# Patient Record
Sex: MALE | Race: WHITE | NOT HISPANIC OR LATINO | Employment: OTHER | URBAN - METROPOLITAN AREA
[De-identification: names, ages, dates, MRNs, and addresses within clinical notes are randomized per-mention and may not be internally consistent; named-entity substitution may affect disease eponyms.]

---

## 2017-01-01 ENCOUNTER — PATIENT MESSAGE (OUTPATIENT)
Dept: HEMATOLOGY/ONCOLOGY | Facility: CLINIC | Age: 62
End: 2017-01-01

## 2017-01-01 ENCOUNTER — ANESTHESIA EVENT (OUTPATIENT)
Dept: SURGERY | Facility: HOSPITAL | Age: 62
DRG: 654 | End: 2017-01-01
Payer: COMMERCIAL

## 2017-01-01 ENCOUNTER — HOSPITAL ENCOUNTER (OUTPATIENT)
Dept: RADIOLOGY | Facility: HOSPITAL | Age: 62
Discharge: HOME | End: 2017-05-19
Attending: NURSE PRACTITIONER
Payer: COMMERCIAL

## 2017-01-01 ENCOUNTER — PATIENT MESSAGE (OUTPATIENT)
Dept: WOUND CARE | Facility: CLINIC | Age: 62
End: 2017-01-01

## 2017-01-01 ENCOUNTER — OFFICE VISIT (OUTPATIENT)
Dept: HEMATOLOGY/ONCOLOGY | Facility: CLINIC | Age: 62
End: 2017-01-01
Payer: COMMERCIAL

## 2017-01-01 ENCOUNTER — INFUSION (OUTPATIENT)
Dept: INFUSION THERAPY | Facility: HOSPITAL | Age: 62
End: 2017-01-01
Attending: INTERNAL MEDICINE
Payer: COMMERCIAL

## 2017-01-01 ENCOUNTER — HOSPITAL ENCOUNTER (OUTPATIENT)
Facility: HOSPITAL | Age: 62
Discharge: HOME | End: 2017-03-21
Attending: RADIOLOGY | Admitting: RADIOLOGY
Payer: COMMERCIAL

## 2017-01-01 ENCOUNTER — LAB VISIT (OUTPATIENT)
Dept: LAB | Facility: HOSPITAL | Age: 62
End: 2017-01-01
Attending: UROLOGY
Payer: COMMERCIAL

## 2017-01-01 ENCOUNTER — TELEPHONE (OUTPATIENT)
Dept: UROLOGY | Facility: CLINIC | Age: 62
End: 2017-01-01

## 2017-01-01 ENCOUNTER — TELEPHONE (OUTPATIENT)
Dept: ADMINISTRATIVE | Facility: OTHER | Age: 62
End: 2017-01-01

## 2017-01-01 ENCOUNTER — HOSPITAL ENCOUNTER (OUTPATIENT)
Dept: RADIOLOGY | Facility: HOSPITAL | Age: 62
Discharge: HOME | End: 2017-08-18
Attending: INTERNAL MEDICINE
Payer: COMMERCIAL

## 2017-01-01 ENCOUNTER — TELEPHONE (OUTPATIENT)
Dept: HEMATOLOGY/ONCOLOGY | Facility: CLINIC | Age: 62
End: 2017-01-01

## 2017-01-01 ENCOUNTER — HOSPITAL ENCOUNTER (OUTPATIENT)
Dept: PREADMISSION TESTING | Facility: HOSPITAL | Age: 62
Discharge: HOME | End: 2017-02-27
Attending: UROLOGY
Payer: COMMERCIAL

## 2017-01-01 ENCOUNTER — TELEPHONE (OUTPATIENT)
Dept: RADIOLOGY | Facility: HOSPITAL | Age: 62
End: 2017-01-01

## 2017-01-01 ENCOUNTER — LAB VISIT (OUTPATIENT)
Dept: LAB | Facility: HOSPITAL | Age: 62
End: 2017-01-01
Attending: NURSE PRACTITIONER
Payer: COMMERCIAL

## 2017-01-01 ENCOUNTER — LAB VISIT (OUTPATIENT)
Dept: LAB | Facility: HOSPITAL | Age: 62
End: 2017-01-01
Payer: COMMERCIAL

## 2017-01-01 ENCOUNTER — CLINICAL SUPPORT (OUTPATIENT)
Dept: UROLOGY | Facility: CLINIC | Age: 62
End: 2017-01-01
Payer: COMMERCIAL

## 2017-01-01 ENCOUNTER — PATIENT MESSAGE (OUTPATIENT)
Dept: SURGERY | Facility: HOSPITAL | Age: 62
End: 2017-01-01

## 2017-01-01 ENCOUNTER — OFFICE VISIT (OUTPATIENT)
Dept: PULMONOLOGY | Facility: CLINIC | Age: 62
End: 2017-01-01
Payer: COMMERCIAL

## 2017-01-01 ENCOUNTER — ANESTHESIA (OUTPATIENT)
Dept: SURGERY | Facility: HOSPITAL | Age: 62
End: 2017-01-01
Payer: COMMERCIAL

## 2017-01-01 ENCOUNTER — HOSPITAL ENCOUNTER (INPATIENT)
Facility: HOSPITAL | Age: 62
LOS: 10 days | DRG: 308 | End: 2017-10-19
Attending: EMERGENCY MEDICINE | Admitting: INTERNAL MEDICINE
Payer: COMMERCIAL

## 2017-01-01 ENCOUNTER — PATIENT MESSAGE (OUTPATIENT)
Dept: UROLOGY | Facility: CLINIC | Age: 62
End: 2017-01-01

## 2017-01-01 ENCOUNTER — LAB VISIT (OUTPATIENT)
Dept: LAB | Facility: HOSPITAL | Age: 62
End: 2017-01-01
Attending: INTERNAL MEDICINE
Payer: COMMERCIAL

## 2017-01-01 ENCOUNTER — LAB VISIT (OUTPATIENT)
Dept: LAB | Facility: HOSPITAL | Age: 62
End: 2017-01-01
Attending: ANESTHESIOLOGY
Payer: COMMERCIAL

## 2017-01-01 ENCOUNTER — HOSPITAL ENCOUNTER (INPATIENT)
Facility: HOSPITAL | Age: 62
LOS: 2 days | Discharge: HOME | DRG: 684 | End: 2017-06-14
Attending: UROLOGY | Admitting: UROLOGY
Payer: COMMERCIAL

## 2017-01-01 ENCOUNTER — OFFICE VISIT (OUTPATIENT)
Dept: UROLOGY | Facility: CLINIC | Age: 62
DRG: 684 | End: 2017-01-01
Payer: COMMERCIAL

## 2017-01-01 ENCOUNTER — TELEPHONE (OUTPATIENT)
Dept: PULMONOLOGY | Facility: CLINIC | Age: 62
End: 2017-01-01

## 2017-01-01 ENCOUNTER — HOSPITAL ENCOUNTER (OUTPATIENT)
Dept: RADIOLOGY | Facility: HOSPITAL | Age: 62
Discharge: HOME | End: 2017-09-19
Attending: INTERNAL MEDICINE
Payer: COMMERCIAL

## 2017-01-01 ENCOUNTER — HOSPITAL ENCOUNTER (INPATIENT)
Facility: HOSPITAL | Age: 62
LOS: 4 days | Discharge: HOME | DRG: 654 | End: 2017-07-09
Attending: UROLOGY | Admitting: UROLOGY
Payer: COMMERCIAL

## 2017-01-01 ENCOUNTER — INITIAL CONSULT (OUTPATIENT)
Dept: WOUND CARE | Facility: CLINIC | Age: 62
End: 2017-01-01
Payer: COMMERCIAL

## 2017-01-01 ENCOUNTER — SURGERY (OUTPATIENT)
Age: 62
End: 2017-01-01

## 2017-01-01 ENCOUNTER — OFFICE VISIT (OUTPATIENT)
Dept: UROLOGY | Facility: CLINIC | Age: 62
End: 2017-01-01
Payer: COMMERCIAL

## 2017-01-01 ENCOUNTER — OFFICE VISIT (OUTPATIENT)
Dept: WOUND CARE | Facility: CLINIC | Age: 62
End: 2017-01-01
Payer: COMMERCIAL

## 2017-01-01 ENCOUNTER — HOSPITAL ENCOUNTER (OUTPATIENT)
Facility: HOSPITAL | Age: 62
Discharge: HOME | End: 2017-07-13
Attending: UROLOGY | Admitting: UROLOGY
Payer: COMMERCIAL

## 2017-01-01 ENCOUNTER — INITIAL CONSULT (OUTPATIENT)
Dept: INTERVENTIONAL RADIOLOGY/VASCULAR | Facility: CLINIC | Age: 62
End: 2017-01-01
Payer: COMMERCIAL

## 2017-01-01 ENCOUNTER — HOSPITAL ENCOUNTER (OUTPATIENT)
Dept: RADIOLOGY | Facility: HOSPITAL | Age: 62
Discharge: HOME | End: 2017-03-16
Attending: NURSE PRACTITIONER
Payer: COMMERCIAL

## 2017-01-01 ENCOUNTER — ANESTHESIA EVENT (OUTPATIENT)
Dept: SURGERY | Facility: HOSPITAL | Age: 62
End: 2017-01-01
Payer: COMMERCIAL

## 2017-01-01 ENCOUNTER — HOSPITAL ENCOUNTER (OUTPATIENT)
Facility: HOSPITAL | Age: 62
Discharge: HOME | End: 2017-06-08
Attending: UROLOGY | Admitting: UROLOGY
Payer: COMMERCIAL

## 2017-01-01 ENCOUNTER — HOSPITAL ENCOUNTER (OUTPATIENT)
Dept: RADIOLOGY | Facility: HOSPITAL | Age: 62
Discharge: HOME | End: 2017-01-31
Attending: UROLOGY
Payer: COMMERCIAL

## 2017-01-01 ENCOUNTER — INITIAL CONSULT (OUTPATIENT)
Dept: HEMATOLOGY/ONCOLOGY | Facility: CLINIC | Age: 62
End: 2017-01-01
Payer: COMMERCIAL

## 2017-01-01 ENCOUNTER — HOSPITAL ENCOUNTER (OUTPATIENT)
Facility: HOSPITAL | Age: 62
Discharge: HOME | End: 2017-09-25
Attending: RADIOLOGY | Admitting: RADIOLOGY
Payer: COMMERCIAL

## 2017-01-01 ENCOUNTER — HOSPITAL ENCOUNTER (OUTPATIENT)
Dept: RADIOLOGY | Facility: HOSPITAL | Age: 62
Discharge: HOME | End: 2017-06-06
Attending: NURSE PRACTITIONER
Payer: COMMERCIAL

## 2017-01-01 ENCOUNTER — HOSPITAL ENCOUNTER (OUTPATIENT)
Facility: HOSPITAL | Age: 62
Discharge: HOME | End: 2017-03-03
Attending: UROLOGY | Admitting: UROLOGY
Payer: COMMERCIAL

## 2017-01-01 ENCOUNTER — HOSPITAL ENCOUNTER (OUTPATIENT)
Facility: HOSPITAL | Age: 62
Discharge: HOME | End: 2017-09-23
Attending: EMERGENCY MEDICINE | Admitting: HOSPITALIST
Payer: COMMERCIAL

## 2017-01-01 ENCOUNTER — INITIAL CONSULT (OUTPATIENT)
Dept: INTERNAL MEDICINE | Facility: CLINIC | Age: 62
End: 2017-01-01
Payer: COMMERCIAL

## 2017-01-01 ENCOUNTER — HOSPITAL ENCOUNTER (OUTPATIENT)
Dept: UROLOGY | Facility: HOSPITAL | Age: 62
Discharge: HOME | End: 2017-05-19
Attending: UROLOGY
Payer: COMMERCIAL

## 2017-01-01 ENCOUNTER — ANESTHESIA (OUTPATIENT)
Dept: SURGERY | Facility: HOSPITAL | Age: 62
DRG: 654 | End: 2017-01-01
Payer: COMMERCIAL

## 2017-01-01 VITALS
TEMPERATURE: 98 F | DIASTOLIC BLOOD PRESSURE: 83 MMHG | RESPIRATION RATE: 18 BRPM | HEIGHT: 66 IN | SYSTOLIC BLOOD PRESSURE: 150 MMHG | HEART RATE: 69 BPM | BODY MASS INDEX: 26.79 KG/M2 | WEIGHT: 166.69 LBS

## 2017-01-01 VITALS
HEIGHT: 66 IN | OXYGEN SATURATION: 96 % | RESPIRATION RATE: 20 BRPM | RESPIRATION RATE: 16 BRPM | SYSTOLIC BLOOD PRESSURE: 151 MMHG | SYSTOLIC BLOOD PRESSURE: 131 MMHG | DIASTOLIC BLOOD PRESSURE: 69 MMHG | HEART RATE: 87 BPM | HEART RATE: 68 BPM | OXYGEN SATURATION: 97 % | TEMPERATURE: 98 F | DIASTOLIC BLOOD PRESSURE: 64 MMHG | TEMPERATURE: 98 F | WEIGHT: 163.56 LBS | BODY MASS INDEX: 26.29 KG/M2

## 2017-01-01 VITALS
SYSTOLIC BLOOD PRESSURE: 131 MMHG | HEART RATE: 89 BPM | HEART RATE: 80 BPM | RESPIRATION RATE: 18 BRPM | DIASTOLIC BLOOD PRESSURE: 60 MMHG | OXYGEN SATURATION: 93 % | WEIGHT: 142.44 LBS | SYSTOLIC BLOOD PRESSURE: 118 MMHG | TEMPERATURE: 99 F | TEMPERATURE: 99 F | DIASTOLIC BLOOD PRESSURE: 64 MMHG | HEIGHT: 66 IN | BODY MASS INDEX: 22.89 KG/M2 | RESPIRATION RATE: 18 BRPM | BODY MASS INDEX: 22.82 KG/M2 | HEIGHT: 66 IN | OXYGEN SATURATION: 93 % | WEIGHT: 142 LBS

## 2017-01-01 VITALS
HEART RATE: 98 BPM | HEIGHT: 66 IN | SYSTOLIC BLOOD PRESSURE: 134 MMHG | DIASTOLIC BLOOD PRESSURE: 70 MMHG | BODY MASS INDEX: 24.45 KG/M2 | WEIGHT: 152.13 LBS

## 2017-01-01 VITALS
RESPIRATION RATE: 18 BRPM | OXYGEN SATURATION: 95 % | HEART RATE: 81 BPM | HEIGHT: 66 IN | WEIGHT: 165 LBS | DIASTOLIC BLOOD PRESSURE: 72 MMHG | TEMPERATURE: 98 F | BODY MASS INDEX: 26.52 KG/M2 | SYSTOLIC BLOOD PRESSURE: 110 MMHG

## 2017-01-01 VITALS
SYSTOLIC BLOOD PRESSURE: 109 MMHG | BODY MASS INDEX: 27.07 KG/M2 | SYSTOLIC BLOOD PRESSURE: 121 MMHG | WEIGHT: 168.44 LBS | BODY MASS INDEX: 29.27 KG/M2 | DIASTOLIC BLOOD PRESSURE: 72 MMHG | WEIGHT: 175.69 LBS | TEMPERATURE: 98 F | HEART RATE: 65 BPM | HEIGHT: 66 IN | DIASTOLIC BLOOD PRESSURE: 60 MMHG | HEART RATE: 63 BPM | HEIGHT: 65 IN | RESPIRATION RATE: 16 BRPM | OXYGEN SATURATION: 97 %

## 2017-01-01 VITALS
TEMPERATURE: 99 F | HEIGHT: 66 IN | DIASTOLIC BLOOD PRESSURE: 70 MMHG | RESPIRATION RATE: 16 BRPM | HEIGHT: 65 IN | HEART RATE: 52 BPM | SYSTOLIC BLOOD PRESSURE: 96 MMHG | OXYGEN SATURATION: 98 % | DIASTOLIC BLOOD PRESSURE: 52 MMHG | RESPIRATION RATE: 15 BRPM | WEIGHT: 175 LBS | TEMPERATURE: 98 F | HEART RATE: 52 BPM | BODY MASS INDEX: 28.12 KG/M2 | WEIGHT: 175.69 LBS | BODY MASS INDEX: 29.27 KG/M2 | SYSTOLIC BLOOD PRESSURE: 109 MMHG

## 2017-01-01 VITALS
OXYGEN SATURATION: 97 % | TEMPERATURE: 97 F | WEIGHT: 168 LBS | RESPIRATION RATE: 18 BRPM | HEART RATE: 55 BPM | BODY MASS INDEX: 27.99 KG/M2 | DIASTOLIC BLOOD PRESSURE: 67 MMHG | SYSTOLIC BLOOD PRESSURE: 114 MMHG | HEIGHT: 65 IN

## 2017-01-01 VITALS
BODY MASS INDEX: 27.16 KG/M2 | HEIGHT: 66 IN | DIASTOLIC BLOOD PRESSURE: 58 MMHG | RESPIRATION RATE: 14 BRPM | WEIGHT: 169 LBS | HEART RATE: 70 BPM | TEMPERATURE: 99 F | OXYGEN SATURATION: 100 % | SYSTOLIC BLOOD PRESSURE: 102 MMHG

## 2017-01-01 VITALS
TEMPERATURE: 98 F | SYSTOLIC BLOOD PRESSURE: 125 MMHG | HEIGHT: 65 IN | HEIGHT: 66 IN | WEIGHT: 167 LBS | RESPIRATION RATE: 18 BRPM | BODY MASS INDEX: 28.38 KG/M2 | WEIGHT: 175.69 LBS | HEART RATE: 70 BPM | WEIGHT: 170.5 LBS | HEART RATE: 64 BPM | BODY MASS INDEX: 27.82 KG/M2 | BODY MASS INDEX: 29.27 KG/M2 | OXYGEN SATURATION: 99 % | DIASTOLIC BLOOD PRESSURE: 70 MMHG | HEIGHT: 65 IN | RESPIRATION RATE: 14 BRPM | DIASTOLIC BLOOD PRESSURE: 65 MMHG | DIASTOLIC BLOOD PRESSURE: 74 MMHG | HEIGHT: 65 IN | WEIGHT: 167 LBS | HEART RATE: 68 BPM | DIASTOLIC BLOOD PRESSURE: 69 MMHG | SYSTOLIC BLOOD PRESSURE: 108 MMHG | SYSTOLIC BLOOD PRESSURE: 140 MMHG | HEIGHT: 66 IN | BODY MASS INDEX: 27.4 KG/M2 | WEIGHT: 176.56 LBS | SYSTOLIC BLOOD PRESSURE: 120 MMHG | HEART RATE: 63 BPM | BODY MASS INDEX: 27.82 KG/M2 | TEMPERATURE: 98 F

## 2017-01-01 VITALS
DIASTOLIC BLOOD PRESSURE: 97 MMHG | HEIGHT: 66 IN | TEMPERATURE: 98 F | WEIGHT: 166.25 LBS | BODY MASS INDEX: 26.72 KG/M2 | SYSTOLIC BLOOD PRESSURE: 153 MMHG | RESPIRATION RATE: 16 BRPM | HEART RATE: 58 BPM

## 2017-01-01 VITALS
SYSTOLIC BLOOD PRESSURE: 133 MMHG | TEMPERATURE: 99 F | BODY MASS INDEX: 23.63 KG/M2 | HEART RATE: 106 BPM | OXYGEN SATURATION: 99 % | RESPIRATION RATE: 20 BRPM | DIASTOLIC BLOOD PRESSURE: 61 MMHG | WEIGHT: 146.38 LBS

## 2017-01-01 VITALS
TEMPERATURE: 98 F | DIASTOLIC BLOOD PRESSURE: 57 MMHG | HEART RATE: 60 BPM | RESPIRATION RATE: 18 BRPM | SYSTOLIC BLOOD PRESSURE: 105 MMHG

## 2017-01-01 VITALS
HEART RATE: 77 BPM | TEMPERATURE: 98 F | OXYGEN SATURATION: 96 % | SYSTOLIC BLOOD PRESSURE: 121 MMHG | HEIGHT: 66 IN | DIASTOLIC BLOOD PRESSURE: 62 MMHG | BODY MASS INDEX: 23.31 KG/M2 | RESPIRATION RATE: 16 BRPM | WEIGHT: 145.06 LBS

## 2017-01-01 VITALS
HEIGHT: 65 IN | HEART RATE: 72 BPM | WEIGHT: 169.06 LBS | SYSTOLIC BLOOD PRESSURE: 110 MMHG | DIASTOLIC BLOOD PRESSURE: 72 MMHG | BODY MASS INDEX: 28.17 KG/M2

## 2017-01-01 VITALS
OXYGEN SATURATION: 100 % | WEIGHT: 165 LBS | TEMPERATURE: 99 F | HEIGHT: 66 IN | SYSTOLIC BLOOD PRESSURE: 105 MMHG | HEART RATE: 90 BPM | DIASTOLIC BLOOD PRESSURE: 53 MMHG | RESPIRATION RATE: 18 BRPM | BODY MASS INDEX: 26.52 KG/M2

## 2017-01-01 VITALS
HEIGHT: 66 IN | BODY MASS INDEX: 26.54 KG/M2 | BODY MASS INDEX: 27.92 KG/M2 | TEMPERATURE: 98 F | WEIGHT: 171.94 LBS | HEIGHT: 65 IN | DIASTOLIC BLOOD PRESSURE: 71 MMHG | SYSTOLIC BLOOD PRESSURE: 122 MMHG | WEIGHT: 165.13 LBS | HEART RATE: 80 BPM | DIASTOLIC BLOOD PRESSURE: 59 MMHG | WEIGHT: 167.56 LBS | RESPIRATION RATE: 16 BRPM | RESPIRATION RATE: 18 BRPM | BODY MASS INDEX: 27.63 KG/M2 | SYSTOLIC BLOOD PRESSURE: 127 MMHG | TEMPERATURE: 98 F | HEIGHT: 66 IN | HEART RATE: 65 BPM

## 2017-01-01 VITALS
DIASTOLIC BLOOD PRESSURE: 72 MMHG | OXYGEN SATURATION: 98 % | BODY MASS INDEX: 26.5 KG/M2 | SYSTOLIC BLOOD PRESSURE: 142 MMHG | HEIGHT: 66 IN | TEMPERATURE: 97 F | WEIGHT: 164.88 LBS | HEART RATE: 61 BPM

## 2017-01-01 VITALS
BODY MASS INDEX: 26.29 KG/M2 | HEIGHT: 66 IN | DIASTOLIC BLOOD PRESSURE: 60 MMHG | SYSTOLIC BLOOD PRESSURE: 108 MMHG | RESPIRATION RATE: 18 BRPM | SYSTOLIC BLOOD PRESSURE: 114 MMHG | HEART RATE: 66 BPM | DIASTOLIC BLOOD PRESSURE: 56 MMHG | WEIGHT: 163.56 LBS | RESPIRATION RATE: 18 BRPM | TEMPERATURE: 98 F | HEART RATE: 61 BPM | TEMPERATURE: 98 F | RESPIRATION RATE: 18 BRPM | HEART RATE: 72 BPM | SYSTOLIC BLOOD PRESSURE: 117 MMHG | DIASTOLIC BLOOD PRESSURE: 65 MMHG

## 2017-01-01 VITALS
HEART RATE: 91 BPM | TEMPERATURE: 98 F | BODY MASS INDEX: 21.33 KG/M2 | OXYGEN SATURATION: 100 % | DIASTOLIC BLOOD PRESSURE: 63 MMHG | HEIGHT: 66 IN | SYSTOLIC BLOOD PRESSURE: 140 MMHG | WEIGHT: 132.69 LBS | RESPIRATION RATE: 27 BRPM

## 2017-01-01 VITALS
RESPIRATION RATE: 12 BRPM | HEIGHT: 66 IN | DIASTOLIC BLOOD PRESSURE: 77 MMHG | OXYGEN SATURATION: 98 % | SYSTOLIC BLOOD PRESSURE: 125 MMHG | WEIGHT: 161 LBS | BODY MASS INDEX: 25.88 KG/M2 | SYSTOLIC BLOOD PRESSURE: 129 MMHG | BODY MASS INDEX: 25.94 KG/M2 | HEART RATE: 75 BPM | OXYGEN SATURATION: 99 % | TEMPERATURE: 98 F | HEIGHT: 66 IN | TEMPERATURE: 98 F | WEIGHT: 161.38 LBS | RESPIRATION RATE: 14 BRPM | DIASTOLIC BLOOD PRESSURE: 58 MMHG | HEART RATE: 82 BPM

## 2017-01-01 VITALS
BODY MASS INDEX: 26.54 KG/M2 | HEART RATE: 61 BPM | RESPIRATION RATE: 16 BRPM | RESPIRATION RATE: 16 BRPM | WEIGHT: 165.13 LBS | HEIGHT: 66 IN | SYSTOLIC BLOOD PRESSURE: 132 MMHG | TEMPERATURE: 98 F | DIASTOLIC BLOOD PRESSURE: 67 MMHG | SYSTOLIC BLOOD PRESSURE: 121 MMHG | HEART RATE: 68 BPM | TEMPERATURE: 98 F | DIASTOLIC BLOOD PRESSURE: 66 MMHG

## 2017-01-01 VITALS
BODY MASS INDEX: 26.29 KG/M2 | WEIGHT: 163.56 LBS | HEIGHT: 66 IN | RESPIRATION RATE: 18 BRPM | DIASTOLIC BLOOD PRESSURE: 58 MMHG | HEART RATE: 63 BPM | SYSTOLIC BLOOD PRESSURE: 118 MMHG | TEMPERATURE: 98 F

## 2017-01-01 VITALS
BODY MASS INDEX: 26.58 KG/M2 | HEIGHT: 66 IN | HEART RATE: 84 BPM | DIASTOLIC BLOOD PRESSURE: 70 MMHG | HEART RATE: 75 BPM | HEIGHT: 66 IN | WEIGHT: 165.38 LBS | SYSTOLIC BLOOD PRESSURE: 159 MMHG | BODY MASS INDEX: 26.22 KG/M2 | WEIGHT: 163.13 LBS | SYSTOLIC BLOOD PRESSURE: 147 MMHG | DIASTOLIC BLOOD PRESSURE: 89 MMHG

## 2017-01-01 VITALS
SYSTOLIC BLOOD PRESSURE: 115 MMHG | DIASTOLIC BLOOD PRESSURE: 58 MMHG | HEART RATE: 71 BPM | RESPIRATION RATE: 16 BRPM | TEMPERATURE: 98 F

## 2017-01-01 VITALS
DIASTOLIC BLOOD PRESSURE: 57 MMHG | HEART RATE: 59 BPM | TEMPERATURE: 99 F | SYSTOLIC BLOOD PRESSURE: 112 MMHG | RESPIRATION RATE: 18 BRPM

## 2017-01-01 VITALS
TEMPERATURE: 98 F | BODY MASS INDEX: 22.99 KG/M2 | RESPIRATION RATE: 16 BRPM | HEART RATE: 109 BPM | HEIGHT: 66 IN | DIASTOLIC BLOOD PRESSURE: 62 MMHG | SYSTOLIC BLOOD PRESSURE: 112 MMHG | WEIGHT: 143.06 LBS

## 2017-01-01 VITALS
WEIGHT: 142.19 LBS | HEART RATE: 93 BPM | SYSTOLIC BLOOD PRESSURE: 128 MMHG | BODY MASS INDEX: 22.85 KG/M2 | RESPIRATION RATE: 16 BRPM | OXYGEN SATURATION: 98 % | DIASTOLIC BLOOD PRESSURE: 70 MMHG | HEIGHT: 66 IN

## 2017-01-01 VITALS
RESPIRATION RATE: 20 BRPM | DIASTOLIC BLOOD PRESSURE: 56 MMHG | HEART RATE: 68 BPM | SYSTOLIC BLOOD PRESSURE: 110 MMHG | TEMPERATURE: 98 F

## 2017-01-01 VITALS
DIASTOLIC BLOOD PRESSURE: 59 MMHG | RESPIRATION RATE: 18 BRPM | SYSTOLIC BLOOD PRESSURE: 117 MMHG | TEMPERATURE: 98 F | HEART RATE: 64 BPM

## 2017-01-01 DIAGNOSIS — N30.01 ACUTE CYSTITIS WITH HEMATURIA: Primary | ICD-10-CM

## 2017-01-01 DIAGNOSIS — R31.0 GROSS HEMATURIA: Primary | ICD-10-CM

## 2017-01-01 DIAGNOSIS — I50.9 CHF (CONGESTIVE HEART FAILURE): ICD-10-CM

## 2017-01-01 DIAGNOSIS — N40.0 HYPERTROPHY OF PROSTATE WITHOUT URINARY OBSTRUCTION AND OTHER LOWER URINARY TRACT SYMPTOMS (LUTS): ICD-10-CM

## 2017-01-01 DIAGNOSIS — R60.9 EDEMA, UNSPECIFIED TYPE: ICD-10-CM

## 2017-01-01 DIAGNOSIS — N13.30 HYDRONEPHROSIS OF RIGHT KIDNEY: ICD-10-CM

## 2017-01-01 DIAGNOSIS — D69.59 CHEMOTHERAPY-INDUCED THROMBOCYTOPENIA: ICD-10-CM

## 2017-01-01 DIAGNOSIS — R30.0 DYSURIA: ICD-10-CM

## 2017-01-01 DIAGNOSIS — Z51.11 ENCOUNTER FOR CHEMOTHERAPY MANAGEMENT: Primary | ICD-10-CM

## 2017-01-01 DIAGNOSIS — R11.2 CHEMOTHERAPY INDUCED NAUSEA AND VOMITING: ICD-10-CM

## 2017-01-01 DIAGNOSIS — R91.8 PULMONARY NODULES: ICD-10-CM

## 2017-01-01 DIAGNOSIS — C67.0 MALIGNANT NEOPLASM OF TRIGONE OF URINARY BLADDER: ICD-10-CM

## 2017-01-01 DIAGNOSIS — D49.4 BLADDER TUMOR: ICD-10-CM

## 2017-01-01 DIAGNOSIS — D63.0 ANEMIA IN NEOPLASTIC DISEASE: ICD-10-CM

## 2017-01-01 DIAGNOSIS — D69.6 THROMBOCYTOPENIA: ICD-10-CM

## 2017-01-01 DIAGNOSIS — E88.09 HYPOALBUMINEMIA: ICD-10-CM

## 2017-01-01 DIAGNOSIS — R79.89 ELEVATED SERUM CREATININE: ICD-10-CM

## 2017-01-01 DIAGNOSIS — Z51.11 ENCOUNTER FOR CHEMOTHERAPY MANAGEMENT: ICD-10-CM

## 2017-01-01 DIAGNOSIS — E83.42 HYPOMAGNESEMIA: ICD-10-CM

## 2017-01-01 DIAGNOSIS — G89.3 CANCER ASSOCIATED PAIN: ICD-10-CM

## 2017-01-01 DIAGNOSIS — C78.00 MALIGNANT NEOPLASM METASTATIC TO LUNG, UNSPECIFIED LATERALITY: ICD-10-CM

## 2017-01-01 DIAGNOSIS — I10 ESSENTIAL HYPERTENSION: ICD-10-CM

## 2017-01-01 DIAGNOSIS — C67.0 MALIGNANT NEOPLASM OF TRIGONE OF URINARY BLADDER: Primary | ICD-10-CM

## 2017-01-01 DIAGNOSIS — R31.0 GROSS HEMATURIA: ICD-10-CM

## 2017-01-01 DIAGNOSIS — Z98.890 POST-OPERATIVE STATE: ICD-10-CM

## 2017-01-01 DIAGNOSIS — Z93.6 PRESENCE OF UROSTOMY: ICD-10-CM

## 2017-01-01 DIAGNOSIS — R53.83 FATIGUE: ICD-10-CM

## 2017-01-01 DIAGNOSIS — C67.6 MALIGNANT NEOPLASM OF URETERIC ORIFICE: ICD-10-CM

## 2017-01-01 DIAGNOSIS — N39.0 URINARY TRACT INFECTION, SITE NOT SPECIFIED: ICD-10-CM

## 2017-01-01 DIAGNOSIS — T45.1X5A CHEMOTHERAPY INDUCED NAUSEA AND VOMITING: ICD-10-CM

## 2017-01-01 DIAGNOSIS — Z45.2 ENCOUNTER FOR INSERTION OF VENOUS ACCESS PORT: ICD-10-CM

## 2017-01-01 DIAGNOSIS — N17.9 AKI (ACUTE KIDNEY INJURY): ICD-10-CM

## 2017-01-01 DIAGNOSIS — N13.30 HYDRONEPHROSIS, RIGHT: ICD-10-CM

## 2017-01-01 DIAGNOSIS — G89.3 NEOPLASM RELATED PAIN (ACUTE) (CHRONIC): ICD-10-CM

## 2017-01-01 DIAGNOSIS — N13.5 BILATERAL URETERAL OBSTRUCTION: ICD-10-CM

## 2017-01-01 DIAGNOSIS — N39.0 URINARY TRACT INFECTION WITHOUT HEMATURIA, SITE UNSPECIFIED: ICD-10-CM

## 2017-01-01 DIAGNOSIS — N40.0 ENLARGED PROSTATE: ICD-10-CM

## 2017-01-01 DIAGNOSIS — Z01.818 PREOP TESTING: ICD-10-CM

## 2017-01-01 DIAGNOSIS — R91.8 MULTIPLE LUNG NODULES: Primary | ICD-10-CM

## 2017-01-01 DIAGNOSIS — R50.81 FEVER IN OTHER DISEASES: ICD-10-CM

## 2017-01-01 DIAGNOSIS — E87.6 HYPOKALEMIA: ICD-10-CM

## 2017-01-01 DIAGNOSIS — R91.8 MULTIPLE LUNG NODULES: ICD-10-CM

## 2017-01-01 DIAGNOSIS — T45.1X5A CHEMOTHERAPY-INDUCED THROMBOCYTOPENIA: ICD-10-CM

## 2017-01-01 DIAGNOSIS — R05.9 COUGH: ICD-10-CM

## 2017-01-01 DIAGNOSIS — N13.5 URETERAL OBSTRUCTION, RIGHT: ICD-10-CM

## 2017-01-01 DIAGNOSIS — Z53.21 PATIENT LEFT WITHOUT BEING SEEN: ICD-10-CM

## 2017-01-01 DIAGNOSIS — N13.8 BPH WITH OBSTRUCTION/LOWER URINARY TRACT SYMPTOMS: ICD-10-CM

## 2017-01-01 DIAGNOSIS — Z01.818 PRE-OP TESTING: Primary | ICD-10-CM

## 2017-01-01 DIAGNOSIS — D63.8 ANEMIA OF CHRONIC DISEASE: ICD-10-CM

## 2017-01-01 DIAGNOSIS — N39.0 URINARY TRACT INFECTION WITHOUT HEMATURIA, SITE UNSPECIFIED: Primary | ICD-10-CM

## 2017-01-01 DIAGNOSIS — I48.91 ATRIAL FIBRILLATION WITH RVR: Primary | ICD-10-CM

## 2017-01-01 DIAGNOSIS — I49.9 CARDIAC RHYTHM DISORDER OR DISTURBANCE OR CHANGE: ICD-10-CM

## 2017-01-01 DIAGNOSIS — R35.0 URINARY FREQUENCY: ICD-10-CM

## 2017-01-01 DIAGNOSIS — R91.8 LUNG MASS: ICD-10-CM

## 2017-01-01 DIAGNOSIS — J90 BILATERAL PLEURAL EFFUSION: ICD-10-CM

## 2017-01-01 DIAGNOSIS — E88.09 SERUM ALBUMIN DECREASED: ICD-10-CM

## 2017-01-01 DIAGNOSIS — N17.9 AKI (ACUTE KIDNEY INJURY): Primary | ICD-10-CM

## 2017-01-01 DIAGNOSIS — Z01.818 PREOP TESTING: Primary | ICD-10-CM

## 2017-01-01 DIAGNOSIS — R06.02 SOB (SHORTNESS OF BREATH): Primary | ICD-10-CM

## 2017-01-01 DIAGNOSIS — K59.00 CONSTIPATION, UNSPECIFIED CONSTIPATION TYPE: Primary | ICD-10-CM

## 2017-01-01 DIAGNOSIS — G47.00 INSOMNIA, UNSPECIFIED TYPE: Primary | ICD-10-CM

## 2017-01-01 DIAGNOSIS — C67.6 MALIGNANT NEOPLASM OF URETERIC ORIFICE: Primary | ICD-10-CM

## 2017-01-01 DIAGNOSIS — Z43.6 ATTENTION TO UROSTOMY: Primary | ICD-10-CM

## 2017-01-01 DIAGNOSIS — N40.1 BPH WITH OBSTRUCTION/LOWER URINARY TRACT SYMPTOMS: ICD-10-CM

## 2017-01-01 DIAGNOSIS — Z90.6 S/P RADICAL CYSTOPROSTATECTOMY: ICD-10-CM

## 2017-01-01 DIAGNOSIS — I48.91 NEW ONSET A-FIB: Primary | ICD-10-CM

## 2017-01-01 DIAGNOSIS — J90 PLEURAL EFFUSION: ICD-10-CM

## 2017-01-01 DIAGNOSIS — Z90.79 S/P RADICAL CYSTOPROSTATECTOMY: ICD-10-CM

## 2017-01-01 DIAGNOSIS — R01.1 NEWLY RECOGNIZED HEART MURMUR: ICD-10-CM

## 2017-01-01 DIAGNOSIS — Z71.89 ENCOUNTER FOR OSTOMY CARE EDUCATION: ICD-10-CM

## 2017-01-01 DIAGNOSIS — R06.02 SHORTNESS OF BREATH: ICD-10-CM

## 2017-01-01 DIAGNOSIS — K59.03 DRUG-INDUCED CONSTIPATION: Primary | ICD-10-CM

## 2017-01-01 DIAGNOSIS — D49.4 BLADDER TUMOR: Primary | ICD-10-CM

## 2017-01-01 DIAGNOSIS — C79.9 METASTASIS FROM BLADDER CANCER: ICD-10-CM

## 2017-01-01 DIAGNOSIS — N30.00 ACUTE CYSTITIS WITHOUT HEMATURIA: Primary | ICD-10-CM

## 2017-01-01 DIAGNOSIS — C67.9 METASTASIS FROM BLADDER CANCER: ICD-10-CM

## 2017-01-01 DIAGNOSIS — D64.9 ANEMIA, UNSPECIFIED TYPE: ICD-10-CM

## 2017-01-01 DIAGNOSIS — I46.9 ASYSTOLE: ICD-10-CM

## 2017-01-01 DIAGNOSIS — I48.91 A-FIB: ICD-10-CM

## 2017-01-01 DIAGNOSIS — R35.0 URINARY FREQUENCY: Primary | ICD-10-CM

## 2017-01-01 DIAGNOSIS — R63.4 WEIGHT LOSS: ICD-10-CM

## 2017-01-01 DIAGNOSIS — R33.9 URINARY RETENTION: Primary | ICD-10-CM

## 2017-01-01 DIAGNOSIS — J18.9 HCAP (HEALTHCARE-ASSOCIATED PNEUMONIA): ICD-10-CM

## 2017-01-01 DIAGNOSIS — J96.01 ACUTE RESPIRATORY FAILURE WITH HYPOXIA: ICD-10-CM

## 2017-01-01 DIAGNOSIS — K59.00 CONSTIPATION, UNSPECIFIED CONSTIPATION TYPE: ICD-10-CM

## 2017-01-01 DIAGNOSIS — Z79.01 LONG-TERM (CURRENT) USE OF ANTICOAGULANTS: ICD-10-CM

## 2017-01-01 DIAGNOSIS — F32.0 MILD SINGLE CURRENT EPISODE OF MAJOR DEPRESSIVE DISORDER: ICD-10-CM

## 2017-01-01 DIAGNOSIS — R06.00 DYSPNEA: ICD-10-CM

## 2017-01-01 DIAGNOSIS — D72.829 LEUKOCYTOSIS, UNSPECIFIED TYPE: Chronic | ICD-10-CM

## 2017-01-01 DIAGNOSIS — Z01.818 PREOP EXAMINATION: Primary | ICD-10-CM

## 2017-01-01 DIAGNOSIS — I48.91 ATRIAL FIBRILLATION: ICD-10-CM

## 2017-01-01 DIAGNOSIS — I50.9 ACUTE DECOMPENSATED HEART FAILURE: ICD-10-CM

## 2017-01-01 DIAGNOSIS — N13.30 HYDRONEPHROSIS, RIGHT: Primary | ICD-10-CM

## 2017-01-01 DIAGNOSIS — R31.9 HEMATURIA: ICD-10-CM

## 2017-01-01 DIAGNOSIS — N40.0 BENIGN NODULAR PROSTATIC HYPERPLASIA WITHOUT LOWER URINARY TRACT SYMPTOMS: ICD-10-CM

## 2017-01-01 DIAGNOSIS — Z95.828 PORT-A-CATH IN PLACE: ICD-10-CM

## 2017-01-01 DIAGNOSIS — R30.0 DYSURIA: Primary | ICD-10-CM

## 2017-01-01 LAB
ABO + RH BLD: NORMAL
ALBUMIN SERPL BCP-MCNC: 2.2 G/DL
ALBUMIN SERPL BCP-MCNC: 2.3 G/DL
ALBUMIN SERPL BCP-MCNC: 2.5 G/DL
ALBUMIN SERPL BCP-MCNC: 2.9 G/DL
ALBUMIN SERPL BCP-MCNC: 3.2 G/DL
ALBUMIN SERPL BCP-MCNC: 3.4 G/DL
ALBUMIN SERPL BCP-MCNC: 3.4 G/DL
ALBUMIN SERPL BCP-MCNC: 3.5 G/DL
ALBUMIN SERPL BCP-MCNC: 3.6 G/DL
ALBUMIN SERPL BCP-MCNC: 3.7 G/DL
ALBUMIN SERPL BCP-MCNC: 3.7 G/DL
ALBUMIN SERPL BCP-MCNC: 3.8 G/DL
ALBUMIN SERPL BCP-MCNC: 4 G/DL
ALBUMIN SERPL BCP-MCNC: 4 G/DL
ALLENS TEST: ABNORMAL
ALP SERPL-CCNC: 103 U/L
ALP SERPL-CCNC: 43 U/L
ALP SERPL-CCNC: 45 U/L
ALP SERPL-CCNC: 46 U/L
ALP SERPL-CCNC: 46 U/L
ALP SERPL-CCNC: 47 U/L
ALP SERPL-CCNC: 51 U/L
ALP SERPL-CCNC: 59 U/L
ALP SERPL-CCNC: 61 U/L
ALP SERPL-CCNC: 63 U/L
ALP SERPL-CCNC: 66 U/L
ALP SERPL-CCNC: 67 U/L
ALP SERPL-CCNC: 75 U/L
ALP SERPL-CCNC: 87 U/L
ALT SERPL W/O P-5'-P-CCNC: 10 U/L
ALT SERPL W/O P-5'-P-CCNC: 12 U/L
ALT SERPL W/O P-5'-P-CCNC: 14 U/L
ALT SERPL W/O P-5'-P-CCNC: 15 U/L
ALT SERPL W/O P-5'-P-CCNC: 16 U/L
ALT SERPL W/O P-5'-P-CCNC: 17 U/L
ALT SERPL W/O P-5'-P-CCNC: 17 U/L
ALT SERPL W/O P-5'-P-CCNC: 18 U/L
ALT SERPL W/O P-5'-P-CCNC: 19 U/L
ALT SERPL W/O P-5'-P-CCNC: 20 U/L
ALT SERPL W/O P-5'-P-CCNC: 23 U/L
ALT SERPL W/O P-5'-P-CCNC: 29 U/L
AMYLASE, BODY FLUID: 18 U/L
ANION GAP SERPL CALC-SCNC: 10 MMOL/L
ANION GAP SERPL CALC-SCNC: 11 MMOL/L
ANION GAP SERPL CALC-SCNC: 12 MMOL/L
ANION GAP SERPL CALC-SCNC: 13 MMOL/L
ANION GAP SERPL CALC-SCNC: 13 MMOL/L
ANION GAP SERPL CALC-SCNC: 5 MMOL/L
ANION GAP SERPL CALC-SCNC: 6 MMOL/L
ANION GAP SERPL CALC-SCNC: 7 MMOL/L
ANION GAP SERPL CALC-SCNC: 8 MMOL/L
ANION GAP SERPL CALC-SCNC: 9 MMOL/L
ANISOCYTOSIS BLD QL SMEAR: SLIGHT
APPEARANCE FLD: NORMAL
APTT BLDCRRT: 24.9 SEC
APTT BLDCRRT: 26.4 SEC
APTT BLDCRRT: 27.5 SEC
AST SERPL-CCNC: 14 U/L
AST SERPL-CCNC: 15 U/L
AST SERPL-CCNC: 16 U/L
AST SERPL-CCNC: 16 U/L
AST SERPL-CCNC: 17 U/L
AST SERPL-CCNC: 17 U/L
AST SERPL-CCNC: 18 U/L
AST SERPL-CCNC: 23 U/L
BACTERIA #/AREA URNS AUTO: ABNORMAL /HPF
BACTERIA BLD CULT: NORMAL
BACTERIA FLD CULT: NORMAL
BACTERIA SPEC AEROBE CULT: NO GROWTH
BACTERIA UR CULT: NORMAL
BASO STIPL BLD QL SMEAR: ABNORMAL
BASOPHILS # BLD AUTO: 0 K/UL
BASOPHILS # BLD AUTO: 0 K/UL
BASOPHILS # BLD AUTO: 0.01 K/UL
BASOPHILS # BLD AUTO: 0.02 K/UL
BASOPHILS # BLD AUTO: 0.03 K/UL
BASOPHILS # BLD AUTO: 0.03 K/UL
BASOPHILS # BLD AUTO: 0.04 K/UL
BASOPHILS # BLD AUTO: 0.05 K/UL
BASOPHILS # BLD AUTO: 0.05 K/UL
BASOPHILS # BLD AUTO: 0.06 K/UL
BASOPHILS # BLD AUTO: ABNORMAL K/UL
BASOPHILS NFR BLD: 0 %
BASOPHILS NFR BLD: 0.1 %
BASOPHILS NFR BLD: 0.2 %
BASOPHILS NFR BLD: 0.3 %
BASOPHILS NFR BLD: 1 %
BILIRUB SERPL-MCNC: 0.4 MG/DL
BILIRUB SERPL-MCNC: 0.5 MG/DL
BILIRUB SERPL-MCNC: 0.6 MG/DL
BILIRUB SERPL-MCNC: 0.7 MG/DL
BILIRUB SERPL-MCNC: 0.9 MG/DL
BILIRUB SERPL-MCNC: NORMAL MG/DL
BILIRUB UR QL STRIP: ABNORMAL
BILIRUB UR QL STRIP: NEGATIVE
BLD GP AB SCN CELLS X3 SERPL QL: NORMAL
BLD PROD TYP BPU: NORMAL
BLOOD UNIT EXPIRATION DATE: NORMAL
BLOOD UNIT TYPE CODE: 6200
BLOOD UNIT TYPE CODE: NORMAL
BLOOD UNIT TYPE: NORMAL
BLOOD URINE, POC: POSITIVE
BNP SERPL-MCNC: 1050 PG/ML
BNP SERPL-MCNC: 248 PG/ML
BNP SERPL-MCNC: 378 PG/ML
BODY FLD TYPE: NORMAL
BODY FLUID SOURCE AMYLASE: NORMAL
BODY FLUID SOURCE, LDH: NORMAL
BUN SERPL-MCNC: 12 MG/DL
BUN SERPL-MCNC: 12 MG/DL
BUN SERPL-MCNC: 13 MG/DL
BUN SERPL-MCNC: 13 MG/DL
BUN SERPL-MCNC: 15 MG/DL
BUN SERPL-MCNC: 15 MG/DL
BUN SERPL-MCNC: 16 MG/DL
BUN SERPL-MCNC: 17 MG/DL
BUN SERPL-MCNC: 18 MG/DL
BUN SERPL-MCNC: 18 MG/DL
BUN SERPL-MCNC: 19 MG/DL
BUN SERPL-MCNC: 20 MG/DL
BUN SERPL-MCNC: 20 MG/DL
BUN SERPL-MCNC: 21 MG/DL
BUN SERPL-MCNC: 23 MG/DL
BUN SERPL-MCNC: 24 MG/DL
BUN SERPL-MCNC: 25 MG/DL
BUN SERPL-MCNC: 25 MG/DL
BUN SERPL-MCNC: 26 MG/DL
BUN SERPL-MCNC: 27 MG/DL
BUN SERPL-MCNC: 30 MG/DL
BUN SERPL-MCNC: 32 MG/DL
BUN SERPL-MCNC: 32 MG/DL
BUN SERPL-MCNC: 33 MG/DL
BUN SERPL-MCNC: 33 MG/DL
BUN SERPL-MCNC: 40 MG/DL
BUN SERPL-MCNC: 40 MG/DL
BUN SERPL-MCNC: 42 MG/DL
BUN SERPL-MCNC: 49 MG/DL
BUN SERPL-MCNC: 55 MG/DL
CALCIUM SERPL-MCNC: 10.1 MG/DL
CALCIUM SERPL-MCNC: 7.8 MG/DL
CALCIUM SERPL-MCNC: 7.9 MG/DL
CALCIUM SERPL-MCNC: 8 MG/DL
CALCIUM SERPL-MCNC: 8 MG/DL
CALCIUM SERPL-MCNC: 8.1 MG/DL
CALCIUM SERPL-MCNC: 8.2 MG/DL
CALCIUM SERPL-MCNC: 8.4 MG/DL
CALCIUM SERPL-MCNC: 8.5 MG/DL
CALCIUM SERPL-MCNC: 8.5 MG/DL
CALCIUM SERPL-MCNC: 8.6 MG/DL
CALCIUM SERPL-MCNC: 8.6 MG/DL
CALCIUM SERPL-MCNC: 8.9 MG/DL
CALCIUM SERPL-MCNC: 8.9 MG/DL
CALCIUM SERPL-MCNC: 9 MG/DL
CALCIUM SERPL-MCNC: 9 MG/DL
CALCIUM SERPL-MCNC: 9.2 MG/DL
CALCIUM SERPL-MCNC: 9.3 MG/DL
CALCIUM SERPL-MCNC: 9.4 MG/DL
CALCIUM SERPL-MCNC: 9.5 MG/DL
CALCIUM SERPL-MCNC: 9.6 MG/DL
CALCIUM SERPL-MCNC: 9.6 MG/DL
CALCIUM SERPL-MCNC: 9.8 MG/DL
CALCIUM SERPL-MCNC: 9.8 MG/DL
CHLORIDE SERPL-SCNC: 100 MMOL/L
CHLORIDE SERPL-SCNC: 101 MMOL/L
CHLORIDE SERPL-SCNC: 102 MMOL/L
CHLORIDE SERPL-SCNC: 103 MMOL/L
CHLORIDE SERPL-SCNC: 104 MMOL/L
CHLORIDE SERPL-SCNC: 104 MMOL/L
CHLORIDE SERPL-SCNC: 106 MMOL/L
CHLORIDE SERPL-SCNC: 107 MMOL/L
CHLORIDE SERPL-SCNC: 84 MMOL/L
CHLORIDE SERPL-SCNC: 84 MMOL/L
CHLORIDE SERPL-SCNC: 87 MMOL/L
CHLORIDE SERPL-SCNC: 88 MMOL/L
CHLORIDE SERPL-SCNC: 89 MMOL/L
CHLORIDE SERPL-SCNC: 89 MMOL/L
CHLORIDE SERPL-SCNC: 90 MMOL/L
CHLORIDE SERPL-SCNC: 91 MMOL/L
CHLORIDE SERPL-SCNC: 91 MMOL/L
CHLORIDE SERPL-SCNC: 92 MMOL/L
CHLORIDE SERPL-SCNC: 93 MMOL/L
CHLORIDE SERPL-SCNC: 96 MMOL/L
CHLORIDE SERPL-SCNC: 97 MMOL/L
CHLORIDE SERPL-SCNC: 98 MMOL/L
CHLORIDE SERPL-SCNC: 99 MMOL/L
CHLORIDE SERPL-SCNC: 99 MMOL/L
CHOLEST SERPL-MCNC: 77 MG/DL
CHOLEST/HDLC SERPL: 2.9 {RATIO}
CLARITY UR REFRACT.AUTO: ABNORMAL
CLARITY UR REFRACT.AUTO: CLEAR
CO2 SERPL-SCNC: 21 MMOL/L
CO2 SERPL-SCNC: 21 MMOL/L
CO2 SERPL-SCNC: 22 MMOL/L
CO2 SERPL-SCNC: 23 MMOL/L
CO2 SERPL-SCNC: 25 MMOL/L
CO2 SERPL-SCNC: 25 MMOL/L
CO2 SERPL-SCNC: 26 MMOL/L
CO2 SERPL-SCNC: 27 MMOL/L
CO2 SERPL-SCNC: 28 MMOL/L
CO2 SERPL-SCNC: 29 MMOL/L
CO2 SERPL-SCNC: 30 MMOL/L
CO2 SERPL-SCNC: 31 MMOL/L
CO2 SERPL-SCNC: 32 MMOL/L
CO2 SERPL-SCNC: 32 MMOL/L
CO2 SERPL-SCNC: 33 MMOL/L
CO2 SERPL-SCNC: 34 MMOL/L
CO2 SERPL-SCNC: 35 MMOL/L
CO2 SERPL-SCNC: 35 MMOL/L
CO2 SERPL-SCNC: 36 MMOL/L
CO2 SERPL-SCNC: 37 MMOL/L
CO2 SERPL-SCNC: 39 MMOL/L
CODING SYSTEM: NORMAL
COLOR FLD: NORMAL
COLOR UR AUTO: ABNORMAL
COLOR UR AUTO: YELLOW
COLOR, POC UA: YELLOW
CREAT SERPL-MCNC: 0.7 MG/DL
CREAT SERPL-MCNC: 0.8 MG/DL
CREAT SERPL-MCNC: 0.9 MG/DL
CREAT SERPL-MCNC: 1 MG/DL
CREAT SERPL-MCNC: 1.1 MG/DL
CREAT SERPL-MCNC: 1.9 MG/DL
CREAT SERPL-MCNC: 2.7 MG/DL
CREAT SERPL-MCNC: 4.2 MG/DL
CREAT SERPL-MCNC: 5.6 MG/DL
DELSYS: ABNORMAL
DIASTOLIC DYSFUNCTION: NO
DIFFERENTIAL METHOD: ABNORMAL
DISPENSE STATUS: NORMAL
EOSINOPHIL # BLD AUTO: 0 K/UL
EOSINOPHIL # BLD AUTO: 0.1 K/UL
EOSINOPHIL # BLD AUTO: 0.2 K/UL
EOSINOPHIL # BLD AUTO: 0.3 K/UL
EOSINOPHIL # BLD AUTO: 0.5 K/UL
EOSINOPHIL # BLD AUTO: ABNORMAL K/UL
EOSINOPHIL NFR BLD: 0 %
EOSINOPHIL NFR BLD: 0 %
EOSINOPHIL NFR BLD: 0.1 %
EOSINOPHIL NFR BLD: 0.2 %
EOSINOPHIL NFR BLD: 0.2 %
EOSINOPHIL NFR BLD: 0.3 %
EOSINOPHIL NFR BLD: 0.4 %
EOSINOPHIL NFR BLD: 0.4 %
EOSINOPHIL NFR BLD: 0.5 %
EOSINOPHIL NFR BLD: 0.6 %
EOSINOPHIL NFR BLD: 0.8 %
EOSINOPHIL NFR BLD: 1 %
EOSINOPHIL NFR BLD: 1.1 %
EOSINOPHIL NFR BLD: 2.5 %
EOSINOPHIL NFR BLD: 2.5 %
EOSINOPHIL NFR BLD: 2.7 %
EOSINOPHIL NFR BLD: 2.8 %
EOSINOPHIL NFR BLD: 5.7 %
ERYTHROCYTE [DISTWIDTH] IN BLOOD BY AUTOMATED COUNT: 12.2 %
ERYTHROCYTE [DISTWIDTH] IN BLOOD BY AUTOMATED COUNT: 12.6 %
ERYTHROCYTE [DISTWIDTH] IN BLOOD BY AUTOMATED COUNT: 13.1 %
ERYTHROCYTE [DISTWIDTH] IN BLOOD BY AUTOMATED COUNT: 13.3 %
ERYTHROCYTE [DISTWIDTH] IN BLOOD BY AUTOMATED COUNT: 13.6 %
ERYTHROCYTE [DISTWIDTH] IN BLOOD BY AUTOMATED COUNT: 13.7 %
ERYTHROCYTE [DISTWIDTH] IN BLOOD BY AUTOMATED COUNT: 13.9 %
ERYTHROCYTE [DISTWIDTH] IN BLOOD BY AUTOMATED COUNT: 14 %
ERYTHROCYTE [DISTWIDTH] IN BLOOD BY AUTOMATED COUNT: 14 %
ERYTHROCYTE [DISTWIDTH] IN BLOOD BY AUTOMATED COUNT: 14.1 %
ERYTHROCYTE [DISTWIDTH] IN BLOOD BY AUTOMATED COUNT: 14.1 %
ERYTHROCYTE [DISTWIDTH] IN BLOOD BY AUTOMATED COUNT: 14.2 %
ERYTHROCYTE [DISTWIDTH] IN BLOOD BY AUTOMATED COUNT: 14.3 %
ERYTHROCYTE [DISTWIDTH] IN BLOOD BY AUTOMATED COUNT: 14.4 %
ERYTHROCYTE [DISTWIDTH] IN BLOOD BY AUTOMATED COUNT: 14.5 %
ERYTHROCYTE [DISTWIDTH] IN BLOOD BY AUTOMATED COUNT: 14.5 %
ERYTHROCYTE [DISTWIDTH] IN BLOOD BY AUTOMATED COUNT: 14.6 %
ERYTHROCYTE [DISTWIDTH] IN BLOOD BY AUTOMATED COUNT: 14.7 %
ERYTHROCYTE [DISTWIDTH] IN BLOOD BY AUTOMATED COUNT: 14.8 %
ERYTHROCYTE [DISTWIDTH] IN BLOOD BY AUTOMATED COUNT: 14.9 %
EST. GFR  (AFRICAN AMERICAN): 11.5 ML/MIN/1.73 M^2
EST. GFR  (AFRICAN AMERICAN): 16.3 ML/MIN/1.73 M^2
EST. GFR  (AFRICAN AMERICAN): 27.7 ML/MIN/1.73 M^2
EST. GFR  (AFRICAN AMERICAN): 42.4 ML/MIN/1.73 M^2
EST. GFR  (AFRICAN AMERICAN): >60 ML/MIN/1.73 M^2
EST. GFR  (NON AFRICAN AMERICAN): 14.1 ML/MIN/1.73 M^2
EST. GFR  (NON AFRICAN AMERICAN): 24 ML/MIN/1.73 M^2
EST. GFR  (NON AFRICAN AMERICAN): 36.7 ML/MIN/1.73 M^2
EST. GFR  (NON AFRICAN AMERICAN): 9.9 ML/MIN/1.73 M^2
EST. GFR  (NON AFRICAN AMERICAN): >60 ML/MIN/1.73 M^2
ESTIMATED PA SYSTOLIC PRESSURE: 41.83
FIBRINOGEN PPP-MCNC: 464 MG/DL
GLUCOSE FLD-MCNC: 143 MG/DL
GLUCOSE SERPL-MCNC: 100 MG/DL
GLUCOSE SERPL-MCNC: 101 MG/DL
GLUCOSE SERPL-MCNC: 102 MG/DL
GLUCOSE SERPL-MCNC: 103 MG/DL
GLUCOSE SERPL-MCNC: 104 MG/DL
GLUCOSE SERPL-MCNC: 105 MG/DL
GLUCOSE SERPL-MCNC: 105 MG/DL
GLUCOSE SERPL-MCNC: 106 MG/DL
GLUCOSE SERPL-MCNC: 107 MG/DL
GLUCOSE SERPL-MCNC: 107 MG/DL
GLUCOSE SERPL-MCNC: 108 MG/DL
GLUCOSE SERPL-MCNC: 108 MG/DL
GLUCOSE SERPL-MCNC: 109 MG/DL
GLUCOSE SERPL-MCNC: 110 MG/DL
GLUCOSE SERPL-MCNC: 110 MG/DL
GLUCOSE SERPL-MCNC: 112 MG/DL
GLUCOSE SERPL-MCNC: 113 MG/DL
GLUCOSE SERPL-MCNC: 117 MG/DL
GLUCOSE SERPL-MCNC: 119 MG/DL
GLUCOSE SERPL-MCNC: 123 MG/DL
GLUCOSE SERPL-MCNC: 123 MG/DL
GLUCOSE SERPL-MCNC: 124 MG/DL
GLUCOSE SERPL-MCNC: 125 MG/DL
GLUCOSE SERPL-MCNC: 127 MG/DL
GLUCOSE SERPL-MCNC: 129 MG/DL
GLUCOSE SERPL-MCNC: 141 MG/DL
GLUCOSE SERPL-MCNC: 141 MG/DL
GLUCOSE SERPL-MCNC: 144 MG/DL
GLUCOSE SERPL-MCNC: 145 MG/DL
GLUCOSE SERPL-MCNC: 145 MG/DL
GLUCOSE SERPL-MCNC: 149 MG/DL
GLUCOSE SERPL-MCNC: 159 MG/DL (ref 70–110)
GLUCOSE SERPL-MCNC: 162 MG/DL (ref 70–110)
GLUCOSE SERPL-MCNC: 169 MG/DL (ref 70–110)
GLUCOSE SERPL-MCNC: 171 MG/DL
GLUCOSE SERPL-MCNC: 172 MG/DL
GLUCOSE SERPL-MCNC: 177 MG/DL (ref 70–110)
GLUCOSE SERPL-MCNC: 395 MG/DL
GLUCOSE SERPL-MCNC: 90 MG/DL
GLUCOSE SERPL-MCNC: 99 MG/DL
GLUCOSE UR QL STRIP: ABNORMAL
GLUCOSE UR QL STRIP: NEGATIVE
GLUCOSE UR QL STRIP: NORMAL
GRAM STN SPEC: NORMAL
HCO3 UR-SCNC: 22.3 MMOL/L (ref 24–28)
HCO3 UR-SCNC: 22.5 MMOL/L (ref 24–28)
HCO3 UR-SCNC: 22.5 MMOL/L (ref 24–28)
HCO3 UR-SCNC: 25.6 MMOL/L (ref 24–28)
HCO3 UR-SCNC: 42.8 MMOL/L (ref 24–28)
HCT VFR BLD AUTO: 20.5 %
HCT VFR BLD AUTO: 20.5 %
HCT VFR BLD AUTO: 21.4 %
HCT VFR BLD AUTO: 23.3 %
HCT VFR BLD AUTO: 24.4 %
HCT VFR BLD AUTO: 24.5 %
HCT VFR BLD AUTO: 24.7 %
HCT VFR BLD AUTO: 25 %
HCT VFR BLD AUTO: 25.7 %
HCT VFR BLD AUTO: 25.7 %
HCT VFR BLD AUTO: 25.8 %
HCT VFR BLD AUTO: 26 %
HCT VFR BLD AUTO: 26.6 %
HCT VFR BLD AUTO: 27.8 %
HCT VFR BLD AUTO: 28.2 %
HCT VFR BLD AUTO: 28.4 %
HCT VFR BLD AUTO: 28.5 %
HCT VFR BLD AUTO: 28.8 %
HCT VFR BLD AUTO: 29.1 %
HCT VFR BLD AUTO: 29.3 %
HCT VFR BLD AUTO: 29.4 %
HCT VFR BLD AUTO: 29.8 %
HCT VFR BLD AUTO: 29.9 %
HCT VFR BLD AUTO: 30 %
HCT VFR BLD AUTO: 30.1 %
HCT VFR BLD AUTO: 30.2 %
HCT VFR BLD AUTO: 30.4 %
HCT VFR BLD AUTO: 31 %
HCT VFR BLD AUTO: 31.6 %
HCT VFR BLD AUTO: 32.2 %
HCT VFR BLD AUTO: 36.4 %
HCT VFR BLD AUTO: 36.6 %
HCT VFR BLD AUTO: 37.7 %
HCT VFR BLD CALC: 25 %PCV (ref 36–54)
HCT VFR BLD CALC: 25 %PCV (ref 36–54)
HCT VFR BLD CALC: 26 %PCV (ref 36–54)
HCT VFR BLD CALC: 26 %PCV (ref 36–54)
HCT VFR BLD CALC: 32 %PCV (ref 36–54)
HDLC SERPL-MCNC: 27 MG/DL
HDLC SERPL: 35.1 %
HGB BLD-MCNC: 10 G/DL
HGB BLD-MCNC: 10.1 G/DL
HGB BLD-MCNC: 10.2 G/DL
HGB BLD-MCNC: 11 G/DL
HGB BLD-MCNC: 11.4 G/DL
HGB BLD-MCNC: 12.4 G/DL
HGB BLD-MCNC: 12.4 G/DL
HGB BLD-MCNC: 12.8 G/DL
HGB BLD-MCNC: 7.1 G/DL
HGB BLD-MCNC: 7.1 G/DL
HGB BLD-MCNC: 7.5 G/DL
HGB BLD-MCNC: 8 G/DL
HGB BLD-MCNC: 8.2 G/DL
HGB BLD-MCNC: 8.3 G/DL
HGB BLD-MCNC: 8.4 G/DL
HGB BLD-MCNC: 8.6 G/DL
HGB BLD-MCNC: 8.7 G/DL
HGB BLD-MCNC: 8.8 G/DL
HGB BLD-MCNC: 8.9 G/DL
HGB BLD-MCNC: 9 G/DL
HGB BLD-MCNC: 9 G/DL
HGB BLD-MCNC: 9.1 G/DL
HGB BLD-MCNC: 9.2 G/DL
HGB BLD-MCNC: 9.2 G/DL
HGB BLD-MCNC: 9.3 G/DL
HGB BLD-MCNC: 9.3 G/DL
HGB BLD-MCNC: 9.4 G/DL
HGB BLD-MCNC: 9.6 G/DL
HGB BLD-MCNC: 9.6 G/DL
HGB BLD-MCNC: 9.9 G/DL
HGB BLD-MCNC: 9.9 G/DL
HGB UR QL STRIP: ABNORMAL
HYALINE CASTS UR QL AUTO: 0 /LPF
HYPOCHROMIA BLD QL SMEAR: ABNORMAL
IMM GRANULOCYTES # BLD AUTO: 0.26 K/UL
IMM GRANULOCYTES # BLD AUTO: 0.37 K/UL
IMM GRANULOCYTES # BLD AUTO: 0.51 K/UL
IMM GRANULOCYTES # BLD AUTO: 0.54 K/UL
IMM GRANULOCYTES NFR BLD AUTO: 0.9 %
IMM GRANULOCYTES NFR BLD AUTO: 1.3 %
IMM GRANULOCYTES NFR BLD AUTO: 1.3 %
IMM GRANULOCYTES NFR BLD AUTO: 1.5 %
INR PPP: 1
INR PPP: 1.1
INR PPP: 1.2
INR PPP: 1.8
KETONES UR QL STRIP: ABNORMAL
KETONES UR QL STRIP: NEGATIVE
KETONES UR QL STRIP: NORMAL
LACTATE SERPL-SCNC: 0.6 MMOL/L
LACTATE SERPL-SCNC: 1 MMOL/L
LACTATE SERPL-SCNC: 2 MMOL/L
LDH FLD L TO P-CCNC: 166 U/L
LDLC SERPL CALC-MCNC: 41.2 MG/DL
LEUKOCYTE ESTERASE UR QL STRIP: ABNORMAL
LEUKOCYTE ESTERASE URINE, POC: NORMAL
LIPASE SERPL-CCNC: 16 U/L
LYMPHOCYTES # BLD AUTO: 0.6 K/UL
LYMPHOCYTES # BLD AUTO: 0.7 K/UL
LYMPHOCYTES # BLD AUTO: 0.8 K/UL
LYMPHOCYTES # BLD AUTO: 0.9 K/UL
LYMPHOCYTES # BLD AUTO: 1 K/UL
LYMPHOCYTES # BLD AUTO: 1 K/UL
LYMPHOCYTES # BLD AUTO: 1.2 K/UL
LYMPHOCYTES # BLD AUTO: 1.3 K/UL
LYMPHOCYTES # BLD AUTO: 1.6 K/UL
LYMPHOCYTES # BLD AUTO: ABNORMAL K/UL
LYMPHOCYTES NFR BLD: 1.8 %
LYMPHOCYTES NFR BLD: 1.9 %
LYMPHOCYTES NFR BLD: 10.8 %
LYMPHOCYTES NFR BLD: 11.5 %
LYMPHOCYTES NFR BLD: 12.1 %
LYMPHOCYTES NFR BLD: 12.3 %
LYMPHOCYTES NFR BLD: 13 %
LYMPHOCYTES NFR BLD: 13.3 %
LYMPHOCYTES NFR BLD: 19.4 %
LYMPHOCYTES NFR BLD: 2.3 %
LYMPHOCYTES NFR BLD: 2.7 %
LYMPHOCYTES NFR BLD: 2.7 %
LYMPHOCYTES NFR BLD: 2.8 %
LYMPHOCYTES NFR BLD: 2.9 %
LYMPHOCYTES NFR BLD: 4.5 %
LYMPHOCYTES NFR BLD: 5 %
LYMPHOCYTES NFR BLD: 6.8 %
LYMPHOCYTES NFR BLD: 7.5 %
LYMPHOCYTES NFR BLD: 7.5 %
LYMPHOCYTES NFR BLD: 7.6 %
LYMPHOCYTES NFR BLD: 8.1 %
LYMPHOCYTES NFR BLD: 8.3 %
LYMPHOCYTES NFR BLD: 9.3 %
LYMPHOCYTES NFR FLD MANUAL: 8 %
MAGNESIUM SERPL-MCNC: 1.3 MG/DL
MAGNESIUM SERPL-MCNC: 1.5 MG/DL
MAGNESIUM SERPL-MCNC: 1.6 MG/DL
MAGNESIUM SERPL-MCNC: 1.7 MG/DL
MAGNESIUM SERPL-MCNC: 1.8 MG/DL
MAGNESIUM SERPL-MCNC: 1.9 MG/DL
MAGNESIUM SERPL-MCNC: 1.9 MG/DL
MAGNESIUM SERPL-MCNC: 2 MG/DL
MAGNESIUM SERPL-MCNC: 2.1 MG/DL
MAGNESIUM SERPL-MCNC: 2.1 MG/DL
MAGNESIUM SERPL-MCNC: 2.2 MG/DL
MAGNESIUM SERPL-MCNC: 3 MG/DL
MAGNESIUM SERPL-MCNC: 3.5 MG/DL
MCH RBC QN AUTO: 24.9 PG
MCH RBC QN AUTO: 25 PG
MCH RBC QN AUTO: 25.1 PG
MCH RBC QN AUTO: 25.3 PG
MCH RBC QN AUTO: 25.4 PG
MCH RBC QN AUTO: 25.5 PG
MCH RBC QN AUTO: 25.5 PG
MCH RBC QN AUTO: 26.3 PG
MCH RBC QN AUTO: 28.2 PG
MCH RBC QN AUTO: 29.1 PG
MCH RBC QN AUTO: 29.3 PG
MCH RBC QN AUTO: 29.4 PG
MCH RBC QN AUTO: 29.5 PG
MCH RBC QN AUTO: 29.6 PG
MCH RBC QN AUTO: 29.7 PG
MCH RBC QN AUTO: 29.8 PG
MCH RBC QN AUTO: 29.8 PG
MCH RBC QN AUTO: 29.9 PG
MCH RBC QN AUTO: 30.4 PG
MCH RBC QN AUTO: 30.7 PG
MCH RBC QN AUTO: 31 PG
MCH RBC QN AUTO: 31 PG
MCH RBC QN AUTO: 31.6 PG
MCH RBC QN AUTO: 31.8 PG
MCH RBC QN AUTO: 32 PG
MCH RBC QN AUTO: 32 PG
MCH RBC QN AUTO: 32.1 PG
MCH RBC QN AUTO: 32.2 PG
MCH RBC QN AUTO: 32.3 PG
MCHC RBC AUTO-ENTMCNC: 29.8 G/DL
MCHC RBC AUTO-ENTMCNC: 30.4 G/DL
MCHC RBC AUTO-ENTMCNC: 30.6 G/DL
MCHC RBC AUTO-ENTMCNC: 30.7 G/DL
MCHC RBC AUTO-ENTMCNC: 30.8 G/DL
MCHC RBC AUTO-ENTMCNC: 31 G/DL
MCHC RBC AUTO-ENTMCNC: 31.3 G/DL
MCHC RBC AUTO-ENTMCNC: 31.6 G/DL
MCHC RBC AUTO-ENTMCNC: 32.1 G/DL
MCHC RBC AUTO-ENTMCNC: 32.6 G/DL
MCHC RBC AUTO-ENTMCNC: 32.6 G/DL
MCHC RBC AUTO-ENTMCNC: 32.7 G/DL
MCHC RBC AUTO-ENTMCNC: 33.2 %
MCHC RBC AUTO-ENTMCNC: 33.2 %
MCHC RBC AUTO-ENTMCNC: 33.5 %
MCHC RBC AUTO-ENTMCNC: 33.6 %
MCHC RBC AUTO-ENTMCNC: 33.9 %
MCHC RBC AUTO-ENTMCNC: 33.9 %
MCHC RBC AUTO-ENTMCNC: 34 %
MCHC RBC AUTO-ENTMCNC: 34.1 %
MCHC RBC AUTO-ENTMCNC: 34.2 %
MCHC RBC AUTO-ENTMCNC: 34.2 %
MCHC RBC AUTO-ENTMCNC: 34.3 %
MCHC RBC AUTO-ENTMCNC: 34.5 %
MCHC RBC AUTO-ENTMCNC: 34.5 G/DL
MCHC RBC AUTO-ENTMCNC: 34.6 %
MCHC RBC AUTO-ENTMCNC: 34.6 %
MCHC RBC AUTO-ENTMCNC: 35 %
MCHC RBC AUTO-ENTMCNC: 35.2 %
MCHC RBC AUTO-ENTMCNC: 35.6 %
MCHC RBC AUTO-ENTMCNC: 36.8 %
MCV RBC AUTO: 78 FL
MCV RBC AUTO: 80 FL
MCV RBC AUTO: 80 FL
MCV RBC AUTO: 81 FL
MCV RBC AUTO: 82 FL
MCV RBC AUTO: 83 FL
MCV RBC AUTO: 83 FL
MCV RBC AUTO: 84 FL
MCV RBC AUTO: 85 FL
MCV RBC AUTO: 86 FL
MCV RBC AUTO: 87 FL
MCV RBC AUTO: 88 FL
MCV RBC AUTO: 89 FL
MCV RBC AUTO: 91 FL
MCV RBC AUTO: 93 FL
MCV RBC AUTO: 94 FL
MCV RBC AUTO: 95 FL
MCV RBC AUTO: 95 FL
MICROSCOPIC COMMENT: ABNORMAL
MITRAL VALVE REGURGITATION: ABNORMAL
MODE: ABNORMAL
MONOCYTES # BLD AUTO: 0.2 K/UL
MONOCYTES # BLD AUTO: 0.6 K/UL
MONOCYTES # BLD AUTO: 0.6 K/UL
MONOCYTES # BLD AUTO: 0.7 K/UL
MONOCYTES # BLD AUTO: 0.8 K/UL
MONOCYTES # BLD AUTO: 0.9 K/UL
MONOCYTES # BLD AUTO: 0.9 K/UL
MONOCYTES # BLD AUTO: 1 K/UL
MONOCYTES # BLD AUTO: 1 K/UL
MONOCYTES # BLD AUTO: 1.2 K/UL
MONOCYTES # BLD AUTO: 1.3 K/UL
MONOCYTES # BLD AUTO: 1.4 K/UL
MONOCYTES # BLD AUTO: 1.7 K/UL
MONOCYTES # BLD AUTO: 1.8 K/UL
MONOCYTES # BLD AUTO: 1.8 K/UL
MONOCYTES # BLD AUTO: 1.9 K/UL
MONOCYTES # BLD AUTO: 2.1 K/UL
MONOCYTES # BLD AUTO: 2.2 K/UL
MONOCYTES # BLD AUTO: ABNORMAL K/UL
MONOCYTES NFR BLD: 11.4 %
MONOCYTES NFR BLD: 11.6 %
MONOCYTES NFR BLD: 3.9 %
MONOCYTES NFR BLD: 4.3 %
MONOCYTES NFR BLD: 4.9 %
MONOCYTES NFR BLD: 5 %
MONOCYTES NFR BLD: 5 %
MONOCYTES NFR BLD: 5.2 %
MONOCYTES NFR BLD: 5.8 %
MONOCYTES NFR BLD: 6 %
MONOCYTES NFR BLD: 6 %
MONOCYTES NFR BLD: 6.2 %
MONOCYTES NFR BLD: 6.5 %
MONOCYTES NFR BLD: 7.1 %
MONOCYTES NFR BLD: 7.2 %
MONOCYTES NFR BLD: 7.3 %
MONOCYTES NFR BLD: 7.8 %
MONOCYTES NFR BLD: 7.8 %
MONOCYTES NFR BLD: 8.3 %
MONOCYTES NFR BLD: 8.3 %
MONOCYTES NFR BLD: 8.9 %
MONOCYTES NFR BLD: 9.1 %
MONOCYTES NFR BLD: 9.1 %
MONOCYTES NFR BLD: 9.4 %
MONOCYTES NFR BLD: 9.4 %
MONOS+MACROS NFR FLD MANUAL: 10 %
NEUTROPHILS # BLD AUTO: 1.2 K/UL
NEUTROPHILS # BLD AUTO: 1.9 K/UL
NEUTROPHILS # BLD AUTO: 10.2 K/UL
NEUTROPHILS # BLD AUTO: 12.2 K/UL
NEUTROPHILS # BLD AUTO: 2.6 K/UL
NEUTROPHILS # BLD AUTO: 20.8 K/UL
NEUTROPHILS # BLD AUTO: 24.4 K/UL
NEUTROPHILS # BLD AUTO: 24.8 K/UL
NEUTROPHILS # BLD AUTO: 25.3 K/UL
NEUTROPHILS # BLD AUTO: 26.1 K/UL
NEUTROPHILS # BLD AUTO: 26.3 K/UL
NEUTROPHILS # BLD AUTO: 26.4 K/UL
NEUTROPHILS # BLD AUTO: 27.9 K/UL
NEUTROPHILS # BLD AUTO: 3.5 K/UL
NEUTROPHILS # BLD AUTO: 33.4 K/UL
NEUTROPHILS # BLD AUTO: 36.7 K/UL
NEUTROPHILS # BLD AUTO: 4 K/UL
NEUTROPHILS # BLD AUTO: 4.9 K/UL
NEUTROPHILS # BLD AUTO: 5.7 K/UL
NEUTROPHILS # BLD AUTO: 5.9 K/UL
NEUTROPHILS # BLD AUTO: 5.9 K/UL
NEUTROPHILS # BLD AUTO: 6.3 K/UL
NEUTROPHILS # BLD AUTO: 6.6 K/UL
NEUTROPHILS # BLD AUTO: 6.7 K/UL
NEUTROPHILS # BLD AUTO: 6.9 K/UL
NEUTROPHILS # BLD AUTO: 7.2 K/UL
NEUTROPHILS # BLD AUTO: 7.7 K/UL
NEUTROPHILS # BLD AUTO: 7.8 K/UL
NEUTROPHILS # BLD AUTO: 8 K/UL
NEUTROPHILS # BLD AUTO: 8.2 K/UL
NEUTROPHILS # BLD AUTO: 8.3 K/UL
NEUTROPHILS # BLD AUTO: 8.7 K/UL
NEUTROPHILS # BLD AUTO: 8.7 K/UL
NEUTROPHILS # BLD AUTO: 8.8 K/UL
NEUTROPHILS NFR BLD: 70.2 %
NEUTROPHILS NFR BLD: 73.5 %
NEUTROPHILS NFR BLD: 74.6 %
NEUTROPHILS NFR BLD: 74.8 %
NEUTROPHILS NFR BLD: 76.1 %
NEUTROPHILS NFR BLD: 78.4 %
NEUTROPHILS NFR BLD: 80.4 %
NEUTROPHILS NFR BLD: 80.6 %
NEUTROPHILS NFR BLD: 81.2 %
NEUTROPHILS NFR BLD: 81.4 %
NEUTROPHILS NFR BLD: 82.7 %
NEUTROPHILS NFR BLD: 83.4 %
NEUTROPHILS NFR BLD: 84.3 %
NEUTROPHILS NFR BLD: 86.9 %
NEUTROPHILS NFR BLD: 87.1 %
NEUTROPHILS NFR BLD: 88 %
NEUTROPHILS NFR BLD: 89.8 %
NEUTROPHILS NFR BLD: 89.9 %
NEUTROPHILS NFR BLD: 89.9 %
NEUTROPHILS NFR BLD: 90.4 %
NEUTROPHILS NFR BLD: 90.7 %
NEUTROPHILS NFR BLD: 91.5 %
NEUTROPHILS NFR BLD: 91.6 %
NEUTROPHILS NFR BLD: 91.8 %
NEUTROPHILS NFR BLD: 91.9 %
NEUTROPHILS NFR FLD MANUAL: 82 %
NITRITE UR QL STRIP: ABNORMAL
NITRITE UR QL STRIP: NEGATIVE
NITRITE, POC UA: NORMAL
NONHDLC SERPL-MCNC: 50 MG/DL
NRBC BLD-RTO: 0 /100 WBC
NUM UNITS TRANS POOLED PLATELETS: NORMAL
OVALOCYTES BLD QL SMEAR: ABNORMAL
PCO2 BLDA: 28.2 MMHG (ref 35–45)
PCO2 BLDA: 29.9 MMHG (ref 35–45)
PCO2 BLDA: 34.1 MMHG (ref 35–45)
PCO2 BLDA: 35.2 MMHG (ref 35–45)
PCO2 BLDA: 81.4 MMHG (ref 35–45)
PH SMN: 7.33 [PH] (ref 7.35–7.45)
PH SMN: 7.41 [PH] (ref 7.35–7.45)
PH SMN: 7.43 [PH] (ref 7.35–7.45)
PH SMN: 7.51 [PH] (ref 7.35–7.45)
PH SMN: 7.54 [PH] (ref 7.35–7.45)
PH UR STRIP: 6 [PH] (ref 5–8)
PH UR STRIP: 7 [PH] (ref 5–8)
PH, POC UA: 8
PHOSPHATE SERPL-MCNC: 1.8 MG/DL
PHOSPHATE SERPL-MCNC: 2.8 MG/DL
PHOSPHATE SERPL-MCNC: 2.8 MG/DL
PHOSPHATE SERPL-MCNC: 3.1 MG/DL
PHOSPHATE SERPL-MCNC: 3.2 MG/DL
PHOSPHATE SERPL-MCNC: 3.5 MG/DL
PHOSPHATE SERPL-MCNC: 3.5 MG/DL
PLATELET # BLD AUTO: 109 K/UL
PLATELET # BLD AUTO: 111 K/UL
PLATELET # BLD AUTO: 115 K/UL
PLATELET # BLD AUTO: 118 K/UL
PLATELET # BLD AUTO: 120 K/UL
PLATELET # BLD AUTO: 121 K/UL
PLATELET # BLD AUTO: 122 K/UL
PLATELET # BLD AUTO: 125 K/UL
PLATELET # BLD AUTO: 131 K/UL
PLATELET # BLD AUTO: 134 K/UL
PLATELET # BLD AUTO: 139 K/UL
PLATELET # BLD AUTO: 143 K/UL
PLATELET # BLD AUTO: 146 K/UL
PLATELET # BLD AUTO: 147 K/UL
PLATELET # BLD AUTO: 151 K/UL
PLATELET # BLD AUTO: 157 K/UL
PLATELET # BLD AUTO: 162 K/UL
PLATELET # BLD AUTO: 188 K/UL
PLATELET # BLD AUTO: 193 K/UL
PLATELET # BLD AUTO: 201 K/UL
PLATELET # BLD AUTO: 273 K/UL
PLATELET # BLD AUTO: 359 K/UL
PLATELET # BLD AUTO: 380 K/UL
PLATELET # BLD AUTO: 382 K/UL
PLATELET # BLD AUTO: 390 K/UL
PLATELET # BLD AUTO: 409 K/UL
PLATELET # BLD AUTO: 419 K/UL
PLATELET # BLD AUTO: 42 K/UL
PLATELET # BLD AUTO: 425 K/UL
PLATELET # BLD AUTO: 426 K/UL
PLATELET # BLD AUTO: 439 K/UL
PLATELET # BLD AUTO: 476 K/UL
PLATELET # BLD AUTO: 482 K/UL
PLATELET # BLD AUTO: 58 K/UL
PLATELET # BLD AUTO: 99 K/UL
PLATELET BLD QL SMEAR: ABNORMAL
PMV BLD AUTO: 10 FL
PMV BLD AUTO: 10.1 FL
PMV BLD AUTO: 10.1 FL
PMV BLD AUTO: 10.3 FL
PMV BLD AUTO: 10.3 FL
PMV BLD AUTO: 10.4 FL
PMV BLD AUTO: 10.5 FL
PMV BLD AUTO: 10.8 FL
PMV BLD AUTO: 11 FL
PMV BLD AUTO: 11.8 FL
PMV BLD AUTO: 8 FL
PMV BLD AUTO: 8.1 FL
PMV BLD AUTO: 8.4 FL
PMV BLD AUTO: 8.4 FL
PMV BLD AUTO: 8.6 FL
PMV BLD AUTO: 8.6 FL
PMV BLD AUTO: 8.8 FL
PMV BLD AUTO: 8.8 FL
PMV BLD AUTO: 9 FL
PMV BLD AUTO: 9.3 FL
PMV BLD AUTO: 9.3 FL
PMV BLD AUTO: 9.4 FL
PMV BLD AUTO: 9.5 FL
PMV BLD AUTO: 9.5 FL
PMV BLD AUTO: 9.6 FL
PMV BLD AUTO: 9.8 FL
PMV BLD AUTO: 9.9 FL
PO2 BLDA: 181 MMHG (ref 80–100)
PO2 BLDA: 184 MMHG (ref 80–100)
PO2 BLDA: 190 MMHG (ref 80–100)
PO2 BLDA: 196 MMHG (ref 80–100)
PO2 BLDA: 322 MMHG (ref 80–100)
POC BE: -1 MMOL/L
POC BE: -2 MMOL/L
POC BE: -2 MMOL/L
POC BE: 17 MMOL/L
POC BE: 3 MMOL/L
POC IONIZED CALCIUM: 0.99 MMOL/L (ref 1.06–1.42)
POC IONIZED CALCIUM: 1.13 MMOL/L (ref 1.06–1.42)
POC IONIZED CALCIUM: 1.21 MMOL/L (ref 1.06–1.42)
POC IONIZED CALCIUM: 1.28 MMOL/L (ref 1.06–1.42)
POC IONIZED CALCIUM: 1.28 MMOL/L (ref 1.06–1.42)
POC SATURATED O2: 100 % (ref 95–100)
POC TCO2: 23 MMOL/L (ref 23–27)
POC TCO2: 24 MMOL/L (ref 23–27)
POC TCO2: 24 MMOL/L (ref 23–27)
POC TCO2: 26 MMOL/L (ref 23–27)
POC TCO2: 45 MMOL/L (ref 23–27)
POCT GLUCOSE: 140 MG/DL (ref 70–110)
POIKILOCYTOSIS BLD QL SMEAR: SLIGHT
POLYCHROMASIA BLD QL SMEAR: ABNORMAL
POTASSIUM BLD-SCNC: 2.8 MMOL/L (ref 3.5–5.1)
POTASSIUM BLD-SCNC: 3 MMOL/L (ref 3.5–5.1)
POTASSIUM BLD-SCNC: 3.1 MMOL/L (ref 3.5–5.1)
POTASSIUM BLD-SCNC: 3.1 MMOL/L (ref 3.5–5.1)
POTASSIUM BLD-SCNC: 5 MMOL/L (ref 3.5–5.1)
POTASSIUM SERPL-SCNC: 2.8 MMOL/L
POTASSIUM SERPL-SCNC: 3.1 MMOL/L
POTASSIUM SERPL-SCNC: 3.2 MMOL/L
POTASSIUM SERPL-SCNC: 3.2 MMOL/L
POTASSIUM SERPL-SCNC: 3.3 MMOL/L
POTASSIUM SERPL-SCNC: 3.3 MMOL/L
POTASSIUM SERPL-SCNC: 3.4 MMOL/L
POTASSIUM SERPL-SCNC: 3.4 MMOL/L
POTASSIUM SERPL-SCNC: 3.5 MMOL/L
POTASSIUM SERPL-SCNC: 3.6 MMOL/L
POTASSIUM SERPL-SCNC: 3.6 MMOL/L
POTASSIUM SERPL-SCNC: 3.7 MMOL/L
POTASSIUM SERPL-SCNC: 3.8 MMOL/L
POTASSIUM SERPL-SCNC: 3.9 MMOL/L
POTASSIUM SERPL-SCNC: 4 MMOL/L
POTASSIUM SERPL-SCNC: 4.1 MMOL/L
POTASSIUM SERPL-SCNC: 4.2 MMOL/L
POTASSIUM SERPL-SCNC: 4.2 MMOL/L
POTASSIUM SERPL-SCNC: 4.3 MMOL/L
POTASSIUM SERPL-SCNC: 4.8 MMOL/L
POTASSIUM SERPL-SCNC: 4.9 MMOL/L
POTASSIUM SERPL-SCNC: 5.1 MMOL/L
POTASSIUM SERPL-SCNC: 5.2 MMOL/L
PROCALCITONIN SERPL IA-MCNC: 0.11 NG/ML
PROCALCITONIN SERPL IA-MCNC: 0.12 NG/ML
PROT FLD-MCNC: 4.1 G/DL
PROT SERPL-MCNC: 6 G/DL
PROT SERPL-MCNC: 6.4 G/DL
PROT SERPL-MCNC: 6.6 G/DL
PROT SERPL-MCNC: 6.6 G/DL
PROT SERPL-MCNC: 6.7 G/DL
PROT SERPL-MCNC: 6.7 G/DL
PROT SERPL-MCNC: 6.8 G/DL
PROT SERPL-MCNC: 7 G/DL
PROT SERPL-MCNC: 7 G/DL
PROT SERPL-MCNC: 7.1 G/DL
PROT SERPL-MCNC: 7.1 G/DL
PROT SERPL-MCNC: 7.2 G/DL
PROT SERPL-MCNC: 7.3 G/DL
PROT SERPL-MCNC: 7.5 G/DL
PROT UR QL STRIP: ABNORMAL
PROT UR QL STRIP: ABNORMAL
PROT UR QL STRIP: NEGATIVE
PROT UR QL STRIP: NEGATIVE
PROTEIN, POC: NORMAL
PROTHROMBIN TIME: 11.1 SEC
PROTHROMBIN TIME: 12 SEC
PROTHROMBIN TIME: 12.5 SEC
PROTHROMBIN TIME: 18.2 SEC
PROVIDER CREDENTIALS: ABNORMAL
PROVIDER NOTIFIED: ABNORMAL
RBC # BLD AUTO: 2.36 M/UL
RBC # BLD AUTO: 2.38 M/UL
RBC # BLD AUTO: 2.39 M/UL
RBC # BLD AUTO: 2.68 M/UL
RBC # BLD AUTO: 2.78 M/UL
RBC # BLD AUTO: 2.79 M/UL
RBC # BLD AUTO: 2.82 M/UL
RBC # BLD AUTO: 2.86 M/UL
RBC # BLD AUTO: 2.91 M/UL
RBC # BLD AUTO: 2.94 M/UL
RBC # BLD AUTO: 2.94 M/UL
RBC # BLD AUTO: 3.06 M/UL
RBC # BLD AUTO: 3.07 M/UL
RBC # BLD AUTO: 3.08 M/UL
RBC # BLD AUTO: 3.22 M/UL
RBC # BLD AUTO: 3.23 M/UL
RBC # BLD AUTO: 3.29 M/UL
RBC # BLD AUTO: 3.43 M/UL
RBC # BLD AUTO: 3.52 M/UL
RBC # BLD AUTO: 3.54 M/UL
RBC # BLD AUTO: 3.54 M/UL
RBC # BLD AUTO: 3.56 M/UL
RBC # BLD AUTO: 3.56 M/UL
RBC # BLD AUTO: 3.58 M/UL
RBC # BLD AUTO: 3.59 M/UL
RBC # BLD AUTO: 3.61 M/UL
RBC # BLD AUTO: 3.63 M/UL
RBC # BLD AUTO: 3.65 M/UL
RBC # BLD AUTO: 3.66 M/UL
RBC # BLD AUTO: 3.77 M/UL
RBC # BLD AUTO: 3.79 M/UL
RBC # BLD AUTO: 3.82 M/UL
RBC # BLD AUTO: 3.87 M/UL
RBC # BLD AUTO: 3.92 M/UL
RBC # BLD AUTO: 3.96 M/UL
RBC #/AREA URNS AUTO: 4 /HPF (ref 0–4)
RBC #/AREA URNS AUTO: 6 /HPF (ref 0–4)
RBC #/AREA URNS AUTO: 8 /HPF (ref 0–4)
RETIRED EF AND QEF - SEE NOTES: 55 (ref 55–65)
SAMPLE: ABNORMAL
SCHISTOCYTES BLD QL SMEAR: PRESENT
SITE: ABNORMAL
SODIUM BLD-SCNC: 130 MMOL/L (ref 136–145)
SODIUM BLD-SCNC: 132 MMOL/L (ref 136–145)
SODIUM BLD-SCNC: 133 MMOL/L (ref 136–145)
SODIUM SERPL-SCNC: 128 MMOL/L
SODIUM SERPL-SCNC: 130 MMOL/L
SODIUM SERPL-SCNC: 131 MMOL/L
SODIUM SERPL-SCNC: 132 MMOL/L
SODIUM SERPL-SCNC: 132 MMOL/L
SODIUM SERPL-SCNC: 133 MMOL/L
SODIUM SERPL-SCNC: 134 MMOL/L
SODIUM SERPL-SCNC: 135 MMOL/L
SODIUM SERPL-SCNC: 136 MMOL/L
SODIUM SERPL-SCNC: 137 MMOL/L
SODIUM SERPL-SCNC: 138 MMOL/L
SODIUM SERPL-SCNC: 140 MMOL/L
SP GR UR STRIP: 1.01 (ref 1–1.03)
SP GR UR STRIP: 1.01 (ref 1–1.03)
SP GR UR STRIP: 1.02 (ref 1–1.03)
SP GR UR STRIP: 1.03 (ref 1–1.03)
SP02: 100
SPECIFIC GRAVITY, POC UA: 1000
SPECIMEN SOURCE: NORMAL
SPECIMEN SOURCE: NORMAL
SQUAMOUS #/AREA URNS AUTO: 1 /HPF
TIME NOTIFIED: 801
TOXIC GRANULES BLD QL SMEAR: ABNORMAL
TRANS ERYTHROCYTES VOL PATIENT: NORMAL ML
TRICUSPID VALVE REGURGITATION: ABNORMAL
TRIGL SERPL-MCNC: 44 MG/DL
TROPONIN I SERPL DL<=0.01 NG/ML-MCNC: <0.006 NG/ML
TSH SERPL DL<=0.005 MIU/L-ACNC: 0.77 UIU/ML
TSH SERPL DL<=0.005 MIU/L-ACNC: 1.21 UIU/ML
URINARY STONE ANALYSIS: NORMAL
URN SPEC COLLECT METH UR: ABNORMAL
UROBILINOGEN UR STRIP-ACNC: ABNORMAL EU/DL
UROBILINOGEN UR STRIP-ACNC: NEGATIVE EU/DL
UROBILINOGEN, POC UA: NORMAL
VERBAL RESULT READBACK PERFORMED: YES
WBC # BLD AUTO: 10 K/UL
WBC # BLD AUTO: 10.08 K/UL
WBC # BLD AUTO: 10.09 K/UL
WBC # BLD AUTO: 10.77 K/UL
WBC # BLD AUTO: 10.96 K/UL
WBC # BLD AUTO: 11.02 K/UL
WBC # BLD AUTO: 12.46 K/UL
WBC # BLD AUTO: 14.48 K/UL
WBC # BLD AUTO: 2.09 K/UL
WBC # BLD AUTO: 2.93 K/UL
WBC # BLD AUTO: 22.7 K/UL
WBC # BLD AUTO: 27.64 K/UL
WBC # BLD AUTO: 28.14 K/UL
WBC # BLD AUTO: 28.3 K/UL
WBC # BLD AUTO: 28.6 K/UL
WBC # BLD AUTO: 28.96 K/UL
WBC # BLD AUTO: 29.49 K/UL
WBC # BLD AUTO: 31.05 K/UL
WBC # BLD AUTO: 31.19 K/UL
WBC # BLD AUTO: 36.42 K/UL
WBC # BLD AUTO: 39.94 K/UL
WBC # BLD AUTO: 4.06 K/UL
WBC # BLD AUTO: 4.85 K/UL
WBC # BLD AUTO: 6.02 K/UL
WBC # BLD AUTO: 6.29 K/UL
WBC # BLD AUTO: 7.22 K/UL
WBC # BLD AUTO: 8.39 K/UL
WBC # BLD AUTO: 8.45 K/UL
WBC # BLD AUTO: 8.59 K/UL
WBC # BLD AUTO: 8.84 K/UL
WBC # BLD AUTO: 8.85 K/UL
WBC # BLD AUTO: 8.86 K/UL
WBC # BLD AUTO: 9.3 K/UL
WBC # BLD AUTO: 9.55 K/UL
WBC # BLD AUTO: 9.58 K/UL
WBC # FLD: 965 /CU MM
WBC #/AREA URNS AUTO: 13 /HPF (ref 0–5)
WBC #/AREA URNS AUTO: 24 /HPF (ref 0–5)
WBC #/AREA URNS AUTO: 25 /HPF (ref 0–5)
YEAST UR QL AUTO: ABNORMAL
YEAST UR QL AUTO: ABNORMAL

## 2017-01-01 PROCEDURE — 80048 BASIC METABOLIC PNL TOTAL CA: CPT

## 2017-01-01 PROCEDURE — 86901 BLOOD TYPING SEROLOGIC RH(D): CPT

## 2017-01-01 PROCEDURE — 99291 CRITICAL CARE FIRST HOUR: CPT | Mod: ,,, | Performed by: EMERGENCY MEDICINE

## 2017-01-01 PROCEDURE — 85025 COMPLETE CBC W/AUTO DIFF WBC: CPT

## 2017-01-01 PROCEDURE — D9220A PRA ANESTHESIA: Mod: CRNA,,, | Performed by: NURSE ANESTHETIST, CERTIFIED REGISTERED

## 2017-01-01 PROCEDURE — 87088 URINE BACTERIA CULTURE: CPT

## 2017-01-01 PROCEDURE — 27200651 HC AIRWAY, LMA: Performed by: NURSE ANESTHETIST, CERTIFIED REGISTERED

## 2017-01-01 PROCEDURE — 87086 URINE CULTURE/COLONY COUNT: CPT

## 2017-01-01 PROCEDURE — 25000003 PHARM REV CODE 250: Performed by: NURSE PRACTITIONER

## 2017-01-01 PROCEDURE — 71000015 HC POSTOP RECOV 1ST HR: Performed by: UROLOGY

## 2017-01-01 PROCEDURE — 83735 ASSAY OF MAGNESIUM: CPT

## 2017-01-01 PROCEDURE — 25000003 PHARM REV CODE 250: Performed by: INTERNAL MEDICINE

## 2017-01-01 PROCEDURE — G0378 HOSPITAL OBSERVATION PER HR: HCPCS

## 2017-01-01 PROCEDURE — 99999 PR PBB SHADOW E&M-EST. PATIENT-LVL III: CPT | Mod: PBBFAC,,,

## 2017-01-01 PROCEDURE — 0T1807C BYPASS BILATERAL URETERS TO ILEOCUTANEOUS WITH AUTOLOGOUS TISSUE SUBSTITUTE, OPEN APPROACH: ICD-10-PCS | Performed by: UROLOGY

## 2017-01-01 PROCEDURE — 63600175 PHARM REV CODE 636 W HCPCS: Performed by: INTERNAL MEDICINE

## 2017-01-01 PROCEDURE — 99999 PR PBB SHADOW E&M-EST. PATIENT-LVL III: CPT | Mod: PBBFAC,,, | Performed by: UROLOGY

## 2017-01-01 PROCEDURE — 63600175 PHARM REV CODE 636 W HCPCS: Performed by: NURSE ANESTHETIST, CERTIFIED REGISTERED

## 2017-01-01 PROCEDURE — 97161 PT EVAL LOW COMPLEX 20 MIN: CPT

## 2017-01-01 PROCEDURE — 97530 THERAPEUTIC ACTIVITIES: CPT

## 2017-01-01 PROCEDURE — 87077 CULTURE AEROBIC IDENTIFY: CPT

## 2017-01-01 PROCEDURE — 85027 COMPLETE CBC AUTOMATED: CPT

## 2017-01-01 PROCEDURE — 87186 SC STD MICRODIL/AGAR DIL: CPT

## 2017-01-01 PROCEDURE — 96361 HYDRATE IV INFUSION ADD-ON: CPT

## 2017-01-01 PROCEDURE — 99214 OFFICE O/P EST MOD 30 MIN: CPT | Mod: S$GLB,,, | Performed by: NURSE PRACTITIONER

## 2017-01-01 PROCEDURE — 99204 OFFICE O/P NEW MOD 45 MIN: CPT | Mod: S$GLB,,, | Performed by: INTERNAL MEDICINE

## 2017-01-01 PROCEDURE — 93005 ELECTROCARDIOGRAM TRACING: CPT

## 2017-01-01 PROCEDURE — 25000003 PHARM REV CODE 250: Performed by: REGISTERED NURSE

## 2017-01-01 PROCEDURE — 25000003 PHARM REV CODE 250: Performed by: UROLOGY

## 2017-01-01 PROCEDURE — 36415 COLL VENOUS BLD VENIPUNCTURE: CPT

## 2017-01-01 PROCEDURE — 63600175 PHARM REV CODE 636 W HCPCS: Performed by: NURSE PRACTITIONER

## 2017-01-01 PROCEDURE — 80053 COMPREHEN METABOLIC PANEL: CPT

## 2017-01-01 PROCEDURE — 96365 THER/PROPH/DIAG IV INF INIT: CPT

## 2017-01-01 PROCEDURE — 0TTB0ZZ RESECTION OF BLADDER, OPEN APPROACH: ICD-10-PCS | Performed by: UROLOGY

## 2017-01-01 PROCEDURE — P9021 RED BLOOD CELLS UNIT: HCPCS

## 2017-01-01 PROCEDURE — 27201037 HC PRESSURE MONITORING SET UP

## 2017-01-01 PROCEDURE — 96366 THER/PROPH/DIAG IV INF ADDON: CPT

## 2017-01-01 PROCEDURE — 99999 PR PBB SHADOW E&M-EST. PATIENT-LVL IV: CPT | Mod: PBBFAC,,, | Performed by: NURSE PRACTITIONER

## 2017-01-01 PROCEDURE — 3008F BODY MASS INDEX DOCD: CPT | Mod: S$GLB,,, | Performed by: INTERNAL MEDICINE

## 2017-01-01 PROCEDURE — 84100 ASSAY OF PHOSPHORUS: CPT

## 2017-01-01 PROCEDURE — 96413 CHEMO IV INFUSION 1 HR: CPT

## 2017-01-01 PROCEDURE — 37000009 HC ANESTHESIA EA ADD 15 MINS: Performed by: UROLOGY

## 2017-01-01 PROCEDURE — 87015 SPECIMEN INFECT AGNT CONCNTJ: CPT

## 2017-01-01 PROCEDURE — 63600175 PHARM REV CODE 636 W HCPCS: Performed by: STUDENT IN AN ORGANIZED HEALTH CARE EDUCATION/TRAINING PROGRAM

## 2017-01-01 PROCEDURE — 25000003 PHARM REV CODE 250: Performed by: STUDENT IN AN ORGANIZED HEALTH CARE EDUCATION/TRAINING PROGRAM

## 2017-01-01 PROCEDURE — C1769 GUIDE WIRE: HCPCS | Performed by: UROLOGY

## 2017-01-01 PROCEDURE — 36430 TRANSFUSION BLD/BLD COMPNT: CPT

## 2017-01-01 PROCEDURE — 97535 SELF CARE MNGMENT TRAINING: CPT

## 2017-01-01 PROCEDURE — 88305 TISSUE EXAM BY PATHOLOGIST: CPT | Mod: 26,,, | Performed by: PATHOLOGY

## 2017-01-01 PROCEDURE — 0VT00ZZ RESECTION OF PROSTATE, OPEN APPROACH: ICD-10-PCS | Performed by: UROLOGY

## 2017-01-01 PROCEDURE — 36000705 HC OR TIME LEV I EA ADD 15 MIN: Performed by: UROLOGY

## 2017-01-01 PROCEDURE — 20600001 HC STEP DOWN PRIVATE ROOM

## 2017-01-01 PROCEDURE — 1160F RVW MEDS BY RX/DR IN RCRD: CPT | Mod: S$GLB,,, | Performed by: UROLOGY

## 2017-01-01 PROCEDURE — 99999 PR PBB SHADOW E&M-EST. PATIENT-LVL III: CPT | Mod: PBBFAC,,, | Performed by: NURSE PRACTITIONER

## 2017-01-01 PROCEDURE — 71260 CT THORAX DX C+: CPT | Mod: 26,,, | Performed by: RADIOLOGY

## 2017-01-01 PROCEDURE — 99244 OFF/OP CNSLTJ NEW/EST MOD 40: CPT | Mod: S$GLB,,, | Performed by: HOSPITALIST

## 2017-01-01 PROCEDURE — 99214 OFFICE O/P EST MOD 30 MIN: CPT | Mod: S$GLB,,, | Performed by: INTERNAL MEDICINE

## 2017-01-01 PROCEDURE — 74177 CT ABD & PELVIS W/CONTRAST: CPT | Mod: 26,,, | Performed by: RADIOLOGY

## 2017-01-01 PROCEDURE — 88342 IMHCHEM/IMCYTCHM 1ST ANTB: CPT | Mod: 26,,, | Performed by: PATHOLOGY

## 2017-01-01 PROCEDURE — 99291 CRITICAL CARE FIRST HOUR: CPT | Mod: ,,, | Performed by: NURSE PRACTITIONER

## 2017-01-01 PROCEDURE — 99214 OFFICE O/P EST MOD 30 MIN: CPT | Mod: S$GLB,,, | Performed by: UROLOGY

## 2017-01-01 PROCEDURE — 93010 ELECTROCARDIOGRAM REPORT: CPT | Mod: 76,,, | Performed by: INTERNAL MEDICINE

## 2017-01-01 PROCEDURE — 76770 US EXAM ABDO BACK WALL COMP: CPT | Mod: TC

## 2017-01-01 PROCEDURE — 99233 SBSQ HOSP IP/OBS HIGH 50: CPT | Mod: ,,, | Performed by: INTERNAL MEDICINE

## 2017-01-01 PROCEDURE — 32554 ASPIRATE PLEURA W/O IMAGING: CPT | Mod: LT,,, | Performed by: INTERNAL MEDICINE

## 2017-01-01 PROCEDURE — 99223 1ST HOSP IP/OBS HIGH 75: CPT | Mod: ,,, | Performed by: INTERNAL MEDICINE

## 2017-01-01 PROCEDURE — 71260 CT THORAX DX C+: CPT | Mod: TC

## 2017-01-01 PROCEDURE — 88309 TISSUE EXAM BY PATHOLOGIST: CPT | Mod: 26,,,

## 2017-01-01 PROCEDURE — 83880 ASSAY OF NATRIURETIC PEPTIDE: CPT

## 2017-01-01 PROCEDURE — 3074F SYST BP LT 130 MM HG: CPT | Mod: S$GLB,,, | Performed by: INTERNAL MEDICINE

## 2017-01-01 PROCEDURE — 20000000 HC ICU ROOM

## 2017-01-01 PROCEDURE — 99215 OFFICE O/P EST HI 40 MIN: CPT | Mod: S$GLB,,, | Performed by: INTERNAL MEDICINE

## 2017-01-01 PROCEDURE — 96367 TX/PROPH/DG ADDL SEQ IV INF: CPT

## 2017-01-01 PROCEDURE — 25500020 PHARM REV CODE 255: Performed by: UROLOGY

## 2017-01-01 PROCEDURE — 99999 PR PBB SHADOW E&M-EST. PATIENT-LVL III: CPT | Mod: PBBFAC,,, | Performed by: INTERNAL MEDICINE

## 2017-01-01 PROCEDURE — 93010 ELECTROCARDIOGRAM REPORT: CPT | Mod: ,,, | Performed by: INTERNAL MEDICINE

## 2017-01-01 PROCEDURE — 99217 PR OBSERVATION CARE DISCHARGE: CPT | Mod: ,,, | Performed by: NURSE PRACTITIONER

## 2017-01-01 PROCEDURE — 99214 OFFICE O/P EST MOD 30 MIN: CPT | Mod: 25,S$GLB,, | Performed by: UROLOGY

## 2017-01-01 PROCEDURE — 1160F RVW MEDS BY RX/DR IN RCRD: CPT | Mod: S$GLB,,, | Performed by: NURSE PRACTITIONER

## 2017-01-01 PROCEDURE — 99202 OFFICE O/P NEW SF 15 MIN: CPT | Mod: S$GLB,,, | Performed by: CLINICAL NURSE SPECIALIST

## 2017-01-01 PROCEDURE — 82945 GLUCOSE OTHER FLUID: CPT

## 2017-01-01 PROCEDURE — 94799 UNLISTED PULMONARY SVC/PX: CPT

## 2017-01-01 PROCEDURE — 63600175 PHARM REV CODE 636 W HCPCS: Performed by: UROLOGY

## 2017-01-01 PROCEDURE — 96360 HYDRATION IV INFUSION INIT: CPT

## 2017-01-01 PROCEDURE — 63600175 PHARM REV CODE 636 W HCPCS: Performed by: RADIOLOGY

## 2017-01-01 PROCEDURE — 80048 BASIC METABOLIC PNL TOTAL CA: CPT | Mod: 91

## 2017-01-01 PROCEDURE — 76770 US EXAM ABDO BACK WALL COMP: CPT | Mod: 26,,, | Performed by: RADIOLOGY

## 2017-01-01 PROCEDURE — 86900 BLOOD TYPING SEROLOGIC ABO: CPT

## 2017-01-01 PROCEDURE — D9220A PRA ANESTHESIA: Mod: CRNA,,, | Performed by: REGISTERED NURSE

## 2017-01-01 PROCEDURE — S0077 INJECTION, CLINDAMYCIN PHOSP: HCPCS | Performed by: INTERNAL MEDICINE

## 2017-01-01 PROCEDURE — 96375 TX/PRO/DX INJ NEW DRUG ADDON: CPT

## 2017-01-01 PROCEDURE — 97165 OT EVAL LOW COMPLEX 30 MIN: CPT

## 2017-01-01 PROCEDURE — 63600175 PHARM REV CODE 636 W HCPCS

## 2017-01-01 PROCEDURE — 25000003 PHARM REV CODE 250: Performed by: EMERGENCY MEDICINE

## 2017-01-01 PROCEDURE — 8E0W4CZ ROBOTIC ASSISTED PROCEDURE OF TRUNK REGION, PERCUTANEOUS ENDOSCOPIC APPROACH: ICD-10-PCS | Performed by: UROLOGY

## 2017-01-01 PROCEDURE — 88309 TISSUE EXAM BY PATHOLOGIST: CPT

## 2017-01-01 PROCEDURE — 93306 TTE W/DOPPLER COMPLETE: CPT | Mod: 26,,, | Performed by: INTERNAL MEDICINE

## 2017-01-01 PROCEDURE — 99900035 HC TECH TIME PER 15 MIN (STAT)

## 2017-01-01 PROCEDURE — 87040 BLOOD CULTURE FOR BACTERIA: CPT | Mod: 59

## 2017-01-01 PROCEDURE — 74178 CT ABD&PLV WO CNTR FLWD CNTR: CPT | Mod: 26,,, | Performed by: RADIOLOGY

## 2017-01-01 PROCEDURE — 52240 CYSTOSCOPY AND TREATMENT: CPT | Mod: ,,, | Performed by: UROLOGY

## 2017-01-01 PROCEDURE — 74177 CT ABD & PELVIS W/CONTRAST: CPT | Mod: TC

## 2017-01-01 PROCEDURE — 86850 RBC ANTIBODY SCREEN: CPT

## 2017-01-01 PROCEDURE — 36000706: Performed by: UROLOGY

## 2017-01-01 PROCEDURE — 93306 TTE W/DOPPLER COMPLETE: CPT

## 2017-01-01 PROCEDURE — 99213 OFFICE O/P EST LOW 20 MIN: CPT | Mod: S$GLB,,, | Performed by: CLINICAL NURSE SPECIALIST

## 2017-01-01 PROCEDURE — 52332 CYSTOSCOPY AND TREATMENT: CPT | Mod: 51,RT,, | Performed by: UROLOGY

## 2017-01-01 PROCEDURE — 27000221 HC OXYGEN, UP TO 24 HOURS

## 2017-01-01 PROCEDURE — 25000003 PHARM REV CODE 250: Performed by: NURSE ANESTHETIST, CERTIFIED REGISTERED

## 2017-01-01 PROCEDURE — 88307 TISSUE EXAM BY PATHOLOGIST: CPT | Performed by: PATHOLOGY

## 2017-01-01 PROCEDURE — 84157 ASSAY OF PROTEIN OTHER: CPT

## 2017-01-01 PROCEDURE — 88333 PATH CONSLTJ SURG CYTO XM 1: CPT | Mod: 26,,, | Performed by: PATHOLOGY

## 2017-01-01 PROCEDURE — 81001 URINALYSIS AUTO W/SCOPE: CPT | Mod: S$GLB,,, | Performed by: UROLOGY

## 2017-01-01 PROCEDURE — 25500020 PHARM REV CODE 255: Performed by: NURSE PRACTITIONER

## 2017-01-01 PROCEDURE — D9220A PRA ANESTHESIA: Mod: ANES,,, | Performed by: ANESTHESIOLOGY

## 2017-01-01 PROCEDURE — 71000039 HC RECOVERY, EACH ADD'L HOUR: Performed by: UROLOGY

## 2017-01-01 PROCEDURE — 84484 ASSAY OF TROPONIN QUANT: CPT

## 2017-01-01 PROCEDURE — 82330 ASSAY OF CALCIUM: CPT

## 2017-01-01 PROCEDURE — 99222 1ST HOSP IP/OBS MODERATE 55: CPT | Mod: ,,, | Performed by: UROLOGY

## 2017-01-01 PROCEDURE — 25500020 PHARM REV CODE 255: Performed by: EMERGENCY MEDICINE

## 2017-01-01 PROCEDURE — 83605 ASSAY OF LACTIC ACID: CPT

## 2017-01-01 PROCEDURE — 88305 TISSUE EXAM BY PATHOLOGIST: CPT | Performed by: PATHOLOGY

## 2017-01-01 PROCEDURE — 86920 COMPATIBILITY TEST SPIN: CPT

## 2017-01-01 PROCEDURE — 99999 PR PBB SHADOW E&M-EST. PATIENT-LVL I: CPT | Mod: PBBFAC,,, | Performed by: CLINICAL NURSE SPECIALIST

## 2017-01-01 PROCEDURE — 71000016 HC POSTOP RECOV ADDL HR: Performed by: UROLOGY

## 2017-01-01 PROCEDURE — 85610 PROTHROMBIN TIME: CPT

## 2017-01-01 PROCEDURE — 37000008 HC ANESTHESIA 1ST 15 MINUTES: Performed by: UROLOGY

## 2017-01-01 PROCEDURE — 99999 PR PBB SHADOW E&M-EST. PATIENT-LVL III: CPT | Mod: 25,PBBFAC,, | Performed by: NURSE PRACTITIONER

## 2017-01-01 PROCEDURE — 97162 PT EVAL MOD COMPLEX 30 MIN: CPT

## 2017-01-01 PROCEDURE — 84295 ASSAY OF SERUM SODIUM: CPT

## 2017-01-01 PROCEDURE — 85730 THROMBOPLASTIN TIME PARTIAL: CPT

## 2017-01-01 PROCEDURE — 88341 IMHCHEM/IMCYTCHM EA ADD ANTB: CPT | Mod: 26,,, | Performed by: PATHOLOGY

## 2017-01-01 PROCEDURE — 36620 INSERTION CATHETER ARTERY: CPT | Mod: 59,,, | Performed by: ANESTHESIOLOGY

## 2017-01-01 PROCEDURE — 07TC0ZZ RESECTION OF PELVIS LYMPHATIC, OPEN APPROACH: ICD-10-PCS | Performed by: UROLOGY

## 2017-01-01 PROCEDURE — 82803 BLOOD GASES ANY COMBINATION: CPT

## 2017-01-01 PROCEDURE — 83690 ASSAY OF LIPASE: CPT

## 2017-01-01 PROCEDURE — 84443 ASSAY THYROID STIM HORMONE: CPT

## 2017-01-01 PROCEDURE — 36000712 HC OR TIME LEV V 1ST 15 MIN: Performed by: UROLOGY

## 2017-01-01 PROCEDURE — 99215 OFFICE O/P EST HI 40 MIN: CPT | Mod: S$GLB,,, | Performed by: UROLOGY

## 2017-01-01 PROCEDURE — 88300 SURGICAL PATH GROSS: CPT | Performed by: PATHOLOGY

## 2017-01-01 PROCEDURE — 51595 REMOVE BLADDER/REVISE TRACT: CPT | Mod: 22,,, | Performed by: UROLOGY

## 2017-01-01 PROCEDURE — 0T9130Z DRAINAGE OF LEFT KIDNEY WITH DRAINAGE DEVICE, PERCUTANEOUS APPROACH: ICD-10-PCS | Performed by: RADIOLOGY

## 2017-01-01 PROCEDURE — 82370 X-RAY ASSAY CALCULUS: CPT

## 2017-01-01 PROCEDURE — 3078F DIAST BP <80 MM HG: CPT | Mod: S$GLB,,, | Performed by: INTERNAL MEDICINE

## 2017-01-01 PROCEDURE — 84132 ASSAY OF SERUM POTASSIUM: CPT

## 2017-01-01 PROCEDURE — 81001 URINALYSIS AUTO W/SCOPE: CPT

## 2017-01-01 PROCEDURE — 99215 OFFICE O/P EST HI 40 MIN: CPT | Mod: S$GLB,,, | Performed by: NURSE PRACTITIONER

## 2017-01-01 PROCEDURE — 99232 SBSQ HOSP IP/OBS MODERATE 35: CPT | Mod: ,,, | Performed by: INTERNAL MEDICINE

## 2017-01-01 PROCEDURE — A4216 STERILE WATER/SALINE, 10 ML: HCPCS | Performed by: NURSE PRACTITIONER

## 2017-01-01 PROCEDURE — 27800505 HC CATH, RADIAL ARTERY KIT: Performed by: NURSE ANESTHETIST, CERTIFIED REGISTERED

## 2017-01-01 PROCEDURE — 87205 SMEAR GRAM STAIN: CPT

## 2017-01-01 PROCEDURE — 84145 PROCALCITONIN (PCT): CPT

## 2017-01-01 PROCEDURE — 0T9030Z DRAINAGE OF RIGHT KIDNEY WITH DRAINAGE DEVICE, PERCUTANEOUS APPROACH: ICD-10-PCS | Performed by: RADIOLOGY

## 2017-01-01 PROCEDURE — 11000001 HC ACUTE MED/SURG PRIVATE ROOM

## 2017-01-01 PROCEDURE — 97116 GAIT TRAINING THERAPY: CPT

## 2017-01-01 PROCEDURE — 96368 THER/DIAG CONCURRENT INF: CPT

## 2017-01-01 PROCEDURE — 1160F RVW MEDS BY RX/DR IN RCRD: CPT | Mod: S$GLB,,, | Performed by: INTERNAL MEDICINE

## 2017-01-01 PROCEDURE — 25000003 PHARM REV CODE 250: Performed by: RADIOLOGY

## 2017-01-01 PROCEDURE — 85025 COMPLETE CBC W/AUTO DIFF WBC: CPT | Mod: 91

## 2017-01-01 PROCEDURE — 63600175 PHARM REV CODE 636 W HCPCS: Performed by: FAMILY MEDICINE

## 2017-01-01 PROCEDURE — 83615 LACTATE (LD) (LDH) ENZYME: CPT

## 2017-01-01 PROCEDURE — 99999 PR PBB SHADOW E&M-EST. PATIENT-LVL V: CPT | Mod: PBBFAC,,, | Performed by: INTERNAL MEDICINE

## 2017-01-01 PROCEDURE — 80061 LIPID PANEL: CPT

## 2017-01-01 PROCEDURE — 99222 1ST HOSP IP/OBS MODERATE 55: CPT | Mod: ,,, | Performed by: INTERNAL MEDICINE

## 2017-01-01 PROCEDURE — 96417 CHEMO IV INFUS EACH ADDL SEQ: CPT

## 2017-01-01 PROCEDURE — 85384 FIBRINOGEN ACTIVITY: CPT

## 2017-01-01 PROCEDURE — 87070 CULTURE OTHR SPECIMN AEROBIC: CPT

## 2017-01-01 PROCEDURE — 63600175 PHARM REV CODE 636 W HCPCS: Performed by: EMERGENCY MEDICINE

## 2017-01-01 PROCEDURE — 83735 ASSAY OF MAGNESIUM: CPT | Mod: 91

## 2017-01-01 PROCEDURE — 87102 FUNGUS ISOLATION CULTURE: CPT

## 2017-01-01 PROCEDURE — 52332 CYSTOSCOPY AND TREATMENT: CPT | Mod: 51,,, | Performed by: UROLOGY

## 2017-01-01 PROCEDURE — 81003 URINALYSIS AUTO W/O SCOPE: CPT

## 2017-01-01 PROCEDURE — 87116 MYCOBACTERIA CULTURE: CPT

## 2017-01-01 PROCEDURE — 96374 THER/PROPH/DIAG INJ IV PUSH: CPT

## 2017-01-01 PROCEDURE — P9019 PLATELETS, EACH UNIT: HCPCS

## 2017-01-01 PROCEDURE — 71000033 HC RECOVERY, INTIAL HOUR: Performed by: UROLOGY

## 2017-01-01 PROCEDURE — 74420 UROGRAPHY RTRGR +-KUB: CPT | Mod: 26,,, | Performed by: UROLOGY

## 2017-01-01 PROCEDURE — 25500020 PHARM REV CODE 255: Performed by: INTERNAL MEDICINE

## 2017-01-01 PROCEDURE — 74178 CT ABD&PLV WO CNTR FLWD CNTR: CPT | Mod: TC

## 2017-01-01 PROCEDURE — 86965 POOLING BLOOD PLATELETS: CPT

## 2017-01-01 PROCEDURE — 87040 BLOOD CULTURE FOR BACTERIA: CPT

## 2017-01-01 PROCEDURE — 96376 TX/PRO/DX INJ SAME DRUG ADON: CPT

## 2017-01-01 PROCEDURE — 85014 HEMATOCRIT: CPT

## 2017-01-01 PROCEDURE — 50389 REMOVE RENAL TUBE W/FLUORO: CPT | Mod: 50,,, | Performed by: UROLOGY

## 2017-01-01 PROCEDURE — 99999 PR PBB SHADOW E&M-EST. PATIENT-LVL III: CPT | Mod: PBBFAC,,, | Performed by: HOSPITALIST

## 2017-01-01 PROCEDURE — 71250 CT THORAX DX C-: CPT | Mod: TC

## 2017-01-01 PROCEDURE — 99220 PR INITIAL OBSERVATION CARE,LEVL III: CPT | Mod: ,,, | Performed by: NURSE PRACTITIONER

## 2017-01-01 PROCEDURE — 94760 N-INVAS EAR/PLS OXIMETRY 1: CPT

## 2017-01-01 PROCEDURE — 36000707: Performed by: UROLOGY

## 2017-01-01 PROCEDURE — 52310 CYSTOSCOPY AND TREATMENT: CPT | Mod: ,,, | Performed by: UROLOGY

## 2017-01-01 PROCEDURE — 36000704 HC OR TIME LEV I 1ST 15 MIN: Performed by: UROLOGY

## 2017-01-01 PROCEDURE — 86644 CMV ANTIBODY: CPT

## 2017-01-01 PROCEDURE — P9045 ALBUMIN (HUMAN), 5%, 250 ML: HCPCS | Performed by: NURSE ANESTHETIST, CERTIFIED REGISTERED

## 2017-01-01 PROCEDURE — 88307 TISSUE EXAM BY PATHOLOGIST: CPT | Mod: 26,,, | Performed by: PATHOLOGY

## 2017-01-01 PROCEDURE — 52310 CYSTOSCOPY AND TREATMENT: CPT

## 2017-01-01 PROCEDURE — 89051 BODY FLUID CELL COUNT: CPT

## 2017-01-01 PROCEDURE — 99205 OFFICE O/P NEW HI 60 MIN: CPT | Mod: S$GLB,,, | Performed by: INTERNAL MEDICINE

## 2017-01-01 PROCEDURE — 99204 OFFICE O/P NEW MOD 45 MIN: CPT | Mod: S$GLB,,, | Performed by: NURSE PRACTITIONER

## 2017-01-01 PROCEDURE — 71250 CT THORAX DX C-: CPT | Mod: 26,,, | Performed by: RADIOLOGY

## 2017-01-01 PROCEDURE — 51720 TREATMENT OF BLADDER LESION: CPT | Mod: 59,,, | Performed by: UROLOGY

## 2017-01-01 PROCEDURE — 88300 SURGICAL PATH GROSS: CPT | Mod: 26,,, | Performed by: PATHOLOGY

## 2017-01-01 PROCEDURE — 27201423 OPTIME MED/SURG SUP & DEVICES STERILE SUPPLY: Performed by: UROLOGY

## 2017-01-01 PROCEDURE — 27200651 HC AIRWAY, LMA: Performed by: REGISTERED NURSE

## 2017-01-01 PROCEDURE — 99024 POSTOP FOLLOW-UP VISIT: CPT | Mod: S$GLB,,, | Performed by: UROLOGY

## 2017-01-01 PROCEDURE — 36600 WITHDRAWAL OF ARTERIAL BLOOD: CPT

## 2017-01-01 PROCEDURE — 99285 EMERGENCY DEPT VISIT HI MDM: CPT | Mod: 25

## 2017-01-01 PROCEDURE — 0W9B3ZX DRAINAGE OF LEFT PLEURAL CAVITY, PERCUTANEOUS APPROACH, DIAGNOSTIC: ICD-10-PCS | Performed by: INTERNAL MEDICINE

## 2017-01-01 PROCEDURE — 3075F SYST BP GE 130 - 139MM HG: CPT | Mod: S$GLB,,, | Performed by: INTERNAL MEDICINE

## 2017-01-01 PROCEDURE — 99255 IP/OBS CONSLTJ NEW/EST HI 80: CPT | Mod: ,,, | Performed by: INTERNAL MEDICINE

## 2017-01-01 PROCEDURE — 50431 NJX PX NFROSGRM &/URTRGRM: CPT | Mod: 59,50,, | Performed by: UROLOGY

## 2017-01-01 PROCEDURE — 36000713 HC OR TIME LEV V EA ADD 15 MIN: Performed by: UROLOGY

## 2017-01-01 PROCEDURE — C1729 CATH, DRAINAGE: HCPCS | Performed by: UROLOGY

## 2017-01-01 PROCEDURE — 25000003 PHARM REV CODE 250: Performed by: HOSPITALIST

## 2017-01-01 PROCEDURE — 88309 TISSUE EXAM BY PATHOLOGIST: CPT | Mod: 26,,, | Performed by: PATHOLOGY

## 2017-01-01 PROCEDURE — 25000003 PHARM REV CODE 250: Performed by: ANESTHESIOLOGY

## 2017-01-01 PROCEDURE — 0T780DZ DILATION OF BILATERAL URETERS WITH INTRALUMINAL DEVICE, OPEN APPROACH: ICD-10-PCS | Performed by: UROLOGY

## 2017-01-01 PROCEDURE — 25000003 PHARM REV CODE 250: Performed by: FAMILY MEDICINE

## 2017-01-01 PROCEDURE — 85007 BL SMEAR W/DIFF WBC COUNT: CPT

## 2017-01-01 PROCEDURE — 63600175 PHARM REV CODE 636 W HCPCS: Performed by: REGISTERED NURSE

## 2017-01-01 PROCEDURE — 82150 ASSAY OF AMYLASE: CPT

## 2017-01-01 PROCEDURE — 99499 UNLISTED E&M SERVICE: CPT | Mod: ,,, | Performed by: EMERGENCY MEDICINE

## 2017-01-01 PROCEDURE — C2617 STENT, NON-COR, TEM W/O DEL: HCPCS | Performed by: UROLOGY

## 2017-01-01 PROCEDURE — 27100025 HC TUBING, SET FLUID WARMER: Performed by: NURSE ANESTHETIST, CERTIFIED REGISTERED

## 2017-01-01 PROCEDURE — 99291 CRITICAL CARE FIRST HOUR: CPT

## 2017-01-01 PROCEDURE — 94761 N-INVAS EAR/PLS OXIMETRY MLT: CPT

## 2017-01-01 DEVICE — STENT URET PERCUFLEX 6FR 26CM: Type: IMPLANTABLE DEVICE | Site: URETER | Status: FUNCTIONAL

## 2017-01-01 RX ORDER — CIPROFLOXACIN 250 MG/1
500 TABLET, FILM COATED ORAL ONCE
Status: CANCELLED | OUTPATIENT
Start: 2017-01-01 | End: 2017-01-01

## 2017-01-01 RX ORDER — SODIUM CHLORIDE 0.9 % (FLUSH) 0.9 %
3 SYRINGE (ML) INJECTION EVERY 8 HOURS
Status: DISCONTINUED | OUTPATIENT
Start: 2017-01-01 | End: 2017-01-01 | Stop reason: HOSPADM

## 2017-01-01 RX ORDER — OXYCODONE HYDROCHLORIDE 5 MG/1
5 TABLET ORAL EVERY 4 HOURS PRN
Status: DISCONTINUED | OUTPATIENT
Start: 2017-01-01 | End: 2017-01-01 | Stop reason: HOSPADM

## 2017-01-01 RX ORDER — MAGNESIUM SULFATE HEPTAHYDRATE 40 MG/ML
2 INJECTION, SOLUTION INTRAVENOUS ONCE
Status: DISCONTINUED | OUTPATIENT
Start: 2017-01-01 | End: 2017-01-01

## 2017-01-01 RX ORDER — DILTIAZEM HYDROCHLORIDE 5 MG/ML
15 INJECTION INTRAVENOUS
Status: COMPLETED | OUTPATIENT
Start: 2017-01-01 | End: 2017-01-01

## 2017-01-01 RX ORDER — SODIUM CHLORIDE 9 MG/ML
INJECTION, SOLUTION INTRAVENOUS CONTINUOUS
Status: DISCONTINUED | OUTPATIENT
Start: 2017-01-01 | End: 2017-01-01

## 2017-01-01 RX ORDER — AMPICILLIN 1 G/1
INJECTION, POWDER, FOR SOLUTION INTRAMUSCULAR; INTRAVENOUS
Status: DISCONTINUED
Start: 2017-01-01 | End: 2017-01-01 | Stop reason: HOSPADM

## 2017-01-01 RX ORDER — DIAZEPAM 5 MG/1
5 TABLET ORAL EVERY 6 HOURS PRN
Status: DISCONTINUED | OUTPATIENT
Start: 2017-01-01 | End: 2017-01-01

## 2017-01-01 RX ORDER — SODIUM CHLORIDE 0.9 % (FLUSH) 0.9 %
10 SYRINGE (ML) INJECTION
Status: DISCONTINUED | OUTPATIENT
Start: 2017-01-01 | End: 2017-01-01 | Stop reason: HOSPADM

## 2017-01-01 RX ORDER — ONDANSETRON 2 MG/ML
INJECTION INTRAMUSCULAR; INTRAVENOUS
Status: DISCONTINUED | OUTPATIENT
Start: 2017-01-01 | End: 2017-01-01

## 2017-01-01 RX ORDER — ALBUMIN HUMAN 50 G/1000ML
SOLUTION INTRAVENOUS CONTINUOUS PRN
Status: DISCONTINUED | OUTPATIENT
Start: 2017-01-01 | End: 2017-01-01

## 2017-01-01 RX ORDER — DEXAMETHASONE SODIUM PHOSPHATE 4 MG/ML
INJECTION, SOLUTION INTRA-ARTICULAR; INTRALESIONAL; INTRAMUSCULAR; INTRAVENOUS; SOFT TISSUE
Status: DISCONTINUED | OUTPATIENT
Start: 2017-01-01 | End: 2017-01-01

## 2017-01-01 RX ORDER — PROPOFOL 10 MG/ML
VIAL (ML) INTRAVENOUS CONTINUOUS PRN
Status: DISCONTINUED | OUTPATIENT
Start: 2017-01-01 | End: 2017-01-01

## 2017-01-01 RX ORDER — SODIUM CHLORIDE 9 MG/ML
INJECTION, SOLUTION INTRAVENOUS CONTINUOUS
Status: DISCONTINUED | OUTPATIENT
Start: 2017-01-01 | End: 2017-01-01 | Stop reason: HOSPADM

## 2017-01-01 RX ORDER — CIPROFLOXACIN 500 MG/1
500 TABLET ORAL EVERY 12 HOURS
Qty: 20 TABLET | Refills: 0 | Status: SHIPPED | OUTPATIENT
Start: 2017-01-01 | End: 2017-01-01

## 2017-01-01 RX ORDER — KETOROLAC TROMETHAMINE 30 MG/ML
INJECTION, SOLUTION INTRAMUSCULAR; INTRAVENOUS
Status: DISCONTINUED | OUTPATIENT
Start: 2017-01-01 | End: 2017-01-01

## 2017-01-01 RX ORDER — POTASSIUM CHLORIDE 20 MEQ/15ML
40 SOLUTION ORAL
Status: DISCONTINUED | OUTPATIENT
Start: 2017-01-01 | End: 2017-01-01

## 2017-01-01 RX ORDER — POTASSIUM CHLORIDE 20 MEQ/15ML
60 SOLUTION ORAL
Status: DISCONTINUED | OUTPATIENT
Start: 2017-01-01 | End: 2017-01-01

## 2017-01-01 RX ORDER — INDAPAMIDE 2.5 MG/1
2.5 TABLET ORAL EVERY MORNING
Status: ON HOLD | COMMUNITY
Start: 2017-01-01 | End: 2017-01-01 | Stop reason: HOSPADM

## 2017-01-01 RX ORDER — DILTIAZEM HCL 1 MG/ML
10 INJECTION, SOLUTION INTRAVENOUS CONTINUOUS
Status: DISCONTINUED | OUTPATIENT
Start: 2017-01-01 | End: 2017-01-01

## 2017-01-01 RX ORDER — METHYLENE BLUE 10 MG/ML
INJECTION INTRAVENOUS
Status: DISCONTINUED | OUTPATIENT
Start: 2017-01-01 | End: 2017-01-01

## 2017-01-01 RX ORDER — LIDOCAINE HYDROCHLORIDE 20 MG/ML
JELLY TOPICAL ONCE
Status: COMPLETED | OUTPATIENT
Start: 2017-01-01 | End: 2017-01-01

## 2017-01-01 RX ORDER — METOPROLOL TARTRATE 25 MG/1
50 TABLET, FILM COATED ORAL 3 TIMES DAILY
Status: DISCONTINUED | OUTPATIENT
Start: 2017-01-01 | End: 2017-01-01

## 2017-01-01 RX ORDER — BISACODYL 10 MG
10 SUPPOSITORY, RECTAL RECTAL 2 TIMES DAILY
Status: DISCONTINUED | OUTPATIENT
Start: 2017-01-01 | End: 2017-01-01

## 2017-01-01 RX ORDER — HEPARIN 100 UNIT/ML
5 SYRINGE INTRAVENOUS ONCE
Status: COMPLETED | OUTPATIENT
Start: 2017-01-01 | End: 2017-01-01

## 2017-01-01 RX ORDER — CIPROFLOXACIN 2 MG/ML
400 INJECTION, SOLUTION INTRAVENOUS
Status: DISCONTINUED | OUTPATIENT
Start: 2017-01-01 | End: 2017-01-01 | Stop reason: HOSPADM

## 2017-01-01 RX ORDER — CIPROFLOXACIN 2 MG/ML
INJECTION, SOLUTION INTRAVENOUS
Status: COMPLETED
Start: 2017-01-01 | End: 2017-01-01

## 2017-01-01 RX ORDER — INDAPAMIDE 2.5 MG/1
2.5 TABLET ORAL EVERY MORNING
Status: DISCONTINUED | OUTPATIENT
Start: 2017-01-01 | End: 2017-01-01 | Stop reason: HOSPADM

## 2017-01-01 RX ORDER — FUROSEMIDE 10 MG/ML
40 INJECTION INTRAMUSCULAR; INTRAVENOUS
Status: COMPLETED | OUTPATIENT
Start: 2017-01-01 | End: 2017-01-01

## 2017-01-01 RX ORDER — HEPARIN 100 UNIT/ML
500 SYRINGE INTRAVENOUS
Status: CANCELLED | OUTPATIENT
Start: 2017-01-01

## 2017-01-01 RX ORDER — OXYCODONE AND ACETAMINOPHEN 5; 325 MG/1; MG/1
1 TABLET ORAL EVERY 4 HOURS PRN
Status: DISCONTINUED | OUTPATIENT
Start: 2017-01-01 | End: 2017-01-01 | Stop reason: HOSPADM

## 2017-01-01 RX ORDER — TRAZODONE HYDROCHLORIDE 50 MG/1
50 TABLET ORAL NIGHTLY
Qty: 30 TABLET | Refills: 1 | Status: SHIPPED | OUTPATIENT
Start: 2017-01-01 | End: 2017-01-01

## 2017-01-01 RX ORDER — MORPHINE SULFATE ORAL SOLUTION 10 MG/5ML
4 SOLUTION ORAL
Status: DISCONTINUED | OUTPATIENT
Start: 2017-01-01 | End: 2017-01-01

## 2017-01-01 RX ORDER — METOPROLOL SUCCINATE 50 MG/1
200 TABLET, EXTENDED RELEASE ORAL NIGHTLY
Qty: 90 TABLET | Refills: 0 | Status: ON HOLD | OUTPATIENT
Start: 2017-01-01 | End: 2017-01-01 | Stop reason: HOSPADM

## 2017-01-01 RX ORDER — PHENAZOPYRIDINE HYDROCHLORIDE 100 MG/1
200 TABLET, FILM COATED ORAL 3 TIMES DAILY PRN
Qty: 60 TABLET | Refills: 1 | Status: SHIPPED | OUTPATIENT
Start: 2017-01-01 | End: 2017-01-01 | Stop reason: SDUPTHER

## 2017-01-01 RX ORDER — AMIODARONE HYDROCHLORIDE 200 MG/1
400 TABLET ORAL 2 TIMES DAILY
Status: DISCONTINUED | OUTPATIENT
Start: 2017-01-01 | End: 2017-01-01

## 2017-01-01 RX ORDER — GLYCOPYRROLATE 0.2 MG/ML
INJECTION INTRAMUSCULAR; INTRAVENOUS
Status: DISCONTINUED | OUTPATIENT
Start: 2017-01-01 | End: 2017-01-01

## 2017-01-01 RX ORDER — CEFAZOLIN SODIUM 1 G/50ML
1 SOLUTION INTRAVENOUS
Status: COMPLETED | OUTPATIENT
Start: 2017-01-01 | End: 2017-01-01

## 2017-01-01 RX ORDER — OXYCODONE AND ACETAMINOPHEN 5; 325 MG/1; MG/1
1 TABLET ORAL EVERY 4 HOURS PRN
Qty: 60 TABLET | Refills: 0 | Status: ON HOLD | OUTPATIENT
Start: 2017-01-01 | End: 2017-01-01 | Stop reason: HOSPADM

## 2017-01-01 RX ORDER — POTASSIUM CHLORIDE 20 MEQ/1
40 TABLET, EXTENDED RELEASE ORAL ONCE
Status: DISCONTINUED | OUTPATIENT
Start: 2017-01-01 | End: 2017-01-01

## 2017-01-01 RX ORDER — ACETAMINOPHEN 325 MG/1
650 TABLET ORAL
Status: CANCELLED | OUTPATIENT
Start: 2017-01-01

## 2017-01-01 RX ORDER — LACTULOSE 10 G/15ML
20 SOLUTION ORAL 2 TIMES DAILY
Qty: 600 ML | Refills: 0 | Status: SHIPPED | OUTPATIENT
Start: 2017-01-01 | End: 2017-01-01

## 2017-01-01 RX ORDER — DOCUSATE SODIUM 100 MG/1
100 CAPSULE, LIQUID FILLED ORAL 2 TIMES DAILY
Status: DISCONTINUED | OUTPATIENT
Start: 2017-01-01 | End: 2017-01-01 | Stop reason: HOSPADM

## 2017-01-01 RX ORDER — FENTANYL 25 UG/1
1 PATCH TRANSDERMAL
Qty: 1 PATCH | Refills: 0 | Status: SHIPPED | OUTPATIENT
Start: 2017-01-01

## 2017-01-01 RX ORDER — ACETAMINOPHEN 10 MG/ML
1000 INJECTION, SOLUTION INTRAVENOUS EVERY 8 HOURS
Status: COMPLETED | OUTPATIENT
Start: 2017-01-01 | End: 2017-01-01

## 2017-01-01 RX ORDER — DILTIAZEM HYDROCHLORIDE 5 MG/ML
0.35 INJECTION INTRAVENOUS ONCE
Status: DISCONTINUED | OUTPATIENT
Start: 2017-01-01 | End: 2017-01-01

## 2017-01-01 RX ORDER — SODIUM CHLORIDE 0.9 % (FLUSH) 0.9 %
10 SYRINGE (ML) INJECTION
Status: CANCELLED | OUTPATIENT
Start: 2017-01-01

## 2017-01-01 RX ORDER — HEPARIN 100 UNIT/ML
500 SYRINGE INTRAVENOUS
Status: COMPLETED | OUTPATIENT
Start: 2017-01-01 | End: 2017-01-01

## 2017-01-01 RX ORDER — METOPROLOL TARTRATE 25 MG/1
25 TABLET, FILM COATED ORAL ONCE
Status: COMPLETED | OUTPATIENT
Start: 2017-01-01 | End: 2017-01-01

## 2017-01-01 RX ORDER — OXYBUTYNIN CHLORIDE 5 MG/1
TABLET ORAL
COMMUNITY
Start: 2017-01-01 | End: 2017-01-01 | Stop reason: SDUPTHER

## 2017-01-01 RX ORDER — DEXTROSE MONOHYDRATE, SODIUM CHLORIDE, AND POTASSIUM CHLORIDE 50; 1.49; 4.5 G/1000ML; G/1000ML; G/1000ML
INJECTION, SOLUTION INTRAVENOUS CONTINUOUS
Status: DISCONTINUED | OUTPATIENT
Start: 2017-01-01 | End: 2017-01-01 | Stop reason: HOSPADM

## 2017-01-01 RX ORDER — TAMSULOSIN HYDROCHLORIDE 0.4 MG/1
CAPSULE ORAL
Status: ON HOLD | COMMUNITY
Start: 2017-01-01 | End: 2017-01-01 | Stop reason: HOSPADM

## 2017-01-01 RX ORDER — MAGNESIUM SULFATE HEPTAHYDRATE 40 MG/ML
2 INJECTION, SOLUTION INTRAVENOUS ONCE
Status: COMPLETED | OUTPATIENT
Start: 2017-01-01 | End: 2017-01-01

## 2017-01-01 RX ORDER — ONDANSETRON 2 MG/ML
4 INJECTION INTRAMUSCULAR; INTRAVENOUS EVERY 8 HOURS PRN
Status: DISCONTINUED | OUTPATIENT
Start: 2017-01-01 | End: 2017-01-01 | Stop reason: HOSPADM

## 2017-01-01 RX ORDER — NAPROXEN SODIUM 220 MG
220 TABLET ORAL 2 TIMES DAILY PRN
COMMUNITY
End: 2017-01-01

## 2017-01-01 RX ORDER — OXYCODONE AND ACETAMINOPHEN 5; 325 MG/1; MG/1
1-2 TABLET ORAL EVERY 4 HOURS PRN
Qty: 31 TABLET | Refills: 0 | Status: SHIPPED | OUTPATIENT
Start: 2017-01-01 | End: 2017-01-01 | Stop reason: SDUPTHER

## 2017-01-01 RX ORDER — POTASSIUM CHLORIDE 29.8 MG/ML
40 INJECTION INTRAVENOUS ONCE
Status: DISCONTINUED | OUTPATIENT
Start: 2017-01-01 | End: 2017-01-01

## 2017-01-01 RX ORDER — HYDROMORPHONE HYDROCHLORIDE 2 MG/ML
INJECTION, SOLUTION INTRAMUSCULAR; INTRAVENOUS; SUBCUTANEOUS
Status: DISCONTINUED | OUTPATIENT
Start: 2017-01-01 | End: 2017-01-01

## 2017-01-01 RX ORDER — L. ACIDOPHILUS/L.BULGARICUS 100MM CELL
1 GRANULES IN PACKET (EA) ORAL 2 TIMES DAILY
Status: ON HOLD | COMMUNITY
End: 2017-01-01 | Stop reason: HOSPADM

## 2017-01-01 RX ORDER — ETODOLAC 400 MG/1
400 TABLET, FILM COATED ORAL DAILY
Status: ON HOLD | COMMUNITY
End: 2017-01-01 | Stop reason: HOSPADM

## 2017-01-01 RX ORDER — METOPROLOL TARTRATE 50 MG/1
50 TABLET ORAL 4 TIMES DAILY
Status: DISCONTINUED | OUTPATIENT
Start: 2017-01-01 | End: 2017-01-01

## 2017-01-01 RX ORDER — LIDOCAINE HYDROCHLORIDE ANHYDROUS AND DEXTROSE MONOHYDRATE .8; 5 G/100ML; G/100ML
INJECTION, SOLUTION INTRAVENOUS CONTINUOUS PRN
Status: DISCONTINUED | OUTPATIENT
Start: 2017-01-01 | End: 2017-01-01

## 2017-01-01 RX ORDER — PHENAZOPYRIDINE HYDROCHLORIDE 100 MG/1
200 TABLET, FILM COATED ORAL 3 TIMES DAILY PRN
Qty: 21 TABLET | Refills: 0 | Status: SHIPPED | OUTPATIENT
Start: 2017-01-01 | End: 2017-01-01 | Stop reason: SDUPTHER

## 2017-01-01 RX ORDER — POTASSIUM CHLORIDE 7.45 MG/ML
10 INJECTION INTRAVENOUS
Status: COMPLETED | OUTPATIENT
Start: 2017-01-01 | End: 2017-01-01

## 2017-01-01 RX ORDER — PROPOFOL 10 MG/ML
VIAL (ML) INTRAVENOUS
Status: DISCONTINUED | OUTPATIENT
Start: 2017-01-01 | End: 2017-01-01

## 2017-01-01 RX ORDER — ALVIMOPAN 12 MG/1
12 CAPSULE ORAL
Status: COMPLETED | OUTPATIENT
Start: 2017-01-01 | End: 2017-01-01

## 2017-01-01 RX ORDER — DILTIAZEM HYDROCHLORIDE 60 MG/1
60 TABLET, FILM COATED ORAL EVERY 6 HOURS
Status: DISCONTINUED | OUTPATIENT
Start: 2017-01-01 | End: 2017-01-01

## 2017-01-01 RX ORDER — ALVIMOPAN 12 MG/1
12 CAPSULE ORAL 2 TIMES DAILY
Status: DISCONTINUED | OUTPATIENT
Start: 2017-01-01 | End: 2017-01-01

## 2017-01-01 RX ORDER — METOCLOPRAMIDE HYDROCHLORIDE 5 MG/ML
INJECTION INTRAMUSCULAR; INTRAVENOUS
Status: DISCONTINUED | OUTPATIENT
Start: 2017-01-01 | End: 2017-01-01

## 2017-01-01 RX ORDER — METOPROLOL TARTRATE 25 MG/1
75 TABLET, FILM COATED ORAL 3 TIMES DAILY
Status: DISCONTINUED | OUTPATIENT
Start: 2017-01-01 | End: 2017-01-01

## 2017-01-01 RX ORDER — MORPHINE SULFATE 2 MG/ML
3 INJECTION, SOLUTION INTRAMUSCULAR; INTRAVENOUS EVERY 4 HOURS PRN
Status: DISCONTINUED | OUTPATIENT
Start: 2017-01-01 | End: 2017-01-01

## 2017-01-01 RX ORDER — IBUPROFEN 200 MG
200 TABLET ORAL EVERY 6 HOURS PRN
Status: ON HOLD | COMMUNITY
End: 2017-01-01 | Stop reason: HOSPADM

## 2017-01-01 RX ORDER — MORPHINE SULFATE 2 MG/ML
2 INJECTION, SOLUTION INTRAMUSCULAR; INTRAVENOUS
Status: COMPLETED | OUTPATIENT
Start: 2017-01-01 | End: 2017-01-01

## 2017-01-01 RX ORDER — POLYETHYLENE GLYCOL 3350 17 G/17G
17 POWDER, FOR SOLUTION ORAL DAILY
Status: DISCONTINUED | OUTPATIENT
Start: 2017-01-01 | End: 2017-01-01 | Stop reason: HOSPADM

## 2017-01-01 RX ORDER — AMOXICILLIN AND CLAVULANATE POTASSIUM 875; 125 MG/1; MG/1
1 TABLET, FILM COATED ORAL EVERY 12 HOURS
Qty: 14 TABLET | Refills: 0 | Status: SHIPPED | OUTPATIENT
Start: 2017-01-01 | End: 2017-01-01 | Stop reason: ALTCHOICE

## 2017-01-01 RX ORDER — FENTANYL CITRATE 50 UG/ML
INJECTION, SOLUTION INTRAMUSCULAR; INTRAVENOUS
Status: DISCONTINUED | OUTPATIENT
Start: 2017-01-01 | End: 2017-01-01

## 2017-01-01 RX ORDER — AMOXICILLIN AND CLAVULANATE POTASSIUM 875; 125 MG/1; MG/1
1 TABLET, FILM COATED ORAL 2 TIMES DAILY
Qty: 28 TABLET | Refills: 0 | Status: SHIPPED | OUTPATIENT
Start: 2017-01-01 | End: 2017-01-01

## 2017-01-01 RX ORDER — BENZONATATE 100 MG/1
100 CAPSULE ORAL 3 TIMES DAILY PRN
Qty: 30 CAPSULE | Refills: 1 | Status: SHIPPED | OUTPATIENT
Start: 2017-01-01 | End: 2017-01-01

## 2017-01-01 RX ORDER — ACETAMINOPHEN 10 MG/ML
1000 INJECTION, SOLUTION INTRAVENOUS EVERY 8 HOURS
Status: DISPENSED | OUTPATIENT
Start: 2017-01-01 | End: 2017-01-01

## 2017-01-01 RX ORDER — OXYCODONE AND ACETAMINOPHEN 10; 325 MG/1; MG/1
1 TABLET ORAL EVERY 4 HOURS PRN
Status: DISCONTINUED | OUTPATIENT
Start: 2017-01-01 | End: 2017-01-01 | Stop reason: HOSPADM

## 2017-01-01 RX ORDER — DILTIAZEM HCL 1 MG/ML
5 INJECTION, SOLUTION INTRAVENOUS
Status: COMPLETED | OUTPATIENT
Start: 2017-01-01 | End: 2017-01-01

## 2017-01-01 RX ORDER — CALCIUM CARBONATE 200(500)MG
1000 TABLET,CHEWABLE ORAL DAILY
Status: DISCONTINUED | OUTPATIENT
Start: 2017-01-01 | End: 2017-01-01 | Stop reason: HOSPADM

## 2017-01-01 RX ORDER — DIPHENHYDRAMINE HCL 25 MG
25 CAPSULE ORAL
Status: COMPLETED | OUTPATIENT
Start: 2017-01-01 | End: 2017-01-01

## 2017-01-01 RX ORDER — BISACODYL 10 MG
10 SUPPOSITORY, RECTAL RECTAL DAILY
Status: DISCONTINUED | OUTPATIENT
Start: 2017-01-01 | End: 2017-01-01 | Stop reason: HOSPADM

## 2017-01-01 RX ORDER — AMOXICILLIN 250 MG
1 CAPSULE ORAL DAILY
Status: DISCONTINUED | OUTPATIENT
Start: 2017-01-01 | End: 2017-01-01

## 2017-01-01 RX ORDER — VALSARTAN 320 MG/1
320 TABLET ORAL DAILY
COMMUNITY
Start: 2017-01-01 | End: 2017-01-01

## 2017-01-01 RX ORDER — LIDOCAINE HYDROCHLORIDE 10 MG/ML
INJECTION, SOLUTION EPIDURAL; INFILTRATION; INTRACAUDAL; PERINEURAL
Status: DISCONTINUED
Start: 2017-01-01 | End: 2017-01-01 | Stop reason: HOSPADM

## 2017-01-01 RX ORDER — IBUPROFEN 200 MG
24 TABLET ORAL
Status: DISCONTINUED | OUTPATIENT
Start: 2017-01-01 | End: 2017-01-01

## 2017-01-01 RX ORDER — VALSARTAN 160 MG/1
320 TABLET ORAL DAILY
Status: DISCONTINUED | OUTPATIENT
Start: 2017-01-01 | End: 2017-01-01 | Stop reason: HOSPADM

## 2017-01-01 RX ORDER — HYDROCODONE BITARTRATE AND ACETAMINOPHEN 500; 5 MG/1; MG/1
TABLET ORAL ONCE
Status: COMPLETED | OUTPATIENT
Start: 2017-01-01 | End: 2017-01-01

## 2017-01-01 RX ORDER — LANOLIN ALCOHOL/MO/W.PET/CERES
400 CREAM (GRAM) TOPICAL DAILY
Qty: 60 TABLET | Refills: 1 | Status: SHIPPED | OUTPATIENT
Start: 2017-01-01 | End: 2017-01-01

## 2017-01-01 RX ORDER — LORAZEPAM 2 MG/ML
0.25 INJECTION INTRAMUSCULAR ONCE AS NEEDED
Status: DISCONTINUED | OUTPATIENT
Start: 2017-01-01 | End: 2017-01-01 | Stop reason: HOSPADM

## 2017-01-01 RX ORDER — LIDOCAINE HYDROCHLORIDE 10 MG/ML
1 INJECTION, SOLUTION EPIDURAL; INFILTRATION; INTRACAUDAL; PERINEURAL ONCE
Status: COMPLETED | OUTPATIENT
Start: 2017-01-01 | End: 2017-01-01

## 2017-01-01 RX ORDER — IBUPROFEN 200 MG
16 TABLET ORAL
Status: DISCONTINUED | OUTPATIENT
Start: 2017-01-01 | End: 2017-01-01

## 2017-01-01 RX ORDER — MIDAZOLAM HYDROCHLORIDE 1 MG/ML
INJECTION, SOLUTION INTRAMUSCULAR; INTRAVENOUS
Status: DISCONTINUED | OUTPATIENT
Start: 2017-01-01 | End: 2017-01-01

## 2017-01-01 RX ORDER — NEOSTIGMINE METHYLSULFATE 1 MG/ML
INJECTION, SOLUTION INTRAVENOUS
Status: DISCONTINUED | OUTPATIENT
Start: 2017-01-01 | End: 2017-01-01

## 2017-01-01 RX ORDER — FUROSEMIDE 40 MG/1
40 TABLET ORAL DAILY
Status: DISCONTINUED | OUTPATIENT
Start: 2017-01-01 | End: 2017-01-01

## 2017-01-01 RX ORDER — PHENAZOPYRIDINE HYDROCHLORIDE 200 MG/1
200 TABLET, FILM COATED ORAL 3 TIMES DAILY PRN
Qty: 21 TABLET | Refills: 0 | Status: SHIPPED | OUTPATIENT
Start: 2017-01-01 | End: 2017-01-01

## 2017-01-01 RX ORDER — ONDANSETRON 2 MG/ML
4 INJECTION INTRAMUSCULAR; INTRAVENOUS ONCE AS NEEDED
Status: DISCONTINUED | OUTPATIENT
Start: 2017-01-01 | End: 2017-01-01 | Stop reason: HOSPADM

## 2017-01-01 RX ORDER — NAPROXEN SODIUM 275 MG/1
275 TABLET ORAL 2 TIMES DAILY PRN
Status: DISCONTINUED | OUTPATIENT
Start: 2017-01-01 | End: 2017-01-01 | Stop reason: HOSPADM

## 2017-01-01 RX ORDER — PHENAZOPYRIDINE HYDROCHLORIDE 100 MG/1
200 TABLET, FILM COATED ORAL 3 TIMES DAILY PRN
Qty: 60 TABLET | Refills: 1 | Status: CANCELLED | OUTPATIENT
Start: 2017-01-01

## 2017-01-01 RX ORDER — PHENAZOPYRIDINE HYDROCHLORIDE 100 MG/1
200 TABLET, FILM COATED ORAL 3 TIMES DAILY PRN
Qty: 21 TABLET | Refills: 0 | Status: CANCELLED | OUTPATIENT
Start: 2017-01-01

## 2017-01-01 RX ORDER — HYDROMORPHONE HYDROCHLORIDE 1 MG/ML
1 INJECTION, SOLUTION INTRAMUSCULAR; INTRAVENOUS; SUBCUTANEOUS
Status: DISCONTINUED | OUTPATIENT
Start: 2017-01-01 | End: 2017-01-01 | Stop reason: HOSPADM

## 2017-01-01 RX ORDER — DOXAZOSIN 1 MG/1
4 TABLET ORAL DAILY
Status: DISCONTINUED | OUTPATIENT
Start: 2017-01-01 | End: 2017-01-01 | Stop reason: HOSPADM

## 2017-01-01 RX ORDER — MORPHINE SULFATE ORAL SOLUTION 10 MG/5ML
4 SOLUTION ORAL ONCE
Status: COMPLETED | OUTPATIENT
Start: 2017-01-01 | End: 2017-01-01

## 2017-01-01 RX ORDER — DOCUSATE SODIUM 100 MG/1
100 CAPSULE, LIQUID FILLED ORAL 2 TIMES DAILY
Refills: 0 | COMMUNITY
Start: 2017-01-01 | End: 2017-01-01

## 2017-01-01 RX ORDER — FENTANYL CITRATE 50 UG/ML
INJECTION, SOLUTION INTRAMUSCULAR; INTRAVENOUS
Status: DISCONTINUED
Start: 2017-01-01 | End: 2017-01-01 | Stop reason: HOSPADM

## 2017-01-01 RX ORDER — LANOLIN ALCOHOL/MO/W.PET/CERES
400 CREAM (GRAM) TOPICAL DAILY
Status: DISCONTINUED | OUTPATIENT
Start: 2017-01-01 | End: 2017-01-01 | Stop reason: HOSPADM

## 2017-01-01 RX ORDER — ACETAMINOPHEN 10 MG/ML
INJECTION, SOLUTION INTRAVENOUS
Status: DISCONTINUED | OUTPATIENT
Start: 2017-01-01 | End: 2017-01-01

## 2017-01-01 RX ORDER — PHENYLEPHRINE HYDROCHLORIDE 10 MG/ML
INJECTION INTRAVENOUS
Status: DISCONTINUED | OUTPATIENT
Start: 2017-01-01 | End: 2017-01-01

## 2017-01-01 RX ORDER — AMOXICILLIN AND CLAVULANATE POTASSIUM 500; 125 MG/1; MG/1
1 TABLET, FILM COATED ORAL 2 TIMES DAILY
Qty: 14 TABLET | Refills: 0 | Status: SHIPPED | OUTPATIENT
Start: 2017-01-01 | End: 2017-01-01

## 2017-01-01 RX ORDER — SODIUM CHLORIDE, SODIUM LACTATE, POTASSIUM CHLORIDE, CALCIUM CHLORIDE 600; 310; 30; 20 MG/100ML; MG/100ML; MG/100ML; MG/100ML
INJECTION, SOLUTION INTRAVENOUS CONTINUOUS
Status: DISCONTINUED | OUTPATIENT
Start: 2017-01-01 | End: 2017-01-01 | Stop reason: ALTCHOICE

## 2017-01-01 RX ORDER — LIDOCAINE HYDROCHLORIDE 20 MG/ML
JELLY TOPICAL ONCE
Status: CANCELLED | OUTPATIENT
Start: 2017-01-01 | End: 2017-01-01

## 2017-01-01 RX ORDER — MIDAZOLAM HYDROCHLORIDE 1 MG/ML
INJECTION INTRAMUSCULAR; INTRAVENOUS
Status: DISCONTINUED
Start: 2017-01-01 | End: 2017-01-01 | Stop reason: HOSPADM

## 2017-01-01 RX ORDER — MAGNESIUM SULFATE HEPTAHYDRATE 40 MG/ML
2 INJECTION, SOLUTION INTRAVENOUS
Status: COMPLETED | OUTPATIENT
Start: 2017-01-01 | End: 2017-01-01

## 2017-01-01 RX ORDER — PROMETHAZINE HYDROCHLORIDE 25 MG/1
25 TABLET ORAL EVERY 6 HOURS PRN
Qty: 60 TABLET | Refills: 1 | Status: SHIPPED | OUTPATIENT
Start: 2017-01-01 | End: 2017-01-01

## 2017-01-01 RX ORDER — SODIUM,POTASSIUM PHOSPHATES 280-250MG
2 POWDER IN PACKET (EA) ORAL
Status: DISCONTINUED | OUTPATIENT
Start: 2017-01-01 | End: 2017-01-01

## 2017-01-01 RX ORDER — NITROFURANTOIN 25; 75 MG/1; MG/1
100 CAPSULE ORAL 2 TIMES DAILY
Qty: 20 CAPSULE | Refills: 0 | Status: SHIPPED | OUTPATIENT
Start: 2017-01-01 | End: 2017-01-01

## 2017-01-01 RX ORDER — ONDANSETRON 8 MG/1
8 TABLET, ORALLY DISINTEGRATING ORAL EVERY 12 HOURS PRN
Qty: 30 TABLET | Refills: 1 | Status: SHIPPED | OUTPATIENT
Start: 2017-01-01 | End: 2017-01-01

## 2017-01-01 RX ORDER — MIDAZOLAM HYDROCHLORIDE 1 MG/ML
2 INJECTION INTRAMUSCULAR; INTRAVENOUS
Status: DISCONTINUED | OUTPATIENT
Start: 2017-01-01 | End: 2017-01-01 | Stop reason: HOSPADM

## 2017-01-01 RX ORDER — ACETAMINOPHEN 325 MG/1
650 TABLET ORAL
Status: COMPLETED | OUTPATIENT
Start: 2017-01-01 | End: 2017-01-01

## 2017-01-01 RX ORDER — GLUCAGON 1 MG
1 KIT INJECTION
Status: DISCONTINUED | OUTPATIENT
Start: 2017-01-01 | End: 2017-01-01

## 2017-01-01 RX ORDER — HYDROCODONE BITARTRATE AND ACETAMINOPHEN 500; 5 MG/1; MG/1
TABLET ORAL
Status: DISCONTINUED | OUTPATIENT
Start: 2017-01-01 | End: 2017-01-01

## 2017-01-01 RX ORDER — METOPROLOL SUCCINATE 100 MG/1
200 TABLET, EXTENDED RELEASE ORAL DAILY
Status: DISCONTINUED | OUTPATIENT
Start: 2017-01-01 | End: 2017-01-01

## 2017-01-01 RX ORDER — POTASSIUM CHLORIDE 20 MEQ/1
40 TABLET, EXTENDED RELEASE ORAL ONCE
Status: COMPLETED | OUTPATIENT
Start: 2017-01-01 | End: 2017-01-01

## 2017-01-01 RX ORDER — DILTIAZEM HYDROCHLORIDE 120 MG/1
120 CAPSULE, COATED, EXTENDED RELEASE ORAL DAILY
Status: DISCONTINUED | OUTPATIENT
Start: 2017-01-01 | End: 2017-01-01

## 2017-01-01 RX ORDER — LORAZEPAM 0.5 MG/1
1 TABLET ORAL EVERY 6 HOURS PRN
Status: DISCONTINUED | OUTPATIENT
Start: 2017-01-01 | End: 2017-01-01

## 2017-01-01 RX ORDER — LACTULOSE 10 G/15ML
SOLUTION ORAL; RECTAL
COMMUNITY
Start: 2017-01-01 | End: 2017-01-01

## 2017-01-01 RX ORDER — DIAZEPAM 10 MG/2ML
2.5 INJECTION INTRAMUSCULAR ONCE
Status: COMPLETED | OUTPATIENT
Start: 2017-01-01 | End: 2017-01-01

## 2017-01-01 RX ORDER — KETAMINE HYDROCHLORIDE 100 MG/ML
INJECTION, SOLUTION INTRAMUSCULAR; INTRAVENOUS
Status: DISCONTINUED | OUTPATIENT
Start: 2017-01-01 | End: 2017-01-01

## 2017-01-01 RX ORDER — OXYBUTYNIN CHLORIDE 5 MG/1
5 TABLET ORAL 3 TIMES DAILY PRN
Qty: 90 TABLET | Refills: 3 | Status: SHIPPED | OUTPATIENT
Start: 2017-01-01 | End: 2017-01-01

## 2017-01-01 RX ORDER — NITROFURANTOIN 25; 75 MG/1; MG/1
100 CAPSULE ORAL
Status: ON HOLD | COMMUNITY
End: 2017-01-01 | Stop reason: HOSPADM

## 2017-01-01 RX ORDER — FENTANYL 25 UG/1
1 PATCH TRANSDERMAL
Status: DISCONTINUED | OUTPATIENT
Start: 2017-01-01 | End: 2017-01-01

## 2017-01-01 RX ORDER — HEPARIN 100 UNIT/ML
500 SYRINGE INTRAVENOUS
Status: DISCONTINUED | OUTPATIENT
Start: 2017-01-01 | End: 2017-01-01 | Stop reason: HOSPADM

## 2017-01-01 RX ORDER — MAGNESIUM SULFATE HEPTAHYDRATE 40 MG/ML
2 INJECTION, SOLUTION INTRAVENOUS
Status: DISCONTINUED | OUTPATIENT
Start: 2017-01-01 | End: 2017-01-01

## 2017-01-01 RX ORDER — ENOXAPARIN SODIUM 100 MG/ML
40 INJECTION SUBCUTANEOUS
Status: CANCELLED | OUTPATIENT
Start: 2017-01-01

## 2017-01-01 RX ORDER — METOPROLOL TARTRATE 50 MG/1
50 TABLET ORAL 3 TIMES DAILY
Status: DISCONTINUED | OUTPATIENT
Start: 2017-01-01 | End: 2017-01-01

## 2017-01-01 RX ORDER — FUROSEMIDE 10 MG/ML
40 INJECTION INTRAMUSCULAR; INTRAVENOUS 2 TIMES DAILY
Status: DISCONTINUED | OUTPATIENT
Start: 2017-01-01 | End: 2017-01-01

## 2017-01-01 RX ORDER — HYDROMORPHONE HYDROCHLORIDE 1 MG/ML
INJECTION, SOLUTION INTRAMUSCULAR; INTRAVENOUS; SUBCUTANEOUS
Status: COMPLETED
Start: 2017-01-01 | End: 2017-01-01

## 2017-01-01 RX ORDER — POTASSIUM CHLORIDE 20 MEQ/1
20 TABLET, EXTENDED RELEASE ORAL DAILY
Qty: 30 TABLET | Refills: 0 | Status: SHIPPED | OUTPATIENT
Start: 2017-01-01 | End: 2017-01-01

## 2017-01-01 RX ORDER — ACETAMINOPHEN 10 MG/ML
1000 INJECTION, SOLUTION INTRAVENOUS
Status: COMPLETED | OUTPATIENT
Start: 2017-01-01 | End: 2017-01-01

## 2017-01-01 RX ORDER — HEPARIN 100 UNIT/ML
SYRINGE INTRAVENOUS CODE/TRAUMA/SEDATION MEDICATION
Status: COMPLETED | OUTPATIENT
Start: 2017-01-01 | End: 2017-01-01

## 2017-01-01 RX ORDER — ASPIRIN 325 MG
325 TABLET ORAL ONCE
Status: DISCONTINUED | OUTPATIENT
Start: 2017-01-01 | End: 2017-01-01

## 2017-01-01 RX ORDER — DEXTROSE MONOHYDRATE, SODIUM CHLORIDE, AND POTASSIUM CHLORIDE 50; 1.49; 4.5 G/1000ML; G/1000ML; G/1000ML
INJECTION, SOLUTION INTRAVENOUS CONTINUOUS
Status: DISCONTINUED | OUTPATIENT
Start: 2017-01-01 | End: 2017-01-01

## 2017-01-01 RX ORDER — MIDAZOLAM HYDROCHLORIDE 1 MG/ML
1 INJECTION INTRAMUSCULAR; INTRAVENOUS
Status: DISCONTINUED | OUTPATIENT
Start: 2017-01-01 | End: 2017-01-01 | Stop reason: HOSPADM

## 2017-01-01 RX ORDER — MIDAZOLAM HYDROCHLORIDE 1 MG/ML
INJECTION, SOLUTION INTRAMUSCULAR; INTRAVENOUS CODE/TRAUMA/SEDATION MEDICATION
Status: COMPLETED | OUTPATIENT
Start: 2017-01-01 | End: 2017-01-01

## 2017-01-01 RX ORDER — METOPROLOL SUCCINATE 50 MG/1
50 TABLET, EXTENDED RELEASE ORAL NIGHTLY
Status: DISCONTINUED | OUTPATIENT
Start: 2017-01-01 | End: 2017-01-01 | Stop reason: HOSPADM

## 2017-01-01 RX ORDER — ROCURONIUM BROMIDE 10 MG/ML
INJECTION, SOLUTION INTRAVENOUS
Status: DISCONTINUED | OUTPATIENT
Start: 2017-01-01 | End: 2017-01-01

## 2017-01-01 RX ORDER — LIDOCAINE HCL/PF 100 MG/5ML
SYRINGE (ML) INTRAVENOUS
Status: DISCONTINUED | OUTPATIENT
Start: 2017-01-01 | End: 2017-01-01

## 2017-01-01 RX ORDER — POTASSIUM CHLORIDE 750 MG/1
60 CAPSULE, EXTENDED RELEASE ORAL ONCE
Status: COMPLETED | OUTPATIENT
Start: 2017-01-01 | End: 2017-01-01

## 2017-01-01 RX ORDER — OXYCODONE HYDROCHLORIDE 10 MG/1
10 TABLET ORAL EVERY 4 HOURS PRN
Qty: 61 TABLET | Refills: 0 | Status: SHIPPED | OUTPATIENT
Start: 2017-01-01 | End: 2017-01-01

## 2017-01-01 RX ORDER — MORPHINE SULFATE ORAL SOLUTION 10 MG/5ML
4 SOLUTION ORAL EVERY 4 HOURS
Status: DISCONTINUED | OUTPATIENT
Start: 2017-01-01 | End: 2017-01-01

## 2017-01-01 RX ORDER — FENTANYL CITRATE 50 UG/ML
INJECTION, SOLUTION INTRAMUSCULAR; INTRAVENOUS CODE/TRAUMA/SEDATION MEDICATION
Status: COMPLETED | OUTPATIENT
Start: 2017-01-01 | End: 2017-01-01

## 2017-01-01 RX ORDER — NAPROXEN SODIUM 220 MG/1
81 TABLET, FILM COATED ORAL DAILY
Status: ON HOLD | COMMUNITY
End: 2017-01-01 | Stop reason: HOSPADM

## 2017-01-01 RX ORDER — KETOROLAC TROMETHAMINE 30 MG/ML
15 INJECTION, SOLUTION INTRAMUSCULAR; INTRAVENOUS EVERY 8 HOURS
Status: COMPLETED | OUTPATIENT
Start: 2017-01-01 | End: 2017-01-01

## 2017-01-01 RX ORDER — HYDROMORPHONE HYDROCHLORIDE 1 MG/ML
0.2 INJECTION, SOLUTION INTRAMUSCULAR; INTRAVENOUS; SUBCUTANEOUS EVERY 5 MIN PRN
Status: DISCONTINUED | OUTPATIENT
Start: 2017-01-01 | End: 2017-01-01 | Stop reason: HOSPADM

## 2017-01-01 RX ORDER — DILTIAZEM HYDROCHLORIDE 5 MG/ML
0.25 INJECTION INTRAVENOUS ONCE
Status: COMPLETED | OUTPATIENT
Start: 2017-01-01 | End: 2017-01-01

## 2017-01-01 RX ORDER — MAGNESIUM SULFATE HEPTAHYDRATE 40 MG/ML
4 INJECTION, SOLUTION INTRAVENOUS ONCE
Status: COMPLETED | OUTPATIENT
Start: 2017-01-01 | End: 2017-01-01

## 2017-01-01 RX ORDER — DIPHENHYDRAMINE HCL 25 MG
25 CAPSULE ORAL EVERY 6 HOURS PRN
Status: DISCONTINUED | OUTPATIENT
Start: 2017-01-01 | End: 2017-01-01 | Stop reason: HOSPADM

## 2017-01-01 RX ORDER — PHENAZOPYRIDINE HYDROCHLORIDE 100 MG/1
200 TABLET, FILM COATED ORAL ONCE
Status: DISCONTINUED | OUTPATIENT
Start: 2017-01-01 | End: 2017-01-01 | Stop reason: HOSPADM

## 2017-01-01 RX ORDER — HEPARIN SODIUM 5000 [USP'U]/ML
5000 INJECTION, SOLUTION INTRAVENOUS; SUBCUTANEOUS EVERY 8 HOURS
Status: DISCONTINUED | OUTPATIENT
Start: 2017-01-01 | End: 2017-01-01

## 2017-01-01 RX ORDER — OXYCODONE AND ACETAMINOPHEN 5; 325 MG/1; MG/1
1 TABLET ORAL EVERY 6 HOURS PRN
Qty: 45 TABLET | Refills: 0 | Status: ON HOLD | OUTPATIENT
Start: 2017-01-01 | End: 2017-01-01 | Stop reason: HOSPADM

## 2017-01-01 RX ORDER — POLYETHYLENE GLYCOL 3350 17 G/17G
17 POWDER, FOR SOLUTION ORAL DAILY
Qty: 30 PACKET | Refills: 0 | Status: SHIPPED | OUTPATIENT
Start: 2017-01-01 | End: 2017-01-01 | Stop reason: SDUPTHER

## 2017-01-01 RX ORDER — MEPERIDINE HYDROCHLORIDE 50 MG/ML
12.5 INJECTION INTRAMUSCULAR; INTRAVENOUS; SUBCUTANEOUS ONCE AS NEEDED
Status: DISCONTINUED | OUTPATIENT
Start: 2017-01-01 | End: 2017-01-01 | Stop reason: HOSPADM

## 2017-01-01 RX ORDER — PHENAZOPYRIDINE HYDROCHLORIDE 100 MG/1
200 TABLET, FILM COATED ORAL 3 TIMES DAILY PRN
Qty: 60 TABLET | Refills: 1 | Status: ON HOLD | OUTPATIENT
Start: 2017-01-01 | End: 2017-01-01 | Stop reason: HOSPADM

## 2017-01-01 RX ORDER — LIDOCAINE HYDROCHLORIDE 10 MG/ML
10 INJECTION, SOLUTION EPIDURAL; INFILTRATION; INTRACAUDAL; PERINEURAL
Status: COMPLETED | OUTPATIENT
Start: 2017-01-01 | End: 2017-01-01

## 2017-01-01 RX ORDER — LIDOCAINE HYDROCHLORIDE 10 MG/ML
1 INJECTION, SOLUTION EPIDURAL; INFILTRATION; INTRACAUDAL; PERINEURAL ONCE
Status: DISCONTINUED | OUTPATIENT
Start: 2017-01-01 | End: 2017-01-01 | Stop reason: HOSPADM

## 2017-01-01 RX ORDER — TRAZODONE HYDROCHLORIDE 50 MG/1
50 TABLET ORAL NIGHTLY
Status: DISCONTINUED | OUTPATIENT
Start: 2017-01-01 | End: 2017-01-01 | Stop reason: HOSPADM

## 2017-01-01 RX ORDER — MAGNESIUM SULFATE HEPTAHYDRATE 40 MG/ML
4 INJECTION, SOLUTION INTRAVENOUS
Status: DISCONTINUED | OUTPATIENT
Start: 2017-01-01 | End: 2017-01-01

## 2017-01-01 RX ORDER — POTASSIUM CHLORIDE 20 MEQ/1
20 TABLET, EXTENDED RELEASE ORAL ONCE
Status: COMPLETED | OUTPATIENT
Start: 2017-01-01 | End: 2017-01-01

## 2017-01-01 RX ORDER — FAMOTIDINE 20 MG/1
20 TABLET, FILM COATED ORAL 2 TIMES DAILY
Status: DISCONTINUED | OUTPATIENT
Start: 2017-01-01 | End: 2017-01-01 | Stop reason: HOSPADM

## 2017-01-01 RX ORDER — BENZONATATE 100 MG/1
100 CAPSULE ORAL 3 TIMES DAILY PRN
Qty: 30 CAPSULE | Refills: 1 | Status: SHIPPED | OUTPATIENT
Start: 2017-01-01 | End: 2017-01-01 | Stop reason: SDUPTHER

## 2017-01-01 RX ORDER — METOPROLOL TARTRATE 1 MG/ML
5 INJECTION, SOLUTION INTRAVENOUS EVERY 5 MIN PRN
Status: COMPLETED | OUTPATIENT
Start: 2017-01-01 | End: 2017-01-01

## 2017-01-01 RX ORDER — BENZONATATE 100 MG/1
100 CAPSULE ORAL 3 TIMES DAILY PRN
Status: DISCONTINUED | OUTPATIENT
Start: 2017-01-01 | End: 2017-01-01 | Stop reason: HOSPADM

## 2017-01-01 RX ORDER — HYDROMORPHONE HYDROCHLORIDE 1 MG/ML
0.5 INJECTION, SOLUTION INTRAMUSCULAR; INTRAVENOUS; SUBCUTANEOUS ONCE
Status: COMPLETED | OUTPATIENT
Start: 2017-01-01 | End: 2017-01-01

## 2017-01-01 RX ORDER — POLYETHYLENE GLYCOL 3350 17 G/17G
17 POWDER, FOR SOLUTION ORAL DAILY
Qty: 30 PACKET | Refills: 0 | Status: SHIPPED | OUTPATIENT
Start: 2017-01-01 | End: 2017-01-01

## 2017-01-01 RX ORDER — VALSARTAN 320 MG/1
TABLET ORAL
COMMUNITY
Start: 2017-01-01 | End: 2017-01-01 | Stop reason: SDUPTHER

## 2017-01-01 RX ORDER — NITROFURANTOIN 25; 75 MG/1; MG/1
100 CAPSULE ORAL 2 TIMES DAILY
Qty: 14 CAPSULE | Refills: 0 | Status: SHIPPED | OUTPATIENT
Start: 2017-01-01 | End: 2017-01-01

## 2017-01-01 RX ORDER — METOPROLOL SUCCINATE 50 MG/1
50 TABLET, EXTENDED RELEASE ORAL DAILY
Status: DISCONTINUED | OUTPATIENT
Start: 2017-01-01 | End: 2017-01-01

## 2017-01-01 RX ORDER — ENOXAPARIN SODIUM 100 MG/ML
40 INJECTION SUBCUTANEOUS DAILY
Qty: 12 ML | Refills: 0 | Status: SHIPPED | OUTPATIENT
Start: 2017-01-01 | End: 2017-01-01

## 2017-01-01 RX ORDER — OXYBUTYNIN CHLORIDE 5 MG/1
5 TABLET ORAL 3 TIMES DAILY
Status: DISCONTINUED | OUTPATIENT
Start: 2017-01-01 | End: 2017-01-01 | Stop reason: HOSPADM

## 2017-01-01 RX ORDER — SODIUM CHLORIDE 9 MG/ML
1000 INJECTION, SOLUTION INTRAVENOUS CONTINUOUS
Qty: 1000 ML | Refills: 6 | Status: SHIPPED | OUTPATIENT
Start: 2017-01-01 | End: 2017-01-01

## 2017-01-01 RX ORDER — CEFOXITIN SODIUM 2 G/50ML
2 INJECTION, SOLUTION INTRAVENOUS
Status: COMPLETED | OUTPATIENT
Start: 2017-01-01 | End: 2017-01-01

## 2017-01-01 RX ORDER — GUAIFENESIN/DEXTROMETHORPHAN 100-10MG/5
5 SYRUP ORAL 3 TIMES DAILY PRN
Status: DISCONTINUED | OUTPATIENT
Start: 2017-01-01 | End: 2017-01-01

## 2017-01-01 RX ORDER — MORPHINE SULFATE 2 MG/ML
1 INJECTION, SOLUTION INTRAMUSCULAR; INTRAVENOUS ONCE
Status: COMPLETED | OUTPATIENT
Start: 2017-01-01 | End: 2017-01-01

## 2017-01-01 RX ORDER — HYDROMORPHONE HYDROCHLORIDE 2 MG/ML
0.2 INJECTION, SOLUTION INTRAMUSCULAR; INTRAVENOUS; SUBCUTANEOUS EVERY 5 MIN PRN
Status: DISCONTINUED | OUTPATIENT
Start: 2017-01-01 | End: 2017-01-01 | Stop reason: HOSPADM

## 2017-01-01 RX ORDER — ENOXAPARIN SODIUM 100 MG/ML
40 INJECTION SUBCUTANEOUS EVERY 24 HOURS
Status: DISCONTINUED | OUTPATIENT
Start: 2017-01-01 | End: 2017-01-01 | Stop reason: HOSPADM

## 2017-01-01 RX ORDER — ENOXAPARIN SODIUM 100 MG/ML
40 INJECTION SUBCUTANEOUS
Status: DISCONTINUED | OUTPATIENT
Start: 2017-01-01 | End: 2017-01-01

## 2017-01-01 RX ORDER — POLYETHYLENE GLYCOL 3350 17 G/17G
17 POWDER, FOR SOLUTION ORAL DAILY
Status: DISCONTINUED | OUTPATIENT
Start: 2017-01-01 | End: 2017-01-01

## 2017-01-01 RX ORDER — OXYCODONE AND ACETAMINOPHEN 10; 325 MG/1; MG/1
1 TABLET ORAL EVERY 4 HOURS PRN
Status: DISCONTINUED | OUTPATIENT
Start: 2017-01-01 | End: 2017-01-01

## 2017-01-01 RX ORDER — CIPROFLOXACIN 500 MG/1
500 TABLET ORAL 2 TIMES DAILY
Qty: 14 TABLET | Refills: 0 | Status: SHIPPED | OUTPATIENT
Start: 2017-01-01 | End: 2017-01-01

## 2017-01-01 RX ORDER — SODIUM CHLORIDE, SODIUM LACTATE, POTASSIUM CHLORIDE, CALCIUM CHLORIDE 600; 310; 30; 20 MG/100ML; MG/100ML; MG/100ML; MG/100ML
INJECTION, SOLUTION INTRAVENOUS CONTINUOUS
Status: DISCONTINUED | OUTPATIENT
Start: 2017-01-01 | End: 2017-01-01 | Stop reason: HOSPADM

## 2017-01-01 RX ORDER — NITROFURANTOIN 25; 75 MG/1; MG/1
100 CAPSULE ORAL 2 TIMES DAILY
Qty: 14 CAPSULE | Refills: 0 | Status: SHIPPED | OUTPATIENT
Start: 2017-01-01 | End: 2017-01-01 | Stop reason: SDUPTHER

## 2017-01-01 RX ORDER — HEPARIN SODIUM 5000 [USP'U]/ML
5000 INJECTION, SOLUTION INTRAVENOUS; SUBCUTANEOUS EVERY 8 HOURS
Status: CANCELLED | OUTPATIENT
Start: 2017-01-01

## 2017-01-01 RX ORDER — DIPHENHYDRAMINE HYDROCHLORIDE 50 MG/ML
25 INJECTION INTRAMUSCULAR; INTRAVENOUS EVERY 6 HOURS PRN
Status: DISCONTINUED | OUTPATIENT
Start: 2017-01-01 | End: 2017-01-01 | Stop reason: HOSPADM

## 2017-01-01 RX ORDER — NAPROXEN SODIUM 220 MG/1
81 TABLET, FILM COATED ORAL DAILY
Status: DISCONTINUED | OUTPATIENT
Start: 2017-01-01 | End: 2017-01-01

## 2017-01-01 RX ORDER — CLINDAMYCIN PHOSPHATE 900 MG/50ML
900 INJECTION, SOLUTION INTRAVENOUS
Status: DISCONTINUED | OUTPATIENT
Start: 2017-01-01 | End: 2017-01-01

## 2017-01-01 RX ORDER — AMOXICILLIN 500 MG/1
500 CAPSULE ORAL EVERY 8 HOURS
Qty: 30 CAPSULE | Refills: 0 | Status: SHIPPED | OUTPATIENT
Start: 2017-01-01 | End: 2017-01-01

## 2017-01-01 RX ORDER — SODIUM CHLORIDE 9 MG/ML
500 INJECTION, SOLUTION INTRAVENOUS ONCE
Status: COMPLETED | OUTPATIENT
Start: 2017-01-01 | End: 2017-01-01

## 2017-01-01 RX ORDER — POTASSIUM CHLORIDE 29.8 MG/ML
40 INJECTION INTRAVENOUS ONCE
Status: COMPLETED | OUTPATIENT
Start: 2017-01-01 | End: 2017-01-01

## 2017-01-01 RX ORDER — POTASSIUM CHLORIDE 20 MEQ/15ML
60 SOLUTION ORAL ONCE
Status: COMPLETED | OUTPATIENT
Start: 2017-01-01 | End: 2017-01-01

## 2017-01-01 RX ORDER — METOPROLOL SUCCINATE 100 MG/1
50 TABLET, EXTENDED RELEASE ORAL NIGHTLY
COMMUNITY
Start: 2017-01-01 | End: 2017-01-01 | Stop reason: DRUGHIGH

## 2017-01-01 RX ORDER — OXYCODONE AND ACETAMINOPHEN 5; 325 MG/1; MG/1
1 TABLET ORAL EVERY 4 HOURS PRN
Status: DISCONTINUED | OUTPATIENT
Start: 2017-01-01 | End: 2017-01-01

## 2017-01-01 RX ORDER — FENTANYL CITRATE 50 UG/ML
50 INJECTION, SOLUTION INTRAMUSCULAR; INTRAVENOUS
Status: DISCONTINUED | OUTPATIENT
Start: 2017-01-01 | End: 2017-01-01 | Stop reason: HOSPADM

## 2017-01-01 RX ORDER — ACETAMINOPHEN 325 MG/1
650 TABLET ORAL EVERY 8 HOURS PRN
Status: DISCONTINUED | OUTPATIENT
Start: 2017-01-01 | End: 2017-01-01

## 2017-01-01 RX ORDER — POLYETHYLENE GLYCOL 3350 17 G/17G
17 POWDER, FOR SOLUTION ORAL DAILY
Qty: 527 G | Refills: 0 | Status: SHIPPED | OUTPATIENT
Start: 2017-01-01 | End: 2017-01-01

## 2017-01-01 RX ORDER — SERTRALINE HYDROCHLORIDE 25 MG/1
25 TABLET, FILM COATED ORAL DAILY
Status: DISCONTINUED | OUTPATIENT
Start: 2017-01-01 | End: 2017-01-01

## 2017-01-01 RX ORDER — MORPHINE SULFATE ORAL SOLUTION 10 MG/5ML
4 SOLUTION ORAL EVERY 4 HOURS PRN
Qty: 15 ML | Refills: 0 | Status: SHIPPED | OUTPATIENT
Start: 2017-01-01

## 2017-01-01 RX ORDER — OXYCODONE AND ACETAMINOPHEN 5; 325 MG/1; MG/1
1 TABLET ORAL EVERY 4 HOURS PRN
Qty: 21 TABLET | Refills: 0 | Status: SHIPPED | OUTPATIENT
Start: 2017-01-01 | End: 2017-01-01 | Stop reason: SDUPTHER

## 2017-01-01 RX ORDER — DIPHENHYDRAMINE HCL 25 MG
25 CAPSULE ORAL
Status: CANCELLED | OUTPATIENT
Start: 2017-01-01

## 2017-01-01 RX ORDER — HYDROCODONE BITARTRATE AND ACETAMINOPHEN 500; 5 MG/1; MG/1
TABLET ORAL ONCE
Status: CANCELLED | OUTPATIENT
Start: 2017-01-01 | End: 2017-01-01

## 2017-01-01 RX ORDER — OXYCODONE AND ACETAMINOPHEN 5; 325 MG/1; MG/1
1 TABLET ORAL EVERY 4 HOURS PRN
Qty: 31 TABLET | Refills: 0 | Status: SHIPPED | OUTPATIENT
Start: 2017-01-01 | End: 2017-01-01 | Stop reason: SDUPTHER

## 2017-01-01 RX ORDER — HYDROCODONE BITARTRATE AND ACETAMINOPHEN 5; 325 MG/1; MG/1
1 TABLET ORAL EVERY 6 HOURS PRN
Qty: 30 TABLET | Refills: 0 | Status: ON HOLD | OUTPATIENT
Start: 2017-01-01 | End: 2017-01-01 | Stop reason: HOSPADM

## 2017-01-01 RX ORDER — OXYCODONE HYDROCHLORIDE 5 MG/1
10 TABLET ORAL EVERY 4 HOURS PRN
Status: DISCONTINUED | OUTPATIENT
Start: 2017-01-01 | End: 2017-01-01 | Stop reason: HOSPADM

## 2017-01-01 RX ORDER — OXYBUTYNIN CHLORIDE 5 MG/1
TABLET ORAL
COMMUNITY
Start: 2017-01-01 | End: 2017-01-01

## 2017-01-01 RX ORDER — DOXAZOSIN 1 MG/1
4 TABLET ORAL EVERY MORNING
Status: DISCONTINUED | OUTPATIENT
Start: 2017-01-01 | End: 2017-01-01 | Stop reason: HOSPADM

## 2017-01-01 RX ORDER — FAMOTIDINE 10 MG/ML
INJECTION INTRAVENOUS
Status: DISCONTINUED | OUTPATIENT
Start: 2017-01-01 | End: 2017-01-01

## 2017-01-01 RX ORDER — HYDROCODONE BITARTRATE AND ACETAMINOPHEN 5; 325 MG/1; MG/1
1 TABLET ORAL EVERY 4 HOURS PRN
Status: DISCONTINUED | OUTPATIENT
Start: 2017-01-01 | End: 2017-01-01 | Stop reason: HOSPADM

## 2017-01-01 RX ORDER — TAMSULOSIN HYDROCHLORIDE 0.4 MG/1
0.4 CAPSULE ORAL DAILY
Qty: 30 CAPSULE | Refills: 11 | Status: SHIPPED | OUTPATIENT
Start: 2017-01-01 | End: 2017-01-01

## 2017-01-01 RX ORDER — LIDOCAINE AND PRILOCAINE 25; 25 MG/G; MG/G
CREAM TOPICAL
Qty: 5 G | Refills: 0 | Status: ON HOLD | OUTPATIENT
Start: 2017-01-01 | End: 2017-01-01 | Stop reason: HOSPADM

## 2017-01-01 RX ORDER — VALSARTAN 160 MG/1
320 TABLET ORAL
Status: DISCONTINUED | OUTPATIENT
Start: 2017-01-01 | End: 2017-01-01 | Stop reason: HOSPADM

## 2017-01-01 RX ORDER — LUBIPROSTONE 8 UG/1
24 CAPSULE ORAL 2 TIMES DAILY
Status: DISCONTINUED | OUTPATIENT
Start: 2017-01-01 | End: 2017-01-01 | Stop reason: HOSPADM

## 2017-01-01 RX ORDER — LUBIPROSTONE 24 UG/1
24 CAPSULE ORAL 2 TIMES DAILY
Qty: 60 CAPSULE | Refills: 2 | Status: SHIPPED | OUTPATIENT
Start: 2017-01-01 | End: 2017-01-01

## 2017-01-01 RX ORDER — SODIUM,POTASSIUM PHOSPHATES 280-250MG
2 POWDER IN PACKET (EA) ORAL ONCE
Status: DISCONTINUED | OUTPATIENT
Start: 2017-01-01 | End: 2017-01-01

## 2017-01-01 RX ORDER — LORAZEPAM 0.5 MG/1
0.5 TABLET ORAL EVERY 8 HOURS PRN
Qty: 60 TABLET | Refills: 0 | Status: ON HOLD | OUTPATIENT
Start: 2017-01-01 | End: 2017-01-01 | Stop reason: HOSPADM

## 2017-01-01 RX ORDER — OXYCODONE AND ACETAMINOPHEN 10; 325 MG/1; MG/1
1 TABLET ORAL EVERY 6 HOURS PRN
Qty: 90 TABLET | Refills: 0 | Status: SHIPPED | OUTPATIENT
Start: 2017-01-01 | End: 2017-01-01

## 2017-01-01 RX ORDER — DIAZEPAM 5 MG/1
5 TABLET ORAL EVERY 6 HOURS PRN
Qty: 20 TABLET | Refills: 0 | Status: SHIPPED | OUTPATIENT
Start: 2017-01-01 | End: 2017-11-17

## 2017-01-01 RX ORDER — METOPROLOL SUCCINATE 50 MG/1
100 TABLET, EXTENDED RELEASE ORAL DAILY
Status: DISCONTINUED | OUTPATIENT
Start: 2017-01-01 | End: 2017-01-01

## 2017-01-01 RX ORDER — FENTANYL CITRATE 50 UG/ML
25 INJECTION, SOLUTION INTRAMUSCULAR; INTRAVENOUS EVERY 4 HOURS PRN
Status: DISCONTINUED | OUTPATIENT
Start: 2017-01-01 | End: 2017-01-01

## 2017-01-01 RX ORDER — FUROSEMIDE 10 MG/ML
40 INJECTION INTRAMUSCULAR; INTRAVENOUS ONCE
Status: COMPLETED | OUTPATIENT
Start: 2017-01-01 | End: 2017-01-01

## 2017-01-01 RX ORDER — KETAMINE HYDROCHLORIDE 100 MG/ML
INJECTION, SOLUTION INTRAMUSCULAR; INTRAVENOUS
Status: DISCONTINUED
Start: 2017-01-01 | End: 2017-01-01 | Stop reason: HOSPADM

## 2017-01-01 RX ORDER — ASPIRIN 81 MG/1
81 TABLET ORAL DAILY
Status: DISCONTINUED | OUTPATIENT
Start: 2017-01-01 | End: 2017-01-01

## 2017-01-01 RX ORDER — DILTIAZEM HCL 1 MG/ML
5 INJECTION, SOLUTION INTRAVENOUS
Status: DISCONTINUED | OUTPATIENT
Start: 2017-01-01 | End: 2017-01-01

## 2017-01-01 RX ORDER — CIPROFLOXACIN 500 MG/1
500 TABLET ORAL ONCE
Status: COMPLETED | OUTPATIENT
Start: 2017-01-01 | End: 2017-01-01

## 2017-01-01 RX ORDER — MULTIVITAMIN
1 TABLET ORAL DAILY
Status: ON HOLD | COMMUNITY
End: 2017-01-01 | Stop reason: HOSPADM

## 2017-01-01 RX ORDER — LORAZEPAM 0.5 MG/1
0.5 TABLET ORAL EVERY 8 HOURS PRN
Status: DISCONTINUED | OUTPATIENT
Start: 2017-01-01 | End: 2017-01-01

## 2017-01-01 RX ORDER — POTASSIUM CHLORIDE 20 MEQ/1
20 TABLET, EXTENDED RELEASE ORAL DAILY
Status: DISCONTINUED | OUTPATIENT
Start: 2017-01-01 | End: 2017-01-01

## 2017-01-01 RX ADMIN — LORAZEPAM 1 MG: 0.5 TABLET ORAL at 02:10

## 2017-01-01 RX ADMIN — FAMOTIDINE 20 MG: 20 TABLET, FILM COATED ORAL at 09:07

## 2017-01-01 RX ADMIN — MORPHINE SULFATE 3 MG: 2 INJECTION, SOLUTION INTRAMUSCULAR; INTRAVENOUS at 06:10

## 2017-01-01 RX ADMIN — SODIUM CHLORIDE, PRESERVATIVE FREE 3 ML: 5 INJECTION INTRAVENOUS at 05:06

## 2017-01-01 RX ADMIN — LORAZEPAM 1 MG: 0.5 TABLET ORAL at 08:10

## 2017-01-01 RX ADMIN — SODIUM CHLORIDE, PRESERVATIVE FREE 3 ML: 5 INJECTION INTRAVENOUS at 02:06

## 2017-01-01 RX ADMIN — SODIUM CHLORIDE, PRESERVATIVE FREE 10 ML: 5 INJECTION INTRAVENOUS at 02:03

## 2017-01-01 RX ADMIN — MITOMYCIN 40 MG: 20 INJECTION, POWDER, LYOPHILIZED, FOR SOLUTION INTRAVENOUS at 08:03

## 2017-01-01 RX ADMIN — OXYCODONE HYDROCHLORIDE AND ACETAMINOPHEN 1 TABLET: 5; 325 TABLET ORAL at 10:06

## 2017-01-01 RX ADMIN — SODIUM CHLORIDE: 0.9 INJECTION, SOLUTION INTRAVENOUS at 09:05

## 2017-01-01 RX ADMIN — MAGNESIUM OXIDE TAB 400 MG (241.3 MG ELEMENTAL MG) 400 MG: 400 (241.3 MG) TAB at 11:07

## 2017-01-01 RX ADMIN — KETOROLAC TROMETHAMINE 15 MG: 30 INJECTION, SOLUTION INTRAMUSCULAR at 06:07

## 2017-01-01 RX ADMIN — LIDOCAINE HYDROCHLORIDE 100 MG: 20 INJECTION, SOLUTION INTRAVENOUS at 08:07

## 2017-01-01 RX ADMIN — FUROSEMIDE 40 MG: 40 TABLET ORAL at 09:10

## 2017-01-01 RX ADMIN — MIDAZOLAM HYDROCHLORIDE 0.5 MG: 1 INJECTION, SOLUTION INTRAMUSCULAR; INTRAVENOUS at 01:09

## 2017-01-01 RX ADMIN — VALSARTAN 320 MG: 160 TABLET, FILM COATED ORAL at 02:06

## 2017-01-01 RX ADMIN — LIDOCAINE HYDROCHLORIDE 100 MG: 20 INJECTION, SOLUTION INTRAVENOUS at 07:03

## 2017-01-01 RX ADMIN — METOPROLOL TARTRATE 75 MG: 25 TABLET ORAL at 09:09

## 2017-01-01 RX ADMIN — ACETAMINOPHEN 650 MG: 325 TABLET ORAL at 09:05

## 2017-01-01 RX ADMIN — DOXAZOSIN 4 MG: 1 TABLET ORAL at 01:06

## 2017-01-01 RX ADMIN — KETAMINE HYDROCHLORIDE 50 MG: 100 INJECTION, SOLUTION, CONCENTRATE INTRAMUSCULAR; INTRAVENOUS at 08:06

## 2017-01-01 RX ADMIN — OXYCODONE HYDROCHLORIDE AND ACETAMINOPHEN 1 TABLET: 10; 325 TABLET ORAL at 11:06

## 2017-01-01 RX ADMIN — DIAZEPAM 5 MG: 5 TABLET ORAL at 12:10

## 2017-01-01 RX ADMIN — ASPIRIN 81 MG CHEWABLE TABLET 81 MG: 81 TABLET CHEWABLE at 09:10

## 2017-01-01 RX ADMIN — CEFOXITIN SODIUM 2 G: 2 INJECTION, SOLUTION INTRAVENOUS at 06:07

## 2017-01-01 RX ADMIN — IOHEXOL 50 ML: 300 INJECTION, SOLUTION INTRAVENOUS at 10:06

## 2017-01-01 RX ADMIN — DEXAMETHASONE SODIUM PHOSPHATE 8 MG: 4 INJECTION, SOLUTION INTRAMUSCULAR; INTRAVENOUS at 08:07

## 2017-01-01 RX ADMIN — AMPICILLIN 1 G: 1 INJECTION, POWDER, FOR SOLUTION INTRAMUSCULAR; INTRAVENOUS at 09:06

## 2017-01-01 RX ADMIN — SODIUM CHLORIDE: 0.9 INJECTION, SOLUTION INTRAVENOUS at 03:04

## 2017-01-01 RX ADMIN — MAGNESIUM SULFATE IN WATER 2 G: 40 INJECTION, SOLUTION INTRAVENOUS at 10:10

## 2017-01-01 RX ADMIN — FAMOTIDINE 20 MG: 20 TABLET, FILM COATED ORAL at 11:07

## 2017-01-01 RX ADMIN — POTASSIUM CHLORIDE 10 MEQ: 10 INJECTION, SOLUTION INTRAVENOUS at 06:07

## 2017-01-01 RX ADMIN — MORPHINE SULFATE 3 MG: 2 INJECTION, SOLUTION INTRAMUSCULAR; INTRAVENOUS at 02:10

## 2017-01-01 RX ADMIN — VALSARTAN 320 MG: 160 TABLET, FILM COATED ORAL at 03:07

## 2017-01-01 RX ADMIN — AMIODARONE HYDROCHLORIDE 400 MG: 200 TABLET ORAL at 08:10

## 2017-01-01 RX ADMIN — ACETAMINOPHEN 1000 MG: 10 INJECTION, SOLUTION INTRAVENOUS at 06:07

## 2017-01-01 RX ADMIN — KETOROLAC TROMETHAMINE 15 MG: 30 INJECTION, SOLUTION INTRAMUSCULAR at 09:07

## 2017-01-01 RX ADMIN — RIVAROXABAN 20 MG: 20 TABLET, FILM COATED ORAL at 05:10

## 2017-01-01 RX ADMIN — INDAPAMIDE 2.5 MG: 2.5 TABLET, FILM COATED ORAL at 06:07

## 2017-01-01 RX ADMIN — DILTIAZEM HYDROCHLORIDE 120 MG: 120 CAPSULE, EXTENDED RELEASE ORAL at 09:10

## 2017-01-01 RX ADMIN — VANCOMYCIN HYDROCHLORIDE 1000 MG: 1 INJECTION, POWDER, LYOPHILIZED, FOR SOLUTION INTRAVENOUS at 12:10

## 2017-01-01 RX ADMIN — SERTRALINE HYDROCHLORIDE 25 MG: 25 TABLET ORAL at 09:10

## 2017-01-01 RX ADMIN — FENTANYL CITRATE 25 MCG: 50 INJECTION INTRAMUSCULAR; INTRAVENOUS at 09:10

## 2017-01-01 RX ADMIN — CALCIUM GLUCONATE 1000 MG: 94 INJECTION, SOLUTION INTRAVENOUS at 06:07

## 2017-01-01 RX ADMIN — METOPROLOL SUCCINATE 50 MG: 50 TABLET, EXTENDED RELEASE ORAL at 09:07

## 2017-01-01 RX ADMIN — CEFTRIAXONE 2 G: 2 INJECTION, SOLUTION INTRAVENOUS at 07:10

## 2017-01-01 RX ADMIN — ONDANSETRON 4 MG: 2 INJECTION, SOLUTION INTRAMUSCULAR; INTRAVENOUS at 07:03

## 2017-01-01 RX ADMIN — FENTANYL CITRATE 50 MCG: 50 INJECTION INTRAMUSCULAR; INTRAVENOUS at 07:03

## 2017-01-01 RX ADMIN — ACETAMINOPHEN 1000 MG: 10 INJECTION, SOLUTION INTRAVENOUS at 07:07

## 2017-01-01 RX ADMIN — FAMOTIDINE 20 MG: 20 TABLET, FILM COATED ORAL at 08:07

## 2017-01-01 RX ADMIN — HYDROMORPHONE HYDROCHLORIDE 0.5 MG: 1 INJECTION, SOLUTION INTRAMUSCULAR; INTRAVENOUS; SUBCUTANEOUS at 03:07

## 2017-01-01 RX ADMIN — DILTIAZEM HYDROCHLORIDE 15 MG: 5 INJECTION INTRAVENOUS at 09:09

## 2017-01-01 RX ADMIN — FUROSEMIDE 40 MG: 10 INJECTION, SOLUTION INTRAVENOUS at 08:10

## 2017-01-01 RX ADMIN — RIVAROXABAN 20 MG: 20 TABLET, FILM COATED ORAL at 04:10

## 2017-01-01 RX ADMIN — FENTANYL CITRATE 50 MCG: 50 INJECTION, SOLUTION INTRAMUSCULAR; INTRAVENOUS at 11:06

## 2017-01-01 RX ADMIN — ASPIRIN 81 MG CHEWABLE TABLET 81 MG: 81 TABLET CHEWABLE at 08:10

## 2017-01-01 RX ADMIN — DEXMEDETOMIDINE HYDROCHLORIDE 0.5 MCG/KG/HR: 100 INJECTION, SOLUTION, CONCENTRATE INTRAVENOUS at 08:07

## 2017-01-01 RX ADMIN — FENTANYL CITRATE 25 MCG: 50 INJECTION, SOLUTION INTRAMUSCULAR; INTRAVENOUS at 10:06

## 2017-01-01 RX ADMIN — MIDAZOLAM HYDROCHLORIDE 2 MG: 1 INJECTION, SOLUTION INTRAMUSCULAR; INTRAVENOUS at 04:07

## 2017-01-01 RX ADMIN — CISPLATIN 65 MG: 1 INJECTION, SOLUTION INTRAVENOUS at 02:05

## 2017-01-01 RX ADMIN — GLYCOPYRROLATE 0.1 MG: 0.2 INJECTION, SOLUTION INTRAMUSCULAR; INTRAVENOUS at 07:03

## 2017-01-01 RX ADMIN — AMIODARONE HYDROCHLORIDE 1 MG/MIN: 1.8 INJECTION, SOLUTION INTRAVENOUS at 02:10

## 2017-01-01 RX ADMIN — SODIUM CHLORIDE, PRESERVATIVE FREE 500 UNITS: 5 INJECTION INTRAVENOUS at 02:03

## 2017-01-01 RX ADMIN — KETOROLAC TROMETHAMINE 30 MG: 30 INJECTION, SOLUTION INTRAMUSCULAR; INTRAVENOUS at 08:03

## 2017-01-01 RX ADMIN — DEXAMETHASONE SODIUM PHOSPHATE 0.25 MG: 4 INJECTION, SOLUTION INTRAMUSCULAR; INTRAVENOUS at 01:04

## 2017-01-01 RX ADMIN — DILTIAZEM HYDROCHLORIDE 60 MG: 60 TABLET, FILM COATED ORAL at 10:10

## 2017-01-01 RX ADMIN — LIDOCAINE HYDROCHLORIDE 10 MG: 10 INJECTION, SOLUTION EPIDURAL; INFILTRATION; INTRACAUDAL; PERINEURAL at 12:03

## 2017-01-01 RX ADMIN — OXYCODONE HYDROCHLORIDE AND ACETAMINOPHEN 1 TABLET: 10; 325 TABLET ORAL at 06:10

## 2017-01-01 RX ADMIN — SODIUM CHLORIDE 500 ML: 0.9 INJECTION, SOLUTION INTRAVENOUS at 12:03

## 2017-01-01 RX ADMIN — MAGNESIUM SULFATE HEPTAHYDRATE 3 G: 500 INJECTION, SOLUTION INTRAMUSCULAR; INTRAVENOUS at 06:07

## 2017-01-01 RX ADMIN — FUROSEMIDE 40 MG: 40 TABLET ORAL at 10:10

## 2017-01-01 RX ADMIN — FENTANYL CITRATE 25 MCG: 50 INJECTION, SOLUTION INTRAMUSCULAR; INTRAVENOUS at 01:09

## 2017-01-01 RX ADMIN — DILTIAZEM HYDROCHLORIDE 120 MG: 120 CAPSULE, EXTENDED RELEASE ORAL at 10:10

## 2017-01-01 RX ADMIN — OXYCODONE HYDROCHLORIDE AND ACETAMINOPHEN 1 TABLET: 5; 325 TABLET ORAL at 10:09

## 2017-01-01 RX ADMIN — DEXMEDETOMIDINE HYDROCHLORIDE 0.6 MCG/KG/HR: 100 INJECTION, SOLUTION, CONCENTRATE INTRAVENOUS at 09:07

## 2017-01-01 RX ADMIN — DEXAMETHASONE SODIUM PHOSPHATE 0.25 MG: 4 INJECTION, SOLUTION INTRAMUSCULAR; INTRAVENOUS at 12:05

## 2017-01-01 RX ADMIN — POLYETHYLENE GLYCOL 3350 17 G: 17 POWDER, FOR SOLUTION ORAL at 09:10

## 2017-01-01 RX ADMIN — FAMOTIDINE 20 MG: 10 INJECTION, SOLUTION INTRAVENOUS at 09:07

## 2017-01-01 RX ADMIN — POTASSIUM CHLORIDE 20 MEQ: 1500 TABLET, EXTENDED RELEASE ORAL at 09:10

## 2017-01-01 RX ADMIN — MIDAZOLAM HYDROCHLORIDE 2 MG: 1 INJECTION, SOLUTION INTRAMUSCULAR; INTRAVENOUS at 08:06

## 2017-01-01 RX ADMIN — NEOSTIGMINE METHYLSULFATE 5 MG: 1 INJECTION INTRAVENOUS at 01:07

## 2017-01-01 RX ADMIN — HYDROMORPHONE HYDROCHLORIDE 1 MG: 2 INJECTION, SOLUTION INTRAMUSCULAR; INTRAVENOUS; SUBCUTANEOUS at 09:06

## 2017-01-01 RX ADMIN — METOPROLOL TARTRATE 50 MG: 50 TABLET, FILM COATED ORAL at 01:09

## 2017-01-01 RX ADMIN — OXYCODONE HYDROCHLORIDE AND ACETAMINOPHEN 1 TABLET: 10; 325 TABLET ORAL at 05:06

## 2017-01-01 RX ADMIN — CEFTRIAXONE 2 G: 2 INJECTION, SOLUTION INTRAVENOUS at 11:10

## 2017-01-01 RX ADMIN — METOPROLOL TARTRATE 50 MG: 50 TABLET, FILM COATED ORAL at 05:10

## 2017-01-01 RX ADMIN — POTASSIUM CHLORIDE 60 MEQ: 20 SOLUTION ORAL at 03:10

## 2017-01-01 RX ADMIN — CEFTRIAXONE 2 G: 2 INJECTION, SOLUTION INTRAVENOUS at 08:10

## 2017-01-01 RX ADMIN — HYDROMORPHONE HYDROCHLORIDE 1 MG: 1 INJECTION, SOLUTION INTRAMUSCULAR; INTRAVENOUS; SUBCUTANEOUS at 05:07

## 2017-01-01 RX ADMIN — HEPARIN SODIUM (PORCINE) LOCK FLUSH IV SOLN 100 UNIT/ML 500 UNITS: 100 SOLUTION at 05:09

## 2017-01-01 RX ADMIN — SODIUM CHLORIDE: 0.9 INJECTION, SOLUTION INTRAVENOUS at 02:04

## 2017-01-01 RX ADMIN — VALSARTAN 320 MG: 160 TABLET, FILM COATED ORAL at 03:06

## 2017-01-01 RX ADMIN — DILTIAZEM HYDROCHLORIDE 60 MG: 60 TABLET, FILM COATED ORAL at 06:10

## 2017-01-01 RX ADMIN — AMIODARONE HYDROCHLORIDE 400 MG: 200 TABLET ORAL at 11:10

## 2017-01-01 RX ADMIN — CALCIUM CARBONATE (ANTACID) CHEW TAB 500 MG 1000 MG: 500 CHEW TAB at 06:07

## 2017-01-01 RX ADMIN — ALVIMOPAN 12 MG: 12 CAPSULE ORAL at 11:07

## 2017-01-01 RX ADMIN — OXYCODONE HYDROCHLORIDE 10 MG: 5 TABLET ORAL at 10:07

## 2017-01-01 RX ADMIN — METOPROLOL TARTRATE 50 MG: 50 TABLET, FILM COATED ORAL at 12:10

## 2017-01-01 RX ADMIN — FENTANYL CITRATE 50 MCG: 50 INJECTION, SOLUTION INTRAMUSCULAR; INTRAVENOUS at 08:06

## 2017-01-01 RX ADMIN — SODIUM CHLORIDE: 0.9 INJECTION, SOLUTION INTRAVENOUS at 02:05

## 2017-01-01 RX ADMIN — SODIUM CHLORIDE, SODIUM GLUCONATE, SODIUM ACETATE, POTASSIUM CHLORIDE, MAGNESIUM CHLORIDE, SODIUM PHOSPHATE, DIBASIC, AND POTASSIUM PHOSPHATE: .53; .5; .37; .037; .03; .012; .00082 INJECTION, SOLUTION INTRAVENOUS at 09:06

## 2017-01-01 RX ADMIN — METOPROLOL TARTRATE 5 MG: 5 INJECTION INTRAVENOUS at 07:10

## 2017-01-01 RX ADMIN — FENTANYL 1 PATCH: 25 PATCH, EXTENDED RELEASE TRANSDERMAL at 02:10

## 2017-01-01 RX ADMIN — LORAZEPAM 1 MG: 0.5 TABLET ORAL at 03:10

## 2017-01-01 RX ADMIN — AMIODARONE HYDROCHLORIDE 400 MG: 200 TABLET ORAL at 09:10

## 2017-01-01 RX ADMIN — DEXTROSE MONOHYDRATE, SODIUM CHLORIDE, AND POTASSIUM CHLORIDE: 50; 4.5; 1.49 INJECTION, SOLUTION INTRAVENOUS at 10:04

## 2017-01-01 RX ADMIN — OXYCODONE HYDROCHLORIDE 10 MG: 5 TABLET ORAL at 01:07

## 2017-01-01 RX ADMIN — OXYCODONE HYDROCHLORIDE AND ACETAMINOPHEN 1 TABLET: 5; 325 TABLET ORAL at 11:06

## 2017-01-01 RX ADMIN — OXYCODONE HYDROCHLORIDE AND ACETAMINOPHEN 1 TABLET: 10; 325 TABLET ORAL at 09:10

## 2017-01-01 RX ADMIN — SODIUM CHLORIDE, SODIUM LACTATE, POTASSIUM CHLORIDE, AND CALCIUM CHLORIDE: .6; .31; .03; .02 INJECTION, SOLUTION INTRAVENOUS at 06:03

## 2017-01-01 RX ADMIN — ALVIMOPAN 12 MG: 12 CAPSULE ORAL at 09:07

## 2017-01-01 RX ADMIN — SODIUM CHLORIDE, PRESERVATIVE FREE 10 ML: 5 INJECTION INTRAVENOUS at 03:05

## 2017-01-01 RX ADMIN — DILTIAZEM HYDROCHLORIDE 60 MG: 60 TABLET, FILM COATED ORAL at 11:10

## 2017-01-01 RX ADMIN — STANDARDIZED SENNA CONCENTRATE AND DOCUSATE SODIUM 1 TABLET: 8.6; 5 TABLET, FILM COATED ORAL at 08:10

## 2017-01-01 RX ADMIN — MIDAZOLAM HYDROCHLORIDE 2 MG: 1 INJECTION, SOLUTION INTRAMUSCULAR; INTRAVENOUS at 07:03

## 2017-01-01 RX ADMIN — METOCLOPRAMIDE 10 MG: 5 INJECTION, SOLUTION INTRAMUSCULAR; INTRAVENOUS at 09:07

## 2017-01-01 RX ADMIN — IOHEXOL 75 ML: 350 INJECTION, SOLUTION INTRAVENOUS at 11:08

## 2017-01-01 RX ADMIN — MAGNESIUM SULFATE HEPTAHYDRATE 1 G: 500 INJECTION, SOLUTION INTRAMUSCULAR; INTRAVENOUS at 10:10

## 2017-01-01 RX ADMIN — METHYLENE BLUE 5 MG: 10 INJECTION, SOLUTION INTRAVENOUS at 08:03

## 2017-01-01 RX ADMIN — DEXTROSE MONOHYDRATE, SODIUM CHLORIDE, AND POTASSIUM CHLORIDE: 50; 4.5; 1.49 INJECTION, SOLUTION INTRAVENOUS at 01:07

## 2017-01-01 RX ADMIN — SODIUM PHOSPHATE, MONOBASIC, MONOHYDRATE 39.99 MMOL: 276; 142 INJECTION, SOLUTION INTRAVENOUS at 10:07

## 2017-01-01 RX ADMIN — SODIUM CHLORIDE 1000 ML: 0.9 INJECTION, SOLUTION INTRAVENOUS at 10:07

## 2017-01-01 RX ADMIN — OXYCODONE HYDROCHLORIDE AND ACETAMINOPHEN 1 TABLET: 5; 325 TABLET ORAL at 03:09

## 2017-01-01 RX ADMIN — METOPROLOL SUCCINATE 200 MG: 100 TABLET, EXTENDED RELEASE ORAL at 10:10

## 2017-01-01 RX ADMIN — HEPARIN 500 UNITS: 100 SYRINGE at 03:05

## 2017-01-01 RX ADMIN — MIDAZOLAM HYDROCHLORIDE 1 MG: 1 INJECTION, SOLUTION INTRAMUSCULAR; INTRAVENOUS at 12:09

## 2017-01-01 RX ADMIN — HEPARIN 500 UNITS: 100 SYRINGE at 04:05

## 2017-01-01 RX ADMIN — CLINDAMYCIN IN 5 PERCENT DEXTROSE 900 MG: 18 INJECTION, SOLUTION INTRAVENOUS at 08:10

## 2017-01-01 RX ADMIN — OXYCODONE HYDROCHLORIDE AND ACETAMINOPHEN 1 TABLET: 10; 325 TABLET ORAL at 05:10

## 2017-01-01 RX ADMIN — KETOROLAC TROMETHAMINE 15 MG: 30 INJECTION, SOLUTION INTRAMUSCULAR at 05:07

## 2017-01-01 RX ADMIN — ENOXAPARIN SODIUM 40 MG: 100 INJECTION SUBCUTANEOUS at 07:07

## 2017-01-01 RX ADMIN — MIDAZOLAM HYDROCHLORIDE 0.5 MG: 1 INJECTION, SOLUTION INTRAMUSCULAR; INTRAVENOUS at 10:06

## 2017-01-01 RX ADMIN — AMIODARONE HYDROCHLORIDE 400 MG: 200 TABLET ORAL at 10:10

## 2017-01-01 RX ADMIN — MAGNESIUM SULFATE HEPTAHYDRATE 3 G: 500 INJECTION, SOLUTION INTRAMUSCULAR; INTRAVENOUS at 03:10

## 2017-01-01 RX ADMIN — LORAZEPAM 1 MG: 0.5 TABLET ORAL at 06:10

## 2017-01-01 RX ADMIN — GEMCITABINE 1870 MG: 1 INJECTION, POWDER, LYOPHILIZED, FOR SOLUTION INTRAVENOUS at 12:04

## 2017-01-01 RX ADMIN — MAGNESIUM OXIDE TAB 400 MG (241.3 MG ELEMENTAL MG) 400 MG: 400 (241.3 MG) TAB at 09:07

## 2017-01-01 RX ADMIN — GUAIFENESIN AND DEXTROMETHORPHAN 5 ML: 100; 10 SYRUP ORAL at 02:10

## 2017-01-01 RX ADMIN — OXYCODONE HYDROCHLORIDE AND ACETAMINOPHEN 1 TABLET: 5; 325 TABLET ORAL at 05:10

## 2017-01-01 RX ADMIN — DOXAZOSIN 4 MG: 1 TABLET ORAL at 09:06

## 2017-01-01 RX ADMIN — POTASSIUM CHLORIDE 60 MEQ: 750 CAPSULE, EXTENDED RELEASE ORAL at 08:09

## 2017-01-01 RX ADMIN — METOPROLOL SUCCINATE 250 MG: 100 TABLET, EXTENDED RELEASE ORAL at 08:09

## 2017-01-01 RX ADMIN — OXYCODONE HYDROCHLORIDE 10 MG: 5 TABLET ORAL at 08:07

## 2017-01-01 RX ADMIN — POTASSIUM BICARBONATE 50 MEQ: 25 TABLET, EFFERVESCENT ORAL at 08:10

## 2017-01-01 RX ADMIN — METOPROLOL SUCCINATE 50 MG: 50 TABLET, EXTENDED RELEASE ORAL at 11:06

## 2017-01-01 RX ADMIN — PHENYLEPHRINE HYDROCHLORIDE 100 MCG: 10 INJECTION INTRAVENOUS at 07:03

## 2017-01-01 RX ADMIN — DIAZEPAM 5 MG: 5 TABLET ORAL at 07:10

## 2017-01-01 RX ADMIN — CEFTRIAXONE 2 G: 2 INJECTION, SOLUTION INTRAVENOUS at 03:06

## 2017-01-01 RX ADMIN — FUROSEMIDE 40 MG: 10 INJECTION, SOLUTION INTRAVENOUS at 02:10

## 2017-01-01 RX ADMIN — CISPLATIN 68 MG: 1 INJECTION, SOLUTION INTRAVENOUS at 02:04

## 2017-01-01 RX ADMIN — FENTANYL CITRATE 25 MCG: 50 INJECTION, SOLUTION INTRAMUSCULAR; INTRAVENOUS at 01:03

## 2017-01-01 RX ADMIN — PHENYLEPHRINE HYDROCHLORIDE 150 MCG: 10 INJECTION INTRAVENOUS at 10:07

## 2017-01-01 RX ADMIN — ALVIMOPAN 12 MG: 12 CAPSULE ORAL at 07:07

## 2017-01-01 RX ADMIN — DOXAZOSIN 4 MG: 1 TABLET ORAL at 03:06

## 2017-01-01 RX ADMIN — GUAIFENESIN AND DEXTROMETHORPHAN 5 ML: 100; 10 SYRUP ORAL at 12:10

## 2017-01-01 RX ADMIN — DOXAZOSIN 4 MG: 1 TABLET ORAL at 06:07

## 2017-01-01 RX ADMIN — METOPROLOL SUCCINATE 200 MG: 100 TABLET, EXTENDED RELEASE ORAL at 09:10

## 2017-01-01 RX ADMIN — SODIUM CHLORIDE, SODIUM LACTATE, POTASSIUM CHLORIDE, AND CALCIUM CHLORIDE: .6; .31; .03; .02 INJECTION, SOLUTION INTRAVENOUS at 08:03

## 2017-01-01 RX ADMIN — SODIUM CHLORIDE 1000 ML: 0.9 INJECTION, SOLUTION INTRAVENOUS at 09:09

## 2017-01-01 RX ADMIN — DIAZEPAM 5 MG: 5 TABLET ORAL at 01:10

## 2017-01-01 RX ADMIN — Medication 3 ML: at 06:09

## 2017-01-01 RX ADMIN — OXYCODONE HYDROCHLORIDE AND ACETAMINOPHEN 1 TABLET: 10; 325 TABLET ORAL at 07:10

## 2017-01-01 RX ADMIN — METOPROLOL TARTRATE 50 MG: 50 TABLET, FILM COATED ORAL at 11:10

## 2017-01-01 RX ADMIN — SODIUM CHLORIDE 150 MG: 9 INJECTION, SOLUTION INTRAVENOUS at 12:04

## 2017-01-01 RX ADMIN — Medication 10 MG: at 08:03

## 2017-01-01 RX ADMIN — KETOROLAC TROMETHAMINE 15 MG: 30 INJECTION, SOLUTION INTRAMUSCULAR at 10:07

## 2017-01-01 RX ADMIN — LIDOCAINE HYDROCHLORIDE: 20 JELLY TOPICAL at 12:05

## 2017-01-01 RX ADMIN — PALONOSETRON HYDROCHLORIDE 0.25 MG: 0.25 INJECTION INTRAVENOUS at 11:03

## 2017-01-01 RX ADMIN — GLYCOPYRROLATE 0.2 MG: 0.2 INJECTION, SOLUTION INTRAMUSCULAR; INTRAVENOUS at 07:03

## 2017-01-01 RX ADMIN — OXYCODONE HYDROCHLORIDE 5 MG: 5 TABLET ORAL at 10:07

## 2017-01-01 RX ADMIN — CLINDAMYCIN IN 5 PERCENT DEXTROSE 900 MG: 18 INJECTION, SOLUTION INTRAVENOUS at 11:10

## 2017-01-01 RX ADMIN — CEFOXITIN SODIUM 2 G: 2 INJECTION, SOLUTION INTRAVENOUS at 07:07

## 2017-01-01 RX ADMIN — DEXTROSE MONOHYDRATE, SODIUM CHLORIDE, AND POTASSIUM CHLORIDE: 50; 4.5; 1.49 INJECTION, SOLUTION INTRAVENOUS at 12:07

## 2017-01-01 RX ADMIN — MAGNESIUM SULFATE IN WATER 4 G: 40 INJECTION, SOLUTION INTRAVENOUS at 08:09

## 2017-01-01 RX ADMIN — SERTRALINE HYDROCHLORIDE 25 MG: 25 TABLET ORAL at 10:10

## 2017-01-01 RX ADMIN — METOPROLOL SUCCINATE 50 MG: 50 TABLET, EXTENDED RELEASE ORAL at 10:07

## 2017-01-01 RX ADMIN — SODIUM CHLORIDE, SODIUM GLUCONATE, SODIUM ACETATE, POTASSIUM CHLORIDE, MAGNESIUM CHLORIDE, SODIUM PHOSPHATE, DIBASIC, AND POTASSIUM PHOSPHATE: .53; .5; .37; .037; .03; .012; .00082 INJECTION, SOLUTION INTRAVENOUS at 08:07

## 2017-01-01 RX ADMIN — TRAZODONE HYDROCHLORIDE 50 MG: 50 TABLET ORAL at 09:09

## 2017-01-01 RX ADMIN — SODIUM CHLORIDE, SODIUM GLUCONATE, SODIUM ACETATE, POTASSIUM CHLORIDE, MAGNESIUM CHLORIDE, SODIUM PHOSPHATE, DIBASIC, AND POTASSIUM PHOSPHATE: .53; .5; .37; .037; .03; .012; .00082 INJECTION, SOLUTION INTRAVENOUS at 09:07

## 2017-01-01 RX ADMIN — SODIUM CHLORIDE 150 MG: 9 INJECTION, SOLUTION INTRAVENOUS at 11:04

## 2017-01-01 RX ADMIN — MAGNESIUM OXIDE TAB 400 MG (241.3 MG ELEMENTAL MG) 400 MG: 400 (241.3 MG) TAB at 08:07

## 2017-01-01 RX ADMIN — AMPICILLIN 1 G: 1 INJECTION, POWDER, FOR SOLUTION INTRAMUSCULAR; INTRAVENOUS at 05:06

## 2017-01-01 RX ADMIN — AMPICILLIN 1 G: 1 INJECTION, POWDER, FOR SOLUTION INTRAMUSCULAR; INTRAVENOUS at 10:06

## 2017-01-01 RX ADMIN — GEMCITABINE HYDROCHLORIDE 1870 MG: 200 INJECTION, POWDER, LYOPHILIZED, FOR SOLUTION INTRAVENOUS at 01:05

## 2017-01-01 RX ADMIN — OXYCODONE HYDROCHLORIDE AND ACETAMINOPHEN 1 TABLET: 10; 325 TABLET ORAL at 10:10

## 2017-01-01 RX ADMIN — MIDAZOLAM HYDROCHLORIDE 1 MG: 1 INJECTION, SOLUTION INTRAMUSCULAR; INTRAVENOUS at 11:06

## 2017-01-01 RX ADMIN — OXYCODONE HYDROCHLORIDE AND ACETAMINOPHEN 1 TABLET: 5; 325 TABLET ORAL at 03:06

## 2017-01-01 RX ADMIN — IOHEXOL 15 ML: 350 INJECTION, SOLUTION INTRAVENOUS at 10:08

## 2017-01-01 RX ADMIN — ENOXAPARIN SODIUM 40 MG: 100 INJECTION SUBCUTANEOUS at 04:07

## 2017-01-01 RX ADMIN — VANCOMYCIN HYDROCHLORIDE 1000 MG: 1 INJECTION, POWDER, LYOPHILIZED, FOR SOLUTION INTRAVENOUS at 02:07

## 2017-01-01 RX ADMIN — ACETAMINOPHEN 1000 MG: 10 INJECTION, SOLUTION INTRAVENOUS at 03:07

## 2017-01-01 RX ADMIN — OXYCODONE HYDROCHLORIDE 10 MG: 5 TABLET ORAL at 02:07

## 2017-01-01 RX ADMIN — SODIUM CHLORIDE, SODIUM GLUCONATE, SODIUM ACETATE, POTASSIUM CHLORIDE, MAGNESIUM CHLORIDE, SODIUM PHOSPHATE, DIBASIC, AND POTASSIUM PHOSPHATE: .53; .5; .37; .037; .03; .012; .00082 INJECTION, SOLUTION INTRAVENOUS at 11:07

## 2017-01-01 RX ADMIN — MORPHINE SULFATE 2 MG: 2 INJECTION, SOLUTION INTRAMUSCULAR; INTRAVENOUS at 03:10

## 2017-01-01 RX ADMIN — DIPHENHYDRAMINE HYDROCHLORIDE 25 MG: 25 CAPSULE ORAL at 09:05

## 2017-01-01 RX ADMIN — MORPHINE SULFATE 2 MG: 2 INJECTION, SOLUTION INTRAMUSCULAR; INTRAVENOUS at 10:10

## 2017-01-01 RX ADMIN — POTASSIUM CHLORIDE: 149 INJECTION, SOLUTION, CONCENTRATE INTRAVENOUS at 09:04

## 2017-01-01 RX ADMIN — FENTANYL CITRATE 100 MCG: 50 INJECTION, SOLUTION INTRAMUSCULAR; INTRAVENOUS at 08:07

## 2017-01-01 RX ADMIN — POTASSIUM CHLORIDE 20 MEQ: 1500 TABLET, EXTENDED RELEASE ORAL at 10:07

## 2017-01-01 RX ADMIN — SODIUM CHLORIDE, PRESERVATIVE FREE 3 ML: 5 INJECTION INTRAVENOUS at 09:06

## 2017-01-01 RX ADMIN — PROPOFOL 200 MG: 10 INJECTION, EMULSION INTRAVENOUS at 07:03

## 2017-01-01 RX ADMIN — HYDROMORPHONE HYDROCHLORIDE 1 MG: 1 INJECTION, SOLUTION INTRAMUSCULAR; INTRAVENOUS; SUBCUTANEOUS at 11:07

## 2017-01-01 RX ADMIN — MIDAZOLAM HYDROCHLORIDE 2 MG: 1 INJECTION, SOLUTION INTRAMUSCULAR; INTRAVENOUS at 08:07

## 2017-01-01 RX ADMIN — HEPARIN SODIUM 5000 UNITS: 5000 INJECTION, SOLUTION INTRAVENOUS; SUBCUTANEOUS at 05:10

## 2017-01-01 RX ADMIN — MIDAZOLAM HYDROCHLORIDE 1 MG: 1 INJECTION, SOLUTION INTRAMUSCULAR; INTRAVENOUS at 04:07

## 2017-01-01 RX ADMIN — INDAPAMIDE 2.5 MG: 2.5 TABLET, FILM COATED ORAL at 08:09

## 2017-01-01 RX ADMIN — OXYCODONE HYDROCHLORIDE AND ACETAMINOPHEN 1 TABLET: 10; 325 TABLET ORAL at 02:10

## 2017-01-01 RX ADMIN — AMPICILLIN 1 G: 1 INJECTION, POWDER, FOR SOLUTION INTRAMUSCULAR; INTRAVENOUS at 02:06

## 2017-01-01 RX ADMIN — FENTANYL CITRATE 50 MCG: 50 INJECTION, SOLUTION INTRAMUSCULAR; INTRAVENOUS at 04:07

## 2017-01-01 RX ADMIN — CALCIUM CARBONATE (ANTACID) CHEW TAB 500 MG 1000 MG: 500 CHEW TAB at 11:07

## 2017-01-01 RX ADMIN — GLYCOPYRROLATE 0.1 MG: 0.2 INJECTION, SOLUTION INTRAMUSCULAR; INTRAVENOUS at 08:07

## 2017-01-01 RX ADMIN — POTASSIUM CHLORIDE 10 MEQ: 10 INJECTION, SOLUTION INTRAVENOUS at 08:07

## 2017-01-01 RX ADMIN — IOHEXOL 125 ML: 350 INJECTION, SOLUTION INTRAVENOUS at 08:01

## 2017-01-01 RX ADMIN — OXYCODONE HYDROCHLORIDE AND ACETAMINOPHEN 1 TABLET: 5; 325 TABLET ORAL at 08:09

## 2017-01-01 RX ADMIN — PROPOFOL 120 MG: 10 INJECTION, EMULSION INTRAVENOUS at 08:07

## 2017-01-01 RX ADMIN — AMPICILLIN 1 G: 1 INJECTION, POWDER, FOR SOLUTION INTRAMUSCULAR; INTRAVENOUS at 01:06

## 2017-01-01 RX ADMIN — SODIUM CHLORIDE, PRESERVATIVE FREE 500 UNITS: 5 INJECTION INTRAVENOUS at 03:04

## 2017-01-01 RX ADMIN — KETOROLAC TROMETHAMINE 30 MG: 30 INJECTION, SOLUTION INTRAMUSCULAR; INTRAVENOUS at 08:07

## 2017-01-01 RX ADMIN — CIPROFLOXACIN 400 MG: 2 INJECTION, SOLUTION INTRAVENOUS at 07:03

## 2017-01-01 RX ADMIN — DIAZEPAM 2.5 MG: 5 INJECTION, SOLUTION INTRAMUSCULAR; INTRAVENOUS at 11:10

## 2017-01-01 RX ADMIN — FUROSEMIDE 40 MG: 10 INJECTION, SOLUTION INTRAVENOUS at 03:10

## 2017-01-01 RX ADMIN — POTASSIUM CHLORIDE 40 MEQ: 400 INJECTION, SOLUTION INTRAVENOUS at 05:10

## 2017-01-01 RX ADMIN — METOPROLOL TARTRATE 25 MG: 25 TABLET ORAL at 05:09

## 2017-01-01 RX ADMIN — SODIUM CHLORIDE: 0.9 INJECTION, SOLUTION INTRAVENOUS at 03:06

## 2017-01-01 RX ADMIN — CEFOXITIN SODIUM 2 G: 2 INJECTION, SOLUTION INTRAVENOUS at 10:07

## 2017-01-01 RX ADMIN — IOHEXOL 15 ML: 350 INJECTION, SOLUTION INTRAVENOUS at 09:08

## 2017-01-01 RX ADMIN — METOPROLOL TARTRATE 75 MG: 25 TABLET ORAL at 05:09

## 2017-01-01 RX ADMIN — AMPICILLIN SODIUM 1 G: 1 INJECTION, POWDER, FOR SOLUTION INTRAMUSCULAR; INTRAVENOUS at 08:06

## 2017-01-01 RX ADMIN — RIVAROXABAN 20 MG: 10 TABLET, FILM COATED ORAL at 06:10

## 2017-01-01 RX ADMIN — Medication 3 ML: at 02:09

## 2017-01-01 RX ADMIN — METOPROLOL SUCCINATE 50 MG: 50 TABLET, EXTENDED RELEASE ORAL at 08:07

## 2017-01-01 RX ADMIN — METOPROLOL SUCCINATE 50 MG: 50 TABLET, EXTENDED RELEASE ORAL at 09:06

## 2017-01-01 RX ADMIN — IOHEXOL 75 ML: 350 INJECTION, SOLUTION INTRAVENOUS at 10:05

## 2017-01-01 RX ADMIN — BENZONATATE 100 MG: 100 CAPSULE ORAL at 12:09

## 2017-01-01 RX ADMIN — CIPROFLOXACIN HYDROCHLORIDE 500 MG: 500 TABLET, FILM COATED ORAL at 12:05

## 2017-01-01 RX ADMIN — SODIUM CHLORIDE: 0.9 INJECTION, SOLUTION INTRAVENOUS at 04:06

## 2017-01-01 RX ADMIN — MAGNESIUM SULFATE IN WATER 2 G: 40 INJECTION, SOLUTION INTRAVENOUS at 03:10

## 2017-01-01 RX ADMIN — AMIODARONE HYDROCHLORIDE 150 MG: 1.5 INJECTION, SOLUTION INTRAVENOUS at 02:10

## 2017-01-01 RX ADMIN — MORPHINE SULFATE 4 MG: 10 SOLUTION ORAL at 06:10

## 2017-01-01 RX ADMIN — METOPROLOL TARTRATE 50 MG: 50 TABLET, FILM COATED ORAL at 09:10

## 2017-01-01 RX ADMIN — ROCURONIUM BROMIDE 30 MG: 10 INJECTION, SOLUTION INTRAVENOUS at 10:07

## 2017-01-01 RX ADMIN — CLINDAMYCIN IN 5 PERCENT DEXTROSE 900 MG: 18 INJECTION, SOLUTION INTRAVENOUS at 03:10

## 2017-01-01 RX ADMIN — MIDAZOLAM HYDROCHLORIDE 0.5 MG: 1 INJECTION, SOLUTION INTRAMUSCULAR; INTRAVENOUS at 12:09

## 2017-01-01 RX ADMIN — AMPICILLIN 1 G: 1 INJECTION, POWDER, FOR SOLUTION INTRAMUSCULAR; INTRAVENOUS at 03:06

## 2017-01-01 RX ADMIN — CISPLATIN 65 MG: 1 INJECTION INTRAVENOUS at 01:04

## 2017-01-01 RX ADMIN — DOCUSATE SODIUM 100 MG: 100 CAPSULE, LIQUID FILLED ORAL at 09:06

## 2017-01-01 RX ADMIN — METOCLOPRAMIDE 10 MG: 5 INJECTION, SOLUTION INTRAMUSCULAR; INTRAVENOUS at 07:03

## 2017-01-01 RX ADMIN — CEFTRIAXONE 2 G: 2 INJECTION, SOLUTION INTRAVENOUS at 04:06

## 2017-01-01 RX ADMIN — LUBIPROSTONE 24 MCG: 8 CAPSULE, GELATIN COATED ORAL at 09:06

## 2017-01-01 RX ADMIN — DILTIAZEM HYDROCHLORIDE 17.5 MG/HR: 5 INJECTION INTRAVENOUS at 06:10

## 2017-01-01 RX ADMIN — OXYCODONE HYDROCHLORIDE 5 MG: 5 TABLET ORAL at 11:07

## 2017-01-01 RX ADMIN — OXYCODONE HYDROCHLORIDE AND ACETAMINOPHEN 1 TABLET: 5; 325 TABLET ORAL at 10:10

## 2017-01-01 RX ADMIN — KETOROLAC TROMETHAMINE 15 MG: 30 INJECTION, SOLUTION INTRAMUSCULAR at 01:07

## 2017-01-01 RX ADMIN — MIDAZOLAM 1 MG: 1 INJECTION INTRAMUSCULAR; INTRAVENOUS at 01:03

## 2017-01-01 RX ADMIN — HYDROMORPHONE HYDROCHLORIDE 1 MG: 1 INJECTION, SOLUTION INTRAMUSCULAR; INTRAVENOUS; SUBCUTANEOUS at 06:07

## 2017-01-01 RX ADMIN — SODIUM CHLORIDE: 0.9 INJECTION, SOLUTION INTRAVENOUS at 01:05

## 2017-01-01 RX ADMIN — IOHEXOL 15 ML: 350 INJECTION, SOLUTION INTRAVENOUS at 08:05

## 2017-01-01 RX ADMIN — IOHEXOL 75 ML: 350 INJECTION, SOLUTION INTRAVENOUS at 06:03

## 2017-01-01 RX ADMIN — ALVIMOPAN 12 MG: 12 CAPSULE ORAL at 08:07

## 2017-01-01 RX ADMIN — PROPOFOL 200 MCG/KG/MIN: 10 INJECTION, EMULSION INTRAVENOUS at 08:06

## 2017-01-01 RX ADMIN — PROPOFOL 50 MG: 10 INJECTION, EMULSION INTRAVENOUS at 07:03

## 2017-01-01 RX ADMIN — IOHEXOL 15 ML: 350 INJECTION, SOLUTION INTRAVENOUS at 09:05

## 2017-01-01 RX ADMIN — POLYETHYLENE GLYCOL 3350 17 G: 17 POWDER, FOR SOLUTION ORAL at 05:10

## 2017-01-01 RX ADMIN — STANDARDIZED SENNA CONCENTRATE AND DOCUSATE SODIUM 1 TABLET: 8.6; 5 TABLET, FILM COATED ORAL at 09:10

## 2017-01-01 RX ADMIN — LIDOCAINE HYDROCHLORIDE 1 MG: 10 INJECTION, SOLUTION EPIDURAL; INFILTRATION; INTRACAUDAL; PERINEURAL at 07:07

## 2017-01-01 RX ADMIN — OXYCODONE HYDROCHLORIDE AND ACETAMINOPHEN 1 TABLET: 10; 325 TABLET ORAL at 08:10

## 2017-01-01 RX ADMIN — DILTIAZEM HYDROCHLORIDE 15 MG/HR: 5 INJECTION INTRAVENOUS at 12:10

## 2017-01-01 RX ADMIN — Medication 20 MG: at 10:07

## 2017-01-01 RX ADMIN — SODIUM CHLORIDE: 0.9 INJECTION, SOLUTION INTRAVENOUS at 01:06

## 2017-01-01 RX ADMIN — ONDANSETRON 4 MG: 2 INJECTION INTRAMUSCULAR; INTRAVENOUS at 08:06

## 2017-01-01 RX ADMIN — ROCURONIUM BROMIDE 50 MG: 10 INJECTION, SOLUTION INTRAVENOUS at 08:07

## 2017-01-01 RX ADMIN — IOHEXOL 15 ML: 350 INJECTION, SOLUTION INTRAVENOUS at 03:03

## 2017-01-01 RX ADMIN — ACETAMINOPHEN 1000 MG: 10 INJECTION, SOLUTION INTRAVENOUS at 10:07

## 2017-01-01 RX ADMIN — GLYCOPYRROLATE 0.5 MG: 0.2 INJECTION, SOLUTION INTRAMUSCULAR; INTRAVENOUS at 01:07

## 2017-01-01 RX ADMIN — POLYETHYLENE GLYCOL 3350 17 G: 17 POWDER, FOR SOLUTION ORAL at 08:07

## 2017-01-01 RX ADMIN — DEXAMETHASONE SODIUM PHOSPHATE 0.25 MG: 4 INJECTION, SOLUTION INTRAMUSCULAR; INTRAVENOUS at 12:04

## 2017-01-01 RX ADMIN — FUROSEMIDE 40 MG: 10 INJECTION, SOLUTION INTRAVENOUS at 05:10

## 2017-01-01 RX ADMIN — KETOROLAC TROMETHAMINE 15 MG: 30 INJECTION, SOLUTION INTRAMUSCULAR at 02:07

## 2017-01-01 RX ADMIN — POTASSIUM CHLORIDE 10 MEQ: 10 INJECTION, SOLUTION INTRAVENOUS at 12:10

## 2017-01-01 RX ADMIN — GUAIFENESIN AND DEXTROMETHORPHAN 5 ML: 100; 10 SYRUP ORAL at 09:10

## 2017-01-01 RX ADMIN — PHENYLEPHRINE HYDROCHLORIDE 0.5 MCG/KG/MIN: 10 INJECTION INTRAVENOUS at 10:07

## 2017-01-01 RX ADMIN — ALBUMIN (HUMAN): 12.5 SOLUTION INTRAVENOUS at 11:07

## 2017-01-01 RX ADMIN — VALSARTAN 320 MG: 160 TABLET, FILM COATED ORAL at 09:06

## 2017-01-01 RX ADMIN — RIVAROXABAN 20 MG: 20 TABLET, FILM COATED ORAL at 06:10

## 2017-01-01 RX ADMIN — GUAIFENESIN AND DEXTROMETHORPHAN 5 ML: 100; 10 SYRUP ORAL at 05:10

## 2017-01-01 RX ADMIN — POLYETHYLENE GLYCOL 3350 17 G: 17 POWDER, FOR SOLUTION ORAL at 08:10

## 2017-01-01 RX ADMIN — GEMCITABINE 1930 MG: 1 INJECTION, POWDER, LYOPHILIZED, FOR SOLUTION INTRAVENOUS at 02:04

## 2017-01-01 RX ADMIN — VANCOMYCIN HYDROCHLORIDE 1000 MG: 1 INJECTION, POWDER, LYOPHILIZED, FOR SOLUTION INTRAVENOUS at 07:07

## 2017-01-01 RX ADMIN — RIVAROXABAN 20 MG: 10 TABLET, FILM COATED ORAL at 05:10

## 2017-01-01 RX ADMIN — DILTIAZEM HYDROCHLORIDE 10 MG/HR: 5 INJECTION INTRAVENOUS at 07:10

## 2017-01-01 RX ADMIN — HEPARIN SODIUM (PORCINE) LOCK FLUSH IV SOLN 100 UNIT/ML 5 ML: 100 SOLUTION at 01:03

## 2017-01-01 RX ADMIN — MORPHINE SULFATE 2 MG: 2 INJECTION, SOLUTION INTRAMUSCULAR; INTRAVENOUS at 07:10

## 2017-01-01 RX ADMIN — SODIUM CHLORIDE, PRESERVATIVE FREE 500 UNITS: 5 INJECTION INTRAVENOUS at 05:03

## 2017-01-01 RX ADMIN — MAGNESIUM SULFATE IN WATER 2 G: 40 INJECTION, SOLUTION INTRAVENOUS at 06:10

## 2017-01-01 RX ADMIN — OXYCODONE HYDROCHLORIDE AND ACETAMINOPHEN 1 TABLET: 10; 325 TABLET ORAL at 04:06

## 2017-01-01 RX ADMIN — HEPARIN 500 UNITS: 100 SYRINGE at 02:04

## 2017-01-01 RX ADMIN — CLINDAMYCIN IN 5 PERCENT DEXTROSE 900 MG: 18 INJECTION, SOLUTION INTRAVENOUS at 04:10

## 2017-01-01 RX ADMIN — MAGNESIUM SULFATE HEPTAHYDRATE 3 G: 500 INJECTION, SOLUTION INTRAMUSCULAR; INTRAVENOUS at 12:07

## 2017-01-01 RX ADMIN — GUAIFENESIN AND DEXTROMETHORPHAN 5 ML: 100; 10 SYRUP ORAL at 10:10

## 2017-01-01 RX ADMIN — FENTANYL CITRATE 25 MCG: 50 INJECTION, SOLUTION INTRAMUSCULAR; INTRAVENOUS at 09:06

## 2017-01-01 RX ADMIN — OXYCODONE HYDROCHLORIDE AND ACETAMINOPHEN 1 TABLET: 5; 325 TABLET ORAL at 08:10

## 2017-01-01 RX ADMIN — ONDANSETRON 4 MG: 2 INJECTION INTRAMUSCULAR; INTRAVENOUS at 09:07

## 2017-01-01 RX ADMIN — OXYCODONE HYDROCHLORIDE AND ACETAMINOPHEN 1 TABLET: 10; 325 TABLET ORAL at 01:10

## 2017-01-01 RX ADMIN — DILTIAZEM HYDROCHLORIDE 50 MG: 5 INJECTION INTRAVENOUS at 10:10

## 2017-01-01 RX ADMIN — OXYCODONE HYDROCHLORIDE AND ACETAMINOPHEN 1 TABLET: 10; 325 TABLET ORAL at 03:10

## 2017-01-01 RX ADMIN — MORPHINE SULFATE 4 MG: 10 SOLUTION ORAL at 03:10

## 2017-01-01 RX ADMIN — POTASSIUM CHLORIDE 40 MEQ: 1500 TABLET, EXTENDED RELEASE ORAL at 05:10

## 2017-01-01 RX ADMIN — GUAIFENESIN AND DEXTROMETHORPHAN 5 ML: 100; 10 SYRUP ORAL at 01:10

## 2017-01-01 RX ADMIN — METOPROLOL TARTRATE 50 MG: 50 TABLET, FILM COATED ORAL at 06:10

## 2017-01-01 RX ADMIN — MAGNESIUM SULFATE: 500 INJECTION, SOLUTION INTRAMUSCULAR; INTRAVENOUS at 09:05

## 2017-01-01 RX ADMIN — KETAMINE HYDROCHLORIDE 25 MG: 100 INJECTION, SOLUTION, CONCENTRATE INTRAMUSCULAR; INTRAVENOUS at 08:07

## 2017-01-01 RX ADMIN — Medication 10 MG: at 07:03

## 2017-01-01 RX ADMIN — POTASSIUM CHLORIDE 40 MEQ: 1500 TABLET, EXTENDED RELEASE ORAL at 10:10

## 2017-01-01 RX ADMIN — CALCIUM CHLORIDE 1000 MG: 100 INJECTION, SOLUTION INTRAVENOUS at 11:07

## 2017-01-01 RX ADMIN — DEXAMETHASONE SODIUM PHOSPHATE 8 MG: 4 INJECTION, SOLUTION INTRAMUSCULAR; INTRAVENOUS at 08:06

## 2017-01-01 RX ADMIN — OXYCODONE HYDROCHLORIDE 10 MG: 5 TABLET ORAL at 05:07

## 2017-01-01 RX ADMIN — GUAIFENESIN AND DEXTROMETHORPHAN 5 ML: 100; 10 SYRUP ORAL at 07:10

## 2017-01-01 RX ADMIN — DILTIAZEM HYDROCHLORIDE 5 MG/HR: 5 INJECTION INTRAVENOUS at 10:10

## 2017-01-01 RX ADMIN — SODIUM CHLORIDE, PRESERVATIVE FREE 500 UNITS: 5 INJECTION INTRAVENOUS at 04:04

## 2017-01-01 RX ADMIN — LIDOCAINE HYDROCHLORIDE 0.03 MG/KG/MIN: 8 INJECTION, SOLUTION INTRAVENOUS at 08:07

## 2017-01-01 RX ADMIN — MORPHINE SULFATE 1 MG: 2 INJECTION, SOLUTION INTRAMUSCULAR; INTRAVENOUS at 11:10

## 2017-01-01 RX ADMIN — SODIUM CHLORIDE: 0.9 INJECTION, SOLUTION INTRAVENOUS at 10:05

## 2017-01-01 RX ADMIN — SODIUM CHLORIDE, PRESERVATIVE FREE 10 ML: 5 INJECTION INTRAVENOUS at 03:04

## 2017-01-01 RX ADMIN — OXYCODONE HYDROCHLORIDE AND ACETAMINOPHEN 1 TABLET: 10; 325 TABLET ORAL at 12:10

## 2017-01-01 RX ADMIN — BENZONATATE 100 MG: 100 CAPSULE ORAL at 10:09

## 2017-01-01 RX ADMIN — GEMCITABINE HYDROCHLORIDE 1930 MG: 1 INJECTION, POWDER, LYOPHILIZED, FOR SOLUTION INTRAVENOUS at 12:03

## 2017-01-01 RX ADMIN — ALBUMIN (HUMAN): 12.5 SOLUTION INTRAVENOUS at 10:07

## 2017-01-01 RX ADMIN — FUROSEMIDE 40 MG: 10 INJECTION, SOLUTION INTRAVENOUS at 06:10

## 2017-01-01 RX ADMIN — MORPHINE SULFATE 3 MG: 2 INJECTION, SOLUTION INTRAMUSCULAR; INTRAVENOUS at 12:10

## 2017-01-01 RX ADMIN — LORAZEPAM 1 MG: 0.5 TABLET ORAL at 10:10

## 2017-01-01 RX ADMIN — METOPROLOL TARTRATE 50 MG: 50 TABLET, FILM COATED ORAL at 02:10

## 2017-01-01 RX ADMIN — ROCURONIUM BROMIDE 5 MG: 10 INJECTION, SOLUTION INTRAVENOUS at 12:07

## 2017-01-01 RX ADMIN — BISACODYL 10 MG: 10 SUPPOSITORY RECTAL at 03:06

## 2017-01-01 RX ADMIN — MORPHINE SULFATE 4 MG: 10 SOLUTION ORAL at 10:10

## 2017-01-01 RX ADMIN — FENTANYL CITRATE 25 MCG: 50 INJECTION, SOLUTION INTRAMUSCULAR; INTRAVENOUS at 11:06

## 2017-01-01 RX ADMIN — VALSARTAN 320 MG: 160 TABLET, FILM COATED ORAL at 02:07

## 2017-01-01 RX ADMIN — DEXTROSE MONOHYDRATE, SODIUM CHLORIDE, AND POTASSIUM CHLORIDE: 50; 4.5; 1.49 INJECTION, SOLUTION INTRAVENOUS at 02:07

## 2017-01-01 RX ADMIN — OXYCODONE HYDROCHLORIDE AND ACETAMINOPHEN 1 TABLET: 10; 325 TABLET ORAL at 07:06

## 2017-01-01 RX ADMIN — FUROSEMIDE 40 MG: 40 TABLET ORAL at 08:10

## 2017-01-01 RX ADMIN — HEPARIN SODIUM (PORCINE) LOCK FLUSH IV SOLN 100 UNIT/ML 500 UNITS: 100 SOLUTION at 12:06

## 2017-01-01 RX ADMIN — POTASSIUM CHLORIDE 10 MEQ: 10 INJECTION, SOLUTION INTRAVENOUS at 03:07

## 2017-01-01 RX ADMIN — DILTIAZEM HYDROCHLORIDE 60 MG: 60 TABLET, FILM COATED ORAL at 05:10

## 2017-01-01 RX ADMIN — POTASSIUM CHLORIDE 40 MEQ: 1500 TABLET, EXTENDED RELEASE ORAL at 08:10

## 2017-01-01 RX ADMIN — ACETAMINOPHEN 1000 MG: 10 INJECTION, SOLUTION INTRAVENOUS at 02:07

## 2017-01-01 RX ADMIN — MIDAZOLAM HYDROCHLORIDE 0.5 MG: 1 INJECTION, SOLUTION INTRAMUSCULAR; INTRAVENOUS at 09:06

## 2017-01-01 RX ADMIN — AMPICILLIN 1 G: 1 INJECTION, POWDER, FOR SOLUTION INTRAMUSCULAR; INTRAVENOUS at 06:06

## 2017-01-01 RX ADMIN — IOHEXOL 75 ML: 350 INJECTION, SOLUTION INTRAVENOUS at 10:09

## 2017-01-01 RX ADMIN — SODIUM CHLORIDE, PRESERVATIVE FREE 3 ML: 5 INJECTION INTRAVENOUS at 10:06

## 2017-01-01 RX ADMIN — DILTIAZEM HYDROCHLORIDE 7 MG/HR: 5 INJECTION INTRAVENOUS at 11:10

## 2017-01-01 RX ADMIN — MIDAZOLAM HYDROCHLORIDE 1 MG: 1 INJECTION, SOLUTION INTRAMUSCULAR; INTRAVENOUS at 09:06

## 2017-01-01 RX ADMIN — SODIUM CHLORIDE 150 MG: 9 INJECTION, SOLUTION INTRAVENOUS at 11:03

## 2017-01-01 RX ADMIN — POLYETHYLENE GLYCOL 3350 17 G: 17 POWDER, FOR SOLUTION ORAL at 11:07

## 2017-01-01 RX ADMIN — FENTANYL CITRATE 25 MCG: 50 INJECTION, SOLUTION INTRAMUSCULAR; INTRAVENOUS at 12:09

## 2017-01-01 RX ADMIN — OXYCODONE HYDROCHLORIDE AND ACETAMINOPHEN 1 TABLET: 5; 325 TABLET ORAL at 04:10

## 2017-01-01 RX ADMIN — SODIUM CHLORIDE: 0.9 INJECTION, SOLUTION INTRAVENOUS at 08:06

## 2017-01-01 RX ADMIN — LORAZEPAM 1 MG: 0.5 TABLET ORAL at 05:10

## 2017-01-01 RX ADMIN — HEPARIN SODIUM 5000 UNITS: 5000 INJECTION, SOLUTION INTRAVENOUS; SUBCUTANEOUS at 10:10

## 2017-01-01 RX ADMIN — SODIUM CHLORIDE: 0.9 INJECTION, SOLUTION INTRAVENOUS at 02:07

## 2017-01-01 RX ADMIN — SODIUM CHLORIDE, PRESERVATIVE FREE 10 ML: 5 INJECTION INTRAVENOUS at 04:04

## 2017-01-01 RX ADMIN — METOPROLOL TARTRATE 25 MG: 25 TABLET ORAL at 08:10

## 2017-01-01 RX ADMIN — FENTANYL CITRATE 50 MCG: 50 INJECTION, SOLUTION INTRAMUSCULAR; INTRAVENOUS at 01:03

## 2017-01-01 RX ADMIN — POTASSIUM BICARBONATE 25 MEQ: 25 TABLET, EFFERVESCENT ORAL at 05:10

## 2017-01-01 RX ADMIN — POTASSIUM CHLORIDE: 149 INJECTION, SOLUTION, CONCENTRATE INTRAVENOUS at 09:03

## 2017-01-01 RX ADMIN — MAGNESIUM SULFATE IN WATER 2 G: 40 INJECTION, SOLUTION INTRAVENOUS at 08:10

## 2017-01-01 RX ADMIN — SODIUM CHLORIDE 150 MG: 9 INJECTION, SOLUTION INTRAVENOUS at 12:05

## 2017-01-01 RX ADMIN — DOCUSATE SODIUM 100 MG: 100 CAPSULE, LIQUID FILLED ORAL at 11:06

## 2017-01-01 RX ADMIN — MORPHINE SULFATE 3 MG: 2 INJECTION, SOLUTION INTRAMUSCULAR; INTRAVENOUS at 09:10

## 2017-01-01 RX ADMIN — STANDARDIZED SENNA CONCENTRATE AND DOCUSATE SODIUM 1 TABLET: 8.6; 5 TABLET, FILM COATED ORAL at 07:10

## 2017-01-01 RX ADMIN — OXYCODONE HYDROCHLORIDE 5 MG: 5 TABLET ORAL at 09:07

## 2017-01-01 RX ADMIN — DEXMEDETOMIDINE HYDROCHLORIDE 0.7 MCG/KG/HR: 100 INJECTION, SOLUTION, CONCENTRATE INTRAVENOUS at 08:07

## 2017-01-01 RX ADMIN — IOHEXOL 15 ML: 350 INJECTION, SOLUTION INTRAVENOUS at 02:03

## 2017-01-01 RX ADMIN — HYDROCODONE BITARTRATE AND ACETAMINOPHEN 1 TABLET: 5; 325 TABLET ORAL at 12:03

## 2017-01-01 RX ADMIN — CEFAZOLIN SODIUM 1 G: 1 SOLUTION INTRAVENOUS at 01:03

## 2017-01-01 RX ADMIN — HEPARIN 500 UNITS: 100 SYRINGE at 02:05

## 2017-01-01 RX ADMIN — LORAZEPAM 1 MG: 0.5 TABLET ORAL at 09:10

## 2017-01-01 RX ADMIN — POTASSIUM CHLORIDE 10 MEQ: 10 INJECTION, SOLUTION INTRAVENOUS at 04:07

## 2017-01-01 RX ADMIN — CISPLATIN 68 MG: 1 INJECTION, SOLUTION INTRAVENOUS at 12:03

## 2017-01-01 RX ADMIN — MIDAZOLAM HYDROCHLORIDE 0.5 MG: 1 INJECTION, SOLUTION INTRAMUSCULAR; INTRAVENOUS at 11:06

## 2017-01-01 RX ADMIN — AMPICILLIN 1 G: 1 INJECTION, POWDER, FOR SOLUTION INTRAMUSCULAR; INTRAVENOUS at 04:06

## 2017-01-01 RX ADMIN — ASPIRIN 81 MG CHEWABLE TABLET 81 MG: 81 TABLET CHEWABLE at 10:10

## 2017-01-01 RX ADMIN — SODIUM CHLORIDE: 0.9 INJECTION, SOLUTION INTRAVENOUS at 07:07

## 2017-01-01 RX ADMIN — MAGNESIUM SULFATE IN WATER 2 G: 40 INJECTION, SOLUTION INTRAVENOUS at 11:10

## 2017-01-01 RX ADMIN — ACETAMINOPHEN 1000 MG: 10 INJECTION, SOLUTION INTRAVENOUS at 01:07

## 2017-01-01 RX ADMIN — LUBIPROSTONE 24 MCG: 8 CAPSULE, GELATIN COATED ORAL at 11:06

## 2017-01-01 RX ADMIN — FENTANYL CITRATE 50 MCG: 50 INJECTION, SOLUTION INTRAMUSCULAR; INTRAVENOUS at 09:06

## 2017-01-01 RX ADMIN — FAMOTIDINE 20 MG: 20 TABLET, FILM COATED ORAL at 10:07

## 2017-01-01 RX ADMIN — SODIUM CHLORIDE, PRESERVATIVE FREE 3 ML: 5 INJECTION INTRAVENOUS at 03:06

## 2017-01-01 RX ADMIN — LIDOCAINE HYDROCHLORIDE 100 MG: 10 INJECTION, SOLUTION EPIDURAL; INFILTRATION; INTRACAUDAL; PERINEURAL at 12:10

## 2017-01-01 RX ADMIN — OXYCODONE HYDROCHLORIDE AND ACETAMINOPHEN 1 TABLET: 10; 325 TABLET ORAL at 03:06

## 2017-01-01 RX ADMIN — SODIUM CHLORIDE: 0.9 INJECTION, SOLUTION INTRAVENOUS at 03:03

## 2017-01-16 NOTE — TELEPHONE ENCOUNTER
----- Message from Ajay Tellez sent at 1/16/2017 11:37 AM CST -----  Contact: self/[phone]  Patient states that his urine is discolored. He would like to speak to staff in regards to this matter. Thank you.

## 2017-01-16 NOTE — TELEPHONE ENCOUNTER
Returned patient phone regarding concerns. Patient states that his urine is a dark reddish brown. No odor, pain, or frequency noted. I inquired on how much water he is drinking per day. He states about 2 bottles a day. I suggested that he increase his fluid intake and decrease his caffeine, but I would check with you and call him back with your recommendations.  Please advise.

## 2017-01-24 NOTE — TELEPHONE ENCOUNTER
----- Message from Linnea Carrillo sent at 1/24/2017 10:10 AM CST -----  Contact: SELF  Calling for urine culture results .      [phone]    LL

## 2017-01-24 NOTE — TELEPHONE ENCOUNTER
Patient states that he is still having blood in his urine. I made him an apt in the upcoming weeks for assessment. Please let me know if you would like to do any testing before the apt.

## 2017-02-01 NOTE — TELEPHONE ENCOUNTER
----- Message from Chelly Escobar sent at 2/1/2017  9:13 AM CST -----  Contact: self  Patient called stating that blood have increased in urine and now he is experiencing burning. Patient requesting medication sent to pharmacy. Please contact him at [phone] or [phone].    Thanks!

## 2017-02-20 NOTE — PROGRESS NOTES
Subjective:       Patient ID: Amador Harrison is a 61 y.o. male who was referred by No ref. provider found    Chief Complaint:   Chief Complaint   Patient presents with    Benign Prostatic Hypertrophy     pt states has blood in urine no pain with this slight burning    Hematuria       Hematuria  Patient complains of gross hematuria with some dysuria at times. Onset of hematuria was 1 month ago and was sudden in onset. There is a history of nephrolithiasis. There is not a history of urologic trauma. Other urologic symptoms include nocturia. Patient admits to history of tobacco use. Patient denies history of Agent Orange exposure, chronic Perez catheter,  surgeries, sexually transmitted diseases and trauma. Prior workup has been CT.    BPH    He patient reports frequency and nocturia one time at night. He denies straining, urgency and weak stream. The patient states symptoms are of mild severity. Onset of symptoms was several years ago and was gradual in onset.  He has no personal history and no family history of prostate cancer. He reports a history of elevated PSA, associated with prostatitis. He denies flank pain, gross hematuria, kidney stones and recurrent UTI.  He is currently taking Cardura 4 mg.  ACTIVE MEDICAL ISSUES:  There is no problem list on file for this patient.      PAST MEDICAL HISTORY  Past Medical History   Diagnosis Date    Hypertension     Hypertrophy of prostate without urinary obstruction and other lower urinary tract symptoms (LUTS)     Nocturia     Nocturia        PAST SURGICAL HISTORY:  Past Surgical History   Procedure Laterality Date    Cystoscopy         SOCIAL HISTORY:  Social History   Substance Use Topics    Smoking status: Former Smoker    Smokeless tobacco: Never Used    Alcohol use Yes       FAMILY HISTORY:  History reviewed. No pertinent family history.    ALLERGIES AND MEDICATIONS: updated and reviewed.  Review of patient's allergies indicates:  No Known  "Allergies  Current Outpatient Prescriptions   Medication Sig    doxazosin (CARDURA) 4 MG tablet Take 1 tablet (4 mg total) by mouth every evening.    fish oil-omega-3 fatty acids 300-1,000 mg capsule Take 2 g by mouth once daily.    glucosamine-chondroitin 500-400 mg tablet Take 1 tablet by mouth 3 (three) times daily.    indapamide (LOZOL) 1.25 MG Tab Take 2.5 mg by mouth once daily.    metoprolol succinate (TOPROL-XL) 50 MG 24 hr tablet Take 50 mg by mouth once daily.    valsartan (DIOVAN) 160 MG tablet Take 160 mg by mouth once daily.    MV-MN/FA/D3/LYCOPENE/LUT/COQ10 (DAILY MULTIVITAMIN ORAL) Take by mouth.     No current facility-administered medications for this visit.        Review of Systems   Constitutional: Negative for activity change, fatigue, fever and unexpected weight change.   HENT: Negative for congestion.    Eyes: Negative for redness.   Respiratory: Negative for chest tightness and shortness of breath.    Cardiovascular: Negative for chest pain and leg swelling.   Gastrointestinal: Negative for abdominal pain, constipation, diarrhea, nausea and vomiting.   Genitourinary: Negative for dysuria, flank pain, frequency, hematuria, penile pain, penile swelling, scrotal swelling, testicular pain and urgency.   Musculoskeletal: Negative for arthralgias and back pain.   Neurological: Negative for dizziness and light-headedness.   Psychiatric/Behavioral: Negative for behavioral problems and confusion. The patient is not nervous/anxious.    All other systems reviewed and are negative.      Objective:      Vitals:    02/20/17 1315   BP: 110/72   Pulse: 72   Weight: 76.7 kg (169 lb 1.5 oz)   Height: 5' 5" (1.651 m)     Physical Exam   Nursing note and vitals reviewed.  Constitutional: He is oriented to person, place, and time. He appears well-developed and well-nourished.   HENT:   Head: Normocephalic.   Eyes: Conjunctivae are normal.   Neck: Normal range of motion. Neck supple. No tracheal deviation " present. No thyromegaly present.   Cardiovascular: Normal rate and normal heart sounds.    Pulmonary/Chest: Effort normal and breath sounds normal. No respiratory distress. He has no wheezes.   Abdominal: Soft. Bowel sounds are normal. There is no hepatosplenomegaly. There is no tenderness. There is no rebound and no CVA tenderness. No hernia.   Musculoskeletal: Normal range of motion. He exhibits no edema or tenderness.   Lymphadenopathy:     He has no cervical adenopathy.   Neurological: He is alert and oriented to person, place, and time.   Skin: Skin is warm and dry. No rash noted. No erythema.     Psychiatric: He has a normal mood and affect. His behavior is normal. Judgment and thought content normal.       Urine dipstick shows positive for RBC's.  Micro exam: 250 RBC's per HPF.    Assessment:       1. Gross hematuria    2. BPH with obstruction/lower urinary tract symptoms          Plan:       1. Gross hematuria  Cystoscopy with likely TURBT possible stent    - Urine culture  - POCT urinalysis, dipstick or tablet reag  - Cytology, urine; Future    2. BPH with obstruction/lower urinary tract symptoms              Return in about 4 weeks (around 3/20/2017) for Follow up.

## 2017-02-20 NOTE — MR AVS SNAPSHOT
[facility]  [address] [place]  [address]  [place] [address]  Phone: [phone]                  Amador Harrison   2017 1:00 PM   Office Visit    Description:  Male : 1955   Provider:  RAMEZ Steen MD   Department:  [facility]           Reason for Visit     Benign Prostatic Hypertrophy     Hematuria           Diagnoses this Visit        Comments    Gross hematuria    -  Primary     BPH with obstruction/lower urinary tract symptoms                To Do List           Goals (5 Years of Data)     None      Follow-Up and Disposition     Return in about 4 weeks (around 3/20/2017) for Follow up.      [facility] On Call     [facility] On Call Nurse Care Line -  Assistance  Registered nurses in the [facility] provide clinical advisement, health education, appointment booking, and other advisory services.  Call for this free service at [phone].             Medications           Message regarding Medications     Verify the changes and/or additions to your medication regime listed below are the same as discussed with your clinician today.  If any of these changes or additions are incorrect, please notify your healthcare provider.        STOP taking these medications     aspirin (ECOTRIN) 81 MG EC tablet Take 81 mg by mouth once daily.           Verify that the below list of medications is an accurate representation of the medications you are currently taking.  If none reported, the list may be blank. If incorrect, please contact your healthcare provider. Carry this list with you in case of emergency.           Current Medications     doxazosin (CARDURA) 4 MG tablet Take 1 tablet (4 mg total) by mouth every evening.    fish oil-omega-3 fatty acids 300-1,000 mg capsule Take 2 g by mouth once daily.    glucosamine-chondroitin 500-400 mg tablet Take 1 tablet by mouth 3 (three) times daily.    indapamide (LOZOL) 1.25 MG Tab Take 2.5 mg by mouth once daily.     "metoprolol succinate (TOPROL-XL) 50 MG 24 hr tablet Take 50 mg by mouth once daily.    valsartan (DIOVAN) 160 MG tablet Take 160 mg by mouth once daily.    MV-MN/FA/D3/LYCOPENE/LUT/COQ10 (DAILY MULTIVITAMIN ORAL) Take by mouth.           Clinical Reference Information           Your Vitals Were     BP Pulse Height Weight BMI    110/72 72 5' 5" (1.651 m) 76.7 kg (169 lb 1.5 oz) 28.14 kg/m2      Blood Pressure          Most Recent Value    BP  110/72      Allergies as of 2/20/2017     No Known Allergies      Immunizations Administered on Date of Encounter - 2/20/2017     None      Orders Placed During Today's Visit      Normal Orders This Visit    Case Request Operating Room: TUMOR-BLADDER-TRANSURETHRAL RESECTION     POCT urinalysis, dipstick or tablet reag     Urine culture     Future Labs/Procedures Expected by Expires    Basic metabolic panel  2/20/2017 4/21/2018    CBC auto differential  2/20/2017 4/21/2018    Cytology, urine  2/20/2017 2/20/2018      [facility] Sign-Up     Activating your [facility] account is as easy as 1-2-3!     1) Visit [email or web address], select Sign Up Now, enter this activation code and your date of birth, then select Next.  [email or web address]  Expires: 4/6/2017  1:43 PM      2) Create a username and password to use when you visit [facility] in the future and select a security question in case you lose your password and select Next.    3) Enter your e-mail address and click Sign Up!    Additional Information  If you have questions, please e-mail [email or web address] or call [phone] to talk to our [facility] staff. Remember, [facility] is NOT to be used for urgent needs. For medical emergencies, dial 911.         Language Assistance Services     ATTENTION: Language assistance services are available, free of charge. Please call [phone].      ATENCIÓN: Si habla español, tiene a adan disposición servicios gratuitos de asistencia lingüística. Llame al [phone].     CHÚ Ý: N?u b?n nói " Ti?ng Vi?t, có các d?ch v? h? tr? ngôn ng? mi?n phí dành cho b?n. G?i s? [phone].         [facility] complies with applicable Federal civil rights laws and does not discriminate on the basis of race, color, national origin, age, disability, or sex.

## 2017-02-22 NOTE — TELEPHONE ENCOUNTER
[facility] of [facility] results [facility]-

## 2017-02-27 NOTE — DISCHARGE INSTRUCTIONS
Your surgery is scheduled for ____FRIDAY 3/3/17________________.    Call [phone]   between 2 p.m. and 5 p.m. on   __Thursday 3/2/17______ to find out your arrival time for the day of your surgery.      Please report to SAME DAY SURGERY UNIT on the 2nd FLOOR at _______ a.m.  Use front door entrance. The doors open at 0530 am.      INSTRUCTIONS IMPORTANT!!!  ¨ Do not eat or drink after 12 midnight-including water. OK to brush teeth, no   gum, candy or mints!    ¨ Take only these medicines with a small swallow of water-morning of surgery.    Doxazosin and antibiotic    (if not finished)      X____  Prep instructions:    SHOWER   OTHER  ______________________  X____  No powder, lotions or creams to surgical area.  X____  You may wear only deodorant on the day of surgery.  X____  Please remove all jewelry, including piercings and leave at home.  X___  No money or valuables needed. Please leave at home.  You may bring your           cell phone.  X____  If going home the same day, arrange for a ride home. You will not be able to   drive if Anesthesia was used.  X____  Stop Aspirin, Ibuprofen, Motrin and Aleve at least 3-5 days before   surgery, unless otherwise instructed by your doctor, or the nurse.              You MAY use Tylenol/acetaminophen until day of surgery.  X____  Call MD for temperature above 101 degrees.        _X___ Stop taking any Fish Oil supplement or any Vitamins that contain Vitamin   E at least 5 days prior to surgery.          I have read or had read and explained to me, and understand the above information.  Additional comments or instructions:Please call  EZEQUIEL at [phone] if you have any questions regarding the instructions above.

## 2017-02-27 NOTE — IP AVS SNAPSHOT
[place] [address] [place] [address]  Phone: [phone]           Patient Discharge Instructions    Our goal is to set you up for success. This packet includes information on your condition, medications, and your home care. It will help you to care for yourself so you don't get sicker.     Please ask your nurse if you have any questions.        There are many details to remember when preparing for your surgery. Here is what you will need to do, please ask your nurse if there are more specific instructions and if you have any questions:    1. 24 hours before procedure Do not smoke or drink alcoholic beverages 24 hours prior to your procedure    2. Eating before procedure Do not eat or drink anything 8 hours before your procedure - this includes gum, mints, and candy.     3. Day of procedure Please remove all jewelry for the procedure. If you wear contact lenses, dentures, hearing aids or glasses, bring a container to put them in during your surgery and give to a family member for safekeeping.  If your doctor has scheduled you for an overnight stay, bring a small overnight bag with any personal items that you need.    4. After procedure Make arrangements in advance for transportation home by a responsible adult. It is not safe to drive a vehicle during the 24 hours following surgery.     PLEASE NOTE: You may be contacted the day before your surgery to confirm your surgery date and arrival time. The Surgery schedule has many variables which may affect the time of your surgery case. Family members should be available if your surgery time changes.                [facility]  Unless otherwise directed by your provider, please contact [facility], our nurse care line that is available for 24/7 assistance.     [phone] (toll-free)    Registered nurses in the [facility] provide clinical advisement, health education, appointment booking, and other advisory  services.                    ** Verify the list of medication(s) below is accurate and up to date. Carry this with you in case of emergency. If your medications have changed, please notify your healthcare provider.             Medication List      TAKE these medications        Additional Info                      aspirin 81 MG Chew   Refills:  0   Dose:  81 mg    Instructions:  Take 81 mg by mouth once daily.     Begin Date    AM    Noon    PM    Bedtime       DAILY MULTIVITAMIN ORAL   Refills:  0    Instructions:  Take by mouth.     Begin Date    AM    Noon    PM    Bedtime       doxazosin 4 MG tablet   Commonly known as:  CARDURA   Quantity:  90 tablet   Refills:  3   Dose:  4 mg    Instructions:  Take 1 tablet (4 mg total) by mouth every evening.     Begin Date    AM    Noon    PM    Bedtime       fish oil-omega-3 fatty acids 300-1,000 mg capsule   Refills:  0   Dose:  2 g    Instructions:  Take 2 g by mouth once daily.     Begin Date    AM    Noon    PM    Bedtime       glucosamine-chondroitin 500-400 mg tablet   Refills:  0   Dose:  1 tablet    Instructions:  Take 1 tablet by mouth 3 (three) times daily.     Begin Date    AM    Noon    PM    Bedtime       indapamide 1.25 MG Tab   Commonly known as:  LOZOL   Refills:  0   Dose:  2.5 mg    Instructions:  Take 2.5 mg by mouth once daily.     Begin Date    AM    Noon    PM    Bedtime       metoprolol succinate 50 MG 24 hr tablet   Commonly known as:  TOPROL-XL   Refills:  0   Dose:  50 mg    Instructions:  Take 50 mg by mouth nightly.     Begin Date    AM    Noon    PM    Bedtime       nitrofurantoin (macrocrystal-monohydrate) 100 MG capsule   Commonly known as:  MACROBID   Quantity:  20 capsule   Refills:  0   Dose:  100 mg    Instructions:  Take 1 capsule (100 mg total) by mouth 2 (two) times daily.     Begin Date    AM    Noon    PM    Bedtime       valsartan 160 MG tablet   Commonly known as:  DIOVAN   Refills:  0   Dose:  320 mg    Instructions:  Take 320 mg  by mouth once daily.     Begin Date    AM    Noon    PM    Bedtime                  Please bring to all follow up appointments:    1. A copy of your discharge instructions.  2. All medicines you are currently taking in their original bottles.  3. Identification and insurance card.    Please arrive 15 minutes ahead of scheduled appointment time.    Please call 24 hours in advance if you must reschedule your appointment and/or time.        Your Scheduled Appointments     Mar 24, 2017  2:00 PM CDT   Post OP with RAMEZ Steen MD   [facility])    [address] [facility] [address]  [address]  [place] [address] [phone]              Your Future Surgeries/Procedures     Mar 03, 2017   Surgery with RAMEZ Steen MD   [facility])    [address] [place] [address] [phone]                  Discharge Instructions         Your surgery is scheduled for ____FRIDAY 3/3/17________________.    Call [phone]   between 2 p.m. and 5 p.m. on   __Thursday 3/2/17______ to find out your arrival time for the day of your surgery.      Please report to SAME DAY SURGERY UNIT on the 2nd FLOOR at _______ a.m.  Use front door entrance. The doors open at 0530 am.      INSTRUCTIONS IMPORTANT!!!  ¨ Do not eat or drink after 12 midnight-including water. OK to brush teeth, no   gum, candy or mints!    ¨ Take only these medicines with a small swallow of water-morning of surgery.    Doxazosin and antibiotic    (if not finished)      X____  Prep instructions:    SHOWER   OTHER  ______________________  X____  No powder, lotions or creams to surgical area.  X____  You may wear only deodorant on the day of surgery.  X____  Please remove all jewelry, including piercings and leave at home.  X___  No money or valuables needed. Please leave at home.  You may bring your           cell phone.  X____  If going home the same day, arrange for a ride home. You will not be able to   drive  if Anesthesia was used.  X____  Stop Aspirin, Ibuprofen, Motrin and Aleve at least 3-5 days before   surgery, unless otherwise instructed by your doctor, or the nurse.              You MAY use Tylenol/acetaminophen until day of surgery.  X____  Call MD for temperature above 101 degrees.        _X___ Stop taking any Fish Oil supplement or any Vitamins that contain Vitamin   E at least 5 days prior to surgery.          I have read or had read and explained to me, and understand the above information.  Additional comments or instructions:Please call  EZEQUIEL at [phone] if you have any questions regarding the instructions above.                 Admission Information     Date & Time Provider Department CSN    2/27/2017  3:00 PM RAMEZ Steen MD [facility] [id number]      Care Providers     Provider Role Specialty Primary office phone    RAMEZ Steen MD Attending Provider Urology [phone]      Your Vitals Were     BP                   114/67 (BP Location: Left arm, Patient Position: Sitting, BP Method: Automatic)           Recent Lab Values     No lab values to display.      Allergies as of 2/27/2017     No Known Allergies      Advance Directives     An advance directive is a document which, in the event you are no longer able to make decisions for yourself, tells your healthcare team what kind of treatment you do or do not want to receive, or who you would like to make those decisions for you.  If you do not currently have an advance directive, [facility] encourages you to create one.  For more information call:  [phone] ([phone]), [phone] ([phone]),  or log on to [email or web address].        Smoking Cessation     If you would like to quit smoking:   You may be eligible for free services if you are a [place] resident and started smoking cigarettes before September 1, 1988.  Call the Smoking Cessation Trust (SCT) toll free at (451) 338-6425 or (756) 026-8355.   Call  1-800-QUIT-NOW if you do not meet the above criteria.            Language Assistance Services     ATTENTION: Language assistance services are available, free of charge. Please call [phone].      ATENCIÓN: Si [place], tiene a adan disposición servicios gratuitos de asistencia lingüística. Llame al [phone].     CHÚ Ý: N?u b?n nói Ti?ng Vi?t, có các d?ch v? h? tr? ngôn ng? mi?n phí dành cho b?n. G?i s? [phone].        [facility] Sign-Up     Activating your [facility] account is as easy as 1-2-3!     1) Visit [email or web address], select Sign Up Now, enter this activation code and your date of birth, then select Next.  [email or web address]  Expires: 4/6/2017  1:43 PM      2) Create a username and password to use when you visit [facility] in the future and select a security question in case you lose your password and select Next.    3) Enter your e-mail address and click Sign Up!    Additional Information  If you have questions, please e-mail [email or web address] or call [phone] to talk to our [facility] staff. Remember, [facility] is NOT to be used for urgent needs. For medical emergencies, dial 911.          [facility] complies with applicable Federal civil rights laws and does not discriminate on the basis of race, color, national origin, age, disability, or sex.

## 2017-02-27 NOTE — PLAN OF CARE
Pre-operative instructions, medication directives and pain scales reviewed with patient. All questions the patient had  were answered. Re-assurance about surgical procedure and day of surgery routine given as needed. The patient verbalized understanding of the pre-op instructions.    3/3/17 TUMOR-BLADDER-TRANSURETHRAL RESECTION

## 2017-03-03 PROBLEM — D49.4 BLADDER TUMOR: Status: ACTIVE | Noted: 2017-01-01

## 2017-03-03 PROBLEM — N13.30 HYDRONEPHROSIS, RIGHT: Status: ACTIVE | Noted: 2017-01-01

## 2017-03-03 PROBLEM — R31.0 GROSS HEMATURIA: Status: ACTIVE | Noted: 2017-01-01

## 2017-03-03 NOTE — ANESTHESIA POSTPROCEDURE EVALUATION
"Anesthesia Post Evaluation    Patient: Amador Harrison    Procedure(s) Performed: Procedure(s) (LRB):  TUMOR-BLADDER-TRANSURETHRAL RESECTION (N/A)    Final Anesthesia Type: general  Patient location during evaluation: PACU  Patient participation: Yes- Able to Participate  Level of consciousness: awake  Post-procedure vital signs: reviewed and stable  Pain management: adequate  Airway patency: patent  PONV status at discharge: No PONV  Anesthetic complications: no      Cardiovascular status: stable  Respiratory status: unassisted  Hydration status: euvolemic  Follow-up not needed.        Visit Vitals    /77 (BP Location: Left arm, Patient Position: Lying, BP Method: Automatic)    Pulse 93    Temp 36.5 °C (97.7 °F) (Oral)    Resp 17    Ht 5' 5" (1.651 m)    Wt 75.8 kg (167 lb)    SpO2 100%    BMI 27.79 kg/m2       Pain/Katalina Score: Pain Assessment Performed: Yes (3/3/2017  8:59 AM)  Presence of Pain: denies (3/3/2017  8:59 AM)  Pain Rating Prior to Med Admin: 0 (3/3/2017  8:59 AM)  Katalina Score: 9 (3/3/2017  8:59 AM)      "

## 2017-03-03 NOTE — INTERVAL H&P NOTE
The patient has been examined and the H&P has been reviewed:    I concur with the findings and no changes have occurred since H&P was written.    Anesthesia/Surgery risks, benefits and alternative options discussed and understood by patient/family.          Active Hospital Problems    Diagnosis  POA    Gross hematuria [R31.0]  Yes      Resolved Hospital Problems    Diagnosis Date Resolved POA   No resolved problems to display.

## 2017-03-03 NOTE — TRANSFER OF CARE
"Anesthesia Transfer of Care Note    Patient: Amador Harrison    Procedure(s) Performed: Procedure(s) (LRB):  TUMOR-BLADDER-TRANSURETHRAL RESECTION (N/A)    Patient location: PACU    Anesthesia Type: general    Transport from OR: Transported from OR on room air with adequate spontaneous ventilation    Post pain: adequate analgesia    Post assessment: no apparent anesthetic complications and tolerated procedure well    Post vital signs: stable    Level of consciousness: awake, alert and oriented    Nausea/Vomiting: no nausea/vomiting    Complications: none          Last vitals:   Visit Vitals    /77 (BP Location: Left arm, Patient Position: Lying, BP Method: Automatic)    Pulse 93    Temp 36.5 °C (97.7 °F) (Oral)    Resp 17    Ht 5' 5" (1.651 m)    Wt 75.8 kg (167 lb)    SpO2 100%    BMI 27.79 kg/m2     "

## 2017-03-03 NOTE — ANESTHESIA PREPROCEDURE EVALUATION
03/03/2017  Amador Harrison is a 61 y.o., male.    [facility] Anesthesia Evaluation    I have reviewed the Patient Summary Reports.     I have reviewed the Medications.     Review of Systems  Anesthesia Hx:  History of prior surgery of interest to airway management or planning:  Denies Personal Hx of Anesthesia complications.   Social:  Former Smoker    Hematology/Oncology:         -- Anemia:   Cardiovascular:   Hypertension    Pulmonary:  Pulmonary Normal    Renal/:  Renal/ Normal     Hepatic/GI:  Hepatic/GI Normal    Endocrine:  Endocrine Normal        Physical Exam  General:  Well nourished    Airway/Jaw/Neck:  Airway Findings: Mouth Opening: Normal Tongue: Normal  General Airway Assessment: Adult  Mallampati: III  TM Distance: 4 - 6 cm  Jaw/Neck Findings:  Neck ROM: Normal ROM      Dental:  Dental Findings: In tact   Chest/Lungs:  Chest/Lungs Findings: Normal Respiratory Rate     Heart/Vascular:  Heart Findings: Rate: Normal        Mental Status:  Mental Status Findings:  Cooperative         Anesthesia Plan  Type of Anesthesia, risks & benefits discussed:  Anesthesia Type:  general  Patient's Preference:   Intra-op Monitoring Plan: standard ASA monitors  Intra-op Monitoring Plan Comments:   Post Op Pain Control Plan:   Post Op Pain Control Plan Comments:   Induction:   IV  Beta Blocker:  Patient is on a Beta-Blocker and has received one dose within the past 24 hours (No further documentation required).       Informed Consent: Patient understands risks and agrees with Anesthesia plan.  Questions answered. Anesthesia consent signed with patient.  ASA Score: 2     Day of Surgery Review of History & Physical:    H&P update referred to the provider.         Ready For Surgery From Anesthesia Perspective.

## 2017-03-03 NOTE — BRIEF OP NOTE
Surgery Date: 3/3/2017    Preoperative Dx:Gross Hematuria    Postoperative Dx:Gross hematuria, Bladder tumor, right hydronephrosis    Surgeon: LICHA Steen MD    Anesthesia: General    Procedure:TURBT, Right Retrograde Pyelogram, Right ureteral stent placement, Fluoroscopy, Mitomycin-C instillation    Findings/Key Components:Sessile tumor of right trigone, stent able to be placed, 40 mg MMC    Estimated Blood Loss: minimal         Specimens Removed:   Specimen (12h ago through future)    Start     Ordered    03/03/17 0843  Specimen to Pathology - Surgery  Once     Comments:  Bladder Tumor    03/03/17 0844          Drains:22 Fr Perez, 6x26 JJ stent, no string

## 2017-03-03 NOTE — OP NOTE
DATE OF PROCEDURE:  03/03/2017    PREOPERATIVE DIAGNOSIS:  Gross hematuria.    POSTOPERATIVE DIAGNOSES:  Gross hematuria, bladder tumor, and right   hydronephrosis.    PROCEDURES PERFORMED:  A transurethral resection of bladder tumor greater than 5   cm, right retrograde pyelogram, right ureteral stent placement, fluoroscopy,   Perez catheter placement, and instillation of mitomycin C.    PRIMARY SURGEON:  Waqar Steen M.D.    ANESTHESIA:  General.    ESTIMATED BLOOD LOSS:  Minimal.    DRAINS:  A 22-Nepalese Perez catheter and 6-Nepalese 26 cm double-J stent, no   string.    SPECIMENS REMOVED:  Bladder tumor with some bladder stones.    INDICATIONS:  Amador Harrison is a 61-year-old male with gross hematuria.    Preoperative imaging suggested that he had either a clot in his bladder or   possibly bladder tumor.  It was recommended that he undergo cystoscopy with   likely TURBT.    PROCEDURE IN DETAIL:  Amador Harrison was taken to the Operating Room where he   was positively identified by sybil.  He was placed supine on the operating   room table.  Following induction of adequate general anesthesia, he was placed   in the dorsal lithotomy position and his external genitalia were prepped and   draped in the usual sterile fashion.    Following confirmation of preoperative antibiotics and a preoperative timeout, a   resectoscope sheath was passed per urethra into the bladder without difficulty.    The obturator was withdrawn and then the resectoscope was then inserted.  The   prostate was visualized.  There were several prostate calcifications noted.    Once into the bladder, the bladder was inspected.  There was a large sessile   tumor, which was involving the entire right trigone and portions of the right   posterior wall and lateral wall.  It was again sessile and high-grade in   appearance.    The left ureteral orifice was identified.  Using that as a landmark, I turned my   attention to the expected location  of the right ureteral orifice and that was   involved in tumor.  At the beginning of the procedure, the ureteral orifice was   not readily seen.    There was some bladder stone also noted within the bladder.  These were   withdrawn.    I then used the resectoscope loop to resect the bladder tumor.  I took care in   the expected location of the right ureteral orifice to not cauterize this area.    I resected the entire tumor.  Visually, I was concerned that there was muscle   involvement.  He had a mild obturator reflex; however, no evidence of any injury   during this portion of the resection.    I then switched to the regular cystoscope.  I asked the anesthesiologist to   instill 5 mL of indigo carmine.  I inspected the region of the trigone.    Contrast was seen effluxing with the blue dye on the left side.  The right side,   it was not readily visible.  I used a 5-Nigerian open-ended catheter and a zip   wire to probe the expected area and it was in the middle of the resection.  I   was able to identify the right ureteral orifice.  I then passed the zip wire up.    I then passed a 5-Nigerian open-ended catheter and I withdrew the wire.  I then   performed a retrograde pyelogram using contrast to confirm that I was within the   right ureter.  The right ureter was hydronephrotic throughout the entire ureter   with mild hydronephrosis.    I then passed a zip wire again up to the level of the kidney.  I then withdrew   the open-ended catheter.  I then passed a 6-Nigerian 26 cm double-J stent over the   wire, deploying a coil in the kidney and a coil within the bladder.  Again, the   ureteral orifice was in the middle of the resection bed.  I then switched back   to the resectoscope.  I cauterized the edges of the resection bed.  Hemostasis   was deemed adequate.  The tumor chips were evacuated.    The scope was then withdrawn.    A 22-Nigerian Perez catheter was then placed into the bladder without difficulty.    The bladder  was then drained.  I then placed 40 mg of mitomycin C.  That was   instilled in 40 mL of saline.  This was instilled into the bladder.  The   catheter was then clamped.  The mitomycin will be kept in the bladder for 30   minutes postoperatively.    His anesthesia was then reversed.  He was taken to the Recovery Room in stable   condition.      MARCY  dd: 03/03/2017 08:57:20 (CST)  td: 03/03/2017 10:23:51 (CST)  Doc ID   #[id number]  Job ID #[id number]    CC:

## 2017-03-03 NOTE — DISCHARGE INSTRUCTIONS
ACTIVITY LEVEL: If you have received sedation or an anesthetic, you may feel sleepy for several hours. Rest until you are more awake. Gradually resume your normal activities.       DIET: You may resume your home diet. If nausea is present, increase your diet gradually with fluids and bland foods.      Medications: Pain medication should be taken only if needed and as directed. If antibiotics are prescribed, the medication should be taken until completed. You will be given an updated list of you medications.  ? No driving, alcoholic beverages or signing legal documents for next 24 hours or while taking pain medication        CALL THE DOCTOR:       · Fever over 101°F  · Severe pain that doesnt go away with medication.  · Upset stomach and vomiting that is persistent.  · Problems urinating-unable to urinate or heavy bleeding (with or without clots)

## 2017-03-03 NOTE — IP AVS SNAPSHOT
[place] [address] [place] [address]  Phone: [phone]           Patient Discharge Instructions     Our goal is to set you up for success. This packet includes information on your condition, medications, and your home care. It will help you to care for yourself so you don't get sicker and need to go back to the hospital.     Please ask your nurse if you have any questions.        There are many details to remember when preparing to leave the hospital. Here is what you will need to do:    1. Take your medicine. If you are prescribed medications, review your Medication List in the following pages. You may have new medications to  at the pharmacy and others that you'll need to stop taking. Review the instructions for how and when to take your medications. Talk with your doctor or nurses if you are unsure of what to do.     2. Go to your follow-up appointments. Specific follow-up information is listed in the following pages. Your may be contacted by a transition nurse or clinical provider about future appointments. Be sure we have all of the phone numbers to reach you, if needed. Please contact your provider's office if you are unable to make an appointment.     3. Watch for warning signs. Your doctor or nurse will give you detailed warning signs to watch for and when to call for assistance. These instructions may also include educational information about your condition. If you experience any of warning signs to your health, call your doctor.               [facility]  Unless otherwise directed by your provider, please contact [facility], our nurse care line that is available for 24/7 assistance.     [phone] (toll-free)    Registered nurses in the [facility] provide clinical advisement, health education, appointment booking, and other advisory services.                    ** Verify the list of medication(s) below is accurate and up to date.  Carry this with you in case of emergency. If your medications have changed, please notify your healthcare provider.             Medication List      START taking these medications        Additional Info                      hydrocodone-acetaminophen 5-325mg 5-325 mg per tablet   Commonly known as:  NORCO   Quantity:  30 tablet   Refills:  0   Dose:  1 tablet    Instructions:  Take 1 tablet by mouth every 6 (six) hours as needed for Pain.     Begin Date    AM    Noon    PM    Bedtime       oxybutynin 5 MG Tab   Commonly known as:  DITROPAN   Quantity:  90 tablet   Refills:  3   Dose:  5 mg    Instructions:  Take 1 tablet (5 mg total) by mouth 3 (three) times daily as needed.     Begin Date    AM    Noon    PM    Bedtime       phenazopyridine 100 MG tablet   Commonly known as:  PYRIDIUM   Quantity:  21 tablet   Refills:  0   Dose:  200 mg    Instructions:  Take 2 tablets (200 mg total) by mouth 3 (three) times daily as needed for Pain (Burning).     Begin Date    AM    Noon    PM    Bedtime         CHANGE how you take these medications        Additional Info                      doxazosin 4 MG tablet   Commonly known as:  CARDURA   Quantity:  90 tablet   Refills:  3   Dose:  4 mg   What changed:  when to take this    Instructions:  Take 1 tablet (4 mg total) by mouth every evening.     Begin Date    AM    Noon    PM    Bedtime         CONTINUE taking these medications        Additional Info                      aspirin 81 MG Chew   Refills:  0   Dose:  81 mg    Instructions:  Take 81 mg by mouth once daily.     Begin Date    AM    Noon    PM    Bedtime       DAILY MULTIVITAMIN ORAL   Refills:  0    Instructions:  Take by mouth.     Begin Date    AM    Noon    PM    Bedtime       fish oil-omega-3 fatty acids 300-1,000 mg capsule   Refills:  0   Dose:  2 g    Instructions:  Take 2 g by mouth once daily.     Begin Date    AM    Noon    PM    Bedtime       glucosamine-chondroitin 500-400 mg tablet   Refills:  0   Dose:  1  tablet    Instructions:  Take 1 tablet by mouth 3 (three) times daily.     Begin Date    AM    Noon    PM    Bedtime       indapamide 1.25 MG Tab   Commonly known as:  LOZOL   Refills:  0   Dose:  2.5 mg    Instructions:  Take 2.5 mg by mouth once daily.     Begin Date    AM    Noon    PM    Bedtime       metoprolol succinate 50 MG 24 hr tablet   Commonly known as:  TOPROL-XL   Refills:  0   Dose:  50 mg    Instructions:  Take 50 mg by mouth nightly.     Begin Date    AM    Noon    PM    Bedtime       nitrofurantoin (macrocrystal-monohydrate) 100 MG capsule   Commonly known as:  MACROBID   Quantity:  20 capsule   Refills:  0   Dose:  100 mg    Instructions:  Take 1 capsule (100 mg total) by mouth 2 (two) times daily.     Begin Date    AM    Noon    PM    Bedtime       valsartan 160 MG tablet   Commonly known as:  DIOVAN   Refills:  0   Dose:  320 mg    Instructions:  Take 320 mg by mouth once daily.     Begin Date    AM    Noon    PM    Bedtime            Where to Get Your Medications      You can get these medications from any pharmacy     Bring a paper prescription for each of these medications     hydrocodone-acetaminophen 5-325mg 5-325 mg per tablet    oxybutynin 5 MG Tab    phenazopyridine 100 MG tablet                  Please bring to all follow up appointments:    1. A copy of your discharge instructions.  2. All medicines you are currently taking in their original bottles.  3. Identification and insurance card.    Please arrive 15 minutes ahead of scheduled appointment time.    Please call 24 hours in advance if you must reschedule your appointment and/or time.        Your Scheduled Appointments     Mar 24, 2017  2:00 PM CDT   Post OP with RAMEZ Steen MD   [facility]) [address] [facility] [address]  [address]  [facility] [address]   [phone]              Follow-up Information     Follow up with LICHA Steen MD. Schedule an appointment as soon as possible for a visit in 1 week.     Specialty:  Urology    Why:  Post op Check, Perez Removal    Contact information:    [address]  [address]  [place]  [phone]          Discharge Instructions     Future Orders    Activity as tolerated     Call MD for:     Comments:    Significant Hematuria    Diet general     Questions:    Total calories:      Fat restriction, if any:      Protein restriction, if any:      Na restriction, if any:      Fluid restriction:      Additional restrictions:          Discharge Instructions         ACTIVITY LEVEL: If you have received sedation or an anesthetic, you may feel sleepy for several hours. Rest until you are more awake. Gradually resume your normal activities.       DIET: You may resume your home diet. If nausea is present, increase your diet gradually with fluids and bland foods.      Medications: Pain medication should be taken only if needed and as directed. If antibiotics are prescribed, the medication should be taken until completed. You will be given an updated list of you medications.  ? No driving, alcoholic beverages or signing legal documents for next 24 hours or while taking pain medication        CALL THE DOCTOR:       · Fever over 101°F  · Severe pain that doesnt go away with medication.  · Upset stomach and vomiting that is persistent.  · Problems urinating-unable to urinate or heavy bleeding (with or without clots)         Discharge References/Attachments     LEG BAG, DISCHARGE INSTRUCTIONS (ENGLISH)    URETERAL STENTS (ENGLISH)        Primary Diagnosis     Your primary diagnosis was:  Bladder Tumor      Admission Information     Date & Time Provider Department [facility]    3/3/2017  5:36 AM RAMEZ Steen MD [facility] [id number]      Care Providers     Provider Role Specialty Primary office phone    RAMEZ Steen MD Attending Provider Urology [phone]    RAMEZ Steen MD Surgeon  Urology [phone]      Your Vitals Were     BP Pulse Temp Resp Height  "Weight    115/74 58 97.8 °F (36.6 °C) (Oral) 18 5' 5" (1.651 m) 75.8 kg (167 lb)    SpO2 BMI             99% 27.79 kg/m2         Recent Lab Values     No lab values to display.      Pending Labs     Order Current Status    Specimen to Pathology - Surgery Collected (03/03/17 0844)      Allergies as of 3/3/2017     No Known Allergies      Advance Directives     An advance directive is a document which, in the event you are no longer able to make decisions for yourself, tells your healthcare team what kind of treatment you do or do not want to receive, or who you would like to make those decisions for you.  If you do not currently have an advance directive, [facility] encourages you to create one.  For more information call:  [phone]), [phone] ([phone]),  or log on to [email or web address].        Language Assistance Services     ATTENTION: Language assistance services are available, free of charge. Please call [phone].      ATENCIÓN: Si habla español, tiene a adan disposición servicios gratuitos de asistencia lingüística. Llame al [phone].     CHÚ Ý: N?u b?n nói Ti?ng Vi?t, có các d?ch v? h? tr? ngôn ng? mi?n phí dành cho b?n. G?i s? [phone].        [facility] Sign-Up     Activating your [facility] account is as easy as 1-2-3!     1) Visit [email or web address], select Sign Up Now, enter this activation code and your date of birth, then select Next.  [email or web address]  Expires: 4/6/2017  1:43 PM      2) Create a username and password to use when you visit [facility] in the future and select a security question in case you lose your password and select Next.    3) Enter your e-mail address and click Sign Up!    Additional Information  If you have questions, please e-mail [email or web address] or call [phone] to talk to our [facility] staff. Remember, [facility] is NOT to be used for urgent needs. For medical emergencies, dial 911.          [facility] complies " with applicable Federal civil rights laws and does not discriminate on the basis of race, color, national origin, age, disability, or sex.

## 2017-03-04 NOTE — DISCHARGE SUMMARY
DATE OF ADMISSION:  03/03/2017    DATE OF DISCHARGE:  03/03/2017    ADMISSION DIAGNOSIS:  Gross hematuria.    DISCHARGE DIAGNOSES:  Gross hematuria, bladder tumor, right hydronephrosis.    PROCEDURE PERFORMED:  Transurethral resection of bladder tumor, right retrograde   pyelogram, right ureteral stent placement, fluoroscopy and Perez catheter   placement and instillation of mitomycin C.    ATTENDING PHYSICIAN:  Waqar Steen M.D.    BRIEF COURSE:  Amador Harrison was taken to the Operating Room.  He underwent the   above procedure.  He had no intraoperative or postoperative complications.  He   was discharged home in stable condition.    DIET:  Regular.    ACTIVITY:  As tolerated.    FOLLOWUP:  In 1 week.      MARCY  dd: 03/03/2017 08:58:27 (CST)  td: 03/03/2017 23:26:32 (CST)  Doc ID   #[id number]  Job ID #[id number]    CC:

## 2017-03-07 NOTE — TELEPHONE ENCOUNTER
----- Message from Oriana Cavazos sent at 3/7/2017  9:21 AM CST -----  Contact: self  Please call pt in regards to catheter issues. Please call [phone]. thanks

## 2017-03-07 NOTE — TELEPHONE ENCOUNTER
Colace and Amitiza sent to pharmacy.  Have him take the Colace for 2 weeks    Take the Amitiza twice daily until his BMs are more normal then stop.    Elevate his legs and place a small towel under his scrotum while lying down or sitting to help with the scrotal swelling.

## 2017-03-07 NOTE — TELEPHONE ENCOUNTER
Patient states that he was up all night with constipation, but after trying a few things was finally able to produce a BM. Patient now states that his bottom half, including his penis and testicles, are swollen. The catheter is leaking as well. VOT scheduled for Thursday. Please advise.

## 2017-03-09 NOTE — PROGRESS NOTES
Subjective:       Patient ID: Amador Harrison is a 61 y.o. male who was last seen in this office 2/20/2017    Chief Complaint:   Chief Complaint   Patient presents with    Post-op Evaluation     post op from turbt       Bladder Cancer  He had hematuria started a little over a month ago.  He had a TURBT +[facility] and right stent placement last week.  He is here for a voiding trial and path review.  The ditropan is causing some dry mouth.      ACTIVE MEDICAL ISSUES:  Patient Active Problem List   Diagnosis    Gross hematuria    Bladder tumor    Hydronephrosis, right       ALLERGIES AND MEDICATIONS: updated and reviewed.  Review of patient's allergies indicates:  No Known Allergies  Current Outpatient Prescriptions   Medication Sig    aspirin 81 MG Chew Take 81 mg by mouth once daily.    docusate sodium (COLACE) 100 MG capsule Take 1 capsule (100 mg total) by mouth 2 (two) times daily.    doxazosin (CARDURA) 4 MG tablet Take 1 tablet (4 mg total) by mouth every evening. (Patient taking differently: Take 4 mg by mouth once daily. )    fish oil-omega-3 fatty acids 300-1,000 mg capsule Take 2 g by mouth once daily.    glucosamine-chondroitin 500-400 mg tablet Take 1 tablet by mouth 3 (three) times daily.    hydrocodone-acetaminophen 5-325mg (NORCO) 5-325 mg per tablet Take 1 tablet by mouth every 6 (six) hours as needed for Pain.    indapamide (LOZOL) 1.25 MG Tab Take 2.5 mg by mouth once daily.    lubiprostone (AMITIZA) 24 MCG Cap Take 1 capsule (24 mcg total) by mouth 2 (two) times daily.    metoprolol succinate (TOPROL-XL) 50 MG 24 hr tablet Take 50 mg by mouth nightly.     MV-MN/FA/D3/LYCOPENE/LUT/COQ10 (DAILY MULTIVITAMIN ORAL) Take by mouth.    oxybutynin (DITROPAN) 5 MG Tab Take 1 tablet (5 mg total) by mouth 3 (three) times daily as needed.    phenazopyridine (PYRIDIUM) 100 MG tablet Take 2 tablets (200 mg total) by mouth 3 (three) times daily as needed for Pain (Burning).    valsartan (DIOVAN) 160 MG  "tablet Take 320 mg by mouth once daily.      No current facility-administered medications for this visit.        Review of Systems   Constitutional: Negative for activity change, fatigue, fever and unexpected weight change.   HENT: Negative for congestion.    Eyes: Negative for redness.   Respiratory: Negative for chest tightness and shortness of breath.    Cardiovascular: Negative for chest pain and leg swelling.   Gastrointestinal: Negative for abdominal pain, constipation, diarrhea, nausea and vomiting.   Genitourinary: Negative for dysuria, flank pain, frequency, hematuria, penile pain, penile swelling, scrotal swelling, testicular pain and urgency.   Musculoskeletal: Negative for arthralgias and back pain.   Neurological: Negative for dizziness and light-headedness.   Psychiatric/Behavioral: Negative for behavioral problems and confusion. The patient is not nervous/anxious.    All other systems reviewed and are negative.      Objective:      Vitals:    03/09/17 1352   BP: (!) 140/70   Pulse: 70   Weight: 75.8 kg (167 lb)   Height: 5' 5" (1.651 m)     Physical Exam   Nursing note and vitals reviewed.  Constitutional: He is oriented to person, place, and time. He appears well-developed and well-nourished.   HENT:   Head: Normocephalic.   Eyes: Conjunctivae are normal.   Neck: Normal range of motion. Neck supple. No tracheal deviation present. No thyromegaly present.   Cardiovascular: Normal rate and normal heart sounds.    Pulmonary/Chest: Effort normal and breath sounds normal. No respiratory distress. He has no wheezes.   Abdominal: Soft. Bowel sounds are normal. There is no hepatosplenomegaly. There is no tenderness. There is no rebound and no CVA tenderness. No hernia.   Musculoskeletal: Normal range of motion. He exhibits no edema or tenderness.   Lymphadenopathy:     He has no cervical adenopathy.   Neurological: He is alert and oriented to person, place, and time.   Skin: Skin is warm and dry. No rash " noted. No erythema.     Psychiatric: He has a normal mood and affect. His behavior is normal. Judgment and thought content normal.         Perez removed without difficulty    FINAL PATHOLOGIC DIAGNOSIS  TRANSURETHRAL RESECTION OF BLADDER TUMOR-HIGH-GRADE PAPILLARY UROTHELIAL CARCINOMA  WITH AREAS OF SQUAMOUS CELL CARCINOMA DIFFERENTIATION. EXTENSIVE INVOLVEMENT OF THE  MUSCULARIS PROPRIA (DETRUSSOR MUSCLE) IS PRESENT.      Assessment:       1. Malignant neoplasm of trigone of urinary bladder    2. Benign nodular prostatic hyperplasia without lower urinary tract symptoms    3. Hydronephrosis, right          Plan:       1. Malignant neoplasm of trigone of urinary bladder  I am going to have him see Dr. Wilde at [facility] for likely cystectomy.  I explained the basics of his surgical options.  He may need neoadjuvant chemotherapy.    - Ambulatory consult to Urology    2. Benign nodular prostatic hyperplasia without lower urinary tract symptoms      3. Hydronephrosis, right  He will need his stent managed in the next 6 weeks to 3 months.              Return for Follow up.

## 2017-03-09 NOTE — MR AVS SNAPSHOT
[facility]  [address] [facility] [address]  [address]  [place] [address]  Phone: [phone]                  Amador Harrison   3/9/2017 2:00 PM   Office Visit    Description:  Male : 1955   Provider:  RAMEZ Steen MD   Department:  [facility]           Reason for Visit     Post-op Evaluation           Diagnoses this Visit        Comments    Malignant neoplasm of trigone of urinary bladder    -  Primary     Benign nodular prostatic hyperplasia without lower urinary tract symptoms         Hydronephrosis, right                To Do List           Future Appointments        Provider Department Dept Phone    3/24/2017 2:00 PM RAMEZ Steen MD [facility] [phone]      Goals (5 Years of Data)     None      Follow-Up and Disposition     Return for Follow up.    Follow-up and Disposition History      [facility]     [facility] On Call Nurse Care Line -  Assistance  Registered nurses in the [facility] provide clinical advisement, health education, appointment booking, and other advisory services.  Call for this free service at [phone].             Medications           Message regarding Medications     Verify the changes and/or additions to your medication regime listed below are the same as discussed with your clinician today.  If any of these changes or additions are incorrect, please notify your healthcare provider.             Verify that the below list of medications is an accurate representation of the medications you are currently taking.  If none reported, the list may be blank. If incorrect, please contact your healthcare provider. Carry this list with you in case of emergency.           Current Medications     aspirin 81 MG Chew Take 81 mg by mouth once daily.    docusate sodium (COLACE) 100 MG capsule Take 1 capsule (100 mg total) by mouth 2 (two) times daily.    doxazosin (CARDURA) 4 MG tablet Take 1 tablet (4 mg total) by mouth every  "evening.    fish oil-omega-3 fatty acids 300-1,000 mg capsule Take 2 g by mouth once daily.    glucosamine-chondroitin 500-400 mg tablet Take 1 tablet by mouth 3 (three) times daily.    hydrocodone-acetaminophen 5-325mg (NORCO) 5-325 mg per tablet Take 1 tablet by mouth every 6 (six) hours as needed for Pain.    indapamide (LOZOL) 1.25 MG Tab Take 2.5 mg by mouth once daily.    lubiprostone (AMITIZA) 24 MCG Cap Take 1 capsule (24 mcg total) by mouth 2 (two) times daily.    metoprolol succinate (TOPROL-XL) 50 MG 24 hr tablet Take 50 mg by mouth nightly.     MV-MN/FA/D3/LYCOPENE/LUT/COQ10 (DAILY MULTIVITAMIN ORAL) Take by mouth.    oxybutynin (DITROPAN) 5 MG Tab Take 1 tablet (5 mg total) by mouth 3 (three) times daily as needed.    phenazopyridine (PYRIDIUM) 100 MG tablet Take 2 tablets (200 mg total) by mouth 3 (three) times daily as needed for Pain (Burning).    valsartan (DIOVAN) 160 MG tablet Take 320 mg by mouth once daily.            Clinical Reference Information           Your Vitals Were     BP Pulse Height Weight BMI    140/70 70 5' 5" (1.651 m) 75.8 kg (167 lb) 27.79 kg/m2      Blood Pressure          Most Recent Value    BP  (!)  140/70      Allergies as of 3/9/2017     No Known Allergies      Immunizations Administered on Date of Encounter - 3/9/2017     None      Orders Placed During Today's Visit      Normal Orders This Visit    Ambulatory consult to Urology       [facility] Sign-Up     Activating your [facility] account is as easy as 1-2-3!     1) Visit [email or web address], select Sign Up Now, enter this activation code and your date of birth, then select Next.  [email or web address]  Expires: 4/6/2017  1:43 PM      2) Create a username and password to use when you visit [facility] in the future and select a security question in case you lose your password and select Next.    3) Enter your e-mail address and click Sign Up!    Additional Information  If you have questions, please e-mail [email or web address] or " call [phone] to talk to our [facility] staff. Remember, [facility] is NOT to be used for urgent needs. For medical emergencies, dial 911.         Language Assistance Services     ATTENTION: Language assistance services are available, free of charge. Please call [phone].      ATENCIÓN: Si habla ferny, tiene a adan disposición servicios gratuitos de asistencia lingüística. Llame al [phone].     [facility] Ý: N?u b?n nói Ti?ng Vi?t, có các d?ch v? h? tr? ngôn ng? mi?n phí dành cho b?n. G?i s? [phone].         [facility] complies with applicable Federal civil rights laws and does not discriminate on the basis of race, color, national origin, age, disability, or sex.

## 2017-03-10 NOTE — TELEPHONE ENCOUNTER
----- Message from Jennifer Mcclain sent at 3/9/2017  3:58 PM CST -----  Contact: Pt wife:[phone]  Pt wife called and states  would like for the pt to be seen as soon as possible by . Pt has  Malignant neoplasm of trigone of urinary bladder.

## 2017-03-10 NOTE — TELEPHONE ENCOUNTER
----- Message from Ajay Tellez sent at 3/10/2017  1:16 PM CST -----  Contact: Self/[phone]  Refill:  phenazopyridine (PYRIDIUM) 100 MG tablet    Thank you.

## 2017-03-10 NOTE — TELEPHONE ENCOUNTER
Spoke with pt's wife and made an appt for this coming Monday.  Directions given.  All questions answered.

## 2017-03-13 NOTE — PROGRESS NOTES
"Subjective:       Patient ID: Amador Harrison is a 61 y.o. male.    Chief Complaint: Bladder Cancer and Hematuria    HPI   61 year old  male, referred by Dr. Wilde, to clinic today for evaluation and management of newly diagnosed muscle-invasive bladder cancer. The patient presented with gross hematuria and was found to have a bladder tumor which was resected on 3/3/2017. Pathology from that TURBT revealed high-grade T2 bladder cancer with negative margins. The patient had a CT urogram on 1/31/2017 that did not show any locally advanced or distant metastatic abdominal disease.    He denies any mouth sores, nausea, vomiting, diarrhea, constipation, abdominal pain, weight loss or loss of appetite, chest pain, shortness of breath, leg swelling, fatigue, pain, headache, dizziness, or mood changes.    His ECOG PS is zero-one and quality of life so far is excellent.He is accompanied by his wife to clinic.He has a remote smoking history.     Oncologic History (From Chart):  61 year old  male, referred by Dr. Wilde, to clinic today for evaluation and management of newly diagnosed muscle-invasive bladder cancer. The patient presented with gross hematuria and was found to have a bladder tumor which was resected on 3/3/2017.  The patient had a CT urogram on 1/31/2017 that did not show any locally advanced or distant metastatic abdominal disease. Pathology from that TURBT revealed high-grade T2 bladder cancer with negative margins. Pathology revealed "WITH AREAS OF SQUAMOUS CELL CARCINOMA DIFFERENTIATION. EXTENSIVE INVOLVEMENT OF THE MUSCULARIS PROPRIA (DETRUSSOR MUSCLE) IS PRESENT."    Review of Systems   Constitutional: Negative for activity change, appetite change, fatigue, fever and unexpected weight change.   HENT: Positive for tinnitus (mild to left ear). Negative for mouth sores, nosebleeds and trouble swallowing.    Eyes: Negative for discharge and visual disturbance.   Respiratory: Negative for " cough, shortness of breath and wheezing.    Cardiovascular: Negative for chest pain and leg swelling.   Gastrointestinal: Negative for abdominal distention, abdominal pain, blood in stool, constipation, diarrhea, nausea and vomiting.   Genitourinary: Positive for hematuria. Negative for decreased urine volume, difficulty urinating and frequency.   Musculoskeletal: Negative for back pain.   Skin: Negative for color change and rash.   Neurological: Negative for weakness and headaches.   Hematological: Negative for adenopathy.   Psychiatric/Behavioral: Negative for sleep disturbance. The patient is not nervous/anxious.    All other systems reviewed and are negative.      Allergies:  Review of patient's allergies indicates:  No Known Allergies    Medications:  Current Outpatient Prescriptions   Medication Sig Dispense Refill    docusate sodium (COLACE) 100 MG capsule Take 1 capsule (100 mg total) by mouth 2 (two) times daily.  0    doxazosin (CARDURA) 4 MG tablet Take 1 tablet (4 mg total) by mouth every evening. (Patient taking differently: Take 4 mg by mouth once daily. ) 90 tablet 3    glucosamine-chondroitin 500-400 mg tablet Take 1 tablet by mouth 3 (three) times daily.      indapamide (LOZOL) 1.25 MG Tab Take 2.5 mg by mouth once daily.      metoprolol succinate (TOPROL-XL) 50 MG 24 hr tablet Take 50 mg by mouth nightly.       oxybutynin (DITROPAN) 5 MG Tab Take 1 tablet (5 mg total) by mouth 3 (three) times daily as needed. 90 tablet 3    phenazopyridine (PYRIDIUM) 100 MG tablet Take 2 tablets (200 mg total) by mouth 3 (three) times daily as needed for Pain (Burning). 21 tablet 0    valsartan (DIOVAN) 160 MG tablet Take 320 mg by mouth once daily.       aspirin 81 MG Chew Take 81 mg by mouth once daily.      hydrocodone-acetaminophen 5-325mg (NORCO) 5-325 mg per tablet Take 1 tablet by mouth every 6 (six) hours as needed for Pain. 30 tablet 0    lubiprostone (AMITIZA) 24 MCG Cap Take 1 capsule (24 mcg  total) by mouth 2 (two) times daily. 60 capsule 2     No current facility-administered medications for this visit.        PMH:  Past Medical History:   Diagnosis Date    Hypertension     Hypertrophy of prostate without urinary obstruction and other lower urinary tract symptoms (LUTS)     Nocturia     Nocturia        PSH:  Past Surgical History:   Procedure Laterality Date    CARPAL TUNNEL RELEASE      Left hand    CYSTOSCOPY      HERNIA REPAIR Bilateral     x 2 separate surgeries. 1 as an infant and another at approx age 18    hydrocele      at age 3    TONSILLECTOMY      turbt 2017      VASECTOMY         FamHx:  No family history on file.    SocHx:  Social History     Social History    Marital status:      Spouse name: N/A    Number of children: N/A    Years of education: N/A     Occupational History    Not on file.     Social History Main Topics    Smoking status: Former Smoker     Quit date: 2/27/1994    Smokeless tobacco: Never Used    Alcohol use Yes      Comment: 2 beers or wine daily    Drug use: No    Sexual activity: Yes     Other Topics Concern    Not on file     Social History Narrative     Objective:      Physical Exam   Constitutional: He is oriented to person, place, and time. He appears well-developed and well-nourished.   HENT:   Head: Normocephalic and atraumatic.   Mouth/Throat: Uvula is midline and oropharynx is clear and moist. No oropharyngeal exudate.   Eyes: Conjunctivae and EOM are normal. Pupils are equal, round, and reactive to light.   Neck: Trachea normal and normal range of motion. Neck supple. No tracheal deviation present.   Cardiovascular: Normal rate, regular rhythm, normal heart sounds and normal pulses.    No swelling noted to bilateral lower extremities.   Pulmonary/Chest: Effort normal.   Abdominal: Normal appearance.   Musculoskeletal: He exhibits no edema.   Lymphadenopathy:     He has no cervical adenopathy.   Neurological: He is alert and oriented  to person, place, and time.   Skin: Skin is warm, dry and intact. No rash noted. He is not diaphoretic.   Psychiatric: He has a normal mood and affect. His speech is normal and behavior is normal.   Nursing note and vitals reviewed.      LABS:  WBC   Date Value Ref Range Status   02/27/2017 8.45 3.90 - 12.70 K/uL Final     Hemoglobin   Date Value Ref Range Status   02/27/2017 12.8 (L) 14.0 - 18.0 g/dL Final     Hematocrit   Date Value Ref Range Status   02/27/2017 37.7 (L) 40.0 - 54.0 % Final     Platelets   Date Value Ref Range Status   02/27/2017 111 (L) 150 - 350 K/uL Final       Chemistry        Component Value Date/Time     02/27/2017 1557    K 3.5 02/27/2017 1557     02/27/2017 1557    CO2 28 02/27/2017 1557    BUN 21 02/27/2017 1557    CREATININE 0.9 02/27/2017 1557    GLU 90 02/27/2017 1557        Component Value Date/Time    CALCIUM 9.0 02/27/2017 1557          Assessment:       1. Malignant neoplasm of urinary bladder, unspecified site    2. Encounter for chemotherapy management    3. Chemotherapy induced nausea and vomiting        Plan:       1,2- Muscle-invasive bladder cancer:  - Dr. Austin personally met and discussed pt's case with Dr. Wilde. I personally reviewed his images, pathology, and previous records. We will need to obtain CT chest and new CT abdomen/pelvis to complete staging.  - Long conversation with patient regarding regarding treatment options including neoadjuvant chemotherapy in bladder cancer, as well as the natural history of disease, side effects of treatment, therapy schedules, etc.   - Pt agrees to move forward with neoadjuvant gemzar-cisplatin and will split the dose of cisplatin between days 1 and 8. Receive fluid support on days 2 and 9. Pt will receive gemzar on days 1,8, and 15.   - Will plan to get repeat imaging after cycle 2 completion.  - Will arrange for port, chemo class, and have pt RTC with labs to begin chemo thereafter.     3- Will send zofran and  phenergan prescriptions.    Patient was also seen and examined by Dr. Austin. Patient is in agreement with the proposed treatment plan. All questions were answered to the patient's satisfaction. Pt knows to call clinic if anything is needed before the next clinic visit.    Marion Flores, ZIYADP-C  Hematology and Medical Oncology        I have reviewed the notes, assessments, and/or procedures performed by the nurse practitioner, as above.  I have personally interviewed the patient at the beside, and rounded with the nurse practitioner. I formulated the plan of care.  I concur with her documentation of Amador Harrison.  More than 60 mins were spent during this encounter, greater than 50% was spent in direct counseling and/or coordination of care.     Antwon Austin M.D., M.S.  Hematology/Oncology Attending  [facility]

## 2017-03-13 NOTE — MR AVS SNAPSHOT
[facility]  [address]  [facility] [address]  Phone: [phone]                  Amador Harrison   3/13/2017 1:30 PM   Initial consult    Description:  Male : 1955   Provider:  Antwon Austin MD   Department:  [facility]           Reason for Visit     Bladder Cancer     Hematuria                To Do List           Goals (5 Years of Data)     None      [facility]     [facility] On Call Nurse Care Line -  Assistance  Registered nurses in the [facility] On Call Center provide clinical advisement, health education, appointment booking, and other advisory services.  Call for this free service at [phone].             Medications           Message regarding Medications     Verify the changes and/or additions to your medication regime listed below are the same as discussed with your clinician today.  If any of these changes or additions are incorrect, please notify your healthcare provider.             Verify that the below list of medications is an accurate representation of the medications you are currently taking.  If none reported, the list may be blank. If incorrect, please contact your healthcare provider. Carry this list with you in case of emergency.           Current Medications     aspirin 81 MG Chew Take 81 mg by mouth once daily.    docusate sodium (COLACE) 100 MG capsule Take 1 capsule (100 mg total) by mouth 2 (two) times daily.    doxazosin (CARDURA) 4 MG tablet Take 1 tablet (4 mg total) by mouth every evening.    glucosamine-chondroitin 500-400 mg tablet Take 1 tablet by mouth 3 (three) times daily.    hydrocodone-acetaminophen 5-325mg (NORCO) 5-325 mg per tablet Take 1 tablet by mouth every 6 (six) hours as needed for Pain.    indapamide (LOZOL) 1.25 MG Tab Take 2.5 mg by mouth once daily.    lubiprostone (AMITIZA) 24 MCG Cap Take 1 capsule (24 mcg total) by mouth 2 (two) times daily.    metoprolol succinate (TOPROL-XL) 50 MG 24 hr  "tablet Take 50 mg by mouth nightly.     oxybutynin (DITROPAN) 5 MG Tab Take 1 tablet (5 mg total) by mouth 3 (three) times daily as needed.    phenazopyridine (PYRIDIUM) 100 MG tablet Take 2 tablets (200 mg total) by mouth 3 (three) times daily as needed for Pain (Burning).    valsartan (DIOVAN) 160 MG tablet Take 320 mg by mouth once daily.            Clinical Reference Information           Your Vitals Were     BP Pulse Temp Resp Height Weight    120/74 (BP Location: Right arm, Patient Position: Sitting, BP Method: Automatic) 68 98.1 °F (36.7 °C) (Oral) 14 5' 5.75" (1.67 m) 80.1 kg (176 lb 9.4 oz)    BMI                28.72 kg/m2          Blood Pressure          Most Recent Value    BP  120/74      Allergies as of 3/13/2017     No Known Allergies      Immunizations Administered on Date of Encounter - 3/13/2017     None      [facility] Sign-Up     Activating your [facility] account is as easy as 1-2-3!     1) Visit [email or web address], select Sign Up Now, enter this activation code and your date of birth, then select Next.  [email or web address]  Expires: 4/6/2017  2:43 PM      2) Create a username and password to use when you visit [facility] in the future and select a security question in case you lose your password and select Next.    3) Enter your e-mail address and click Sign Up!    Additional Information  If you have questions, please e-mail [email or web address] or call [phone] to talk to our [facility] staff. Remember, [facility] is NOT to be used for urgent needs. For medical emergencies, dial 911.         Language Assistance Services     ATTENTION: Language assistance services are available, free of charge. Please call [phone].      ATENCIÓN: Si habla español, tiene a adan disposición servicios gratuitos de asistencia lingüística. Llame al [phone].     CHÚ Ý: N?u b?n nói Ti?ng Vi?t, có các d?ch v? h? tr? ngôn ng? mi?n phí dành cho b?n. G?i s? [phone].         [facility] " complies with applicable Federal civil rights laws and does not discriminate on the basis of race, color, national origin, age, disability, or sex.

## 2017-03-13 NOTE — LETTER
March 13, 2017      Larry Wilde MD  [address]  [facility] [address]           [facility] Hematology Oncology  [address]  [facility] [address]  Phone: [phone]          Patient: Amador Harrison   MR Number: [id number]   YOB: 1955   Date of Visit: 3/13/2017       Dear Dr. Larry Wilde:    Thank you for referring Amador Harrison to me for evaluation. Attached you will find relevant portions of my assessment and plan of care.    If you have questions, please do not hesitate to call me. I look forward to following Amador Harrison along with you.    Sincerely,    Antwon Austin MD    Enclosure  CC:  No Recipients    If you would like to receive this communication electronically, please contact [email or web address] or [phone] to request more information on [employer] access.    For providers and/or their staff who would like to refer a patient to [facility], please contact us through our one-stop-shop provider referral line, [facility], at [phone].    If you feel you have received this communication in error or would no longer like to receive these types of communications, please e-mail [email or web address]

## 2017-03-13 NOTE — PATIENT INSTRUCTIONS
Understanding Bladder Cancer    The urinary system includes the kidneys, ureters, bladder, and urethra. It makes, stores, and gets rid of liquid waste called urine. The two kidneys filter blood to collect waste and make urine. The ureters are small tubes that carry urine from each kidney to the bladder. The bladder is where urine is stored before it leaves the body. The urethra is the tube urine goes through to leave the body.   Bladder cancer means that certain cells in the bladder have changed in ways that aren't normal.  When bladder cancer forms  Cancer is a disease that causes cells to change and multiply out of control. The multiplying cells may form a lump of tissue (tumor). With time, the cancer cells destroy healthy tissue. They may spread to other parts of the body. Why some cells become cancerous is not always clear. But bladder cancer is strongly linked to cigarette smoking. The longer a person smokes and the more a person smokes, the greater that persons chances of developing bladder cancer.  Types of cancer that may form  Bladder cancer may grow in different ways:  · Papillary tumors stick out from the bladder lining on a stalk. They tend to grow into the bladder cavity, away from the bladder wall, instead of deeper into the layers of the bladder wall.  · Flat tumors do not stick out from the bladder lining. These tumors are much more likely than papillary tumors to grow deeper into the layers of the bladder wall.  · Carcinoma in situ (CIS) is a cancerous patch of cells that is only in the inner layer of the bladder lining and has not spread to deeper tissue. The patch may look almost normal or may look inflamed.    Each type of tumor can be present in one or more areas of the bladder, and more than one type can be present at the same time.  Date Last Reviewed: 2/17/2016  © 8731-9238 [employer]. [address] [place] [address], [place] [address]. All rights reserved. This information is not  intended as a substitute for professional medical care. Always follow your healthcare professional's instructions.

## 2017-03-13 NOTE — Clinical Note
Schedule the following: CT CAP (can use Dr. Wilde's CT chest), port placement, chemo class.  -Once insurance authorization received, RTC with labs (CBC,CMP), to see me, and cisplatin/gemzar day 1, IVFs day 2.

## 2017-03-13 NOTE — PROGRESS NOTES
Subjective:       Patient ID: Amador Harrison is a 61 y.o. male.    Chief Complaint: No chief complaint on file.      HPI: Amador Harrison is a 61 y.o. White male who presents today for evaluation and management of invasive bladder cancer.    The patient presented with gross hematuria and was found to have a bladder tumor which was resected on 3/3/2017. Pathology from that TURBT revealed high-grade T2 bladder cancer.    The patient had a CT urogram on 1/31/2017 that did not show any locally advanced or distant metastatic abdominal disease.    He has a remote smoking history.    He presents today with his wife to discuss his diagnosis and optimal management.    Review of patient's allergies indicates:  No Known Allergies    Current Outpatient Prescriptions   Medication Sig Dispense Refill    aspirin 81 MG Chew Take 81 mg by mouth once daily.      docusate sodium (COLACE) 100 MG capsule Take 1 capsule (100 mg total) by mouth 2 (two) times daily.  0    doxazosin (CARDURA) 4 MG tablet Take 1 tablet (4 mg total) by mouth every evening. (Patient taking differently: Take 4 mg by mouth once daily. ) 90 tablet 3    glucosamine-chondroitin 500-400 mg tablet Take 1 tablet by mouth 3 (three) times daily.      hydrocodone-acetaminophen 5-325mg (NORCO) 5-325 mg per tablet Take 1 tablet by mouth every 6 (six) hours as needed for Pain. 30 tablet 0    indapamide (LOZOL) 1.25 MG Tab Take 2.5 mg by mouth once daily.      lubiprostone (AMITIZA) 24 MCG Cap Take 1 capsule (24 mcg total) by mouth 2 (two) times daily. 60 capsule 2    metoprolol succinate (TOPROL-XL) 50 MG 24 hr tablet Take 50 mg by mouth nightly.       oxybutynin (DITROPAN) 5 MG Tab Take 1 tablet (5 mg total) by mouth 3 (three) times daily as needed. 90 tablet 3    phenazopyridine (PYRIDIUM) 100 MG tablet Take 2 tablets (200 mg total) by mouth 3 (three) times daily as needed for Pain (Burning). 21 tablet 0    valsartan (DIOVAN) 160 MG tablet Take 320 mg by  mouth once daily.        No current facility-administered medications for this visit.        Past Medical History:   Diagnosis Date    Hypertension     Hypertrophy of prostate without urinary obstruction and other lower urinary tract symptoms (LUTS)     Nocturia     Nocturia        Past Surgical History:   Procedure Laterality Date    CARPAL TUNNEL RELEASE      Left hand    CYSTOSCOPY      HERNIA REPAIR Bilateral     x 2 separate surgeries. 1 as an infant and another at approx age 18    hydrocele      at age 3    TONSILLECTOMY      turbt 2017      VASECTOMY         History reviewed. No pertinent family history.    Review of Systems    Review of Systems   Constitutional: Negative for fever, chills, activity change, appetite change and unexpected weight change.   HENT: Negative for congestion, nosebleeds, sneezing, sore throat and trouble swallowing.    Eyes: Negative for pain, discharge, redness and visual disturbance.   Respiratory: Negative for cough, choking, chest tightness and shortness of breath.    Cardiovascular: Negative for chest pain, palpitations and leg swelling.   Gastrointestinal: Negative for nausea, vomiting, abdominal pain, diarrhea, blood in stool, abdominal distention and anal bleeding.  Genitourinary: As documented per HPI   Endocrine: Negative for cold intolerance, heat intolerance, polydipsia, polyphagia and polyuria.   Musculoskeletal: Negative for myalgias, gait problem, neck pain and neck stiffness.   Skin: Negative for color change, pallor, rash and wound.   Allergic/Immunologic: Negative for immunocompromised state.   Neurological: Negative for seizures, facial asymmetry, speech difficulty, weakness and light-headedness.   Hematological: Negative for adenopathy. Does not bruise/bleed easily.   Psychiatric/Behavioral: Negative for hallucinations, behavioral problems, self-injury and agitation. The patient is not hyperactive.    All other systems were reviewed and were negative.       Objective:     Vitals:    03/13/17 1036   BP: 121/72   Pulse: 65     Physical Exam   Vitals reviewed.  Constitutional: He is oriented to person, place, and time. He appears well-developed and well-nourished. No distress.   HENT:   Head: Normocephalic and atraumatic.   Right Ear: External ear normal.   Left Ear: External ear normal.   Nose: Nose normal.   Eyes: EOM are normal. Pupils are equal, round, and reactive to light. Right eye exhibits no discharge. Left eye exhibits no discharge.   Neck: Normal range of motion. Neck supple. No tracheal deviation present. No thyroid enlargement or tenderness.   Cardiovascular: Regular rhythm and intact distal pulses. No signs of peripheral edema.    Pulmonary/Chest: Effort normal and breath sounds normal. No stridor. No respiratory distress. He has no wheezes.   Abdominal: Soft. Bowel sounds are normal. He exhibits no distension. There is no tenderness. Hernia confirmed negative in the right inguinal area and confirmed negative in the left inguinal area. No hepatic or splenic enlargement or tenderness.  Genitourinary: Penis normal. Right testis shows no mass, no swelling and no tenderness. Right testis is descended. Left testis shows no mass, no swelling and no tenderness. Left testis is descended. Circumcised. No phimosis or hypospadias.   LYLY: Deferred  Musculoskeletal: Normal range of motion. He exhibits no edema.   Neurological: He is alert and oriented to person, place, and time. He exhibits normal muscle tone. Coordination normal.   Skin: Skin is warm. No rash noted.   Lymphatic: No palpable lymph nodes.  Psychiatric: He has a normal mood and affect. His behavior is normal. Judgment and thought content normal.     No results found for: PSA  Lab Results   Component Value Date    CREATININE 0.9 02/27/2017     Lab Results   Component Value Date    EGFRNONAA >60 02/27/2017     Lab Results   Component Value Date    ESTGFRAFRICA >60 02/27/2017     I personally reviewed all  the patient's imaging studies.    CT urogram (1/31/2017): Thickening of the posterior bladder wall, slightly irregular appearance may represent cystitis versus neoplasm.    Assessment:       1. Malignant neoplasm of trigone of urinary bladder    2. Hydronephrosis, right        Plan:     Diagnoses and all orders for this visit:    Malignant neoplasm of trigone of urinary bladder  -     CT Chest With Contrast; Future    Hydronephrosis, right    The patient has cT2 bladder cancer.    I had a lengthy discussion with the patient regarding implications of a diagnosis of urothelial carcinoma of the bladder, i.e, bladder cancer.    We talked about the various therapeutic options including endoscopic management, a combined chemotherapy and radiotherapy regimen (the Esme protocol), primary chemotherapy alone and radical surgery. Regarding endoscopic management, the patient is aware that although the tumor may have been fully resected, microscopic disease can still persist.  Regarding chemoradiation protocols, the patient is aware that a minority of patients will preserve their bladder long-term and the function of a radiated bladder may result in significant morbidity.  The patient was offered to speak to both the medical oncology and the radiation oncology services for a consultation regarding bladder sparing techniques.    With regard to surgery, I explained that there are 3 basic categories of modern urinary diversions: (1) incontinent bowel conduit (e.g. ileal conduit) (2) continent cutaneous stomal reservoire (e.g. Indiana pouch) (3) continent orthotopic urethral diversion (ileal neopbladder).      I explained that an ileal conduit is the simplest, most time-tested urinary diversion that requires the least operative time and arguably is associated with the fewest complications. I described that a short piece of ileum is anastamosed to the ureters and brought onto the skin.  A life-long urostomy appliance to collect  urine is required.   Although time-tested, short-term and long-term issues with ileal conduit diversions are not uncommon.  I quoted the best data available, which in my view is the 2003 [facility] series that included long-term bladder cancer survivors who had at least 5 years of follow-up.  In this series, issues that were seen with ileal conduits were as follows: ureteral stricture/obstruction (~15%), recurrent pyelonephritis (~ 25%), urinary calculi (this complication often occurred beyond 5 to 10 years after surgery, ~10% ), impaired kidney function due to obstruction or ureteral reflux (~27%, only 2% required dialysis and all of whom had compromised kidney function prior to surgery), and stomal complications (~25%) such as parastomal hernia, stensosis, and bleeding/skin irritation.  Furthermore up to 25% of patients had bowel complications, which included small bowel obstruction, fistula formation, and diarrhea.  I explained that although many issues that arise can be handled conservatively, some do require re-operation.      Then we discussed the ileal neobladder.  I explained that the neobladder is constructed from detubularized bowel, which is then sewn into a spherical reservoir for urine storage.  The neobladder is anastamosed to the ureters and to the urethra, to mimic functions of the native bladder. The goal is for the patient to void spontaneously using the Valsalva menuver.  Choice of proceeding with the neobladder vs. ileal conduit must be balanced against additional issues and risks.  Along with risks listed for ileal conduit diversion, I explained the additional potential problems that can arise with the ileal neobladder.   I described risks of urinary leaks from a number of suture lines and explained that in general these can be managed conservatively.  I stressed that a life-long risk of pouch perforation exists.  We discussed risks of incontinence and hypercontinence.  I explained that  it is critical to integrate these risks into the decision to proceed with the neobladder.  I quoted the risks of day-time incontinence to be on the order of 10%, while the risk of nighttime incontinence to be approximately 20%.  With regard to hypercontinence and poor pouch emptying, I explained that approximately 5% of male patients will require life-long intermittent catheterization.  Such risks can reach 30% in female patients with neobladders.  Furthemore, neobladder-vaginal fistula formation is a great concern when a neobladder is constructed in women (~5%).  I explained that continent urinary diversions are contra-indicated in patients with renal insufficiency, but did explain that metabolic disturbances are possible even in patients with intact renal function.    With regard to Indiana pouch, we discussed that this diversion consists of a pouch constructed from detubularized right colon.  The pouch is catheterized through the native ileocecal valve via a segment of the ileum that is brought onto the skin.  I explained that all risks that pertain to ileal conduits and neobladders generally apply to patients who undergo a cutaneous continent diversion.  These include risks of stomal stenosis, pouch stomal formation, incontinence, and metabolic disturbances. Furthermore, because, the ileocecal valve is resected, some post-operative diarrhea can be expected.  Such symptoms usually can be controlled with over-the-counter medications.  We reviewed the life-long catheterization and irrigation regimen that is required with this diversion.  I explained that patients who chose to undergo continent diversions  such as an [place] pouch or neobladder, need to be prepared to commit to the following in the post-operative period:   Keep drains as long as needed (for neobladder for example, the catheter and suprapubic tube stay on the order of 2-3 weeks, but possibly longer)   Return for appointments as often as  needed.   Empty neobladder/[place] pouch every 2 hours for 1 month   Empty neobladder/Indiana pouch every 3 hours for 1 month   Empty neobladder/[place] pouch every 4 hour for the rest of my life    Risks of sexual dysfunction:  We discussed that in males rates of erectile dysfunction are substantial, since removal of the prostate - an integral part of radical cystectomy - results in compromise of the neurovascular bundles that innervate the penile corpora.  I explained that I perform nerve-sparing cystoprostatectomy whenever possible.    Other risks of the surgery were discussed in detail including medical and anesthetic complications (e.g. heart attack, stroke, deep vein thrombosis, pulmonary embolism, infection (pneumonia, etc), bleeding with possible need for transfusion, adjacent organ involvement or injury (including possible need for permanent or temporary colostomy), wound complications, reaction to medications).  We reviewed what to expect with regard to incisions, post-operative pain, and risks of subsequent hernias.     Neoadjuvant chemotherapy:  We discussed advantages of neoadjuvant chemotherapy.  I explained that such strategy is only indicated for patients with muscle-invasive disease.  Advantages of neoadjuvant chemotherapy include potential treatment of microscopic metastases, downstaging of the primary tumor, and improved patient tolerance when compared to adjuvant chemotherapy.  The SWOG 8710 trial randomized 307 patients to immediate cystectomy vs cystectomy preceded by chemotherapy (MVAC = methotrexate, vinblastine, adriamycin (aka doxorubicin), and cisplatin).  Patients in the chemotherapy arm demonstrated improvement in both disease-specific and overall survival).  Patients with pathologic T3 and T4 disease demonstrated the greatest benefit, in contrast to those with T2 disease.  I explained that in large metanalyses of over 3,000 patients from 11 randomized trials advantages of neoadjuvant  chemotherapy are on the order of 5% in overall 5-year survival.  I invited the patients to discussed risks and benefits of neoadjuvant therapy with our medical oncology team.    I have recommended neoadjuvant chemotherapy followed by consolidative surgery for this patient. He will see Dr. Austin later today who will likely order complete restaging scans.    I will see him back near the end of his chemotherapy to discuss again surgery and urinary diversion.    I answered all his and his wife's questions.    At the conclusion of our discussion, the patient was agreeable to proceeding as outlined above.     I encouraged him or any of his family members to call or email me with questions and/or concerns.    I spent 45 minutes with the patient of which more than half was spent in coordinating the patient's care as well as in direct consultation with the patient in regards to our treatment and plan.

## 2017-03-14 NOTE — TELEPHONE ENCOUNTER
spoke with patient  Ct scan has been scheduled on 3/16  Patient was given prep instruction and location.

## 2017-03-14 NOTE — TELEPHONE ENCOUNTER
----- Message from Anoop Lowery sent at 3/14/2017 11:36 AM CDT -----  Contact: self  Pt is calling to check status of ct-scan being scheduled.  Contact number [phone] or [phone]

## 2017-03-16 NOTE — PROGRESS NOTES
Subjective:       Patient ID: Amador Harrison is a 61 y.o. male.    Chief Complaint: Malignant neoplasm of trigone of urinary bladder    HPI Comments: Patient in today for pre procedure consult for a port placement scheduled on 03/21/17 at 11:00am. Patient was recently diagnosed with bladder cancer and will begin chemotherapy soon. He has no complaints today. He is accompanied by his wife.     Review of Systems   Constitutional: Negative for activity change, appetite change, chills, fatigue, fever and unexpected weight change.   HENT:        Seasonal allergies    Eyes:        Wears glasses    Respiratory: Negative for apnea, cough, chest tightness and shortness of breath.    Cardiovascular: Negative for chest pain, palpitations and leg swelling.   Gastrointestinal: Negative for abdominal distention, abdominal pain, blood in stool, constipation, diarrhea, nausea and vomiting.   Endocrine: Negative.    Genitourinary: Positive for difficulty urinating (some straining to start urinary stream ), dysuria, flank pain (right flank discomfort due to ureteral stent ) and frequency. Negative for decreased urine volume and hematuria.   Musculoskeletal: Negative for arthralgias, back pain, gait problem, joint swelling, myalgias, neck pain and neck stiffness.   Neurological: Negative for dizziness, seizures, syncope, weakness, light-headedness, numbness and headaches.   Hematological: Does not bruise/bleed easily.       Objective:      Physical Exam   Constitutional: He is oriented to person, place, and time. He appears well-developed and well-nourished.   HENT:   Head: Normocephalic and atraumatic.   Eyes: EOM are normal. Pupils are equal, round, and reactive to light.   Neck: Normal range of motion. Neck supple.   Cardiovascular: Normal rate, regular rhythm, normal heart sounds and intact distal pulses.  Exam reveals no gallop and no friction rub.    No murmur heard.  Pulmonary/Chest: Effort normal and breath sounds normal. He  has no wheezes. He has no rales.   Abdominal: Soft. Bowel sounds are normal. He exhibits no distension and no mass. There is no tenderness. There is no rebound and no guarding.   Musculoskeletal: Normal range of motion.   Lymphadenopathy:     He has no cervical adenopathy.   Neurological: He is alert and oriented to person, place, and time.   Skin: Skin is warm and dry.   Psychiatric: He has a normal mood and affect. His behavior is normal. Judgment and thought content normal.   Vitals reviewed.      Assessment:       1. Malignant neoplasm of trigone of urinary bladder    2. Encounter for insertion of venous access port        Plan:   Port placement procedure discussed in detail with the patient including risks, benefits, potential complications, usual pre and post procedure course. Informed consent signed. Verbal and written pre procedure instructions provided. Patient and wife verbalized understanding. Contact information verified in [facility]. Clinic contact information provided.

## 2017-03-20 NOTE — TELEPHONE ENCOUNTER
----- Message from Marion Flores NP sent at 3/13/2017  2:40 PM CDT -----  Schedule the following: CT CAP (can use Dr. Wilde's CT chest), port placement, chemo class.    -Once insurance authorization received, RTC with labs (CBC,CMP), to see me, and cisplatin/gemzar day 1, IVFs day 2.

## 2017-03-20 NOTE — TELEPHONE ENCOUNTER
Offered to have patient come in today for treatment- patient states  That he is trying to find transportation to come in for a bladder scan and UCx.

## 2017-03-20 NOTE — TELEPHONE ENCOUNTER
Have him come in for urine culture and bladder scan.  If he is in retention then he will need a Perez.    If he is emptying fine then I will send in Ditropan but I need the bladder scan first.

## 2017-03-20 NOTE — TELEPHONE ENCOUNTER
Patient is having abd pain, frequency, straining to urinate, dribbling. Patient has a stent in place. Patient followed up with Dr. Silva on the 13th- please advise.

## 2017-03-20 NOTE — PROGRESS NOTES
Name and  verified. Patient verbalized understanding of why they are here today. Patient was prepped in sterile fashion for catheter to be placed. Patient was cleansed with NS, then a 16 fr coude catheter was placed. Orange tinged urine was obtained. Balloon was inflated with 10cc of NS. Patient tolerated procedure well.  Patient instructed on catheter care and instructed on medications. Apt made next week for a VOT.

## 2017-03-20 NOTE — TELEPHONE ENCOUNTER
----- Message from Linnea Carrillo sent at 3/20/2017  8:40 AM CDT -----  Contact: self  Patient has a stent in place . He is having frequency ,he has to strain to urinate ,dribbling & abdominal pain .    [phone]      LL

## 2017-03-21 PROBLEM — C67.0 MALIGNANT NEOPLASM OF TRIGONE OF URINARY BLADDER: Status: ACTIVE | Noted: 2017-01-01

## 2017-03-21 NOTE — DISCHARGE INSTRUCTIONS
Sponge bath only today.  You may shower tomorrow with dressing covered.     Remove dressing after 48 hours and leave open to air. You may wash the site with soap and water at this time.Do not soak the site until completely healed.    If there are skin tapes over the incision, leave them in place and wash over them. The skin tapes should fall off in 7-10 days. If they have not come off by themselves in 10 days ,you may remove them.    Rest and take it easy today. Do not drive for 24 hours. Gradually resume your normal activity.    If the port is in your arm, move you arm and hand normally. DO NOT hold it still.  Avoid lifting heavy objects or overusing the arm.    At home,continue with liquids and if there is no nausea, you may resume your normal diet.    If you have any discomfort, take what you normally take for pain. If it is not effective, call us.    WHEN TO CALL THE DOCTOR:    Obvious bleeding (dried blood over the incision is normal)    Redness or swelling in the affected area    Fever over 101 F (38.4C)    Drainage or pus from the wound    Pain not relieved by normal pain medication    If you have any problems or questions call us:    Normal working hours= [facility] [phone]    24/7= Call the page  at [phone] and ask for the Radiology Resident on call

## 2017-03-21 NOTE — PROGRESS NOTES
Pt arrived to [facility] s/p right upper arm port placement. Report given to EDITH Panchal. Family updated.

## 2017-03-21 NOTE — PROGRESS NOTES
Pt arrived to Ir room 190 for right upper arm port placement. See sedation documentation for full assessment.

## 2017-03-21 NOTE — H&P
Radiology History & Physical      SUBJECTIVE:     Chief Complaint: History of urinary bladder malignancy requiring chemotherapy.    History of Present Illness:  Amador Harrison is a 61 y.o. male who presents for image guided tunneled catheter/port placement.  Past Medical History:   Diagnosis Date    Cancer     Hypertension     Hypertrophy of prostate without urinary obstruction and other lower urinary tract symptoms (LUTS)     Nocturia     Nocturia      Past Surgical History:   Procedure Laterality Date    CARPAL TUNNEL RELEASE      Left hand    CYSTOSCOPY      HERNIA REPAIR Bilateral     x 2 separate surgeries. 1 as an infant and another at approx age 18    hydrocele      at age 3    TONSILLECTOMY      turbt 2017      VASECTOMY         Home Meds:   Prior to Admission medications    Medication Sig Start Date End Date Taking? Authorizing Provider   docusate sodium (COLACE) 100 MG capsule Take 1 capsule (100 mg total) by mouth 2 (two) times daily. 3/7/17  Yes RAMEZ Steen MD   doxazosin (CARDURA) 4 MG tablet Take 1 tablet (4 mg total) by mouth every evening.  Patient taking differently: Take 4 mg by mouth once daily.  9/9/16  Yes RAMEZ Steen MD   glucosamine-chondroitin 500-400 mg tablet Take 1 tablet by mouth 3 (three) times daily.   Yes Historical Provider, MD   hydrocodone-acetaminophen 5-325mg (NORCO) 5-325 mg per tablet Take 1 tablet by mouth every 6 (six) hours as needed for Pain. 3/3/17  Yes RAMEZ Steen MD   indapamide (LOZOL) 1.25 MG Tab Take 2.5 mg by mouth once daily.   Yes Historical Provider, MD   metoprolol succinate (TOPROL-XL) 50 MG 24 hr tablet Take 50 mg by mouth nightly.    Yes Historical Provider, MD   multivitamin (ONE DAILY MULTIVITAMIN) per tablet Take 1 tablet by mouth once daily.   Yes Historical Provider, MD   oxybutynin (DITROPAN) 5 MG Tab Take 1 tablet (5 mg total) by mouth 3 (three) times daily as needed. 3/3/17 4/2/17 Yes RAMEZ Steen MD    phenazopyridine (PYRIDIUM) 100 MG tablet Take 2 tablets (200 mg total) by mouth 3 (three) times daily as needed for Pain (Burning). 3/3/17  Yes RAMEZ Steen MD   valsartan (DIOVAN) 160 MG tablet Take 320 mg by mouth once daily.    Yes Historical Provider, MD   aspirin 81 MG Chew Take 81 mg by mouth once daily.    Historical Provider, MD   lidocaine-prilocaine (EMLA) cream Apply to port 30-45 minutes prior to being accessed. 3/13/17   Marion Flores NP   lubiprostone (AMITIZA) 24 MCG Cap Take 1 capsule (24 mcg total) by mouth 2 (two) times daily. 3/7/17   RAMEZ Steen MD   ondansetron (ZOFRAN-ODT) 8 MG TbDL Take 1 tablet (8 mg total) by mouth every 12 (twelve) hours as needed. 3/13/17 3/13/18  Marion Flores NP   promethazine (PHENERGAN) 25 MG tablet Take 1 tablet (25 mg total) by mouth every 6 (six) hours as needed for Nausea. 3/13/17 3/20/17  Marion Flores NP   tamsulosin (FLOMAX) 0.4 mg Cp24 Take 1 capsule (0.4 mg total) by mouth once daily. 3/20/17 4/19/17  RAMEZ Steen MD     Anticoagulants/Antiplatelets: no anticoagulation    Allergies: Review of patient's allergies indicates:  No Known Allergies  Sedation History:  no adverse reactions    Review of Systems:   Hematological: no known coagulopathies  Respiratory: no shortness of breath  Cardiovascular: no chest pain  Gastrointestinal: no abdominal pain  Genito-Urinary: no dysuria  Musculoskeletal: negative  Neurological: no TIA or stroke symptoms         OBJECTIVE:     Vital Signs (Most Recent)  Temp: 98.3 °F (36.8 °C) (03/21/17 1155)  Pulse: 60 (03/21/17 1155)  Resp: 20 (03/21/17 1155)  BP: 111/60 (03/21/17 1155)  SpO2: 98 % (03/21/17 1155)    Physical Exam:  ASA: 2  Mallampati: 2    General: no acute distress  Mental Status: alert and oriented to person, place and time  HEENT: normocephalic, atraumatic  Chest: unlabored breathing  Heart: regular heart rate  Abdomen: nondistended  Extremity: moves all extremities    Laboratory  Lab Results    Component Value Date    INR 1.0 03/16/2017       Lab Results   Component Value Date    WBC 8.45 02/27/2017    HGB 12.8 (L) 02/27/2017    HCT 37.7 (L) 02/27/2017    MCV 95 02/27/2017     (L) 02/27/2017      Lab Results   Component Value Date    GLU 90 02/27/2017     02/27/2017    K 3.5 02/27/2017     02/27/2017    CO2 28 02/27/2017    BUN 21 02/27/2017    CREATININE 0.9 02/27/2017    CALCIUM 9.0 02/27/2017       ASSESSMENT/PLAN:     Sedation Plan: Moderate.   Patient will undergo image guided tunneled port/catheter placement..    Luke Puente  Radiology  PGY-3

## 2017-03-21 NOTE — IP AVS SNAPSHOT
[facility]  [address]  [facility] [address]  Phone: [phone]           Patient Discharge Instructions     Our goal is to set you up for success. This packet includes information on your condition, medications, and your home care. It will help you to care for yourself so you don't get sicker and need to go back to the hospital.     Please ask your nurse if you have any questions.        There are many details to remember when preparing to leave the hospital. Here is what you will need to do:    1. Take your medicine. If you are prescribed medications, review your Medication List in the following pages. You may have new medications to  at the pharmacy and others that you'll need to stop taking. Review the instructions for how and when to take your medications. Talk with your doctor or nurses if you are unsure of what to do.     2. Go to your follow-up appointments. Specific follow-up information is listed in the following pages. Your may be contacted by a transition nurse or clinical provider about future appointments. Be sure we have all of the phone numbers to reach you, if needed. Please contact your provider's office if you are unable to make an appointment.     3. Watch for warning signs. Your doctor or nurse will give you detailed warning signs to watch for and when to call for assistance. These instructions may also include educational information about your condition. If you experience any of warning signs to your health, call your doctor.               [facility]  Unless otherwise directed by your provider, please contact [facility], our nurse care line that is available for 24/7 assistance.     [phone] (toll-free)    Registered nurses in the [facility] provide clinical advisement, health education, appointment booking, and other advisory services.                    ** Verify the list of medication(s) below is accurate and up  to date. Carry this with you in case of emergency. If your medications have changed, please notify your healthcare provider.             Medication List      ASK your doctor about these medications        Additional Info                      aspirin 81 MG Chew   Refills:  0   Dose:  81 mg    Instructions:  Take 81 mg by mouth once daily.     Begin Date    AM    Noon    PM    Bedtime       docusate sodium 100 MG capsule   Commonly known as:  COLACE   Refills:  0   Dose:  100 mg    Instructions:  Take 1 capsule (100 mg total) by mouth 2 (two) times daily.     Begin Date    AM    Noon    PM    Bedtime       doxazosin 4 MG tablet   Commonly known as:  CARDURA   Quantity:  90 tablet   Refills:  3   Dose:  4 mg    Instructions:  Take 1 tablet (4 mg total) by mouth every evening.     Begin Date    AM    Noon    PM    Bedtime       glucosamine-chondroitin 500-400 mg tablet   Refills:  0   Dose:  1 tablet    Instructions:  Take 1 tablet by mouth 3 (three) times daily.     Begin Date    AM    Noon    PM    Bedtime       hydrocodone-acetaminophen 5-325mg 5-325 mg per tablet   Commonly known as:  NORCO   Quantity:  30 tablet   Refills:  0   Dose:  1 tablet    Instructions:  Take 1 tablet by mouth every 6 (six) hours as needed for Pain.     Begin Date    AM    Noon    PM    Bedtime       indapamide 1.25 MG Tab   Commonly known as:  LOZOL   Refills:  0   Dose:  2.5 mg    Instructions:  Take 2.5 mg by mouth once daily.     Begin Date    AM    Noon    PM    Bedtime       lidocaine-prilocaine cream   Commonly known as:  EMLA   Quantity:  5 g   Refills:  0    Instructions:  Apply to port 30-45 minutes prior to being accessed.     Begin Date    AM    Noon    PM    Bedtime       lubiprostone 24 MCG Cap   Commonly known as:  AMITIZA   Quantity:  60 capsule   Refills:  2   Dose:  24 mcg    Instructions:  Take 1 capsule (24 mcg total) by mouth 2 (two) times daily.     Begin Date    AM    Noon    PM    Bedtime       metoprolol succinate 50  MG 24 hr tablet   Commonly known as:  TOPROL-XL   Refills:  0   Dose:  50 mg    Instructions:  Take 50 mg by mouth nightly.     Begin Date    AM    Noon    PM    Bedtime       ondansetron 8 MG Tbdl   Commonly known as:  ZOFRAN-ODT   Quantity:  30 tablet   Refills:  1   Dose:  8 mg    Instructions:  Take 1 tablet (8 mg total) by mouth every 12 (twelve) hours as needed.     Begin Date    AM    Noon    PM    Bedtime       ONE DAILY MULTIVITAMIN per tablet   Refills:  0   Dose:  1 tablet   Generic drug:  multivitamin    Instructions:  Take 1 tablet by mouth once daily.     Begin Date    AM    Noon    PM    Bedtime       oxybutynin 5 MG Tab   Commonly known as:  DITROPAN   Quantity:  90 tablet   Refills:  3   Dose:  5 mg    Instructions:  Take 1 tablet (5 mg total) by mouth 3 (three) times daily as needed.     Begin Date    AM    Noon    PM    Bedtime       phenazopyridine 100 MG tablet   Commonly known as:  PYRIDIUM   Quantity:  21 tablet   Refills:  0   Dose:  200 mg    Instructions:  Take 2 tablets (200 mg total) by mouth 3 (three) times daily as needed for Pain (Burning).     Begin Date    AM    Noon    PM    Bedtime       promethazine 25 MG tablet   Commonly known as:  PHENERGAN   Quantity:  60 tablet   Refills:  1   Dose:  25 mg   Ask about: Should I take this medication?    Instructions:  Take 1 tablet (25 mg total) by mouth every 6 (six) hours as needed for Nausea.     Begin Date    AM    Noon    PM    Bedtime       tamsulosin 0.4 mg Cp24   Commonly known as:  FLOMAX   Quantity:  30 capsule   Refills:  11   Dose:  0.4 mg    Instructions:  Take 1 capsule (0.4 mg total) by mouth once daily.     Begin Date    AM    Noon    PM    Bedtime       valsartan 160 MG tablet   Commonly known as:  DIOVAN   Refills:  0   Dose:  320 mg    Instructions:  Take 320 mg by mouth once daily.     Begin Date    AM    Noon    PM    Bedtime                  Please bring to all follow up appointments:    1. A copy of your discharge  instructions.  2. All medicines you are currently taking in their original bottles.  3. Identification and insurance card.    Please arrive 15 minutes ahead of scheduled appointment time.    Please call 24 hours in advance if you must reschedule your appointment and/or time.        Your Scheduled Appointments     Mar 27, 2017 10:10 AM CDT   Non-Fasting Lab with LAB, HEMONC CANCER BLDG   [facility] ([facility])    [address]  [place] [address]   [phone]            Mar 27, 2017 11:00 AM CDT   Established Patient Visit with Marion Flores NP   [facility] Hematology Oncology ([facility])    [facility]  [place] [address]   [phone]            Mar 27, 2017  2:45 PM CDT   Nurse Visit with RAMEZ Steen MD   [facility] ([facility]    [address] [facility] [address]  [address]  [facility] [address]   [phone]            Mar 28, 2017  9:00 AM CDT   Infusion 360 Min with [facility], CHEMO   [facility] ([facility])    [facility] [facility] [address]   [phone]            Mar 29, 2017  3:00 PM CDT   Infusion 060 Min with [facility] CHEMO   [facility] ([facility])    [facility] [facility] [address]   [phone]                  Discharge Instructions       Sponge bath only today.  You may shower tomorrow with dressing covered.     Remove dressing after 48 hours and leave open to air. You may wash the site with soap and water at this time.Do not soak the site until completely healed.    If there are skin tapes over the incision, leave them in place and wash over them. The skin tapes should fall off in 7-10 days. If they have not come off by themselves in 10 days ,you may remove them.    Rest and take it easy today. Do not drive for 24 hours. Gradually resume your normal activity.    If the port is in your arm, move you arm and hand normally. DO NOT hold it  "still.  Avoid lifting heavy objects or overusing the arm.    At home,continue with liquids and if there is no nausea, you may resume your normal diet.    If you have any discomfort, take what you normally take for pain. If it is not effective, call us.    WHEN TO CALL THE DOCTOR:    Obvious bleeding (dried blood over the incision is normal)    Redness or swelling in the affected area    Fever over 101 F (38.4C)    Drainage or pus from the wound    Pain not relieved by normal pain medication    If you have any problems or questions call us:    Normal working hours= [facility] [phone]    24/7= Call the page  at [phone] and ask for the Radiology Resident on call                  Admission Information     Date & Time Provider Department CSN    3/21/2017 10:01 AM Glenn Crowe MD [facility] [id number]      Care Providers     Provider Role Specialty Primary office phone    Glenn Crowe MD Attending Provider Radiology [phone]    [facility] Provider Surgeon  -- Number not on file      Your Vitals Were     BP Pulse Temp Resp Height Weight    96/52 51 97.9 °F (36.6 °C) (Oral) 15 5' 6" (1.676 m) 79.4 kg (175 lb)    SpO2 BMI             97% 28.25 kg/m2         Recent Lab Values     No lab values to display.      Allergies as of 3/21/2017     No Known Allergies      Advance Directives     An advance directive is a document which, in the event you are no longer able to make decisions for yourself, tells your healthcare team what kind of treatment you do or do not want to receive, or who you would like to make those decisions for you.  If you do not currently have an advance directive, [facility] encourages you to create one.  For more information call:  [phone]), [phone] ([phone]),  or log on to [email or web address].        Smoking Cessation     If you would like to quit smoking:   You may be eligible for free services if you are a [place] resident and " started smoking cigarettes before September 1, 1988.  Call the Smoking Cessation [employer] (S[employer]) toll free at (164) 334-6647 or (008) 290-3965.   Call 1-800-QUIT-NOW if you do not meet the above criteria.            Language Assistance Services     ATTENTION: Language assistance services are available, free of charge. Please call [phone].      ATENCIÓN: Si habla español, tiene a adan disposición servicios gratuitos de asistencia lingüística. Llame al [phone].     CHÚ Ý: N?u b?n nói Ti?ng Vi?t, có các d?ch v? h? tr? ngôn ng? mi?n phí dành cho b?n. G?i s? [phone].         [facility] complies with applicable Federal civil rights laws and does not discriminate on the basis of race, color, national origin, age, disability, or sex.

## 2017-03-23 NOTE — TELEPHONE ENCOUNTER
Please let him know that he should be fine. Dr. Steen sent a prescription today for him and he should be almost finished by the time he sees me on 3/27 and starts chemo on 3/28.

## 2017-03-23 NOTE — TELEPHONE ENCOUNTER
----- Message from Mary Mayes sent at 3/23/2017 11:12 AM CDT -----  Contact: SELF  Pt called in about wanting to speak with NP. Pt called in about wanting to know that he did talk to urology and in his lab results it determined that he has a UTI. Pt stated that he starts chemo next week and doesn't know if it will make a different in anything.      Pt can be reached at [phone]      Thank You

## 2017-03-24 NOTE — PROCEDURES
Radiology Post-Procedure Note    Pre Op Diagnosis: bladder ca    Post Op Diagnosis: Same    Procedure: PORT placement    Procedure performed by: ana    Written Informed Consent Obtained: yes    Specimen Removed: No    Estimated Blood Loss: min    Findings:   Using realtime U/S guidance a 5 Fr port catheter was placed into the right brachial} vein with tip of the catheter in the svc    Port is ready for use.     Eamon Neri MD  Staff Radiologist  Department of Radiology  Pager: [phone]

## 2017-03-24 NOTE — DISCHARGE SUMMARY
Radiology Discharge Summary      Hospital Course: No complications    Admit Date: 3/21/2017  Discharge Date: 3/21/17    Instructions Given to Patient: yes}  Diet: reg  Activity: no heavy lifting  Description of Condition on Discharge: stable  Vital Signs (Most Recent): Temp: 97.9 °F (36.6 °C) (03/21/17 1400)  Pulse: (!) 52 (03/21/17 1500)  Resp: 15 (03/21/17 1500)  BP: 109/70 (03/21/17 1500)  SpO2: 98 % (03/21/17 1500)    Discharge Disposition: home    Discharge Diagnosis: bladder ca    Eamon Neri MD  Staff Radiologist  Department of Radiology  Pager: [phone]

## 2017-03-27 PROBLEM — D49.4 BLADDER TUMOR: Status: RESOLVED | Noted: 2017-01-01 | Resolved: 2017-01-01

## 2017-03-27 PROBLEM — Z51.11 ENCOUNTER FOR CHEMOTHERAPY MANAGEMENT: Status: ACTIVE | Noted: 2017-01-01

## 2017-03-27 NOTE — PROGRESS NOTES
"Subjective:       Patient ID: Amador Harrison is a 61 y.o. male.    Chief Complaint: Bladder Cancer and Chemotherapy    HPI   61 year old  male,patient of Dr. Austin, to clinic today for cycle #1, day 1 of cis/gem for newly diagnosed muscle-invasive bladder cancer. The patient presented with gross hematuria and was found to have a bladder tumor which was resected on 3/3/2017. Pathology from that TURBT revealed high-grade T2 bladder cancer with negative margins. The patient had a CT urogram on 1/31/2017 that did not show any locally advanced or distant metastatic abdominal disease. Of importance, pt began with urinary retention on 3/20/17 and had catheter placed. He was starting on Augmentin for UTI and is finishing it on Thursday. He is due to have catheter removed today.    He denies any mouth sores, nausea, vomiting, diarrhea, constipation, abdominal pain, weight loss or loss of appetite, chest pain, shortness of breath, leg swelling, fatigue, pain, headache, dizziness, or mood changes.    His ECOG PS is zero-one and quality of life so far is excellent.He is accompanied by his wife to clinic.He has a remote smoking history.     Oncologic History (From Chart):  61 year old  male, referred by Dr. Wilde, to clinic today for evaluation and management of newly diagnosed muscle-invasive bladder cancer. The patient presented with gross hematuria and was found to have a bladder tumor which was resected on 3/3/2017.  The patient had a CT urogram on 1/31/2017 that did not show any locally advanced or distant metastatic abdominal disease. Pathology from that TURBT revealed high-grade T2 bladder cancer with negative margins. Pathology revealed "WITH AREAS OF SQUAMOUS CELL CARCINOMA DIFFERENTIATION. EXTENSIVE INVOLVEMENT OF THE MUSCULARIS PROPRIA (DETRUSSOR MUSCLE) IS PRESENT."  3/17/17- CT CAP reveals "Injuries patient with a reported history of bladder cancer, the following findings are identified: " "Persistent though decreased asymmetric thickening of the right posterolateral aspect of the bladder wall.Right-sided double J stent in appropriate position.Subcentimeter hepatic segment III hypodensity, too small to accurately characterized but favored to be benign."    Review of Systems   Constitutional: Negative for activity change, appetite change, fatigue, fever and unexpected weight change.   HENT: Positive for tinnitus (mild to left ear). Negative for mouth sores, nosebleeds and trouble swallowing.    Eyes: Negative for discharge and visual disturbance.   Respiratory: Negative for cough, shortness of breath and wheezing.    Cardiovascular: Negative for chest pain and leg swelling.   Gastrointestinal: Negative for abdominal distention, abdominal pain, blood in stool, constipation, diarrhea, nausea and vomiting.   Genitourinary: Positive for difficulty urinating (mcgee in place) and hematuria. Negative for decreased urine volume and frequency.   Musculoskeletal: Negative for back pain.   Skin: Negative for color change and rash.   Neurological: Negative for weakness and headaches.   Hematological: Negative for adenopathy.   Psychiatric/Behavioral: Negative for sleep disturbance. The patient is not nervous/anxious.    All other systems reviewed and are negative.      Allergies:  Review of patient's allergies indicates:  No Known Allergies    Medications:  Current Outpatient Prescriptions   Medication Sig Dispense Refill    amoxicillin-clavulanate 875-125mg (AUGMENTIN) 875-125 mg per tablet Take 1 tablet by mouth every 12 (twelve) hours. 14 tablet 0    aspirin 81 MG Chew Take 81 mg by mouth once daily.      docusate sodium (COLACE) 100 MG capsule Take 1 capsule (100 mg total) by mouth 2 (two) times daily.  0    doxazosin (CARDURA) 4 MG tablet Take 1 tablet (4 mg total) by mouth every evening. (Patient taking differently: Take 4 mg by mouth once daily. ) 90 tablet 3    glucosamine-chondroitin 500-400 mg tablet " Take 1 tablet by mouth 3 (three) times daily.      hydrocodone-acetaminophen 5-325mg (NORCO) 5-325 mg per tablet Take 1 tablet by mouth every 6 (six) hours as needed for Pain. 30 tablet 0    indapamide (LOZOL) 1.25 MG Tab Take 2.5 mg by mouth once daily.      lidocaine-prilocaine (EMLA) cream Apply to port 30-45 minutes prior to being accessed. 5 g 0    lubiprostone (AMITIZA) 24 MCG Cap Take 1 capsule (24 mcg total) by mouth 2 (two) times daily. 60 capsule 2    metoprolol succinate (TOPROL-XL) 50 MG 24 hr tablet Take 50 mg by mouth nightly.       multivitamin (ONE DAILY MULTIVITAMIN) per tablet Take 1 tablet by mouth once daily.      ondansetron (ZOFRAN-ODT) 8 MG TbDL Take 1 tablet (8 mg total) by mouth every 12 (twelve) hours as needed. 30 tablet 1    oxybutynin (DITROPAN) 5 MG Tab Take 1 tablet (5 mg total) by mouth 3 (three) times daily as needed. 90 tablet 3    phenazopyridine (PYRIDIUM) 100 MG tablet Take 2 tablets (200 mg total) by mouth 3 (three) times daily as needed for Pain (Burning). 21 tablet 0    tamsulosin (FLOMAX) 0.4 mg Cp24 Take 1 capsule (0.4 mg total) by mouth once daily. 30 capsule 11    valsartan (DIOVAN) 160 MG tablet Take 320 mg by mouth once daily.       valsartan (DIOVAN) 320 MG tablet        No current facility-administered medications for this visit.        PMH:  Past Medical History:   Diagnosis Date    Cancer     Hypertension     Hypertrophy of prostate without urinary obstruction and other lower urinary tract symptoms (LUTS)     Nocturia     Nocturia        PSH:  Past Surgical History:   Procedure Laterality Date    CARPAL TUNNEL RELEASE      Left hand    CYSTOSCOPY      HERNIA REPAIR Bilateral     x 2 separate surgeries. 1 as an infant and another at approx age 18    hydrocele      at age 3    TONSILLECTOMY      turbt 2017      VASECTOMY         FamHx:  No family history on file.    SocHx:  Social History     Social History    Marital status:      Spouse  name: N/A    Number of children: N/A    Years of education: N/A     Occupational History    Not on file.     Social History Main Topics    Smoking status: Former Smoker     Quit date: 2/27/1994    Smokeless tobacco: Never Used    Alcohol use Yes      Comment: 2 beers or wine daily    Drug use: No    Sexual activity: Yes     Other Topics Concern    Not on file     Social History Narrative     Objective:      Physical Exam   Constitutional: He is oriented to person, place, and time. He appears well-developed and well-nourished.   HENT:   Head: Normocephalic and atraumatic.   Mouth/Throat: Uvula is midline and oropharynx is clear and moist. No oropharyngeal exudate.   Eyes: Conjunctivae and EOM are normal. Pupils are equal, round, and reactive to light.   Neck: Trachea normal and normal range of motion. Neck supple. No tracheal deviation present.   Cardiovascular: Normal rate, regular rhythm, normal heart sounds and normal pulses.    No swelling noted to bilateral lower extremities.   Pulmonary/Chest: Effort normal.   Abdominal: Normal appearance.   Musculoskeletal: He exhibits no edema.   Lymphadenopathy:     He has no cervical adenopathy.   Neurological: He is alert and oriented to person, place, and time.   Skin: Skin is warm, dry and intact. No rash noted. He is not diaphoretic.   Psychiatric: He has a normal mood and affect. His speech is normal and behavior is normal.   Nursing note and vitals reviewed.      LABS:  WBC   Date Value Ref Range Status   03/27/2017 8.39 3.90 - 12.70 K/uL Final     Hemoglobin   Date Value Ref Range Status   03/27/2017 12.4 (L) 14.0 - 18.0 g/dL Final     Hematocrit   Date Value Ref Range Status   03/27/2017 36.6 (L) 40.0 - 54.0 % Final     Platelets   Date Value Ref Range Status   03/27/2017 120 (L) 150 - 350 K/uL Final       Chemistry        Component Value Date/Time     03/27/2017 0953    K 4.3 03/27/2017 0953     03/27/2017 0953    CO2 29 03/27/2017 0953    BUN  15 03/27/2017 0953    CREATININE 0.9 03/27/2017 0953     03/27/2017 0953        Component Value Date/Time    CALCIUM 9.4 03/27/2017 0953    ALKPHOS 51 (L) 03/27/2017 0953    AST 18 03/27/2017 0953    ALT 17 03/27/2017 0953    BILITOT 0.6 03/27/2017 0953          Assessment:       1. Malignant neoplasm of trigone of urinary bladder    2. Encounter for chemotherapy management    3. Thrombocytopenia    4. Anemia in neoplastic disease    5. Chemotherapy induced nausea and vomiting        Plan:       1,2,3,4- Muscle-invasive bladder cancer:  - Dr. Austin personally met and discussed pt's case with Dr. Wilde. I personally reviewed his images, pathology, and previous records.  - Long conversation with patient regarding regarding treatment options including neoadjuvant chemotherapy in bladder cancer, as well as the natural history of disease, side effects of treatment, therapy schedules, etc.   - Pt agrees to move forward with neoadjuvant gemzar-cisplatin and will split the dose of cisplatin between days 1 and 8. Receive fluid support on days 2 and 9. Pt will receive gemzar on days 1,8, and 15.   - Will plan to get repeat imaging after cycle 2 completion. Plan is for 4 total cycles of neoadjuvant cisplatin/gemzar chemotherapy followed by consolidative surgery with Dr. Wilde.    Restaging CT scans and labs reviewed with patient. Discussed he has baseline thrombocytopenia, which may cause an issue with gemzar dosing. Discussed we may need to switch to every 2 week dosing if it does. S/p port placement, unable to attend chemo class. Chemotherapy handouts, [facility] new patient chemotherapy binder and [facility] chemo side effect guide provided and reviewed. Education provided on when to call provider, hygiene and food preparation.     Patient was consented for cisplatin/gemzar chemotherapy today 3/27/2017 .   An extensive discussion was had which included a thorough discussion of the risk and benefits of treatment and  alternatives.  Risks, including but not limited to, possible hair loss, bone marrow damage (anemia, thrombocytopenia, immune suppression, neutropenia), damage to body organs (brain, heart, liver, kidney, lungs, nervous system, skin, and others), allergic reactions, sterility, nausea/vomiting, constipation/diarrhea, sores in the mouth, secondary cancers, local damage at possible injection sites, and rarely death were all discussed.  The patient agrees with the plan, and all questions have been answered to their satisfaction.  Consent was signed the patient, provider, and a third party witness.      5- Discussed use of zofran and phenergan.    More than 50 mins were spent during this encounter, greater than 50% was spent in direct counseling and/or coordination of care.     Patient is in agreement with the proposed treatment plan. All questions were answered to the patient's satisfaction. Pt knows to call clinic if anything is needed before the next clinic visit.    ZIYAD NorwoodP-C  Hematology and Medical Oncology

## 2017-03-27 NOTE — Clinical Note
RTC with labs (CBC,CMP,Mag) and to see me on 3/3. Schedule gemzar/cisplatin cisplatin on 3/4.  RTC with labs (CBC,CMP,Mag) and to see me on 4/10. Schedule gemzar 4/11.

## 2017-03-27 NOTE — PROGRESS NOTES
Name and  verified. Patient verbalized understanding of why they are here today. VOT performed.  Patient catheter bag was removed from catheter and ( 180 ml )  ml of NS was administered. ( 10 )  ml of NS was removed from bulb and catheter d/c'd. Patient voided ( 225 ) ml of clear urine with no abnormalities.  Patient instructed to dress and MD notified. Two stones were noted when patient voided. These will be sent to pathology.

## 2017-03-28 NOTE — PLAN OF CARE
Problem: Patient Care Overview  Goal: Individualization & Mutuality  Outcome: Ongoing (interventions implemented as appropriate)  3166 --  Patient's labs, history, allergies, and medication reviewed.  Patient to receive Gemzar/Cisplatin for first time. Educated patient  using [employer], diet guidelines handout, and [facility] Chemotherapy Side-Effects Handbook, and Important Phone Numbers handout.  Explanation given and handouts to be sent home with patient.  Patient verbalized understanding.  Discussed plan of care with patient.  Patient in agreement.  Will monitor.

## 2017-03-28 NOTE — PLAN OF CARE
Problem: Patient Care Overview  Goal: Plan of Care Review  Outcome: Ongoing (interventions implemented as appropriate)  0659 -- Patient tolerated treatment well.  PAC heparinized and de-accessed.  Discharged without S/S of adverse effects.  AVS given.  Patient instructed to call provider with any questions or concerns.

## 2017-03-28 NOTE — MR AVS SNAPSHOT
Patient Information     Patient Name Sex Amador Plata Male 1955      Visit Information        Provider Department Dept Phone Center    3/28/2017 9:00 AM N[facility], CHEMO [facility] [phone] [facility] Stock      Patient Instructions     None      Your Current Medications Are     amoxicillin-clavulanate 875-125mg (AUGMENTIN) 875-125 mg per tablet    aspirin 81 MG Chew    docusate sodium (COLACE) 100 MG capsule    doxazosin (CARDURA) 4 MG tablet    glucosamine-chondroitin 500-400 mg tablet    hydrocodone-acetaminophen 5-325mg (NORCO) 5-325 mg per tablet    indapamide (LOZOL) 1.25 MG Tab    lidocaine-prilocaine (EMLA) cream    lubiprostone (AMITIZA) 24 MCG Cap    metoprolol succinate (TOPROL-XL) 50 MG 24 hr tablet    multivitamin (ONE DAILY MULTIVITAMIN) per tablet    ondansetron (ZOFRAN-ODT) 8 MG TbDL    oxybutynin (DITROPAN) 5 MG Tab    phenazopyridine (PYRIDIUM) 100 MG tablet    tamsulosin (FLOMAX) 0.4 mg Cp24    valsartan (DIOVAN) 160 MG tablet    valsartan (DIOVAN) 320 MG tablet      Facility-Administered Medications     cisplatin (PLATINOL) 35 mg/m2 = 68 mg in sodium chloride 0.9% 500 mL chemo infusion    fosaprepitant 150 mg in sodium chloride 0.9% 150 mL IVPB    gemcitabine (GEMZAR) 1,930 mg in sodium chloride 0.9% 250 mL chemo infusion    heparin, porcine (PF) 100 unit/mL injection flush 500 Units    palonosetron 0.25mg/dexamethasone 10mg IVPB    potassium chloride 20 mEq in dextrose 5 % and 0.45 % NaCl 1,000 mL infusion    sodium chloride 0.9% flush 10 mL    palonosetron 0.25mg/dexamethasone 20mg IVPB (Discontinued)      Appointments for Next Year     3/29/2017  3:00 PM INFUSION 060 MIN (60 min.) [facility]    Arrive at check-in approximately 15 minutes before your scheduled appointment time. Bring all outside medical records and imaging, along with a list of your current medications and insurance card.    [facility] [place], 5th Floor    4/3/2017  10:30 AM NON FASTING LAB (10 min.) [facility], [facility] CANCER BLDG    Arrive at check-in approximately 15 minutes before your scheduled appointment time. Bring all outside medical records and imaging, along with a list of your current medications and insurance card.    [facility] 3rd Floor    4/3/2017 11:30 AM ESTABLISHED PATIENT (30 min.) [facility] Marion Flores NP    Arrive at check-in approximately 15 minutes before your scheduled appointment time. Bring all outside medical records and imaging, along with a list of your current medications and insurance card.    [facility] - 3rd Floor    4/4/2017 10:30 AM INFUSION 360 MIN (360 min.) [facility], CHEMO    Arrive at check-in approximately 15 minutes before your scheduled appointment time. Bring all outside medical records and imaging, along with a list of your current medications and insurance card.    [facility], 5th Floor    4/5/2017  3:00 PM INFUSION 060 MIN (60 min.) [facility], CHEMO    Arrive at check-in approximately 15 minutes before your scheduled appointment time. Bring all outside medical records and imaging, along with a list of your current medications and insurance card.    [facility], 5th Floor    4/10/2017 10:30 AM NON FASTING LAB (10 min.) [facility], [facility] CANCER BLDG    Arrive at check-in approximately 15 minutes before your scheduled appointment time. Bring all outside medical records and imaging, along with a list of your current medications and insurance card.    [facility] 3rd Floor    4/10/2017 11:30 AM ESTABLISHED PATIENT (30 min.) [facility] Oncology Marion Flores NP    Arrive at check-in approximately 15 minutes before your scheduled appointment time. Bring all outside medical records and imaging, along with a list of your current medications and insurance card.  "   [facility] - 3rd Floor    4/11/2017  9:30 AM INFUSION 360 MIN (360 min.) [facility], CHEMO    Arrive at check-in approximately 15 minutes before your scheduled appointment time. Bring all outside medical records and imaging, along with a list of your current medications and insurance card.    [facility], 5th Floor    4/27/2017  9:00 AM NURSE VISIT (15 min.) [facility] - Urology RAMEZ Steen MD    Arrive at check-in approximately 15 minutes before your scheduled appointment time. Bring all outside medical records and imaging, along with a list of your current medications and insurance card.    2nd Floor - [address]         Default Flowsheet Data (last 24 hours)      Amb Complex Vitals Chris        03/28/17 0922 03/27/17 1503             Measurements    Weight  79.7 kg (175 lb 11.3 oz)       Height  5' 5" (1.651 m)       BSA (Calculated - sq m)  1.91 sq meters       BMI (Calculated)  29.3       BP (!)  109/58        Temp 98.5 °F (36.9 °C)        Pulse 62        Resp 18        Pain Assessment    Pain Score Zero                Allergies     No Known Allergies      Medications You Received from 03/27/2017 1423 to 03/28/2017 1423        Date/Time Order Dose Route Action     03/28/2017 1246 cisplatin (PLATINOL) 35 mg/m2 = 68 mg in sodium chloride 0.9% 500 mL chemo infusion 68 mg Intravenous New Bag     03/28/2017 1144 fosaprepitant 150 mg in sodium chloride 0.9% 150 mL IVPB 150 mg Intravenous New Bag     03/28/2017 1208 gemcitabine (GEMZAR) 1,930 mg in sodium chloride 0.9% 250 mL chemo infusion 1,930 mg Intravenous New Bag     03/28/2017 1124 palonosetron 0.25mg/dexamethasone 10mg IVPB 0.25 mg Intravenous New Bag     03/28/2017 0931 potassium chloride 20 mEq in dextrose 5 % and 0.45 % NaCl 1,000 mL infusion   Intravenous New Bag      Current Discharge Medication List     Cannot display discharge medications since this is not an admission.      "

## 2017-04-03 NOTE — PROGRESS NOTES
"Subjective:       Patient ID: Amador Harrison is a 61 y.o. male.    Chief Complaint: Bladder Cancer and Results (labs)    HPI   61 year old  male,patient of Dr. Austin, to clinic today for cycle #1, day 8 of cis/gem for newly diagnosed muscle-invasive bladder cancer. Pt tolerated first chemotherapy relatively well. He had some mild nausea resolved with zofran and phenergan. He completed his Augmentin and had his urinary catheter removed last week.    He denies any mouth sores, nausea, vomiting, diarrhea, constipation, abdominal pain, weight loss or loss of appetite, chest pain, shortness of breath, leg swelling, fatigue, pain, headache, dizziness, or mood changes.    His ECOG PS is zero-one and quality of life so far is excellent.He is accompanied by his wife to clinic.He has a remote smoking history.     Oncologic History (From Chart):  61 year old  male, referred by Dr. Wilde, to clinic today for evaluation and management of newly diagnosed muscle-invasive bladder cancer. The patient presented with gross hematuria and was found to have a bladder tumor which was resected on 3/3/2017.  The patient had a CT urogram on 1/31/2017 that did not show any locally advanced or distant metastatic abdominal disease. Pathology from that TURBT revealed high-grade T2 bladder cancer with negative margins. Pathology revealed "WITH AREAS OF SQUAMOUS CELL CARCINOMA DIFFERENTIATION. EXTENSIVE INVOLVEMENT OF THE MUSCULARIS PROPRIA (DETRUSSOR MUSCLE) IS PRESENT."  3/17/17- CT CAP reveals "Injuries patient with a reported history of bladder cancer, the following findings are identified: Persistent though decreased asymmetric thickening of the right posterolateral aspect of the bladder wall.Right-sided double J stent in appropriate position.Subcentimeter hepatic segment III hypodensity, too small to accurately characterized but favored to be benign."    Review of Systems   Constitutional: Negative for activity change, " appetite change, fatigue, fever and unexpected weight change.   HENT: Positive for tinnitus (mild to left ear). Negative for mouth sores, nosebleeds and trouble swallowing.    Eyes: Negative for discharge and visual disturbance.   Respiratory: Negative for cough, shortness of breath and wheezing.    Cardiovascular: Negative for chest pain and leg swelling.   Gastrointestinal: Negative for abdominal distention, abdominal pain, blood in stool, constipation, diarrhea, nausea and vomiting.   Genitourinary: Positive for hematuria. Negative for decreased urine volume and frequency.   Musculoskeletal: Negative for back pain.   Skin: Negative for color change and rash.   Neurological: Negative for weakness and headaches.   Hematological: Negative for adenopathy.   Psychiatric/Behavioral: Negative for sleep disturbance. The patient is not nervous/anxious.    All other systems reviewed and are negative.      Allergies:  Review of patient's allergies indicates:  No Known Allergies    Medications:  Current Outpatient Prescriptions   Medication Sig Dispense Refill    amoxicillin-clavulanate 875-125mg (AUGMENTIN) 875-125 mg per tablet Take 1 tablet by mouth every 12 (twelve) hours. 14 tablet 0    aspirin 81 MG Chew Take 81 mg by mouth once daily.      docusate sodium (COLACE) 100 MG capsule Take 1 capsule (100 mg total) by mouth 2 (two) times daily.  0    doxazosin (CARDURA) 4 MG tablet Take 1 tablet (4 mg total) by mouth every evening. (Patient taking differently: Take 4 mg by mouth once daily. ) 90 tablet 3    glucosamine-chondroitin 500-400 mg tablet Take 1 tablet by mouth 3 (three) times daily.      hydrocodone-acetaminophen 5-325mg (NORCO) 5-325 mg per tablet Take 1 tablet by mouth every 6 (six) hours as needed for Pain. 30 tablet 0    indapamide (LOZOL) 1.25 MG Tab Take 2.5 mg by mouth once daily.      lidocaine-prilocaine (EMLA) cream Apply to port 30-45 minutes prior to being accessed. 5 g 0    lubiprostone  (AMITIZA) 24 MCG Cap Take 1 capsule (24 mcg total) by mouth 2 (two) times daily. 60 capsule 2    metoprolol succinate (TOPROL-XL) 50 MG 24 hr tablet Take 50 mg by mouth nightly.       multivitamin (ONE DAILY MULTIVITAMIN) per tablet Take 1 tablet by mouth once daily.      ondansetron (ZOFRAN-ODT) 8 MG TbDL Take 1 tablet (8 mg total) by mouth every 12 (twelve) hours as needed. 30 tablet 1    oxybutynin (DITROPAN) 5 MG Tab Take 1 tablet (5 mg total) by mouth 3 (three) times daily as needed. 90 tablet 3    phenazopyridine (PYRIDIUM) 100 MG tablet Take 2 tablets (200 mg total) by mouth 3 (three) times daily as needed for Pain (Burning). 21 tablet 0    tamsulosin (FLOMAX) 0.4 mg Cp24 Take 1 capsule (0.4 mg total) by mouth once daily. 30 capsule 11    valsartan (DIOVAN) 160 MG tablet Take 320 mg by mouth once daily.       valsartan (DIOVAN) 320 MG tablet        No current facility-administered medications for this visit.        PMH:  Past Medical History:   Diagnosis Date    Cancer     Hypertension     Hypertrophy of prostate without urinary obstruction and other lower urinary tract symptoms (LUTS)     Nocturia     Nocturia        PSH:  Past Surgical History:   Procedure Laterality Date    CARPAL TUNNEL RELEASE      Left hand    CYSTOSCOPY      HERNIA REPAIR Bilateral     x 2 separate surgeries. 1 as an infant and another at approx age 18    hydrocele      at age 3    TONSILLECTOMY      turbt 2017      VASECTOMY         FamHx:  No family history on file.    SocHx:  Social History     Social History    Marital status:      Spouse name: N/A    Number of children: N/A    Years of education: N/A     Occupational History    Not on file.     Social History Main Topics    Smoking status: Former Smoker     Quit date: 2/27/1994    Smokeless tobacco: Never Used    Alcohol use Yes      Comment: 2 beers or wine daily    Drug use: No    Sexual activity: Yes     Other Topics Concern    Not on file      Social History Narrative     Objective:      Physical Exam   Constitutional: He is oriented to person, place, and time. He appears well-developed and well-nourished.   HENT:   Head: Normocephalic and atraumatic.   Mouth/Throat: Uvula is midline and oropharynx is clear and moist. No oropharyngeal exudate.   Eyes: Conjunctivae and EOM are normal. Pupils are equal, round, and reactive to light.   Neck: Trachea normal and normal range of motion. Neck supple. No tracheal deviation present.   Cardiovascular: Normal rate, regular rhythm, normal heart sounds and normal pulses.    No swelling noted to bilateral lower extremities.   Pulmonary/Chest: Effort normal.   Abdominal: Normal appearance.   Musculoskeletal: He exhibits no edema.   Lymphadenopathy:     He has no cervical adenopathy.   Neurological: He is alert and oriented to person, place, and time.   Skin: Skin is warm, dry and intact. No rash noted. He is not diaphoretic.   Psychiatric: He has a normal mood and affect. His speech is normal and behavior is normal.   Nursing note and vitals reviewed.      LABS:  WBC   Date Value Ref Range Status   04/03/2017 6.29 3.90 - 12.70 K/uL Final     Hemoglobin   Date Value Ref Range Status   04/03/2017 12.4 (L) 14.0 - 18.0 g/dL Final     Hematocrit   Date Value Ref Range Status   04/03/2017 36.4 (L) 40.0 - 54.0 % Final     Platelets   Date Value Ref Range Status   04/03/2017 99 (L) 150 - 350 K/uL Final       Chemistry        Component Value Date/Time     (L) 04/03/2017 1045    K 3.7 04/03/2017 1045    CL 97 04/03/2017 1045    CO2 30 (H) 04/03/2017 1045    BUN 21 04/03/2017 1045    CREATININE 1.0 04/03/2017 1045     (H) 04/03/2017 1045        Component Value Date/Time    CALCIUM 9.4 04/03/2017 1045    ALKPHOS 45 (L) 04/03/2017 1045    AST 16 04/03/2017 1045    ALT 19 04/03/2017 1045    BILITOT 0.4 04/03/2017 1045          Assessment:       1. Malignant neoplasm of trigone of urinary bladder    2. Encounter for  chemotherapy management    3. Thrombocytopenia    4. Anemia in neoplastic disease    5. Chemotherapy induced nausea and vomiting    6. Hydronephrosis, right        Plan:       1,2,3,4- Muscle-invasive bladder cancer:  - Dr. Austin personally met and discussed pt's case with Dr. Wilde. I personally reviewed his images, pathology, and previous records.  - Long conversation with patient regarding regarding treatment options including neoadjuvant chemotherapy in bladder cancer, as well as the natural history of disease, side effects of treatment, therapy schedules, etc.   - Pt agrees to move forward with neoadjuvant gemzar-cisplatin and will split the dose of cisplatin between days 1 and 8. Receive fluid support on days 2 and 9. Pt will receive gemzar on days 1,8, and 15.   - Will plan to get repeat imaging after cycle 2 completion. Plan is for 4 total cycles of neoadjuvant cisplatin/gemzar chemotherapy followed by consolidative surgery with Dr. Wilde.     Discussed he has baseline thrombocytopenia, which may cause an issue with gemzar dosing. Discussed we may need to switch to every 2 week dosing if it does. Labs acceptable to proceed with cycle 1, day 8 cisplatin/gemzar. He will receive IVFs on day 9. RTC in 1 week with labs (CBC,CMP,Mag), to see me and cycle 1, day 15 gemzar.      5- Controlled with zofran and phenergan.    6- Pt has some discomfort from right stent placement. Will try pyridium.      Patient is in agreement with the proposed treatment plan. All questions were answered to the patient's satisfaction. Pt knows to call clinic if anything is needed before the next clinic visit.    BAILEY Norwood  Hematology and Medical Oncology

## 2017-04-04 NOTE — PLAN OF CARE
Problem: Patient Care Overview  Goal: Individualization & Mutuality  Outcome: Ongoing (interventions implemented as appropriate)  1030-Labs , hx, and medications reviewed, patient seen by MD earlier this week. Assessment completed. Discussed plan of care with patient. Patient in agreement. Chair reclined and warm blanket and snack offered.

## 2017-04-04 NOTE — PLAN OF CARE
Problem: Patient Care Overview  Goal: Plan of Care Review  Outcome: Ongoing (interventions implemented as appropriate)  1604-Patient tolerated treatment well. Discharged without complaints or S/S of adverse event.   Instructed to call provider for any questions or concerns. Patient to return to clinic tomorrow for next IV fluid infusion.

## 2017-04-05 NOTE — MR AVS SNAPSHOT
Patient Information     Patient Name Sex Amador Plata Male 1955      Visit Information        [facility]    2017 3:00 PM [facility], [facility] [phone] [address] Stock      Patient Instructions     None      Your Current Medications Are     aspirin 81 MG Chew    docusate sodium (COLACE) 100 MG capsule    doxazosin (CARDURA) 4 MG tablet    glucosamine-chondroitin 500-400 mg tablet    hydrocodone-acetaminophen 5-325mg (NORCO) 5-325 mg per tablet    indapamide (LOZOL) 1.25 MG Tab    lidocaine-prilocaine (EMLA) cream    lubiprostone (AMITIZA) 24 MCG Cap    metoprolol succinate (TOPROL-XL) 50 MG 24 hr tablet    multivitamin (ONE DAILY MULTIVITAMIN) per tablet    ondansetron (ZOFRAN-ODT) 8 MG TbDL    oxybutynin (DITROPAN) 5 MG Tab    phenazopyridine (PYRIDIUM) 100 MG tablet    tamsulosin (FLOMAX) 0.4 mg Cp24    valsartan (DIOVAN) 160 MG tablet      Facility-Administered Medications     sodium chloride 0.9% 1,000 mL infusion    dextrose 5 % and 0.45 % NaCl with KCl 20 mEq infusion (Discontinued)    heparin, porcine (PF) 100 unit/mL injection flush 500 Units (Discontinued)    sodium chloride 0.9% flush 10 mL (Discontinued)      Appointments for Next Year     4/10/2017 10:30 AM NON FASTING LAB (10 min.) [facility] LAB, [facility] CANCER DG    Arrive at check-in approximately 15 minutes before your scheduled appointment time. Bring all outside medical records and imaging, along with a list of your current medications and insurance card.    [facility] 3rd Floor    4/10/2017 11:30 AM ESTABLISHED PATIENT (30 min.) [facility] Hematology Oncology Marion Flores NP    Arrive at check-in approximately 15 minutes before your scheduled appointment time. Bring all outside medical records and imaging, along with a list of your current medications and insurance card.    [facility] - 3rd Floor    2017  9:30 AM INFUSION 360 MIN (360  min.) [facility], CHE[facility]    Arrive at check-in approximately 15 minutes before your scheduled appointment time. Bring all outside medical records and imaging, along with a list of your current medications and insurance card.    [facility], 5th Floor    4/27/2017  9:00 AM NURSE VISIT (15 min.) [facility] - Urology RAMEZ Steen MD    Arrive at check-in approximately 15 minutes before your scheduled appointment time. Bring all outside medical records and imaging, along with a list of your current medications and insurance card.    2nd Floor - [address]         Default Flowsheet Data (last 24 hours)      Amb Complex Vitals Chris        04/05/17 1543                Measurements    BP (!)  110/56        Temp 98.3 °F (36.8 °C)        Pulse 68        Resp 20        Pain Assessment    Pain Score Zero                Allergies     No Known Allergies      Medications You Received from 04/04/2017 1602 to 04/05/2017 1602        Date/Time Order Dose Route Action     04/05/2017 1514 sodium chloride 0.9% 1,000 mL infusion   Intravenous New Bag      Current Discharge Medication List     Cannot display discharge medications since this is not an admission.

## 2017-04-05 NOTE — PLAN OF CARE
Problem: Patient Care Overview  Goal: Plan of Care Review  Outcome: Ongoing (interventions implemented as appropriate)  Tolerated IVF's without difficulty. No complaints voiced. AVS given to pt. Instructed to notify Md with any concerns or problems. Pt verbalized understanding.

## 2017-04-10 NOTE — Clinical Note
RTC in 2 weeks with labs (CBC,CMP, Mag), to see me, and cycle 2, day 1 cisplatin/gemzar. He will receive IVFs on day 2 .

## 2017-04-10 NOTE — PROGRESS NOTES
"Subjective:       Patient ID: Amador Harrison is a 61 y.o. male.    Chief Complaint: Bladder Cancer; Results (labs); burning upon urination; and upper abdominal cramping    HPI   61 year old  male,patient of Dr. Austin, to clinic today for cycle #1, day 15 of cis/gem for newly diagnosed muscle-invasive bladder cancer. Pt tolerating chemo fairly well. Pt reports consistent dysuria, no improvement with pyridium. He is eating smaller portions of food and has lost 6 lbs since last visit.     He denies any mouth sores, nausea, vomiting, diarrhea, constipation, abdominal pain, weight loss or loss of appetite, chest pain, shortness of breath, leg swelling, fatigue, pain, headache, dizziness, or mood changes.    His ECOG PS is zero-one and quality of life so far is excellent.He is accompanied by his wife to clinic.He has a remote smoking history.     Oncologic History (From Chart):  61 year old  male, referred by Dr. Wilde, to clinic today for evaluation and management of newly diagnosed muscle-invasive bladder cancer. The patient presented with gross hematuria and was found to have a bladder tumor which was resected on 3/3/2017.  The patient had a CT urogram on 1/31/2017 that did not show any locally advanced or distant metastatic abdominal disease. Pathology from that TURBT revealed high-grade T2 bladder cancer with negative margins. Pathology revealed "WITH AREAS OF SQUAMOUS CELL CARCINOMA DIFFERENTIATION. EXTENSIVE INVOLVEMENT OF THE MUSCULARIS PROPRIA (DETRUSSOR MUSCLE) IS PRESENT."  3/17/17- CT CAP reveals "Injuries patient with a reported history of bladder cancer, the following findings are identified: Persistent though decreased asymmetric thickening of the right posterolateral aspect of the bladder wall.Right-sided double J stent in appropriate position.Subcentimeter hepatic segment III hypodensity, too small to accurately characterized but favored to be benign."    Review of Systems "   Constitutional: Positive for unexpected weight change. Negative for activity change, appetite change, fatigue and fever.   HENT: Positive for tinnitus (mild to left ear). Negative for mouth sores, nosebleeds and trouble swallowing.    Eyes: Negative for discharge and visual disturbance.   Respiratory: Negative for cough, shortness of breath and wheezing.    Cardiovascular: Negative for chest pain and leg swelling.   Gastrointestinal: Negative for abdominal distention, abdominal pain, blood in stool, constipation, diarrhea, nausea and vomiting.   Genitourinary: Positive for dysuria and hematuria. Negative for decreased urine volume and frequency.   Musculoskeletal: Negative for back pain.   Skin: Negative for color change and rash.   Neurological: Negative for weakness and headaches.   Hematological: Negative for adenopathy.   Psychiatric/Behavioral: Negative for sleep disturbance. The patient is not nervous/anxious.    All other systems reviewed and are negative.      Allergies:  Review of patient's allergies indicates:  No Known Allergies    Medications:  Current Outpatient Prescriptions   Medication Sig Dispense Refill    aspirin 81 MG Chew Take 81 mg by mouth once daily.      docusate sodium (COLACE) 100 MG capsule Take 1 capsule (100 mg total) by mouth 2 (two) times daily.  0    doxazosin (CARDURA) 4 MG tablet Take 1 tablet (4 mg total) by mouth every evening. (Patient taking differently: Take 4 mg by mouth once daily. ) 90 tablet 3    glucosamine-chondroitin 500-400 mg tablet Take 1 tablet by mouth 3 (three) times daily.      hydrocodone-acetaminophen 5-325mg (NORCO) 5-325 mg per tablet Take 1 tablet by mouth every 6 (six) hours as needed for Pain. 30 tablet 0    indapamide (LOZOL) 1.25 MG Tab Take 2.5 mg by mouth once daily.      lidocaine-prilocaine (EMLA) cream Apply to port 30-45 minutes prior to being accessed. 5 g 0    lubiprostone (AMITIZA) 24 MCG Cap Take 1 capsule (24 mcg total) by mouth 2  (two) times daily. 60 capsule 2    metoprolol succinate (TOPROL-XL) 50 MG 24 hr tablet Take 50 mg by mouth nightly.       multivitamin (ONE DAILY MULTIVITAMIN) per tablet Take 1 tablet by mouth once daily.      ondansetron (ZOFRAN-ODT) 8 MG TbDL Take 1 tablet (8 mg total) by mouth every 12 (twelve) hours as needed. 30 tablet 1    phenazopyridine (PYRIDIUM) 100 MG tablet Take 2 tablets (200 mg total) by mouth 3 (three) times daily as needed for Pain (Burning). 21 tablet 0    tamsulosin (FLOMAX) 0.4 mg Cp24 Take 1 capsule (0.4 mg total) by mouth once daily. 30 capsule 11    valsartan (DIOVAN) 160 MG tablet Take 320 mg by mouth once daily.       oxybutynin (DITROPAN) 5 MG Tab Take 1 tablet (5 mg total) by mouth 3 (three) times daily as needed. 90 tablet 3     No current facility-administered medications for this visit.        PMH:  Past Medical History:   Diagnosis Date    Cancer     Hypertension     Hypertrophy of prostate without urinary obstruction and other lower urinary tract symptoms (LUTS)     Nocturia     Nocturia        PSH:  Past Surgical History:   Procedure Laterality Date    CARPAL TUNNEL RELEASE      Left hand    CYSTOSCOPY      HERNIA REPAIR Bilateral     x 2 separate surgeries. 1 as an infant and another at approx age 18    hydrocele      at age 3    TONSILLECTOMY      turbt 2017      VASECTOMY         FamHx:  No family history on file.    SocHx:  Social History     Social History    Marital status:      Spouse name: N/A    Number of children: N/A    Years of education: N/A     Occupational History    Not on file.     Social History Main Topics    Smoking status: Former Smoker     Quit date: 2/27/1994    Smokeless tobacco: Never Used    Alcohol use Yes      Comment: 2 beers or wine daily    Drug use: No    Sexual activity: Yes     Other Topics Concern    Not on file     Social History Narrative     Objective:      Physical Exam   Constitutional: He is oriented to  person, place, and time. He appears well-developed and well-nourished.   HENT:   Head: Normocephalic and atraumatic.   Mouth/Throat: Uvula is midline and oropharynx is clear and moist. No oropharyngeal exudate.   Eyes: Conjunctivae and EOM are normal. Pupils are equal, round, and reactive to light.   Neck: Trachea normal and normal range of motion. Neck supple. No tracheal deviation present.   Cardiovascular: Normal rate, regular rhythm, normal heart sounds and normal pulses.    No swelling noted to bilateral lower extremities.   Pulmonary/Chest: Effort normal.   Abdominal: Normal appearance.   Musculoskeletal: He exhibits no edema.   Lymphadenopathy:     He has no cervical adenopathy.   Neurological: He is alert and oriented to person, place, and time.   Skin: Skin is warm, dry and intact. No rash noted. He is not diaphoretic.   Psychiatric: He has a normal mood and affect. His speech is normal and behavior is normal.   Nursing note and vitals reviewed.      LABS:  WBC   Date Value Ref Range Status   04/10/2017 2.09 (L) 3.90 - 12.70 K/uL Final     Hemoglobin   Date Value Ref Range Status   04/10/2017 11.4 (L) 14.0 - 18.0 g/dL Final     Hematocrit   Date Value Ref Range Status   04/10/2017 31.0 (L) 40.0 - 54.0 % Final     Platelets   Date Value Ref Range Status   04/10/2017 42 (L) 150 - 350 K/uL Final       Chemistry        Component Value Date/Time     (L) 04/10/2017 1020    K 3.1 (L) 04/10/2017 1020    CL 91 (L) 04/10/2017 1020    CO2 31 (H) 04/10/2017 1020    BUN 20 04/10/2017 1020    CREATININE 0.9 04/10/2017 1020     04/10/2017 1020        Component Value Date/Time    CALCIUM 9.2 04/10/2017 1020    ALKPHOS 46 (L) 04/10/2017 1020    AST 15 04/10/2017 1020    ALT 17 04/10/2017 1020    BILITOT 0.7 04/10/2017 1020          Assessment:       1. Malignant neoplasm of trigone of urinary bladder    2. Encounter for chemotherapy management    3. Chemotherapy-induced thrombocytopenia    4. Anemia in  neoplastic disease    5. Hydronephrosis, right    6. Dysuria    7. Hypomagnesemia    8. Hypokalemia        Plan:       1,2,3,4- Muscle-invasive bladder cancer:  - Dr. Austin personally met and discussed pt's case with Dr. Wilde. I personally reviewed his images, pathology, and previous records.  - Long conversation with patient regarding regarding treatment options including neoadjuvant chemotherapy in bladder cancer, as well as the natural history of disease, side effects of treatment, therapy schedules, etc.   - Pt agrees to move forward with neoadjuvant gemzar-cisplatin and will split the dose of cisplatin between days 1 and 8. Receive fluid support on days 2 and 9. Pt will receive gemzar on days 1,8, and 15.   - Will plan to get repeat imaging after cycle 2 completion. Plan is for 4 total cycles of neoadjuvant cisplatin/gemzar chemotherapy followed by consolidative surgery with Dr. Wilde.     Discussed he has baseline thrombocytopenia, which may cause an issue with gemzar dosing. Platelet count 42,000 today. Will hold cycle 1, day 15 due to thrombocytopenia. Discussed we may need to switch to every 2 week dosing if thrombocytopenia continues, but will have him RTC in 2 weeks with labs (CBC,CMP, Mag), to see me, and cycle 2, day 1 cisplatin/gemzar. He will receive IVFs on day 2 .     4-Mild, will monitor.    5,6- Will get UA and continue pyridium.    7- Start oral mag    8- Start oral K.        Patient is in agreement with the proposed treatment plan. All questions were answered to the patient's satisfaction. Pt knows to call clinic if anything is needed before the next clinic visit.    BAILEY Norwood  Hematology and Medical Oncology

## 2017-04-24 NOTE — PROGRESS NOTES
"Subjective:       Patient ID: Amador Harrison is a 61 y.o. male.    Chief Complaint: Bladder Cancer and Chemotherapy    HPI   61 year old  male,patient of Dr. Austin, to clinic today for 2 week follow up and cycle #2, day 1 of cis/gem for newly diagnosed muscle-invasive bladder cancer. Cycle 1, day 15 held secondary to thrombocytopenia. Otherwise, patient is tolerating chemo fairly well. Weight is stable from last visit.    He denies any mouth sores, nausea, vomiting, diarrhea, constipation, abdominal pain, weight loss or loss of appetite, chest pain, shortness of breath, leg swelling, fatigue, pain, headache, dizziness, or mood changes.    His ECOG PS is zero-one and quality of life so far is excellent.He is accompanied alone to clinic. He has a remote smoking history.     Oncologic History (From Chart):  61 year old  male, referred by Dr. Wilde, to clinic today for evaluation and management of newly diagnosed muscle-invasive bladder cancer. The patient presented with gross hematuria and was found to have a bladder tumor which was resected on 3/3/2017.  The patient had a CT urogram on 1/31/2017 that did not show any locally advanced or distant metastatic abdominal disease. Pathology from that TURBT revealed high-grade T2 bladder cancer with negative margins. Pathology revealed "WITH AREAS OF SQUAMOUS CELL CARCINOMA DIFFERENTIATION. EXTENSIVE INVOLVEMENT OF THE MUSCULARIS PROPRIA (DETRUSSOR MUSCLE) IS PRESENT."  3/17/17- CT CAP reveals "Injuries patient with a reported history of bladder cancer, the following findings are identified: Persistent though decreased asymmetric thickening of the right posterolateral aspect of the bladder wall.Right-sided double J stent in appropriate position.Subcentimeter hepatic segment III hypodensity, too small to accurately characterized but favored to be benign."    Review of Systems   Constitutional: Negative for activity change, appetite change, fatigue and " fever.   HENT: Positive for tinnitus (mild to left ear). Negative for mouth sores, nosebleeds and trouble swallowing.    Eyes: Negative for discharge and visual disturbance.   Respiratory: Negative for cough, shortness of breath and wheezing.    Cardiovascular: Negative for chest pain and leg swelling.   Gastrointestinal: Negative for abdominal distention, abdominal pain, blood in stool, constipation, diarrhea, nausea and vomiting.   Genitourinary: Positive for dysuria. Negative for decreased urine volume and frequency.   Musculoskeletal: Negative for back pain.   Skin: Negative for color change and rash.   Neurological: Negative for weakness and headaches.   Hematological: Negative for adenopathy.   Psychiatric/Behavioral: Negative for sleep disturbance. The patient is not nervous/anxious.    All other systems reviewed and are negative.      Allergies:  Review of patient's allergies indicates:  No Known Allergies    Medications:  Current Outpatient Prescriptions   Medication Sig Dispense Refill    aspirin 81 MG Chew Take 81 mg by mouth once daily.      docusate sodium (COLACE) 100 MG capsule Take 1 capsule (100 mg total) by mouth 2 (two) times daily.  0    doxazosin (CARDURA) 4 MG tablet Take 1 tablet (4 mg total) by mouth every evening. (Patient taking differently: Take 4 mg by mouth once daily. ) 90 tablet 3    glucosamine-chondroitin 500-400 mg tablet Take 1 tablet by mouth 3 (three) times daily.      hydrocodone-acetaminophen 5-325mg (NORCO) 5-325 mg per tablet Take 1 tablet by mouth every 6 (six) hours as needed for Pain. 30 tablet 0    indapamide (LOZOL) 1.25 MG Tab Take 2.5 mg by mouth once daily.      lidocaine-prilocaine (EMLA) cream Apply to port 30-45 minutes prior to being accessed. 5 g 0    lubiprostone (AMITIZA) 24 MCG Cap Take 1 capsule (24 mcg total) by mouth 2 (two) times daily. 60 capsule 2    magnesium oxide (MAGOX) 400 mg tablet Take 1 tablet (400 mg total) by mouth once daily. 60  tablet 1    metoprolol succinate (TOPROL-XL) 50 MG 24 hr tablet Take 50 mg by mouth nightly.       multivitamin (ONE DAILY MULTIVITAMIN) per tablet Take 1 tablet by mouth once daily.      ondansetron (ZOFRAN-ODT) 8 MG TbDL Take 1 tablet (8 mg total) by mouth every 12 (twelve) hours as needed. 30 tablet 1    oxybutynin (DITROPAN) 5 MG Tab Take 1 tablet (5 mg total) by mouth 3 (three) times daily as needed. 90 tablet 3    phenazopyridine (PYRIDIUM) 100 MG tablet Take 2 tablets (200 mg total) by mouth 3 (three) times daily as needed for Pain (Burning). 60 tablet 1    potassium chloride SA (K-DUR,KLOR-CON) 20 MEQ tablet Take 1 tablet (20 mEq total) by mouth once daily. 30 tablet 0    tamsulosin (FLOMAX) 0.4 mg Cp24 Take 1 capsule (0.4 mg total) by mouth once daily. 30 capsule 11    valsartan (DIOVAN) 160 MG tablet Take 320 mg by mouth once daily.        No current facility-administered medications for this visit.        PMH:  Past Medical History:   Diagnosis Date    Cancer     Hypertension     Hypertrophy of prostate without urinary obstruction and other lower urinary tract symptoms (LUTS)     Nocturia     Nocturia        PSH:  Past Surgical History:   Procedure Laterality Date    CARPAL TUNNEL RELEASE      Left hand    CYSTOSCOPY      HERNIA REPAIR Bilateral     x 2 separate surgeries. 1 as an infant and another at approx age 18    hydrocele      at age 3    TONSILLECTOMY      turbt 2017      VASECTOMY         FamHx:  No family history on file.    SocHx:  Social History     Social History    Marital status:      Spouse name: N/A    Number of children: N/A    Years of education: N/A     Occupational History    Not on file.     Social History Main Topics    Smoking status: Former Smoker     Quit date: 2/27/1994    Smokeless tobacco: Never Used    Alcohol use Yes      Comment: 2 beers or wine daily    Drug use: No    Sexual activity: Yes     Other Topics Concern    Not on file      Social History Narrative     Objective:      Physical Exam   Constitutional: He is oriented to person, place, and time. He appears well-developed and well-nourished.   HENT:   Head: Normocephalic and atraumatic.   Mouth/Throat: Uvula is midline and oropharynx is clear and moist. No oropharyngeal exudate.   Eyes: Conjunctivae and EOM are normal. Pupils are equal, round, and reactive to light.   Neck: Trachea normal and normal range of motion. Neck supple. No tracheal deviation present.   Cardiovascular: Normal rate, regular rhythm, normal heart sounds and normal pulses.    No swelling noted to bilateral lower extremities.   Pulmonary/Chest: Effort normal.   Abdominal: Normal appearance.   Musculoskeletal: He exhibits no edema.   Lymphadenopathy:     He has no cervical adenopathy.   Neurological: He is alert and oriented to person, place, and time.   Skin: Skin is warm, dry and intact. No rash noted. He is not diaphoretic.   Psychiatric: He has a normal mood and affect. His speech is normal and behavior is normal.   Nursing note and vitals reviewed.      LABS:  WBC   Date Value Ref Range Status   04/24/2017 6.02 3.90 - 12.70 K/uL Final     Hemoglobin   Date Value Ref Range Status   04/24/2017 11.0 (L) 14.0 - 18.0 g/dL Final     Hematocrit   Date Value Ref Range Status   04/24/2017 32.2 (L) 40.0 - 54.0 % Final     Platelets   Date Value Ref Range Status   04/24/2017 201 150 - 350 K/uL Final       Chemistry        Component Value Date/Time     04/24/2017 0911    K 3.3 (L) 04/24/2017 0911     04/24/2017 0911    CO2 28 04/24/2017 0911    BUN 12 04/24/2017 0911    CREATININE 0.9 04/24/2017 0911     04/24/2017 0911        Component Value Date/Time    CALCIUM 9.0 04/24/2017 0911    ALKPHOS 47 (L) 04/24/2017 0911    AST 16 04/24/2017 0911    ALT 14 04/24/2017 0911    BILITOT 0.7 04/24/2017 0911          Assessment:       1. Malignant neoplasm of trigone of urinary bladder    2. Encounter for  chemotherapy management    3. Chemotherapy-induced thrombocytopenia    4. Anemia in neoplastic disease    5. Hydronephrosis, right    6. Dysuria    7. Hypomagnesemia    8. Hypokalemia        Plan:       1,2,3,4- Muscle-invasive bladder cancer:  - Dr. Austin personally met and discussed pt's case with Dr. Wilde. I personally reviewed his images, pathology, and previous records.  - Long conversation with patient regarding regarding treatment options including neoadjuvant chemotherapy in bladder cancer, as well as the natural history of disease, side effects of treatment, therapy schedules, etc.   - Pt agrees to move forward with neoadjuvant gemzar-cisplatin and will split the dose of cisplatin between days 1 and 8. Receive fluid support on days 2 and 9. Pt will receive gemzar on days 1,8, and 15.   - Plan is for 4 total cycles of neoadjuvant cisplatin/gemzar chemotherapy followed by consolidative surgery with Dr. Wilde.    Discussed he has baseline thrombocytopenia, which may cause an issue with gemzar dosing. Platelet count improved to 201,000 with holding cycle 1, day 15 due to thrombocytopenia. Labs acceptable to proceed with cycle 2,day1 cis/gem. Discussed we may need to switch to every 2 week dosing if thrombocytopenia recoccurs, but will have him RTC in 1 weeks with labs (CBC,CMP, Mag), to see me, and cycle 2, day 8 cisplatin/gemzar. He will receive IVFs on day 9 . Will plan to get repeat imaging after cycle 2 completion.    4-Mild, will monitor.    5,6- Improved with antibiotics.    7- Back to normal. Continue oral mag    8- Continue oral K and will receive K in fluids with chemo.        Patient is in agreement with the proposed treatment plan. All questions were answered to the patient's satisfaction. Pt knows to call clinic if anything is needed before the next clinic visit.    BAILEY Norwood  Hematology and Medical Oncology

## 2017-04-24 NOTE — TELEPHONE ENCOUNTER
----- Message from Francisca Salamanca sent at 4/24/2017  1:19 PM CDT -----  Contact: [phone]  Pt is wanting to discuss the procedure he is having Please call pt at your earliest convenience.  Thanks !

## 2017-04-25 NOTE — PLAN OF CARE
Problem: Patient Care Overview  Goal: Plan of Care Review  Outcome: Ongoing (interventions implemented as appropriate)  Patient tolerated infusion well.  No reaction suspected.  VSS.  No questions or concerns.  Patient to RTC tomorrow for D2 fluids.  AVS given to patient.  Patient ambulated off unit unassisted.

## 2017-04-25 NOTE — PLAN OF CARE
Problem: Chemotherapy Effects (Adult)  Goal: Signs and Symptoms of Listed Potential Problems Will be Absent, Minimized or Managed (Chemotherapy Effects)  Signs and symptoms of listed potential problems will be absent, minimized or managed by discharge/transition of care (reference Chemotherapy Effects (Adult) CPG).   Outcome: Ongoing (interventions implemented as appropriate)  Patient here for Gemzar/Cisplatin.  Assessment complete and labs reviewed.  VSS.  No needs expressed at this time.  Will continue to monitor.

## 2017-04-25 NOTE — MR AVS SNAPSHOT
Patient Information     Patient Name Sex Amador Plata Male 1955      Visit Information        Provider Department Dept Phone Center    2017 9:30 AM [facility], CHEMO [facility] Chemotherapy Infusion [phone] Freeman Stock      Patient Instructions     None      Your Current Medications Are     aspirin 81 MG Chew    docusate sodium (COLACE) 100 MG capsule    doxazosin (CARDURA) 4 MG tablet    glucosamine-chondroitin 500-400 mg tablet    hydrocodone-acetaminophen 5-325mg (NORCO) 5-325 mg per tablet    indapamide (LOZOL) 1.25 MG Tab    lidocaine-prilocaine (EMLA) cream    lubiprostone (AMITIZA) 24 MCG Cap    magnesium oxide (MAGOX) 400 mg tablet    metoprolol succinate (TOPROL-XL) 50 MG 24 hr tablet    multivitamin (ONE DAILY MULTIVITAMIN) per tablet    ondansetron (ZOFRAN-ODT) 8 MG TbDL    oxybutynin (DITROPAN) 5 MG Tab    phenazopyridine (PYRIDIUM) 100 MG tablet    potassium chloride SA (K-DUR,KLOR-CON) 20 MEQ tablet    tamsulosin (FLOMAX) 0.4 mg Cp24    valsartan (DIOVAN) 160 MG tablet      Facility-Administered Medications     cisplatin (PLATINOL) 35 mg/m2 = 65 mg in sodium chloride 0.9% 500 mL chemo infusion    fosaprepitant 150 mg in sodium chloride 0.9% 150 mL IVPB    gemcitabine (GEMZAR) 1,870 mg in sodium chloride 0.9% 250 mL chemo infusion    heparin, porcine (PF) 100 unit/mL injection flush 500 Units    palonosetron 0.25mg/dexamethasone 10mg IVPB    potassium chloride 20 mEq in dextrose 5 % and 0.45 % NaCl 1,000 mL infusion    sodium chloride 0.9% flush 10 mL      Appointments for Next Year     2017  2:00 PM INFUSION 060 MIN (60 min.) [facility]    Arrive at check-in approximately 15 minutes before your scheduled appointment time. Bring all outside medical records and imaging, along with a list of your current medications and insurance card.    [facility], 5th Floor    2017  9:00 AM NURSE VISIT (15 min.) [facility] - Urology RAMEZ Steen MD     Arrive at check-in approximately 15 minutes before your scheduled appointment time. Bring all outside medical records and imaging, along with a list of your current medications and insurance card.    2nd Floor - [address]    5/1/2017  9:30 AM NON FASTING LAB (10 min.) [facility], [facility] CANCER BLDG    Arrive at check-in approximately 15 minutes before your scheduled appointment time. Bring all outside medical records and imaging, along with a list of your current medications and insurance card.    [facility] 3rd Floor    5/1/2017 10:30 AM ESTABLISHED PATIENT (30 min.) [facility] Marion Flores NP    Arrive at check-in approximately 15 minutes before your scheduled appointment time. Bring all outside medical records and imaging, along with a list of your current medications and insurance card.    [facility] - 3rd Floor    5/2/2017  9:30 AM INFUSION 360 MIN (360 min.) [facility]    Arrive at check-in approximately 15 minutes before your scheduled appointment time. Bring all outside medical records and imaging, along with a list of your current medications and insurance card.    [facility], 5th Floor    5/3/2017  2:00 PM INFUSION 060 MIN (60 min.) [facility], CHE[facility]    Arrive at check-in approximately 15 minutes before your scheduled appointment time. Bring all outside medical records and imaging, along with a list of your current medications and insurance card.    [facility], 5th Floor    5/8/2017  9:20 AM NON FASTING LAB (10 min.) [facility], [facility] CANCER BLDG    Arrive at check-in approximately 15 minutes before your scheduled appointment time. Bring all outside medical records and imaging, along with a list of your current medications and insurance card.    [facility] 3rd Floor    5/8/2017 10:30 AM ESTABLISHED PATIENT (30 min.) [facility]   Hematology Oncology Marion Flores NP    Arrive at check-in approximately 15 minutes before your scheduled appointment time. Bring all outside medical records and imaging, along with a list of your current medications and insurance card.    [facility] - 3rd Floor    5/9/2017  9:30 AM INFUSION 360 MIN (360 min.) [facility]    Arrive at check-in approximately 15 minutes before your scheduled appointment time. Bring all outside medical records and imaging, along with a list of your current medications and insurance card.    [facility], 5th Floor         Default Flowsheet Data (last 24 hours)      Amb Complex Vitals Chris        04/25/17 1411 04/25/17 0952             Measurements    BP (!)  115/58 (!)  107/58       Temp  97.8 °F (36.6 °C)       Pulse 71 61       Resp 16 16       Pain Assessment    Pain Score  Zero               Allergies     No Known Allergies      Medications You Received from 04/24/2017 1412 to 04/25/2017 1412        Date/Time Order Dose Route Action     04/25/2017 1308 cisplatin (PLATINOL) 35 mg/m2 = 65 mg in sodium chloride 0.9% 500 mL chemo infusion 65 mg Intravenous New Bag     04/25/2017 1157 fosaprepitant 150 mg in sodium chloride 0.9% 150 mL IVPB 150 mg Intravenous New Bag     04/25/2017 1234 gemcitabine (GEMZAR) 1,870 mg in sodium chloride 0.9% 250 mL chemo infusion 1,870 mg Intravenous New Bag     04/25/2017 1410 heparin, porcine (PF) 100 unit/mL injection flush 500 Units 500 Units Intravenous Given     04/25/2017 1215 palonosetron 0.25mg/dexamethasone 10mg IVPB 0.25 mg Intravenous New Bag     04/25/2017 0957 potassium chloride 20 mEq in dextrose 5 % and 0.45 % NaCl 1,000 mL infusion   Intravenous New Bag      Current Discharge Medication List     Cannot display discharge medications since this is not an admission.

## 2017-04-26 NOTE — PLAN OF CARE
Problem: Patient Care Overview  Goal: Plan of Care Review  Outcome: Ongoing (interventions implemented as appropriate)  Pt tolerated fluids well, avs given, pt d/c , return to clinic 5/2/17

## 2017-04-26 NOTE — MR AVS SNAPSHOT
Patient Information     Patient Name Sex Amador Plata Male 1955      Visit Information        Provider Department [facility]    2017 2:00 PM [facility], CHEMO [facility] Chemotherapy Infusion [phone] Freeman Stock      Patient Instructions     None      Your Current Medications Are     aspirin 81 MG Chew    docusate sodium (COLACE) 100 MG capsule    doxazosin (CARDURA) 4 MG tablet    glucosamine-chondroitin 500-400 mg tablet    hydrocodone-acetaminophen 5-325mg (NORCO) 5-325 mg per tablet    indapamide (LOZOL) 1.25 MG Tab    lidocaine-prilocaine (EMLA) cream    lubiprostone (AMITIZA) 24 MCG Cap    magnesium oxide (MAGOX) 400 mg tablet    metoprolol succinate (TOPROL-XL) 50 MG 24 hr tablet    multivitamin (ONE DAILY MULTIVITAMIN) per tablet    ondansetron (ZOFRAN-ODT) 8 MG TbDL    oxybutynin (DITROPAN) 5 MG Tab    phenazopyridine (PYRIDIUM) 100 MG tablet    potassium chloride SA (K-DUR,KLOR-CON) 20 MEQ tablet    tamsulosin (FLOMAX) 0.4 mg Cp24    valsartan (DIOVAN) 160 MG tablet      Facility-Administered Medications     sodium chloride 0.9% 1,000 mL infusion      Appointments for Next Year     2017  9:00 AM NURSE VISIT (15 min.) [facility] - Urology RAMEZ Steen MD    Arrive at check-in approximately 15 minutes before your scheduled appointment time. Bring all outside medical records and imaging, along with a list of your current medications and insurance card.    2nd Floor - [address]    2017  9:30 AM NON FASTING LAB (10 min.) [facility], [facility] CANCER BLDG    Arrive at check-in approximately 15 minutes before your scheduled appointment time. Bring all outside medical records and imaging, along with a list of your current medications and insurance card.    [facility] 3rd Floor    2017 10:30 AM ESTABLISHED PATIENT (30 min.) [place] - Hematology Oncology Marion Flores NP    Arrive at check-in approximately 15 minutes before your scheduled  appointment time. Bring all outside medical records and imaging, along with a list of your current medications and insurance card.    [facility] Floor    5/2/2017  9:30 AM INFUSION 360 MIN (360 min.) [facility], CHEMO    Arrive at check-in approximately 15 minutes before your scheduled appointment time. Bring all outside medical records and imaging, along with a list of your current medications and insurance card.    [facility], [address] [place] [address]    5/3/2017  2:00 PM INFUSION 060 MIN (60 min.) [facility], CHEMO    Arrive at check-in approximately 15 minutes before your scheduled appointment time. Bring all outside medical records and imaging, along with a list of your current medications and insurance card.    [facility], [address] [place] [address]    5/8/2017  9:20 AM NON FASTING LAB (10 min.) [facility], HEMONC CANCER BLDG    Arrive at check-in approximately 15 minutes before your scheduled appointment time. Bring all outside medical records and imaging, along with a list of your current medications and insurance card.    [address]    5/8/2017 10:30 AM ESTABLISHED PATIENT (30 min.) [facility] Hematology Oncology Marion Flores NP    Arrive at check-in approximately 15 minutes before your scheduled appointment time. Bring all outside medical records and imaging, along with a list of your current medications and insurance card.    [facility] - [address] [place] [address]    5/9/2017  9:30 AM INFUSION 360 MIN (360 min.) [facility], CHEMO    Arrive at check-in approximately 15 minutes before your scheduled appointment time. Bring all outside medical records and imaging, along with a list of your current medications and insurance card.    [facility], [address] [place] [address]         Default Flowsheet Data (last 24 hours)      Amb Complex Vitals Chris        04/26/17 1358 04/26/17 1356              Measurements    BP  (!)  105/57       Temp  97.9 °F (36.6 °C)       Pulse  60       Resp  18       Pain Assessment    Pain Score Zero                Allergies     No Known Allergies      Medications You Received from 04/25/2017 1636 to 04/26/2017 1636        Date/Time Order Dose Route Action     04/26/2017 1416 sodium chloride 0.9% 1,000 mL infusion   Intravenous New Bag      Current Discharge Medication List     Cannot display discharge medications since this is not an admission.

## 2017-04-27 NOTE — TELEPHONE ENCOUNTER
Called pt and scheduled his cysto w/ stent removal. Pt verbalized understanding to all.  Appt mailed.

## 2017-04-27 NOTE — TELEPHONE ENCOUNTER
----- Message from Larry Wilde MD sent at 4/27/2017 11:39 AM CDT -----  Contact: pt [phone]  We can set him up to have his stent removed.    Rohan Muller  ----- Message -----     From: Shireen Carter RN     Sent: 4/27/2017  11:00 AM       To: Larry Wilde MD     I told him that I would send you the message and that we would call him back.  Thanks, Shireen    ----- Message -----     From: Nica Mack     Sent: 4/27/2017  10:47 AM       To: Andry AHUMADA Staff    Pt states he went to see Dr Steen and he had a cysto on 3/3 and stent placed. Pt states  bladder is emptying properly and asked if the stent can be removed. Pt states Dr Steen advised him to ask Dr Wilde would this be okay first since Dr Wilde will be doing his surgery for bladder cancer some time in the future and if Dr Wilde would remove the stent since he will be preforming the surgery. Please call pt to discuss.

## 2017-04-27 NOTE — PROGRESS NOTES
PVR conducted on patient today revealed 15 ml of urine post void- patient states that he recently finished taking abt d/t a uti which was treated by Dr. Silva- patient reports the stent is still in and that he is experiencing frequent urination HS, but no other problems- advised patient to remind Dr. Silva of the stent and to ask about a POC- if Dr. Wilde does not address it to please call us back. Patient verbalized understanding.

## 2017-05-01 NOTE — PROGRESS NOTES
"Subjective:       Patient ID: Amador Harrison is a 61 y.o. male.    Chief Complaint: Bladder Cancer and Chemotherapy    HPI   61 year old  male, patient of Dr. Austin, to clinic today for 1 week follow up and cycle #2, day 8 of cis/gem for newly diagnosed muscle-invasive bladder cancer. Cycle 1, day 15 held secondary to thrombocytopenia. Otherwise, patient is tolerating chemo fairly well. He has some mild constipation on Friday. Denies hematuria. He notes yesterday has some pelvic discomfort. Denies any new issues with urination.    He denies any mouth sores, nausea, vomiting, diarrhea, constipation, abdominal pain, weight loss or loss of appetite, chest pain, shortness of breath, leg swelling, fatigue, pain, headache, dizziness, or mood changes.    His ECOG PS is zero-one and quality of life so far is excellent.He is accompanied alone to clinic. He has a remote smoking history.     Oncologic History (From Chart):  61 year old  male, referred by Dr. Wilde, to clinic today for evaluation and management of newly diagnosed muscle-invasive bladder cancer. The patient presented with gross hematuria and was found to have a bladder tumor which was resected on 3/3/2017.  The patient had a CT urogram on 1/31/2017 that did not show any locally advanced or distant metastatic abdominal disease. Pathology from that TURBT revealed high-grade T2 bladder cancer with negative margins. Pathology revealed "WITH AREAS OF SQUAMOUS CELL CARCINOMA DIFFERENTIATION. EXTENSIVE INVOLVEMENT OF THE MUSCULARIS PROPRIA (DETRUSSOR MUSCLE) IS PRESENT."  3/17/17- CT CAP reveals "Injuries patient with a reported history of bladder cancer, the following findings are identified: Persistent though decreased asymmetric thickening of the right posterolateral aspect of the bladder wall.Right-sided double J stent in appropriate position.Subcentimeter hepatic segment III hypodensity, too small to accurately characterized but favored to " "be benign."    Review of Systems   Constitutional: Negative for activity change, appetite change, fatigue and fever.   HENT: Positive for tinnitus (mild to left ear). Negative for mouth sores, nosebleeds and trouble swallowing.    Eyes: Negative for discharge and visual disturbance.   Respiratory: Negative for cough, shortness of breath and wheezing.    Cardiovascular: Negative for chest pain and leg swelling.   Gastrointestinal: Negative for abdominal distention, abdominal pain, blood in stool, constipation, diarrhea, nausea and vomiting.   Genitourinary: Positive for dysuria. Negative for decreased urine volume and frequency.   Musculoskeletal: Negative for back pain.   Skin: Negative for color change and rash.   Neurological: Negative for weakness and headaches.   Hematological: Negative for adenopathy.   Psychiatric/Behavioral: Negative for sleep disturbance. The patient is not nervous/anxious.    All other systems reviewed and are negative.      Allergies:  Review of patient's allergies indicates:  No Known Allergies    Medications:  Current Outpatient Prescriptions   Medication Sig Dispense Refill    aspirin 81 MG Chew Take 81 mg by mouth once daily.      docusate sodium (COLACE) 100 MG capsule Take 1 capsule (100 mg total) by mouth 2 (two) times daily.  0    doxazosin (CARDURA) 4 MG tablet Take 1 tablet (4 mg total) by mouth every evening. (Patient taking differently: Take 4 mg by mouth once daily. ) 90 tablet 3    glucosamine-chondroitin 500-400 mg tablet Take 1 tablet by mouth 3 (three) times daily.      hydrocodone-acetaminophen 5-325mg (NORCO) 5-325 mg per tablet Take 1 tablet by mouth every 6 (six) hours as needed for Pain. 30 tablet 0    indapamide (LOZOL) 1.25 MG Tab Take 2.5 mg by mouth once daily.      lidocaine-prilocaine (EMLA) cream Apply to port 30-45 minutes prior to being accessed. 5 g 0    lubiprostone (AMITIZA) 24 MCG Cap Take 1 capsule (24 mcg total) by mouth 2 (two) times daily. 60 " capsule 2    magnesium oxide (MAGOX) 400 mg tablet Take 1 tablet (400 mg total) by mouth once daily. 60 tablet 1    metoprolol succinate (TOPROL-XL) 50 MG 24 hr tablet Take 50 mg by mouth nightly.       multivitamin (ONE DAILY MULTIVITAMIN) per tablet Take 1 tablet by mouth once daily.      ondansetron (ZOFRAN-ODT) 8 MG TbDL Take 1 tablet (8 mg total) by mouth every 12 (twelve) hours as needed. 30 tablet 1    oxybutynin (DITROPAN) 5 MG Tab Take 1 tablet (5 mg total) by mouth 3 (three) times daily as needed. 90 tablet 3    phenazopyridine (PYRIDIUM) 100 MG tablet Take 2 tablets (200 mg total) by mouth 3 (three) times daily as needed for Pain (Burning). 60 tablet 1    potassium chloride SA (K-DUR,KLOR-CON) 20 MEQ tablet Take 1 tablet (20 mEq total) by mouth once daily. 30 tablet 0    tamsulosin (FLOMAX) 0.4 mg Cp24 Take 1 capsule (0.4 mg total) by mouth once daily. 30 capsule 11    valsartan (DIOVAN) 160 MG tablet Take 320 mg by mouth once daily.        No current facility-administered medications for this visit.        PMH:  Past Medical History:   Diagnosis Date    Cancer     Hypertension     Hypertrophy of prostate without urinary obstruction and other lower urinary tract symptoms (LUTS)     Nocturia     Nocturia        PSH:  Past Surgical History:   Procedure Laterality Date    CARPAL TUNNEL RELEASE      Left hand    CYSTOSCOPY      HERNIA REPAIR Bilateral     x 2 separate surgeries. 1 as an infant and another at approx age 18    hydrocele      at age 3    TONSILLECTOMY      turbt 2017      VASECTOMY         FamHx:  No family history on file.    SocHx:  Social History     Social History    Marital status:      Spouse name: N/A    Number of children: N/A    Years of education: N/A     Occupational History    Not on file.     Social History Main Topics    Smoking status: Former Smoker     Quit date: 2/27/1994    Smokeless tobacco: Never Used    Alcohol use Yes      Comment: 2 beers  or wine daily    Drug use: No    Sexual activity: Yes     Other Topics Concern    Not on file     Social History Narrative     Objective:      Physical Exam   Constitutional: He is oriented to person, place, and time. He appears well-developed and well-nourished.   HENT:   Head: Normocephalic and atraumatic.   Mouth/Throat: Uvula is midline and oropharynx is clear and moist. No oropharyngeal exudate.   Eyes: Conjunctivae and EOM are normal. Pupils are equal, round, and reactive to light.   Neck: Trachea normal and normal range of motion. Neck supple. No tracheal deviation present.   Cardiovascular: Normal rate, regular rhythm, normal heart sounds and normal pulses.    No swelling noted to bilateral lower extremities.   Pulmonary/Chest: Effort normal.   Abdominal: Normal appearance.   Musculoskeletal: He exhibits no edema.   Lymphadenopathy:     He has no cervical adenopathy.   Neurological: He is alert and oriented to person, place, and time.   Skin: Skin is warm, dry and intact. No rash noted. He is not diaphoretic.   Psychiatric: He has a normal mood and affect. His speech is normal and behavior is normal.   Nursing note and vitals reviewed.      LABS:  WBC   Date Value Ref Range Status   05/01/2017 4.06 3.90 - 12.70 K/uL Final     Hemoglobin   Date Value Ref Range Status   05/01/2017 9.9 (L) 14.0 - 18.0 g/dL Final     Hematocrit   Date Value Ref Range Status   05/01/2017 27.8 (L) 40.0 - 54.0 % Final     Platelets   Date Value Ref Range Status   05/01/2017 134 (L) 150 - 350 K/uL Final       Chemistry        Component Value Date/Time     05/01/2017 0935    K 3.6 05/01/2017 0935    CL 99 05/01/2017 0935    CO2 32 (H) 05/01/2017 0935    BUN 17 05/01/2017 0935    CREATININE 0.9 05/01/2017 0935     05/01/2017 0935        Component Value Date/Time    CALCIUM 9.8 05/01/2017 0935    ALKPHOS 46 (L) 05/01/2017 0935    AST 15 05/01/2017 0935    ALT 23 05/01/2017 0935    BILITOT 0.6 05/01/2017 0935           Assessment:       1. Malignant neoplasm of trigone of urinary bladder    2. Encounter for chemotherapy management    3. Chemotherapy-induced thrombocytopenia    4. Anemia in neoplastic disease    5. Hydronephrosis, right    6. Dysuria    7. Hypomagnesemia    8. Hypokalemia        Plan:       1,2,3,4- Muscle-invasive bladder cancer:  - Dr. Austin personally met and discussed pt's case with Dr. Wilde. I personally reviewed his images, pathology, and previous records.  - Long conversation with patient regarding regarding treatment options including neoadjuvant chemotherapy in bladder cancer, as well as the natural history of disease, side effects of treatment, therapy schedules, etc.   - Pt agrees to move forward with neoadjuvant gemzar-cisplatin and will split the dose of cisplatin between days 1 and 8. Receive fluid support on days 2 and 9. Pt will receive gemzar on days 1,8, and 15.   - Plan is for 4 total cycles of neoadjuvant cisplatin/gemzar chemotherapy followed by consolidative surgery with Dr. Wilde.    Discussed he has baseline thrombocytopenia, which may cause an issue with gemzar dosing.  Labs acceptable to proceed with cycle 2,day 8 cis/gem. Discussed we may need to switch to every 2 week dosing if thrombocytopenia recoccurs, but will have him RTC in 1 weeks with labs (CBC,CMP, Mag), to see me, and cycle 2, day 15 cisplatin/gemzar. Will plan to get repeat imaging after cycle 2 completion.    4-Mild, will monitor.    5,6- Continue pyridium.    7- Low normal. Continue oral mag and will receive some in IVFs tomorrow.    8- Continue oral K and will receive K in fluids with chemo.       Patient is in agreement with the proposed treatment plan. All questions were answered to the patient's satisfaction. Pt knows to call clinic if anything is needed before the next clinic visit.    BAILEY Norwood  Hematology and Medical Oncology

## 2017-05-02 NOTE — PLAN OF CARE
Problem: Patient Care Overview  Goal: Plan of Care Review  Outcome: Ongoing (interventions implemented as appropriate)  Patient tolerated Gemzar and Cisplatin infusions well,no s/s infusion reaction. Patient ambulated steady gait upon exit. Wife present.Given AVS. Verbalized understanding to call MD with any problems or concerns.

## 2017-05-02 NOTE — MR AVS SNAPSHOT
Patient Information     Patient Name Sex Amador Plata Male 1955      Visit Information        Provider Department Dept Phone Center    2017 9:30 AM [facility], CHEMO [facility] [phone] [address]      Patient Instructions     None      Your Current Medications Are     aspirin 81 MG Chew    docusate sodium (COLACE) 100 MG capsule    doxazosin (CARDURA) 4 MG tablet    glucosamine-chondroitin 500-400 mg tablet    hydrocodone-acetaminophen 5-325mg (NORCO) 5-325 mg per tablet    indapamide (LOZOL) 1.25 MG Tab    lidocaine-prilocaine (EMLA) cream    lubiprostone (AMITIZA) 24 MCG Cap    magnesium oxide (MAGOX) 400 mg tablet    metoprolol succinate (TOPROL-XL) 50 MG 24 hr tablet    multivitamin (ONE DAILY MULTIVITAMIN) per tablet    ondansetron (ZOFRAN-ODT) 8 MG TbDL    oxybutynin (DITROPAN) 5 MG Tab    phenazopyridine (PYRIDIUM) 100 MG tablet    potassium chloride SA (K-DUR,KLOR-CON) 20 MEQ tablet    tamsulosin (FLOMAX) 0.4 mg Cp24    valsartan (DIOVAN) 160 MG tablet      Facility-Administered Medications     alteplase injection 2 mg    cisplatin (PLATINOL) 35 mg/m2 = 65 mg in sodium chloride 0.9% 500 mL chemo infusion    fosaprepitant 150 mg in sodium chloride 0.9% 150 mL IVPB    gemcitabine (GEMZAR) 1,870 mg in sodium chloride 0.9% 250 mL chemo infusion    heparin, porcine (PF) 100 unit/mL injection flush 500 Units    magnesium sulfate 1 g, potassium chloride 20 mEq in dextrose 5 % and 0.45 % NaCl 1,000 mL infusion    palonosetron 0.25mg/dexamethasone 10mg IVPB    sodium chloride 0.9% flush 10 mL      Appointments for Next Year     5/3/2017  2:00 PM INFUSION 060 MIN (60 min.) [facility], [facility]    Arrive at check-in approximately 15 minutes before your scheduled appointment time. Bring all outside medical records and imaging, along with a list of your current medications and insurance card.    [facility], 5th Floor    2017  9:20 AM NON FASTING  LAB (10 min.) [facility], HEMONC CANCER BLDG    Arrive at check-in approximately 15 minutes before your scheduled appointment time. Bring all outside medical records and imaging, along with a list of your current medications and insurance card.    [facility] 3rd Floor    5/8/2017 10:30 AM ESTABLISHED PATIENT (30 min.) [facility] Hematology Oncology Marion Flores NP    Arrive at check-in approximately 15 minutes before your scheduled appointment time. Bring all outside medical records and imaging, along with a list of your current medications and insurance card.    [facility] - 3rd Floor    5/9/2017  9:30 AM INFUSION 360 MIN (360 min.) [facility], CHEMO    Arrive at check-in approximately 15 minutes before your scheduled appointment time. Bring all outside medical records and imaging, along with a list of your current medications and insurance card.    [facility], 5th Floor    5/19/2017  1:30 PM CYSTO AND STENT REMOVAL (30 min.) [facility]    What to expect: Cystoscopy During a cystoscopy, urologists (doctors who treat urinary problems) insert an instrument to check inside the urethra and bladder. They can also see the openings for the ureters(tubes that connect the kidney to the bladder) and prostate (in men). Urologists usually use cystoscopy for cases of blood in the urine, frequent bladder infections, difficulty passing urine, and prostate problems. They can also learn more about other problems of the urethra or bladder. How to Prepare for Cystoscopy Eat and take your usual medicines before this test. Tell your doctor ahead of time if you feel you may have a urinary tract infection. An infection may mean the test cannot be done. How the Test Is Done You will lie on your back. Females may have to put their legs in stirrups. Males will lie flat with legs straight. Someone will clean your genital  area with a sterile (free from germs) cleaning solution, apply a numbing gel, lidocaine jelly, and cover the area with a sterile drape. The doctor will pass a small flexible camera through the urethra into the bladder. Sterile fluid will flow through the flexible tube to help the doctor see the inner lining of the urethra and bladder. You may choose to watch the images on the video screen. What to Expect During and After Cystoscopy The numbing gel may cause mild burning at first, but this will become less. You may feel the sterile fluid flowing into your bladder. By the end of the test, most patients feel like their bladder is full For the next 24-48 hours, you may feel a mild burning when you urinate.This burning is normal and expected. Drink plenty of water to dilute the urine to help relieve the burning sensation.You may also see a small amount of blood in your urine after the procedure. Unless you are already taking antibiotics, you may be given an antibiotic after the test to prevent infection. Signs and Symptoms to Report Call the [facility] clinic at [phone] if you develop any of the following:  Fever of 101 degrees or higher  Chills or persistent bleeding  Inability to urinate Please arrive 30 minutes prior to scheduled appointment time.    4th Floor [facility]         Default Flowsheet Data (last 24 hours)      Amb Complex Vitals Chris        05/02/17 0928                Measurements    BP (!)  106/59        Temp 98.3 °F (36.8 °C)        Pulse 63        Resp 18        Pain Assessment    Pain Score Zero                Allergies     No Known Allergies      Medications You Received from 05/01/2017 1448 to 05/02/2017 1448        Date/Time Order Dose Route Action     05/02/2017 1414 cisplatin (PLATINOL) 35 mg/m2 = 65 mg in sodium chloride 0.9% 500 mL chemo infusion 65 mg Intravenous New Bag     05/02/2017 1227 fosaprepitant 150 mg in sodium chloride 0.9% 150 mL IVPB 150 mg Intravenous New Bag     05/02/2017  1319 gemcitabine (GEMZAR) 1,870 mg in sodium chloride 0.9% 250 mL chemo infusion 1,870 mg Intravenous New Bag     05/02/2017 0945 magnesium sulfate 1 g, potassium chloride 20 mEq in dextrose 5 % and 0.45 % NaCl 1,000 mL infusion   Intravenous New Bag     05/02/2017 1206 palonosetron 0.25mg/dexamethasone 10mg IVPB 0.25 mg Intravenous New Bag      Current Discharge Medication List     Cannot display discharge medications since this is not an admission.

## 2017-05-03 NOTE — PLAN OF CARE
Problem: Patient Care Overview  Goal: Plan of Care Review  Outcome: Ongoing (interventions implemented as appropriate)  Pt. Tolerated one liter of NS today. VSS. Port flushed, hep-locked and de-accessed. No questions at this time.

## 2017-05-08 NOTE — Clinical Note
Schedule urine home collect tomorrow before treatment. No chemo tomorrow- 2 units PRBCS instead. Consent obtained in clinic.   RTC in 2 weeks with CT scans, labs (CBC,CMP,Mag), and to see me/ Dr. Jimenez the afternoon. Schedule cisplatin/gemzar next day.

## 2017-05-08 NOTE — PROGRESS NOTES
"Subjective:       Patient ID: Amador Harrison is a 61 y.o. male.    Chief Complaint: Bladder Cancer and Chemotherapy    HPI   61 year old  male, patient of Dr. Austin, to clinic today for 1 week follow up and cycle #2, day 15 of cis/gem for newly diagnosed muscle-invasive bladder cancer. Cycle 1, day 15 held secondary to thrombocytopenia. Otherwise, patient is tolerating chemo fairly well. He notes he began with some dysuria yesterday, concerned it may be a UTI.     He denies any mouth sores, nausea, vomiting, diarrhea, constipation, abdominal pain, weight loss or loss of appetite, chest pain, shortness of breath, leg swelling, pain, headache, dizziness, or mood changes.    His ECOG PS is zero-one and quality of life so far is excellent.He is accompanied to clinic with his wife. He has a remote smoking history.     Oncologic History (From Chart):  61 year old  male, referred by Dr. Wilde, to clinic today for evaluation and management of newly diagnosed muscle-invasive bladder cancer. The patient presented with gross hematuria and was found to have a bladder tumor which was resected on 3/3/2017.  The patient had a CT urogram on 1/31/2017 that did not show any locally advanced or distant metastatic abdominal disease. Pathology from that TURBT revealed high-grade T2 bladder cancer with negative margins. Pathology revealed "WITH AREAS OF SQUAMOUS CELL CARCINOMA DIFFERENTIATION. EXTENSIVE INVOLVEMENT OF THE MUSCULARIS PROPRIA (DETRUSSOR MUSCLE) IS PRESENT."  3/17/17- CT CAP reveals "Injuries patient with a reported history of bladder cancer, the following findings are identified: Persistent though decreased asymmetric thickening of the right posterolateral aspect of the bladder wall.Right-sided double J stent in appropriate position.Subcentimeter hepatic segment III hypodensity, too small to accurately characterized but favored to be benign."    Review of Systems   Constitutional: Positive for " fatigue. Negative for activity change, appetite change, fever and unexpected weight change.   HENT: Positive for tinnitus (mild to left ear). Negative for mouth sores, nosebleeds and trouble swallowing.    Eyes: Negative for discharge and visual disturbance.   Respiratory: Negative for cough, shortness of breath and wheezing.    Cardiovascular: Negative for chest pain and leg swelling.   Gastrointestinal: Negative for abdominal distention, abdominal pain, blood in stool, constipation, diarrhea, nausea and vomiting.   Genitourinary: Positive for dysuria. Negative for decreased urine volume and frequency.   Musculoskeletal: Negative for back pain.   Skin: Negative for color change and rash.   Neurological: Negative for weakness and headaches.   Hematological: Negative for adenopathy.   Psychiatric/Behavioral: Negative for sleep disturbance. The patient is not nervous/anxious.    All other systems reviewed and are negative.      Allergies:  Review of patient's allergies indicates:  No Known Allergies    Medications:  Current Outpatient Prescriptions   Medication Sig Dispense Refill    aspirin 81 MG Chew Take 81 mg by mouth once daily.      docusate sodium (COLACE) 100 MG capsule Take 1 capsule (100 mg total) by mouth 2 (two) times daily.  0    doxazosin (CARDURA) 4 MG tablet Take 1 tablet (4 mg total) by mouth every evening. (Patient taking differently: Take 4 mg by mouth once daily. ) 90 tablet 3    glucosamine-chondroitin 500-400 mg tablet Take 1 tablet by mouth 3 (three) times daily.      hydrocodone-acetaminophen 5-325mg (NORCO) 5-325 mg per tablet Take 1 tablet by mouth every 6 (six) hours as needed for Pain. 30 tablet 0    indapamide (LOZOL) 1.25 MG Tab Take 2.5 mg by mouth once daily.      lidocaine-prilocaine (EMLA) cream Apply to port 30-45 minutes prior to being accessed. 5 g 0    lubiprostone (AMITIZA) 24 MCG Cap Take 1 capsule (24 mcg total) by mouth 2 (two) times daily. 60 capsule 2    magnesium  oxide (MAGOX) 400 mg tablet Take 1 tablet (400 mg total) by mouth once daily. 60 tablet 1    metoprolol succinate (TOPROL-XL) 50 MG 24 hr tablet Take 50 mg by mouth nightly.       multivitamin (ONE DAILY MULTIVITAMIN) per tablet Take 1 tablet by mouth once daily.      ondansetron (ZOFRAN-ODT) 8 MG TbDL Take 1 tablet (8 mg total) by mouth every 12 (twelve) hours as needed. 30 tablet 1    oxybutynin (DITROPAN) 5 MG Tab Take 1 tablet (5 mg total) by mouth 3 (three) times daily as needed. 90 tablet 3    phenazopyridine (PYRIDIUM) 100 MG tablet Take 2 tablets (200 mg total) by mouth 3 (three) times daily as needed for Pain (Burning). 60 tablet 1    potassium chloride SA (K-DUR,KLOR-CON) 20 MEQ tablet Take 1 tablet (20 mEq total) by mouth once daily. 30 tablet 0    tamsulosin (FLOMAX) 0.4 mg Cp24 Take 1 capsule (0.4 mg total) by mouth once daily. 30 capsule 11    valsartan (DIOVAN) 160 MG tablet Take 320 mg by mouth once daily.        No current facility-administered medications for this visit.        PMH:  Past Medical History:   Diagnosis Date    Cancer     Hypertension     Hypertrophy of prostate without urinary obstruction and other lower urinary tract symptoms (LUTS)     Nocturia     Nocturia        PSH:  Past Surgical History:   Procedure Laterality Date    CARPAL TUNNEL RELEASE      Left hand    CYSTOSCOPY      HERNIA REPAIR Bilateral     x 2 separate surgeries. 1 as an infant and another at approx age 18    hydrocele      at age 3    TONSILLECTOMY      turbt 2017      VASECTOMY         FamHx:  No family history on file.    SocHx:  Social History     Social History    Marital status:      Spouse name: N/A    Number of children: N/A    Years of education: N/A     Occupational History    Not on file.     Social History Main Topics    Smoking status: Former Smoker     Quit date: 2/27/1994    Smokeless tobacco: Never Used    Alcohol use Yes      Comment: 2 beers or wine daily    Drug  use: No    Sexual activity: Yes     Other Topics Concern    Not on file     Social History Narrative     Objective:      Physical Exam   Constitutional: He is oriented to person, place, and time. He appears well-developed and well-nourished.   HENT:   Head: Normocephalic and atraumatic.   Mouth/Throat: Uvula is midline and oropharynx is clear and moist. No oropharyngeal exudate.   Eyes: Conjunctivae and EOM are normal. Pupils are equal, round, and reactive to light.   Neck: Trachea normal and normal range of motion. Neck supple. No tracheal deviation present.   Cardiovascular: Normal rate, regular rhythm, normal heart sounds and normal pulses.    No swelling noted to bilateral lower extremities.   Pulmonary/Chest: Effort normal.   Abdominal: Normal appearance.   Musculoskeletal: He exhibits no edema.   Lymphadenopathy:     He has no cervical adenopathy.   Neurological: He is alert and oriented to person, place, and time.   Skin: Skin is warm, dry and intact. No rash noted. He is not diaphoretic.   Psychiatric: He has a normal mood and affect. His speech is normal and behavior is normal.   Nursing note and vitals reviewed.      LABS:  WBC   Date Value Ref Range Status   05/08/2017 2.93 (L) 3.90 - 12.70 K/uL Final     Hemoglobin   Date Value Ref Range Status   05/08/2017 7.5 (L) 14.0 - 18.0 g/dL Final     Hematocrit   Date Value Ref Range Status   05/08/2017 21.4 (L) 40.0 - 54.0 % Final     Platelets   Date Value Ref Range Status   05/08/2017 58 (L) 150 - 350 K/uL Final       Chemistry        Component Value Date/Time     (L) 05/08/2017 0918    K 3.7 05/08/2017 0918    CL 97 05/08/2017 0918    CO2 31 (H) 05/08/2017 0918    BUN 20 05/08/2017 0918    CREATININE 1.1 05/08/2017 0918     05/08/2017 0918        Component Value Date/Time    CALCIUM 9.5 05/08/2017 0918    ALKPHOS 43 (L) 05/08/2017 0918    AST 15 05/08/2017 0918    ALT 18 05/08/2017 0918    BILITOT 0.7 05/08/2017 0918          Assessment:        1. Malignant neoplasm of trigone of urinary bladder    2. Encounter for chemotherapy management    3. Chemotherapy-induced thrombocytopenia    4. Anemia in neoplastic disease    5. Hydronephrosis, right    6. Dysuria    7. Hypomagnesemia    8. Hypokalemia        Plan:       1,2,3,4- Muscle-invasive bladder cancer:  - Dr. Austin personally met and discussed pt's case with Dr. Wilde. I personally reviewed his images, pathology, and previous records.  - Long conversation with patient regarding regarding treatment options including neoadjuvant chemotherapy in bladder cancer, as well as the natural history of disease, side effects of treatment, therapy schedules, etc.   - Pt agrees to move forward with neoadjuvant gemzar-cisplatin and will split the dose of cisplatin between days 1 and 8. Receive fluid support on days 2 and 9. Pt will receive gemzar on days 1,8, and 15.   - Plan is for 4 total cycles of neoadjuvant cisplatin/gemzar chemotherapy followed by consolidative surgery with Dr. Wilde.    Discussed he has baseline thrombocytopenia, which may cause an issue with gemzar dosing. Due to anemia and thrombocytopenia, will delay cycle 2, day 15 and give 2 units of PRBCs tomorrow instead. Blood consent obtained during visit. Will switch to every 2 week dosing. RTC in 2 weeks with CT CAP, labs (CBC,CMP, Mag), to see me and Dr. Austin, and cycle 3, day 1 cisplatin/gemzar.     5,6- Will get urinalysis. Continue pyridium for now.    7- Low normal. Continue oral mag.    8- Continue oral K.      Patient is in agreement with the proposed treatment plan. All questions were answered to the patient's satisfaction. Pt knows to call clinic if anything is needed before the next clinic visit.    CODY Norwood-JOHNNY  Hematology and Medical Oncology

## 2017-05-09 NOTE — MR AVS SNAPSHOT
Patient Information     Patient Name Sex Amador Plata Male 1955      Visit Information        Provider Department Dept Phone Center    2017 9:30 AM [facility], [facility] [phone] [address]      Patient Instructions     None      Your Current Medications Are     aspirin 81 MG Chew    docusate sodium (COLACE) 100 MG capsule    doxazosin (CARDURA) 4 MG tablet    glucosamine-chondroitin 500-400 mg tablet    hydrocodone-acetaminophen 5-325mg (NORCO) 5-325 mg per tablet    indapamide (LOZOL) 1.25 MG Tab    lidocaine-prilocaine (EMLA) cream    lubiprostone (AMITIZA) 24 MCG Cap    magnesium oxide (MAGOX) 400 mg tablet    metoprolol succinate (TOPROL-XL) 50 MG 24 hr tablet    multivitamin (ONE DAILY MULTIVITAMIN) per tablet    ondansetron (ZOFRAN-ODT) 8 MG TbDL    oxybutynin (DITROPAN) 5 MG Tab    phenazopyridine (PYRIDIUM) 100 MG tablet    potassium chloride SA (K-DUR,KLOR-CON) 20 MEQ tablet    tamsulosin (FLOMAX) 0.4 mg Cp24    valsartan (DIOVAN) 160 MG tablet      Facility-Administered Medications     0.9%  NaCl infusion (for blood administration)    acetaminophen tablet 650 mg    diphenhydrAMINE capsule 25 mg      Appointments for Next Year     2017 10:20 AM CT ABD PEL W CONTRAST (20 min.) [facility] CT6 MOBILE LIMIT 450 LBS    You must  fast four (4) hours prior to your appointment. Arrive 2 hours early for your appointment to drink the exam prep.    2nd floor    2017  1:30 PM CYSTO AND STENT REMOVAL (30 min.) [facility], UROLOGY    What to expect: Cystoscopy During a cystoscopy, urologists (doctors who treat urinary problems) insert an instrument to check inside the urethra and bladder. They can also see the openings for the ureters(tubes that connect the kidney to the bladder) and prostate (in men). Urologists usually use cystoscopy for cases of blood in the urine, frequent bladder infections, difficulty  passing urine, and prostate problems. They can also learn more about other problems of the urethra or bladder. How to Prepare for Cystoscopy Eat and take your usual medicines before this test. Tell your doctor ahead of time if you feel you may have a urinary tract infection. An infection may mean the test cannot be done. How the Test Is Done You will lie on your back. Females may have to put their legs in stirrups. Males will lie flat with legs straight. Someone will clean your genital area with a sterile (free from germs) cleaning solution, apply a numbing gel, lidocaine jelly, and cover the area with a sterile drape. The doctor will pass a small flexible camera through the urethra into the bladder. Sterile fluid will flow through the flexible tube to help the doctor see the inner lining of the urethra and bladder. You may choose to watch the images on the video screen. What to Expect During and After Cystoscopy The numbing gel may cause mild burning at first, but this will become less. You may feel the sterile fluid flowing into your bladder. By the end of the test, most patients feel like their bladder is full For the next 24-48 hours, you may feel a mild burning when you urinate.This burning is normal and expected. Drink plenty of water to dilute the urine to help relieve the burning sensation.You may also see a small amount of blood in your urine after the procedure. Unless you are already taking antibiotics, you may be given an antibiotic after the test to prevent infection. Signs and Symptoms to Report Call the [facility] clinic at [phone] if you develop any of the following:  Fever of 101 degrees or higher  Chills or persistent bleeding  Inability to urinate Please arrive 30 minutes prior to scheduled appointment time.    4th Floor [facility]    5/22/2017  9:40 AM NON FASTING LAB (10 min.) [facility] LAB, [facility]    Arrive at check-in approximately 15 minutes before your  "scheduled appointment time. Bring all outside medical records and imaging, along with a list of your current medications and insurance card.    [facility] 3rd Floor    5/22/2017 10:30 AM ESTABLISHED PATIENT (30 min.) [place] - Hematology Oncology Antwon Austin MD    Arrive at check-in approximately 15 minutes before your scheduled appointment time. Bring all outside medical records and imaging, along with a list of your current medications and insurance card.    [facility] - 3rd Floor    5/23/2017  9:30 AM INFUSION 360 MIN (360 min.) [facility], [facility]    Arrive at check-in approximately 15 minutes before your scheduled appointment time. Bring all outside medical records and imaging, along with a list of your current medications and insurance card.    [facility], 5th Floor    5/24/2017  2:00 PM INFUSION 060 MIN (60 min.) [facility], [facility]    Arrive at check-in approximately 15 minutes before your scheduled appointment time. Bring all outside medical records and imaging, along with a list of your current medications and insurance card.    [facility], 5th Floor         Default Flowsheet Data (last 24 hours)      Amb Complex Vitals Chris        05/09/17 0912 05/08/17 1111             Measurements    Weight  74.2 kg (163 lb 9.3 oz)       Height  5' 6" (1.676 m)       BSA (Calculated - sq m)  1.86 sq meters       BMI (Calculated)  26.5       BP (!)  116/58 (!)  118/58       Temp  97.8 °F (36.6 °C)       Pulse 78 63       Resp 18 18       Pain Assessment    Pain Score Zero Three       Pain Loc  ABDOMEN   urinary discomfort               Allergies     No Known Allergies      Medications You Received from 05/08/2017 0935 to 05/09/2017 0935        Date/Time Order Dose Route Action     05/09/2017 0923 0.9%  NaCl infusion (for blood administration)   Intravenous New Bag      Current Discharge Medication List     Cannot display discharge " medications since this is not an admission.

## 2017-05-09 NOTE — PLAN OF CARE
Problem: Patient Care Overview  Goal: Plan of Care Review  Outcome: Ongoing (interventions implemented as appropriate)  Patient received 2 units of prbc's today. Tolerated well. Chemo held this week due to thrombocytopenia. Port flushed, heparin locked, and deaccessed. Verbalized understanding to call MD for any questions/concerns. Outpatient transfusion instructions given to patient. NAD noted upon discharge. Ambulated independently with wife by side.

## 2017-05-19 NOTE — INTERVAL H&P NOTE
The patient has been examined and the H&P has been reviewed:    I concur with the findings and no changes have occurred since H&P was written.     Patient is here for JJ stent removal. He will continue neoadjuvant chemotherapy and will return for consolidative surgery after completion.        There are no hospital problems to display for this patient.

## 2017-05-19 NOTE — H&P (VIEW-ONLY)
"Subjective:       Patient ID: Amaodr Harrison is a 61 y.o. male.    Chief Complaint: Bladder Cancer and Chemotherapy    HPI   61 year old  male, patient of Dr. Austin, to clinic today for 1 week follow up and cycle #2, day 15 of cis/gem for newly diagnosed muscle-invasive bladder cancer. Cycle 1, day 15 held secondary to thrombocytopenia. Otherwise, patient is tolerating chemo fairly well. He notes he began with some dysuria yesterday, concerned it may be a UTI.     He denies any mouth sores, nausea, vomiting, diarrhea, constipation, abdominal pain, weight loss or loss of appetite, chest pain, shortness of breath, leg swelling, pain, headache, dizziness, or mood changes.    His ECOG PS is zero-one and quality of life so far is excellent.He is accompanied to clinic with his wife. He has a remote smoking history.     Oncologic History (From Chart):  61 year old  male, referred by Dr. Wilde, to clinic today for evaluation and management of newly diagnosed muscle-invasive bladder cancer. The patient presented with gross hematuria and was found to have a bladder tumor which was resected on 3/3/2017.  The patient had a CT urogram on 1/31/2017 that did not show any locally advanced or distant metastatic abdominal disease. Pathology from that TURBT revealed high-grade T2 bladder cancer with negative margins. Pathology revealed "WITH AREAS OF SQUAMOUS CELL CARCINOMA DIFFERENTIATION. EXTENSIVE INVOLVEMENT OF THE MUSCULARIS PROPRIA (DETRUSSOR MUSCLE) IS PRESENT."  3/17/17- CT CAP reveals "Injuries patient with a reported history of bladder cancer, the following findings are identified: Persistent though decreased asymmetric thickening of the right posterolateral aspect of the bladder wall.Right-sided double J stent in appropriate position.Subcentimeter hepatic segment III hypodensity, too small to accurately characterized but favored to be benign."    Review of Systems   Constitutional: Positive for " fatigue. Negative for activity change, appetite change, fever and unexpected weight change.   HENT: Positive for tinnitus (mild to left ear). Negative for mouth sores, nosebleeds and trouble swallowing.    Eyes: Negative for discharge and visual disturbance.   Respiratory: Negative for cough, shortness of breath and wheezing.    Cardiovascular: Negative for chest pain and leg swelling.   Gastrointestinal: Negative for abdominal distention, abdominal pain, blood in stool, constipation, diarrhea, nausea and vomiting.   Genitourinary: Positive for dysuria. Negative for decreased urine volume and frequency.   Musculoskeletal: Negative for back pain.   Skin: Negative for color change and rash.   Neurological: Negative for weakness and headaches.   Hematological: Negative for adenopathy.   Psychiatric/Behavioral: Negative for sleep disturbance. The patient is not nervous/anxious.    All other systems reviewed and are negative.      Allergies:  Review of patient's allergies indicates:  No Known Allergies    Medications:  Current Outpatient Prescriptions   Medication Sig Dispense Refill    aspirin 81 MG Chew Take 81 mg by mouth once daily.      docusate sodium (COLACE) 100 MG capsule Take 1 capsule (100 mg total) by mouth 2 (two) times daily.  0    doxazosin (CARDURA) 4 MG tablet Take 1 tablet (4 mg total) by mouth every evening. (Patient taking differently: Take 4 mg by mouth once daily. ) 90 tablet 3    glucosamine-chondroitin 500-400 mg tablet Take 1 tablet by mouth 3 (three) times daily.      hydrocodone-acetaminophen 5-325mg (NORCO) 5-325 mg per tablet Take 1 tablet by mouth every 6 (six) hours as needed for Pain. 30 tablet 0    indapamide (LOZOL) 1.25 MG Tab Take 2.5 mg by mouth once daily.      lidocaine-prilocaine (EMLA) cream Apply to port 30-45 minutes prior to being accessed. 5 g 0    lubiprostone (AMITIZA) 24 MCG Cap Take 1 capsule (24 mcg total) by mouth 2 (two) times daily. 60 capsule 2    magnesium  oxide (MAGOX) 400 mg tablet Take 1 tablet (400 mg total) by mouth once daily. 60 tablet 1    metoprolol succinate (TOPROL-XL) 50 MG 24 hr tablet Take 50 mg by mouth nightly.       multivitamin (ONE DAILY MULTIVITAMIN) per tablet Take 1 tablet by mouth once daily.      ondansetron (ZOFRAN-ODT) 8 MG TbDL Take 1 tablet (8 mg total) by mouth every 12 (twelve) hours as needed. 30 tablet 1    oxybutynin (DITROPAN) 5 MG Tab Take 1 tablet (5 mg total) by mouth 3 (three) times daily as needed. 90 tablet 3    phenazopyridine (PYRIDIUM) 100 MG tablet Take 2 tablets (200 mg total) by mouth 3 (three) times daily as needed for Pain (Burning). 60 tablet 1    potassium chloride SA (K-DUR,KLOR-CON) 20 MEQ tablet Take 1 tablet (20 mEq total) by mouth once daily. 30 tablet 0    tamsulosin (FLOMAX) 0.4 mg Cp24 Take 1 capsule (0.4 mg total) by mouth once daily. 30 capsule 11    valsartan (DIOVAN) 160 MG tablet Take 320 mg by mouth once daily.        No current facility-administered medications for this visit.        PMH:  Past Medical History:   Diagnosis Date    Cancer     Hypertension     Hypertrophy of prostate without urinary obstruction and other lower urinary tract symptoms (LUTS)     Nocturia     Nocturia        PSH:  Past Surgical History:   Procedure Laterality Date    CARPAL TUNNEL RELEASE      Left hand    CYSTOSCOPY      HERNIA REPAIR Bilateral     x 2 separate surgeries. 1 as an infant and another at approx age 18    hydrocele      at age 3    TONSILLECTOMY      turbt 2017      VASECTOMY         FamHx:  No family history on file.    SocHx:  Social History     Social History    Marital status:      Spouse name: N/A    Number of children: N/A    Years of education: N/A     Occupational History    Not on file.     Social History Main Topics    Smoking status: Former Smoker     Quit date: 2/27/1994    Smokeless tobacco: Never Used    Alcohol use Yes      Comment: 2 beers or wine daily    Drug  use: No    Sexual activity: Yes     Other Topics Concern    Not on file     Social History Narrative     Objective:      Physical Exam   Constitutional: He is oriented to person, place, and time. He appears well-developed and well-nourished.   HENT:   Head: Normocephalic and atraumatic.   Mouth/Throat: Uvula is midline and oropharynx is clear and moist. No oropharyngeal exudate.   Eyes: Conjunctivae and EOM are normal. Pupils are equal, round, and reactive to light.   Neck: Trachea normal and normal range of motion. Neck supple. No tracheal deviation present.   Cardiovascular: Normal rate, regular rhythm, normal heart sounds and normal pulses.    No swelling noted to bilateral lower extremities.   Pulmonary/Chest: Effort normal.   Abdominal: Normal appearance.   Musculoskeletal: He exhibits no edema.   Lymphadenopathy:     He has no cervical adenopathy.   Neurological: He is alert and oriented to person, place, and time.   Skin: Skin is warm, dry and intact. No rash noted. He is not diaphoretic.   Psychiatric: He has a normal mood and affect. His speech is normal and behavior is normal.   Nursing note and vitals reviewed.      LABS:  WBC   Date Value Ref Range Status   05/08/2017 2.93 (L) 3.90 - 12.70 K/uL Final     Hemoglobin   Date Value Ref Range Status   05/08/2017 7.5 (L) 14.0 - 18.0 g/dL Final     Hematocrit   Date Value Ref Range Status   05/08/2017 21.4 (L) 40.0 - 54.0 % Final     Platelets   Date Value Ref Range Status   05/08/2017 58 (L) 150 - 350 K/uL Final       Chemistry        Component Value Date/Time     (L) 05/08/2017 0918    K 3.7 05/08/2017 0918    CL 97 05/08/2017 0918    CO2 31 (H) 05/08/2017 0918    BUN 20 05/08/2017 0918    CREATININE 1.1 05/08/2017 0918     05/08/2017 0918        Component Value Date/Time    CALCIUM 9.5 05/08/2017 0918    ALKPHOS 43 (L) 05/08/2017 0918    AST 15 05/08/2017 0918    ALT 18 05/08/2017 0918    BILITOT 0.7 05/08/2017 0918          Assessment:        1. Malignant neoplasm of trigone of urinary bladder    2. Encounter for chemotherapy management    3. Chemotherapy-induced thrombocytopenia    4. Anemia in neoplastic disease    5. Hydronephrosis, right    6. Dysuria    7. Hypomagnesemia    8. Hypokalemia        Plan:       1,2,3,4- Muscle-invasive bladder cancer:  - Dr. Austin personally met and discussed pt's case with Dr. Wilde. I personally reviewed his images, pathology, and previous records.  - Long conversation with patient regarding regarding treatment options including neoadjuvant chemotherapy in bladder cancer, as well as the natural history of disease, side effects of treatment, therapy schedules, etc.   - Pt agrees to move forward with neoadjuvant gemzar-cisplatin and will split the dose of cisplatin between days 1 and 8. Receive fluid support on days 2 and 9. Pt will receive gemzar on days 1,8, and 15.   - Plan is for 4 total cycles of neoadjuvant cisplatin/gemzar chemotherapy followed by consolidative surgery with Dr. Wilde.    Discussed he has baseline thrombocytopenia, which may cause an issue with gemzar dosing. Due to anemia and thrombocytopenia, will delay cycle 2, day 15 and give 2 units of PRBCs tomorrow instead. Blood consent obtained during visit. Will switch to every 2 week dosing. RTC in 2 weeks with CT CAP, labs (CBC,CMP, Mag), to see me and Dr. Austin, and cycle 3, day 1 cisplatin/gemzar.     5,6- Will get urinalysis. Continue pyridium for now.    7- Low normal. Continue oral mag.    8- Continue oral K.      Patient is in agreement with the proposed treatment plan. All questions were answered to the patient's satisfaction. Pt knows to call clinic if anything is needed before the next clinic visit.    CODY Norwood-JOHNNY  Hematology and Medical Oncology

## 2017-05-19 NOTE — PROCEDURES
CYSTOSCOPY REPORT    5/19/2017     Procedure: Cystoscopy, JJ stent removal    Pre Procedure Diagnosis:  1. cT2 bladder cancer undergoing neoadjuvant chemotherapy            2. Indwelling JJ stent            3. LUTS    Post Procedure Diagnosis: Same    Anesthesia: 10 cc 2% lidocaine jelly applied per urethra.    14 FR Flexible Olympus cystoscope used.    FINDINGS: Indwelling JJ stent; no lesions visualized    Specimen:  JJ stent    The patient was taken to the cystoscopy suite and placed in supine position.  The genitalia was prepped and draped  in the usual sterile fashion.  Two percent lidocaine jelly was inserted in the urethra and held in place with a penile clamp.  After sufficent time had passed to allow good local anesthesia, the cystoscope was inserted in the urethra and passed into the bladder visualizing the urethra along its entire course.  The ureteral orifices were in their usual position and configuration.  The cystoscope was then brought to the level of the bladder neck, and the indwelling JJ stent was visualized and grasped with the stent grasper. The cystoscope and JJ stent were removed intact in toto. The patient was instructed to urinate prior to leaving the office.     ASSESSMENT/PLAN:  Patient status post flexible cystoscopy and JJ stent removal.  1. Push fluids for 24 hours.  2. May see blood in the urine, this should gradually improve over the next 2-3 days.  3. The patient was instructed to return to the office or go to the emergency should fever, chills, cloudy urine, or inability to urinate develop.  4. Follow up as planned for continuation of chemotherapy and consolidative surgery.

## 2017-05-19 NOTE — PATIENT INSTRUCTIONS
What to Expect After a Cystoscopy  For the next 24-48 hours, you may feel a mild burning when you urinate. This burning is normal and expected. Drink plenty of water to dilute the urine to help relieve the burning sensation. You may also see a small amount of blood in your urine after the procedure.    Unless you are already taking antibiotics, you may be given an antibiotic after the test to prevent infection.    Signs and Symptoms to Report  Call the [facility] at [phone] if you develop any of the following:  · Fever of 101 degrees or higher  · Chills or persistent bleeding  · Inability to urinate

## 2017-05-19 NOTE — IP AVS SNAPSHOT
[facility]  [address]  [place] [address]  Phone: [phone]  Fax: [phone]                  Amador Harrison   2017  1:30 PM   Cysto and Stent Removal    Description:  Male : 1955   Provider:  [facility]   Department:  [facility]           Visit Information     Date & Time Provider Department    2017  1:30 PM LIZETTE GARCIAY [facility]      Recent Lab Values        2017 3/20/2017 4/10/2017 2017                  2:24 PM  7:23 PM 11:57 AM  9:01 AM        Urine Culture - ENTEROCOCCUS FAECALIS  &gt; 100,000 cfu/ml   No significant growth ENTEROCOCCUS FAECALIS  50,000 - 99,999 cfu/ml          Color yellow - orange Orange (A)        Specific Gravity 1000 - 1.020 1.015        pH 8 - 6.0 6.0        Leukocytes neg - - -        Blood positive - - -        Nitrite neg - Negative SEE COMMENT        Ketones neg - Negative SEE COMMENT        Bilirubin neg - - -        Urobilinogen neg - Negative SEE COMMENT        Protein neg - - -        Glucose neg - - -        Comment for Nitrite at  9:01 AM on 2017:  Pharmaceutical reagent present which precludes ability to  accurately assess chemical analysis.      Comment for Ketones at  9:01 AM on 2017:  Pharmaceutical reagent present which precludes ability to  accurately assess chemical analysis.      Comment for Urobilinogen at  9:01 AM on 2017:  Pharmaceutical reagent present which precludes ability to  accurately assess chemical analysis.        Procedures Performed This Visit     Procedure Authorizing Provider    Cystoscopy with Stent Removal Larry Wilde MD      Reason for Visit     Other           Diagnoses this Visit        Comments    Malignant neoplasm of ureteric orifice         Ureteral obstruction, right                To Do List           Your Scheduled Appointments     May 19, 2017  1:30 PM CDT   Cysto and Stent Removal with  [facility]   [facility] ([facility])    [address] [facility] [address]   [phone]            May 22, 2017  9:40 AM CDT   Non-Fasting Lab with LAB, HEMONC CANCER BLDG   [facility] ([facility])    [address]  [place] [address]   [phone]            May 22, 2017 10:30 AM CDT   Established Patient Visit with MD Freeman Martinez - Hematology Oncology ([facility])    [address]  [place] [address]   [phone]            May 23, 2017  9:30 AM CDT   Infusion 360 Min with NOMVIMAL CHEMO   [facility] ([facility])    [address] [facility] [address]   [phone]            May 24, 2017  2:00 PM CDT   Infusion 060 Min with NOMVIMAL CHEMO   [facility] ([facility])    [address] [facility] [address]   [phone]              Goals (5 Years of Data)     None           Medications                ** Verify the list of medication(s) below is accurate and up to date. Carry this with you in case of emergency. If your medications have changed, please notify your healthcare provider.             Medication List      TAKE these medications        Additional Info                      amoxicillin 500 MG capsule   Commonly known as:  AMOXIL   Quantity:  30 capsule   Refills:  0   Dose:  500 mg    Instructions:  Take 1 capsule (500 mg total) by mouth every 8 (eight) hours.     Begin Date    AM    Noon    PM    Bedtime       aspirin 81 MG Chew   Refills:  0   Dose:  81 mg    Instructions:  Take 81 mg by mouth once daily.     Begin Date    AM    Noon    PM    Bedtime       docusate sodium 100 MG capsule   Commonly known as:  COLACE   Refills:  0   Dose:  100 mg    Instructions:  Take 1 capsule (100 mg total) by mouth 2 (two) times daily.     Begin Date    AM    Noon    PM     Bedtime       doxazosin 4 MG tablet   Commonly known as:  CARDURA   Quantity:  90 tablet   Refills:  3   Dose:  4 mg    Instructions:  Take 1 tablet (4 mg total) by mouth every evening.     Begin Date    AM    Noon    PM    Bedtime       glucosamine-chondroitin 500-400 mg tablet   Refills:  0   Dose:  1 tablet    Instructions:  Take 1 tablet by mouth 3 (three) times daily.     Begin Date    AM    Noon    PM    Bedtime       hydrocodone-acetaminophen 5-325mg 5-325 mg per tablet   Commonly known as:  NORCO   Quantity:  30 tablet   Refills:  0   Dose:  1 tablet    Instructions:  Take 1 tablet by mouth every 6 (six) hours as needed for Pain.     Begin Date    AM    Noon    PM    Bedtime       indapamide 1.25 MG Tab   Commonly known as:  LOZOL   Refills:  0   Dose:  2.5 mg    Instructions:  Take 2.5 mg by mouth once daily.     Begin Date    AM    Noon    PM    Bedtime       lidocaine-prilocaine cream   Commonly known as:  EMLA   Quantity:  5 g   Refills:  0    Instructions:  Apply to port 30-45 minutes prior to being accessed.     Begin Date    AM    Noon    PM    Bedtime       lubiprostone 24 MCG Cap   Commonly known as:  AMITIZA   Quantity:  60 capsule   Refills:  2   Dose:  24 mcg    Instructions:  Take 1 capsule (24 mcg total) by mouth 2 (two) times daily.     Begin Date    AM    Noon    PM    Bedtime       magnesium oxide 400 mg tablet   Commonly known as:  MAGOX   Quantity:  60 tablet   Refills:  1   Dose:  400 mg    Instructions:  Take 1 tablet (400 mg total) by mouth once daily.     Begin Date    AM    Noon    PM    Bedtime       metoprolol succinate 50 MG 24 hr tablet   Commonly known as:  TOPROL-XL   Refills:  0   Dose:  50 mg    Instructions:  Take 50 mg by mouth nightly.     Begin Date    AM    Noon    PM    Bedtime       ondansetron 8 MG Tbdl   Commonly known as:  ZOFRAN-ODT   Quantity:  30 tablet   Refills:  1   Dose:  8 mg    Instructions:  Take 1 tablet (8 mg total) by mouth every 12 (twelve) hours as  "needed.     Begin Date    AM    Noon    PM    Bedtime       ONE DAILY MULTIVITAMIN per tablet   Refills:  0   Dose:  1 tablet   Generic drug:  multivitamin    Instructions:  Take 1 tablet by mouth once daily.     Begin Date    AM    Noon    PM    Bedtime       oxybutynin 5 MG Tab   Commonly known as:  DITROPAN   Quantity:  90 tablet   Refills:  3   Dose:  5 mg    Instructions:  Take 1 tablet (5 mg total) by mouth 3 (three) times daily as needed.     Begin Date    AM    Noon    PM    Bedtime       phenazopyridine 100 MG tablet   Commonly known as:  PYRIDIUM   Quantity:  60 tablet   Refills:  1   Dose:  200 mg    Instructions:  Take 2 tablets (200 mg total) by mouth 3 (three) times daily as needed for Pain (Burning).     Begin Date    AM    Noon    PM    Bedtime       tamsulosin 0.4 mg Cp24   Commonly known as:  FLOMAX   Quantity:  30 capsule   Refills:  11   Dose:  0.4 mg    Instructions:  Take 1 capsule (0.4 mg total) by mouth once daily.     Begin Date    AM    Noon    PM    Bedtime       valsartan 160 MG tablet   Commonly known as:  DIOVAN   Refills:  0   Dose:  320 mg    Instructions:  Take 320 mg by mouth once daily.     Begin Date    AM    Noon    PM    Bedtime            Where to Get Your Medications      These medications were sent to [facility] [address] [place] [address] ([place], [place] - [address] [facility]  [address] ([place] [address]     Phone:  [phone]     amoxicillin 500 MG capsule               Your Vitals Were     BP Pulse Temp Resp Height Weight    119/65 70 98.2 °F (36.8 °C) 16 5' 6" (1.676 m) 75.6 kg (166 lb 10.7 oz)    BMI                26.9 kg/m2          Allergies as of 5/19/2017     No Known Allergies      Immunizations Administered on Date of Encounter - 5/19/2017     None      Orders Placed During Today's Visit      Normal Orders This Visit    Cystoscopy with Stent Removal       Instructions    What to Expect After a Cystoscopy  For the next 24-48 hours, you may feel a mild " burning when you urinate. This burning is normal and expected. Drink plenty of water to dilute the urine to help relieve the burning sensation. You may also see a small amount of blood in your urine after the procedure.    Unless you are already taking antibiotics, you may be given an antibiotic after the test to prevent infection.    Signs and Symptoms to Report  Call the [facility] at [phone] if you develop any of the following:  · Fever of 101 degrees or higher  · Chills or persistent bleeding  · Inability to urinate       Advance Directives     An advance directive is a document which, in the event you are no longer able to make decisions for yourself, tells your healthcare team what kind of treatment you do or do not want to receive, or who you would like to make those decisions for you.  If you do not currently have an advance directive, [facility] encourages you to create one.  For more information call:  [phone]), [phone] ([phone]),  or log on to [email or web address].        [facility]     [facility] Nurse Care Line - 24/7 Assistance  Unless otherwise directed by your provider, please contact [facility], our nurse care line that is available for 24/7 assistance.     Registered nurses in the [facility] provide: appointment scheduling, clinical advisement, health education, and other advisory services.  Call: [phone] (toll free)          Language Assistance Services     ATTENTION: Language assistance services are available, free of charge. Please call [phone].      ATENCIÓN: Si habla español, tiene a adan disposición servicios gratuitos de asistencia lingüística. Llame al [phone].     [facility] Ý: N?u b?n nói Ti?ng Vi?t, có các d?ch v? h? tr? ngôn ng? mi?n phí dành cho b?n. G?i s? [phone].         [facility] complies with applicable Federal civil rights laws and does not discriminate on the basis  of race, color, national origin, age, disability, or sex.

## 2017-05-26 NOTE — PROGRESS NOTES
"Subjective:       Patient ID: Amador Harrison is a 61 y.o. male.    Chief Complaint: Malignant neoplasm of trigone of urinary bladder    HPI   61 year old  male, patient of Dr. Austin, to clinic today for 2 week follow up, to review CT CAP, and cycle #3, day 1 of cis/gem for muscle-invasive bladder cancer. Cycle 1, day 15 held secondary to thrombocytopenia. Otherwise, patient is tolerating chemo fairly well. Since his last visit, he has hd stent removal with Dr. Wilde. He notes dysuria and bladder spasms. He has been taking anti-inflammatories with some relief.    He denies any mouth sores, nausea, vomiting, diarrhea, constipation, abdominal pain, weight loss or loss of appetite, chest pain, shortness of breath, leg swelling,headache, dizziness, or mood changes.    His ECOG PS is zero-one and quality of life so far is excellent.He is accompanied to clinic with his wife. He has a remote smoking history.     Oncologic History (From Chart):  61 year old  male, referred by Dr. Wilde, to clinic today for evaluation and management of newly diagnosed muscle-invasive bladder cancer. The patient presented with gross hematuria and was found to have a bladder tumor which was resected on 3/3/2017.  The patient had a CT urogram on 1/31/2017 that did not show any locally advanced or distant metastatic abdominal disease. Pathology from that TURBT revealed high-grade T2 bladder cancer with negative margins. Pathology revealed "WITH AREAS OF SQUAMOUS CELL CARCINOMA DIFFERENTIATION. EXTENSIVE INVOLVEMENT OF THE MUSCULARIS PROPRIA (DETRUSSOR MUSCLE) IS PRESENT."  3/17/17- CT CAP reveals "Injuries patient with a reported history of bladder cancer, the following findings are identified: Persistent though decreased asymmetric thickening of the right posterolateral aspect of the bladder wall.Right-sided double J stent in appropriate position.Subcentimeter hepatic segment III hypodensity, too small to accurately " "characterized but favored to be benign."  5/19/17-CT CAP reveals "Increased asymmetric right posterior wall thickening of the urinary bladder. Although bladder was incompletely distended on this examination, this finding is nonspecific, but could represent sequela of infection, or recurrence of prior neoplasm. Clinical correlation and further evaluation is recommended.Right double-J ureteral stent in appropriate position with adjacent soft tissue stranding about the distal course of the stent. Other incidental findings including stable hypodensity in hepatic segment III small to characterize, mild degenerative changes with vacuum disc phenomenon scattered throughout the thoracolumbar spine. Left nephrolith, aortic atherosclerosis, and stable right-sided portacatheterAlthough the patient has a history of malignancy, no measurable lesions per the RECIST criteria are identified. "    Review of Systems   Constitutional: Positive for fatigue. Negative for activity change, appetite change, fever and unexpected weight change.   HENT: Positive for tinnitus (mild to left ear). Negative for mouth sores, nosebleeds and trouble swallowing.    Eyes: Negative for discharge and visual disturbance.   Respiratory: Negative for cough, shortness of breath and wheezing.    Cardiovascular: Negative for chest pain and leg swelling.   Gastrointestinal: Negative for abdominal distention, abdominal pain, blood in stool, constipation, diarrhea, nausea and vomiting.   Genitourinary: Positive for dysuria. Negative for decreased urine volume and frequency.   Musculoskeletal: Negative for back pain.   Skin: Negative for color change and rash.   Neurological: Negative for weakness and headaches.   Hematological: Negative for adenopathy.   Psychiatric/Behavioral: Negative for sleep disturbance. The patient is not nervous/anxious.    All other systems reviewed and are negative.      Allergies:  Review of patient's allergies indicates:  No Known " Allergies    Medications:  Current Outpatient Prescriptions   Medication Sig Dispense Refill    amoxicillin (AMOXIL) 500 MG capsule Take 1 capsule (500 mg total) by mouth every 8 (eight) hours. 30 capsule 0    aspirin 81 MG Chew Take 81 mg by mouth once daily.      docusate sodium (COLACE) 100 MG capsule Take 1 capsule (100 mg total) by mouth 2 (two) times daily.  0    doxazosin (CARDURA) 4 MG tablet Take 1 tablet (4 mg total) by mouth every evening. (Patient taking differently: Take 4 mg by mouth once daily. ) 90 tablet 3    etodolac (LODINE) 400 MG tablet Take 400 mg by mouth once daily.      glucosamine-chondroitin 500-400 mg tablet Take 1 tablet by mouth 3 (three) times daily.      hydrocodone-acetaminophen 5-325mg (NORCO) 5-325 mg per tablet Take 1 tablet by mouth every 6 (six) hours as needed for Pain. 30 tablet 0    indapamide (LOZOL) 1.25 MG Tab Take 2.5 mg by mouth once daily.      indapamide (LOZOL) 2.5 MG Tab       lactulose (CHRONULAC) 10 gram/15 mL solution       lactulose (CHRONULAC) 20 gram/30 mL Soln Take 30 mLs (20 g total) by mouth 2 (two) times daily. 600 mL 0    lidocaine-prilocaine (EMLA) cream Apply to port 30-45 minutes prior to being accessed. 5 g 0    lubiprostone (AMITIZA) 24 MCG Cap Take 1 capsule (24 mcg total) by mouth 2 (two) times daily. 60 capsule 2    magnesium oxide (MAGOX) 400 mg tablet Take 1 tablet (400 mg total) by mouth once daily. 60 tablet 1    metoprolol succinate (TOPROL-XL) 100 MG 24 hr tablet       metoprolol succinate (TOPROL-XL) 50 MG 24 hr tablet Take 50 mg by mouth nightly.       multivitamin (ONE DAILY MULTIVITAMIN) per tablet Take 1 tablet by mouth once daily.      ondansetron (ZOFRAN-ODT) 8 MG TbDL Take 1 tablet (8 mg total) by mouth every 12 (twelve) hours as needed. 30 tablet 1    oxybutynin (DITROPAN) 5 MG Tab       oxybutynin (DITROPAN) 5 MG Tab       phenazopyridine (PYRIDIUM) 100 MG tablet Take 2 tablets (200 mg total) by mouth 3 (three)  times daily as needed for Pain (Burning). 60 tablet 1    tamsulosin (FLOMAX) 0.4 mg Cp24       valsartan (DIOVAN) 160 MG tablet Take 320 mg by mouth once daily.        No current facility-administered medications for this visit.        PMH:  Past Medical History:   Diagnosis Date    Cancer     Hypertension     Hypertrophy of prostate without urinary obstruction and other lower urinary tract symptoms (LUTS)     Nocturia     Nocturia        PSH:  Past Surgical History:   Procedure Laterality Date    CARPAL TUNNEL RELEASE      Left hand    CYSTOSCOPY      HERNIA REPAIR Bilateral     x 2 separate surgeries. 1 as an infant and another at approx age 18    hydrocele      at age 3    TONSILLECTOMY      turbt 2017      VASECTOMY         FamHx:  No family history on file.    SocHx:  Social History     Social History    Marital status:      Spouse name: N/A    Number of children: N/A    Years of education: N/A     Occupational History    Not on file.     Social History Main Topics    Smoking status: Former Smoker     Quit date: 2/27/1994    Smokeless tobacco: Never Used    Alcohol use Yes      Comment: 2 beers or wine daily    Drug use: No    Sexual activity: Yes     Other Topics Concern    Not on file     Social History Narrative    No narrative on file     Objective:      Physical Exam   Constitutional: He is oriented to person, place, and time. He appears well-developed and well-nourished.   HENT:   Head: Normocephalic and atraumatic.   Mouth/Throat: Uvula is midline and oropharynx is clear and moist. No oropharyngeal exudate.   Eyes: Conjunctivae and EOM are normal. Pupils are equal, round, and reactive to light.   Neck: Trachea normal and normal range of motion. Neck supple. No tracheal deviation present.   Cardiovascular: Normal rate, regular rhythm, normal heart sounds and normal pulses.    No swelling noted to bilateral lower extremities.   Pulmonary/Chest: Effort normal.   Abdominal:  Normal appearance.   Musculoskeletal: He exhibits no edema.   Lymphadenopathy:     He has no cervical adenopathy.   Neurological: He is alert and oriented to person, place, and time.   Skin: Skin is warm, dry and intact. No rash noted. He is not diaphoretic.   Psychiatric: He has a normal mood and affect. His speech is normal and behavior is normal.   Nursing note and vitals reviewed.      LABS:  WBC   Date Value Ref Range Status   05/29/2017 10.77 3.90 - 12.70 K/uL Final     Hemoglobin   Date Value Ref Range Status   05/29/2017 9.9 (L) 14.0 - 18.0 g/dL Final     Hematocrit   Date Value Ref Range Status   05/29/2017 29.8 (L) 40.0 - 54.0 % Final     Platelets   Date Value Ref Range Status   05/29/2017 157 150 - 350 K/uL Final       Chemistry        Component Value Date/Time     05/29/2017 1319    K 3.6 05/29/2017 1319    CL 98 05/29/2017 1319    CO2 29 05/29/2017 1319    BUN 27 (H) 05/29/2017 1319    CREATININE 1.9 (H) 05/29/2017 1319     05/29/2017 1319        Component Value Date/Time    CALCIUM 9.2 05/29/2017 1319    ALKPHOS 59 05/29/2017 1319    AST 17 05/29/2017 1319    ALT 16 05/29/2017 1319    BILITOT 0.6 05/29/2017 1319          Assessment:       1. Malignant neoplasm of trigone of urinary bladder    2. Encounter for chemotherapy management    3. Chemotherapy-induced thrombocytopenia    4. Hydronephrosis, right    5. LOIDA (acute kidney injury)    6. Anemia in neoplastic disease    7. Neoplasm related pain (acute) (chronic)    8. Dysuria        Plan:       1,2,3,4,5- Muscle-invasive bladder cancer:  - Dr. Austin personally met and discussed pt's case with Dr. Wilde. I personally reviewed his images, pathology, and previous records.  - Long conversation with patient regarding regarding treatment options including neoadjuvant chemotherapy in bladder cancer, as well as the natural history of disease, side effects of treatment, therapy schedules, etc.   - Pt agrees to move forward with neoadjuvant  gemzar-cisplatin and will split the dose of cisplatin between days 1 and 8. Receive fluid support on days 2 and 9. Pt will receive gemzar on days 1,8, and 15.   - Plan is for 4 total cycles of neoadjuvant cisplatin/gemzar chemotherapy followed by consolidative surgery with Dr. Wilde.    Restaging scans stable. Due to anemia and thrombocytopenia, will switch to every 2 week dosing. Pt had stent removal on 5/19 and has been taking NSAIDs for pain. Due to elevated serum creatinine, will hold chemo scheduled for tomorrow and give IVFs tomorrow and Wednesday. RTC on 6/1/17 with labs (CBC,CMP, Mag) and and cycle 3, day 1 cisplatin/gemzar 6/2/17 if renal function improved.     6- Mild, will monitor.    7,8- Start Percocet PRN.      Patient was also seen and examined by Dr. Austin. Patient is in agreement with the proposed treatment plan. All questions were answered to the patient's satisfaction. Pt knows to call clinic if anything is needed before the next clinic visit.    CODY Norwood-JOHNNY  Hematology and Medical Oncology            I have reviewed the notes, assessments, and/or procedures performed by the nurse practitioner, as above.  I have personally interviewed the patient at the beside, and rounded with the nurse practitioner. I formulated the plan of care.  I concur with her documentation of Amador Harrison.  More than 25 mins were spent during this encounter, greater than 50% was spent in direct counseling and/or coordination of care.     Antwon Austin M.D., M.S., F.A.C.P.  Hematology/Oncology Attending  [facility]

## 2017-05-29 PROBLEM — N17.9 AKI (ACUTE KIDNEY INJURY): Status: ACTIVE | Noted: 2017-01-01

## 2017-05-29 NOTE — Clinical Note
Hold chemo scheduled for tomorrow and give IVFs tomorrow and Wednesday only.   Schedule labs on 6/1/17 with labs (CBC,CMP, Mag) and and cycle 3, day 1 cisplatin/gemzar 6/2/17 if renal function improved.

## 2017-05-30 NOTE — PLAN OF CARE
Problem: Fluid Volume Deficit (Adult)  Intervention: Monitor/Manage Hypovolemia  1 L of ivf's given over 1 hour

## 2017-05-30 NOTE — PLAN OF CARE
Problem: Patient Care Overview (Adult)  Goal: Plan of Care Review  Outcome: Ongoing (interventions implemented as appropriate)  Patient tolerated 1 L of ivfs. Plan to rtc tomorrow for 1 L of ivfs. Port flushed, heparin locked, and deaccessed per patient's request. Verbalized understanding to call MD for any questions/concerns. Encouraged increased fluid intake. AVS given. Discharged home; ambulated independently with wife by side.

## 2017-06-01 NOTE — PLAN OF CARE
Problem: Patient Care Overview (Adult)  Goal: Plan of Care Review  Outcome: Ongoing (interventions implemented as appropriate)  Patient tolerated hydration well. Patient given AVS,instructed to call MD with any problems or concerns.

## 2017-06-01 NOTE — TELEPHONE ENCOUNTER
----- Message from Marion Flores NP sent at 6/1/2017 10:10 AM CDT -----  Regarding: RE: Chemo appts  I did send him a message.  ----- Message -----  From: Payal Ruiz RN  Sent: 6/1/2017  10:03 AM  To: Marion Flores NP  Subject: RE: Chemo appts                                  Did you send him a message in MY [facility]? i sent you one this am, he was expecting to hear form you  Payal  ----- Message -----  From: Marion Flores NP  Sent: 6/1/2017   9:45 AM  To: Payal Ruiz RN, Chaka Sullivan  Subject: Chemo appts                                      Please call patient and cancel chemo appts scheduled for this week. Creatinine is still elevated at 1.9.     RTC with labs (CBC,CMP,Mag) and to see me on either 6/5 or 6/6 with chemo the next day.

## 2017-06-01 NOTE — TELEPHONE ENCOUNTER
Called patient and reviewed labs , Per Marion Flores NP.  Reviewed upcoming appointments and he was agreeable to dates and times.

## 2017-06-01 NOTE — PLAN OF CARE
Problem: Patient Care Overview (Adult)  Goal: Interdisciplinary Rounds/Family Conf  Outcome: Ongoing (interventions implemented as appropriate)  Patient present with wife,no s/s distress. Pending hydration/IV fluids infusion.

## 2017-06-05 NOTE — TELEPHONE ENCOUNTER
----- Message from Larry Wilde MD sent at 6/5/2017  1:36 PM CDT -----  Regarding: RE: Creatinine   Let's check an ultrasound today or tomorrow, and if it shows hydronephrosis, then I will replace the stent ASAP.  Yohana Madrigal  ----- Message -----  From: Marion Flores NP  Sent: 6/5/2017   1:17 PM  To: Antwon Austin MD, Larry Wilde MD  Subject: Creatinine                                       Dr. Wilde,      We attempted to begin cycle 3 of cis/gem last week; however, his creatinine was elevated at 1.9. His baseline is around 1. Despite IV and oral hydration, his creatinine is not trending down where we can give cisplatin safely. Today, he remains at 1.9. He had a stent removal on 5/19 with you. Dr. Autsin asked that I reach out to you if his creatinine had no improved by today. Do you think replacing the stent would help?    Thanks,  Marion

## 2017-06-05 NOTE — PROGRESS NOTES
"Subjective:       Patient ID: Amador Harrison is a 61 y.o. male.    Chief Complaint: Bladder Cancer    HPI   61 year old  male, patient of Dr. Austin, to clinic today for 1 week follow up, to review labs, and cycle #3, day 1 of cis/gem for muscle-invasive bladder cancer. Patient has been tolerating chemo fairly well. Chemotherapy held last week due to creatinine of 1.9. He received 2 liters of normal saline over 2 days and has increased oral fluid intake to 8 bottles of water/day. Pt continues to note bladder spasms and now passing small blood clots in his urine. He had a stent removal on 5/19/17 with Dr. Wilde.    He denies any mouth sores, nausea, vomiting, diarrhea, constipation, abdominal pain, weight loss or loss of appetite, chest pain, shortness of breath, leg swelling,headache, dizziness, or mood changes.    His ECOG PS is zero-one and quality of life so far is excellent.He is accompanied to clinic with his wife. He has a remote smoking history.     Oncologic History (From Chart):  61 year old  male, referred by Dr. Wilde, to clinic today for evaluation and management of newly diagnosed muscle-invasive bladder cancer. The patient presented with gross hematuria and was found to have a bladder tumor which was resected on 3/3/2017.  The patient had a CT urogram on 1/31/2017 that did not show any locally advanced or distant metastatic abdominal disease. Pathology from that TURBT revealed high-grade T2 bladder cancer with negative margins. Pathology revealed "WITH AREAS OF SQUAMOUS CELL CARCINOMA DIFFERENTIATION. EXTENSIVE INVOLVEMENT OF THE MUSCULARIS PROPRIA (DETRUSSOR MUSCLE) IS PRESENT."  3/17/17- CT CAP reveals "Injuries patient with a reported history of bladder cancer, the following findings are identified: Persistent though decreased asymmetric thickening of the right posterolateral aspect of the bladder wall.Right-sided double J stent in appropriate position.Subcentimeter hepatic " "segment III hypodensity, too small to accurately characterized but favored to be benign."  5/19/17-CT CAP reveals "Increased asymmetric right posterior wall thickening of the urinary bladder. Although bladder was incompletely distended on this examination, this finding is nonspecific, but could represent sequela of infection, or recurrence of prior neoplasm. Clinical correlation and further evaluation is recommended.Right double-J ureteral stent in appropriate position with adjacent soft tissue stranding about the distal course of the stent. Other incidental findings including stable hypodensity in hepatic segment III small to characterize, mild degenerative changes with vacuum disc phenomenon scattered throughout the thoracolumbar spine. Left nephrolith, aortic atherosclerosis, and stable right-sided portacatheterAlthough the patient has a history of malignancy, no measurable lesions per the RECIST criteria are identified. "    Review of Systems   Constitutional: Positive for fatigue. Negative for activity change, appetite change, fever and unexpected weight change.   HENT: Positive for tinnitus (mild to left ear). Negative for mouth sores, nosebleeds and trouble swallowing.    Eyes: Negative for discharge and visual disturbance.   Respiratory: Negative for cough, shortness of breath and wheezing.    Cardiovascular: Negative for chest pain and leg swelling.   Gastrointestinal: Negative for abdominal distention, abdominal pain, blood in stool, constipation, diarrhea, nausea and vomiting.   Genitourinary: Positive for dysuria. Negative for decreased urine volume and frequency.        +blood clots in urine   Musculoskeletal: Negative for back pain.   Skin: Negative for color change and rash.   Neurological: Negative for weakness and headaches.   Hematological: Negative for adenopathy.   Psychiatric/Behavioral: Negative for sleep disturbance. The patient is not nervous/anxious.    All other systems reviewed and are " negative.      Allergies:  Review of patient's allergies indicates:  No Known Allergies    Medications:  Current Outpatient Prescriptions   Medication Sig Dispense Refill    aspirin 81 MG Chew Take 81 mg by mouth once daily.      docusate sodium (COLACE) 100 MG capsule Take 1 capsule (100 mg total) by mouth 2 (two) times daily.  0    doxazosin (CARDURA) 4 MG tablet Take 1 tablet (4 mg total) by mouth every evening. (Patient taking differently: Take 4 mg by mouth once daily. ) 90 tablet 3    etodolac (LODINE) 400 MG tablet Take 400 mg by mouth once daily.      glucosamine-chondroitin 500-400 mg tablet Take 1 tablet by mouth 3 (three) times daily.      hydrocodone-acetaminophen 5-325mg (NORCO) 5-325 mg per tablet Take 1 tablet by mouth every 6 (six) hours as needed for Pain. 30 tablet 0    indapamide (LOZOL) 1.25 MG Tab Take 2.5 mg by mouth once daily.      indapamide (LOZOL) 2.5 MG Tab       lactulose (CHRONULAC) 10 gram/15 mL solution       lidocaine-prilocaine (EMLA) cream Apply to port 30-45 minutes prior to being accessed. 5 g 0    lubiprostone (AMITIZA) 24 MCG Cap Take 1 capsule (24 mcg total) by mouth 2 (two) times daily. 60 capsule 2    magnesium oxide (MAGOX) 400 mg tablet Take 1 tablet (400 mg total) by mouth once daily. 60 tablet 1    metoprolol succinate (TOPROL-XL) 100 MG 24 hr tablet       metoprolol succinate (TOPROL-XL) 50 MG 24 hr tablet Take 50 mg by mouth nightly.       multivitamin (ONE DAILY MULTIVITAMIN) per tablet Take 1 tablet by mouth once daily.      ondansetron (ZOFRAN-ODT) 8 MG TbDL Take 1 tablet (8 mg total) by mouth every 12 (twelve) hours as needed. 30 tablet 1    oxybutynin (DITROPAN) 5 MG Tab       oxybutynin (DITROPAN) 5 MG Tab       oxycodone-acetaminophen (PERCOCET) 5-325 mg per tablet Take 1 tablet by mouth every 6 (six) hours as needed for Pain. 45 tablet 0    phenazopyridine (PYRIDIUM) 100 MG tablet Take 2 tablets (200 mg total) by mouth 3 (three) times daily  as needed for Pain (Burning). 60 tablet 1    tamsulosin (FLOMAX) 0.4 mg Cp24       valsartan (DIOVAN) 160 MG tablet Take 320 mg by mouth once daily.        No current facility-administered medications for this visit.        PMH:  Past Medical History:   Diagnosis Date    Cancer     Hypertension     Hypertrophy of prostate without urinary obstruction and other lower urinary tract symptoms (LUTS)     Nocturia     Nocturia        PSH:  Past Surgical History:   Procedure Laterality Date    CARPAL TUNNEL RELEASE      Left hand    CYSTOSCOPY      HERNIA REPAIR Bilateral     x 2 separate surgeries. 1 as an infant and another at approx age 18    hydrocele      at age 3    TONSILLECTOMY      turbt 2017      VASECTOMY         FamHx:  No family history on file.    SocHx:  Social History     Social History    Marital status:      Spouse name: N/A    Number of children: N/A    Years of education: N/A     Occupational History    Not on file.     Social History Main Topics    Smoking status: Former Smoker     Quit date: 2/27/1994    Smokeless tobacco: Never Used    Alcohol use Yes      Comment: 2 beers or wine daily    Drug use: No    Sexual activity: Yes     Other Topics Concern    Not on file     Social History Narrative    No narrative on file     Objective:      Physical Exam   Constitutional: He is oriented to person, place, and time. He appears well-developed and well-nourished.   HENT:   Head: Normocephalic and atraumatic.   Mouth/Throat: Uvula is midline and oropharynx is clear and moist. No oropharyngeal exudate.   Eyes: Conjunctivae and EOM are normal. Pupils are equal, round, and reactive to light.   Neck: Trachea normal and normal range of motion. Neck supple. No tracheal deviation present.   Cardiovascular: Normal rate, regular rhythm, normal heart sounds and normal pulses.    No swelling noted to bilateral lower extremities.   Pulmonary/Chest: Effort normal.   Abdominal: Normal  appearance.   Musculoskeletal: He exhibits no edema.   Lymphadenopathy:     He has no cervical adenopathy.   Neurological: He is alert and oriented to person, place, and time.   Skin: Skin is warm, dry and intact. No rash noted. He is not diaphoretic.   Psychiatric: He has a normal mood and affect. His speech is normal and behavior is normal.   Nursing note and vitals reviewed.      LABS:  WBC   Date Value Ref Range Status   06/05/2017 10.08 3.90 - 12.70 K/uL Final     Hemoglobin   Date Value Ref Range Status   06/05/2017 10.2 (L) 14.0 - 18.0 g/dL Final     Hematocrit   Date Value Ref Range Status   06/05/2017 30.0 (L) 40.0 - 54.0 % Final     Platelets   Date Value Ref Range Status   06/05/2017 118 (L) 150 - 350 K/uL Final       Chemistry        Component Value Date/Time     (L) 06/05/2017 1230    K 3.5 06/05/2017 1230    CL 96 06/05/2017 1230    CO2 27 06/05/2017 1230    BUN 24 (H) 06/05/2017 1230    CREATININE 1.9 (H) 06/05/2017 1230     06/05/2017 1230        Component Value Date/Time    CALCIUM 9.6 06/05/2017 1230    ALKPHOS 67 06/05/2017 1230    AST 14 06/05/2017 1230    ALT 10 06/05/2017 1230    BILITOT 0.4 06/05/2017 1230          Assessment:       1. Malignant neoplasm of trigone of urinary bladder    2. Encounter for chemotherapy management    3. Chemotherapy-induced thrombocytopenia    4. Hydronephrosis, right    5. LOIDA (acute kidney injury)    6. Anemia in neoplastic disease    7. Neoplasm related pain (acute) (chronic)    8. Dysuria        Plan:       1,2,3,4,5- Muscle-invasive bladder cancer:  - Dr. Austin personally met and discussed pt's case with Dr. Wilde. I personally reviewed his images, pathology, and previous records.  - Long conversation with patient regarding regarding treatment options including neoadjuvant chemotherapy in bladder cancer, as well as the natural history of disease, side effects of treatment, therapy schedules, etc.   - Pt agrees to move forward with neoadjuvant  gemzar-cisplatin and will split the dose of cisplatin between days 1 and 8. Receive fluid support on days 2 and 9. Pt will receive gemzar on days 1,8, and 15.   - Plan is for 4 total cycles of neoadjuvant cisplatin/gemzar chemotherapy followed by consolidative surgery with Dr. Wilde.    Restaging scans stable. Due to anemia and thrombocytopenia, will switch to every 2 week dosing. There has been no improvement in serum creatinine with IV and oral hydration s/p stent removal on 5/19. Cancel chemotherapy scheduled for tomorrow. Discussed with Dr. Wilde. Will order renal ultrasound. If it shows hydronephrosis, Dr. Wilde will replace the stent.     RTC with labs (CBC,CMP, Mag) to begin cycle 3, day 1 cisplatin/gemzar pending renal ultrasound results and if stent replacement is needed.     6- Mild, will monitor.    7,8- Continue Percocet PRN as creatinine clearance is not high enough to use pyridium. Will order UA today to evaluate for infectious process.      Patient is in agreement with the proposed treatment plan. All questions were answered to the patient's satisfaction. Pt knows to call clinic if anything is needed before the next clinic visit.    BAILEY Norwood  Hematology and Medical Oncology

## 2017-06-06 NOTE — TELEPHONE ENCOUNTER
Called pt and after several attempts left a message that someone would call him tomorrow with the time for his procedure and for him to not eat or drink after midnight. I also left my direct phone number for him to call me back to confirm this.

## 2017-06-06 NOTE — TELEPHONE ENCOUNTER
----- Message from Larry Mayorga MD sent at 6/6/2017 10:44 AM CDT -----  Contact: Radiology  Case request placed.  June, please call him and let him know someone will call him tomorrow afternoon for procedure time. Also, please remind him that he needs to be NPO after midnight.  Thanks all,    Rohan  ----- Message -----  From: Payal Ruiz RN  Sent: 6/6/2017  10:18 AM  To: Marion Flores NP, Shireen Carter RN, #    Dr mayorga,  Radiology called and Mr Harrison had an ultrasound today and it showed hydronephrosis on the right side, per you Informed patient your staff would be arranging a stent replacement for Thursday .   Thank you for your help!  Payal

## 2017-06-06 NOTE — TELEPHONE ENCOUNTER
----- Message from Marion Flores NP sent at 6/6/2017 11:18 AM CDT -----  Contact: Radiology  Schedule pt to RTC on 6/13 with labs (CBC,CMP,Mag), to see me and cycle 3, day 1 cisplatin/gemzar with IVFs on day 2. Thanks!    ----- Message -----  From: Larry Mayorga MD  Sent: 6/6/2017  10:44 AM  To: Payal Ruiz RN, Marion Flores NP, #    Case request placed.  Shireen, please call him and let him know someone will call him tomorrow afternoon for procedure time. Also, please remind him that he needs to be NPO after midnight.  Thanks all,    Rohan  ----- Message -----  From: Payal Ruiz RN  Sent: 6/6/2017  10:18 AM  To: Marion Flores NP, Shireen Carter RN, #    Dr mayorga,  Radiology called and Mr Harrison had an ultrasound today and it showed hydronephrosis on the right side, per you Informed patient your staff would be arranging a stent replacement for Thursday .   Thank you for your help!  Payal

## 2017-06-06 NOTE — TELEPHONE ENCOUNTER
Attempted to call patient, phone was going straight to voicemail. Called patient's wife, Joselin. Informed her of ultrasound finding from today and need for stent placement. Per Dr. Wilde, will plan for 6/8/17. Instructed that he needs to be NPO after midnight on Wednesday. Informed her that someone will call tomorrow with exact time. Will plan to have patient RTC on 6/13 with labs, to see me, and resume chemo. Understanding verbalized.

## 2017-06-08 PROBLEM — N13.30 HYDRONEPHROSIS OF RIGHT KIDNEY: Status: ACTIVE | Noted: 2017-01-01

## 2017-06-08 NOTE — TRANSFER OF CARE
"Anesthesia Transfer of Care Note    Patient: Amador Harrison    Procedure(s) Performed: Procedure(s) (LRB):  CYSTOSCOPY (Right)  TUMOR-BLADDER-TRANSURETHRAL RESECTION (N/A)    Patient location: [facility]    Anesthesia Type: general    Transport from OR: Transported from OR on 6-10 L/min O2 by face mask with adequate spontaneous ventilation    Post pain: adequate analgesia    Post assessment: no apparent anesthetic complications    Post vital signs: stable    Level of consciousness: sedated    Nausea/Vomiting: no nausea/vomiting    Complications: none          Last vitals:   Visit Vitals  BP (!) 147/83 (BP Location: Left arm, Patient Position: Lying, BP Method: Automatic)   Pulse 93   Temp 37.2 °C (99 °F) (Skin)   Resp 16   Ht 5' 6" (1.676 m)   Wt 73 kg (161 lb)   SpO2 100%   BMI 25.99 kg/m²     "

## 2017-06-08 NOTE — H&P
Urology ([facility]) H&P  Staff: Larry Wilde MD    Is this patient in a research study?  No    CC: right hydronephrosis     HPI:  Amador Harrison is a 61 y.o. male with bladder tumor s/p TURBT and right JJ ureteral stent insertion in March 2017.  Pathology returned high grade papillary muscle invasive bladder cancer.  He recently had his right JJ ureteral stent removed, and subsequently developed right hydronephrosis and LOIDA.     ROS:  Neg except per HPI    Past Medical History:   Diagnosis Date    Cancer     Hypertension     Hypertrophy of prostate without urinary obstruction and other lower urinary tract symptoms (LUTS)     Nocturia     Nocturia        Past Surgical History:   Procedure Laterality Date    CARPAL TUNNEL RELEASE      Left hand    CYSTOSCOPY      CYSTOSCOPY W/ URETERAL STENT PLACEMENT      HERNIA REPAIR Bilateral     x 2 separate surgeries. 1 as an infant and another at approx age 18    hydrocele      at age 3    TONSILLECTOMY      turbt 2017      VASECTOMY         Social History     Social History    Marital status:      Spouse name: N/A    Number of children: N/A    Years of education: N/A     Social History Main Topics    Smoking status: Former Smoker     Quit date: 2/27/1994    Smokeless tobacco: Never Used    Alcohol use Yes      Comment: 2 beers or wine daily    Drug use: No    Sexual activity: Yes     Other Topics Concern    None     Social History Narrative    None       History reviewed. No pertinent family history.    Review of patient's allergies indicates:  No Known Allergies    No current facility-administered medications on file prior to encounter.      Current Outpatient Prescriptions on File Prior to Encounter   Medication Sig Dispense Refill    docusate sodium (COLACE) 100 MG capsule Take 1 capsule (100 mg total) by mouth 2 (two) times daily.  0    doxazosin (CARDURA) 4 MG tablet Take 1 tablet (4 mg total) by mouth every evening. (Patient  taking differently: Take 4 mg by mouth once daily. ) 90 tablet 3    etodolac (LODINE) 400 MG tablet Take 400 mg by mouth once daily.      glucosamine-chondroitin 500-400 mg tablet Take 1 tablet by mouth 3 (three) times daily.      indapamide (LOZOL) 1.25 MG Tab Take 2.5 mg by mouth once daily.      indapamide (LOZOL) 2.5 MG Tab       lactulose (CHRONULAC) 10 gram/15 mL solution       magnesium oxide (MAGOX) 400 mg tablet Take 1 tablet (400 mg total) by mouth once daily. 60 tablet 1    metoprolol succinate (TOPROL-XL) 100 MG 24 hr tablet       metoprolol succinate (TOPROL-XL) 50 MG 24 hr tablet Take 50 mg by mouth nightly.       multivitamin (ONE DAILY MULTIVITAMIN) per tablet Take 1 tablet by mouth once daily.      oxybutynin (DITROPAN) 5 MG Tab       oxybutynin (DITROPAN) 5 MG Tab       oxycodone-acetaminophen (PERCOCET) 5-325 mg per tablet Take 1 tablet by mouth every 6 (six) hours as needed for Pain. 45 tablet 0    phenazopyridine (PYRIDIUM) 100 MG tablet Take 2 tablets (200 mg total) by mouth 3 (three) times daily as needed for Pain (Burning). 60 tablet 1    tamsulosin (FLOMAX) 0.4 mg Cp24       valsartan (DIOVAN) 160 MG tablet Take 320 mg by mouth once daily.       aspirin 81 MG Chew Take 81 mg by mouth once daily.      hydrocodone-acetaminophen 5-325mg (NORCO) 5-325 mg per tablet Take 1 tablet by mouth every 6 (six) hours as needed for Pain. 30 tablet 0    lidocaine-prilocaine (EMLA) cream Apply to port 30-45 minutes prior to being accessed. 5 g 0    lubiprostone (AMITIZA) 24 MCG Cap Take 1 capsule (24 mcg total) by mouth 2 (two) times daily. 60 capsule 2    ondansetron (ZOFRAN-ODT) 8 MG TbDL Take 1 tablet (8 mg total) by mouth every 12 (twelve) hours as needed. 30 tablet 1       Anticoagulation:  No    Physical Exam:  weight 161 lb/73 kg      AAOx4, NAD, WDWN  NC/AT, EOMI, PER, sclerae anicteric, speech normal, tongue midline  Nl effort, respirations unlabored  RRR  Soft, non-tender,  non-distended      Labs:    Urine dipstick today - negative for all components       Lab Results   Component Value Date    WBC 10.08 06/05/2017    HGB 10.2 (L) 06/05/2017    HCT 30.0 (L) 06/05/2017    MCV 91 06/05/2017     (L) 06/05/2017       BMP  Lab Results   Component Value Date     (L) 06/05/2017    K 3.5 06/05/2017    CL 96 06/05/2017    CO2 27 06/05/2017    BUN 24 (H) 06/05/2017    CREATININE 1.9 (H) 06/05/2017    CALCIUM 9.6 06/05/2017    ANIONGAP 11 06/05/2017    ESTGFRAFRICA 42.4 (A) 06/05/2017    EGFRNONAA 36.7 (A) 06/05/2017       No results found for: PSA    Imaging:  Retroperitoneal US:  Right hydronephrosis     Assessment: Amador Harrison is a 61 y.o. male with muscle invasive bladder cancer s/p TURBT with right hydronephrosis and LOIDA    Plan:     1. To OR today for cystoscopy with right JJ ureteral stent insertion  2. Consents signed   3. I have explained the risk, benefits, and alternatives of the procedure in detail. The patient voices understanding and all questions have been answered. The patient agrees to proceed as planned.     Irvin Ashley MD

## 2017-06-08 NOTE — ANESTHESIA POSTPROCEDURE EVALUATION
"Anesthesia Post Evaluation    Patient: Amador Harrison    Procedure(s) Performed: Procedure(s) (LRB):  CYSTOSCOPY (Right)  TUMOR-BLADDER-TRANSURETHRAL RESECTION (N/A)    Final Anesthesia Type: general  Patient location during evaluation: PACU  Patient participation: Yes- Able to Participate  Level of consciousness: awake and alert  Post-procedure vital signs: reviewed and stable  Pain management: adequate  Airway patency: patent  PONV status at discharge: No PONV  Anesthetic complications: no      Cardiovascular status: blood pressure returned to baseline and stable  Respiratory status: unassisted and nasal cannula  Hydration status: euvolemic  Follow-up not needed.        Visit Vitals  BP (!) 147/73 (BP Location: Left arm, Patient Position: Lying, BP Method: Automatic)   Pulse 99   Temp 36.7 °C (98.1 °F) (Skin)   Resp 12   Ht 5' 6" (1.676 m)   Wt 73 kg (161 lb)   SpO2 99%   BMI 25.99 kg/m²       Pain/Katalina Score: Pain Assessment Performed: Yes (6/8/2017  9:40 AM)  Presence of Pain: non-verbal indicators absent (6/8/2017  9:40 AM)  Katalina Score: 3 (6/8/2017  9:40 AM)      "

## 2017-06-08 NOTE — DISCHARGE SUMMARY
[facility]  Discharge Note  Short Stay    Admit Date: 6/8/2017    Discharge Date and Time: 6/8/2017       Attending Physician: Larry Wilde MD     Discharge Provider: Irvin Ashley MD    Diagnoses:  Active Hospital Problems    Diagnosis  POA    *Hydronephrosis of right kidney [N13.30]  Yes    LOIDA (acute kidney injury) [N17.9]  Yes    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]  Yes    Gross hematuria [R31.0]  Yes    Hydronephrosis, right [N13.30]  Yes      Resolved Hospital Problems    Diagnosis Date Resolved POA   No resolved problems to display.       Discharged Condition: good    Hospital Course: Patient was admitted for a cystoscopy with right JJ ureteral stent insertion.  This as unsuccessful due to tumor propagation.   Tumor was resected, but no stent was placed.  Patient tolerated the procedure well with no complications.    Final Diagnoses: Same as principal problem.    Disposition: Home or Self Care    Follow up/Patient Instructions:    Medications:  Reconciled Home Medications:   Current Discharge Medication List      START taking these medications    Details   polyethylene glycol (GLYCOLAX) 17 gram PwPk Take 17 g by mouth once daily.  Qty: 30 packet, Refills: 0         CONTINUE these medications which have CHANGED    Details   oxycodone-acetaminophen (PERCOCET) 5-325 mg per tablet Take 1-2 tablets by mouth every 4 (four) hours as needed.  Qty: 31 tablet, Refills: 0         CONTINUE these medications which have NOT CHANGED    Details   docusate sodium (COLACE) 100 MG capsule Take 1 capsule (100 mg total) by mouth 2 (two) times daily.  Refills: 0    Associated Diagnoses: Drug-induced constipation      doxazosin (CARDURA) 4 MG tablet Take 1 tablet (4 mg total) by mouth every evening.  Qty: 90 tablet, Refills: 3      etodolac (LODINE) 400 MG tablet Take 400 mg by mouth once daily.      glucosamine-chondroitin 500-400 mg tablet Take 1 tablet by mouth 3 (three) times daily.       !! indapamide (LOZOL) 1.25 MG Tab Take 2.5 mg by mouth once daily.      !! indapamide (LOZOL) 2.5 MG Tab       lactulose (CHRONULAC) 10 gram/15 mL solution       magnesium oxide (MAGOX) 400 mg tablet Take 1 tablet (400 mg total) by mouth once daily.  Qty: 60 tablet, Refills: 1    Associated Diagnoses: Hypomagnesemia      !! metoprolol succinate (TOPROL-XL) 100 MG 24 hr tablet       !! metoprolol succinate (TOPROL-XL) 50 MG 24 hr tablet Take 50 mg by mouth nightly.       multivitamin (ONE DAILY MULTIVITAMIN) per tablet Take 1 tablet by mouth once daily.      nitrofurantoin, macrocrystal-monohydrate, (MACROBID) 100 MG capsule Take 100 mg by mouth every 12 (twelve) hours.      !! oxybutynin (DITROPAN) 5 MG Tab       !! oxybutynin (DITROPAN) 5 MG Tab       phenazopyridine (PYRIDIUM) 100 MG tablet Take 2 tablets (200 mg total) by mouth 3 (three) times daily as needed for Pain (Burning).  Qty: 60 tablet, Refills: 1    Associated Diagnoses: Hydronephrosis, right; Dysuria      tamsulosin (FLOMAX) 0.4 mg Cp24       valsartan (DIOVAN) 160 MG tablet Take 320 mg by mouth once daily.       lidocaine-prilocaine (EMLA) cream Apply to port 30-45 minutes prior to being accessed.  Qty: 5 g, Refills: 0    Associated Diagnoses: Malignant neoplasm of trigone of urinary bladder; Encounter for chemotherapy management; Chemotherapy induced nausea and vomiting      lubiprostone (AMITIZA) 24 MCG Cap Take 1 capsule (24 mcg total) by mouth 2 (two) times daily.  Qty: 60 capsule, Refills: 2    Associated Diagnoses: Drug-induced constipation      ondansetron (ZOFRAN-ODT) 8 MG TbDL Take 1 tablet (8 mg total) by mouth every 12 (twelve) hours as needed.  Qty: 30 tablet, Refills: 1    Associated Diagnoses: Malignant neoplasm of trigone of urinary bladder; Encounter for chemotherapy management; Chemotherapy induced nausea and vomiting       !! - Potential duplicate medications found. Please discuss with provider.      STOP taking these  medications       aspirin 81 MG Chew Comments:   Reason for Stopping:         hydrocodone-acetaminophen 5-325mg (NORCO) 5-325 mg per tablet Comments:   Reason for Stopping:             No discharge procedures on file.  Follow-up Information     Call Larry Wilde MD.    Specialty:  Urology  Why:  post op cysto TURBT, needs cystectomy   Contact information:  [id number]  [facility] [address]  [phone]                   Discharge Procedure Orders (must include Diet, Follow-up, Activity):  No discharge procedures on file.

## 2017-06-08 NOTE — ANESTHESIA RELEASE NOTE
Anesthesia Release from PACU Note    Patient: Amador Harrison    Procedure(s) Performed: Procedure(s) (LRB):  CYSTOSCOPY (Right)  TUMOR-BLADDER-TRANSURETHRAL RESECTION (N/A)    Anesthesia type: General    Post pain: Adequate analgesia    Post assessment: no apparent anesthetic complications    Last Vitals:   Vitals:    06/08/17 1000   BP: (!) 147/73   Pulse: 99   Resp: 12   Temp:    SpO2: 99%       Post vital signs: stable    Level of consciousness: awake    Complications: none    Airway Patency: patent    Respiratory: spontaneous    Cardiovascular: stable    Hydration: euvolemic

## 2017-06-08 NOTE — ANESTHESIA PREPROCEDURE EVALUATION
06/08/2017  Amador Harrison is a 61 y.o., male.    Anesthesia Evaluation    I have reviewed the Patient Summary Reports.    I have reviewed the Nursing Notes.   I have reviewed the Medications.     Review of Systems  Anesthesia Hx:  History of prior surgery of interest to airway management or planning:  Denies Personal Hx of Anesthesia complications.   Social:  Former Smoker, No Alcohol Use    Hematology/Oncology:         -- Anemia:   Cardiovascular:   Hypertension    Pulmonary:  Pulmonary Normal    Renal/:   Chronic Renal Disease Hydronephrosis   Hepatic/GI:  Hepatic/GI Normal    Endocrine:  Endocrine Normal        Physical Exam  General:  Well nourished    Airway/Jaw/Neck:  Airway Findings: Mouth Opening: Normal Tongue: Normal  General Airway Assessment: Adult  Mallampati: I  TM Distance: 4 - 6 cm  Jaw/Neck Findings:  Neck ROM: Normal ROM      Dental:  Dental Findings: In tact   Chest/Lungs:  Chest/Lungs Findings: Normal Respiratory Rate     Heart/Vascular:  Heart Findings: Rate: Normal        Mental Status:  Mental Status Findings:  Cooperative, Alert and Oriented         Anesthesia Plan  Type of Anesthesia, risks & benefits discussed:  Anesthesia Type:  general  Patient's Preference:   Intra-op Monitoring Plan: standard ASA monitors  Intra-op Monitoring Plan Comments:   Post Op Pain Control Plan:   Post Op Pain Control Plan Comments:   Induction:   IV  Beta Blocker:  Patient is not currently on a Beta-Blocker (No further documentation required).       Informed Consent: Patient understands risks and agrees with Anesthesia plan.  Questions answered. Anesthesia consent signed with patient.  ASA Score: 3     Day of Surgery Review of History & Physical:            Ready For Surgery From Anesthesia Perspective.

## 2017-06-08 NOTE — OP NOTE
[facility]  Operative Note    Date: 06/08/2017    Pre-Op Diagnosis:  1. [id number] bladder cancer currently undergoing neoadjuvant chemotherapy     2. Right hydronephrosis     3. LOIDA with CKD stage III    Patient Active Problem List    Diagnosis Date Noted    Hydronephrosis of right kidney 06/08/2017    LOIDA (acute kidney injury) 05/29/2017    Encounter for chemotherapy management 03/27/2017    Malignant neoplasm of trigone of urinary bladder 03/21/2017    Gross hematuria 03/03/2017    Hydronephrosis, right 03/03/2017     Post-Op Diagnosis: same    Procedure(s) Performed:   1.  TURBT of large sized bladder tumor    Specimen(s): bladder tumor     Staff Surgeon: Larry Wilde MD    Assistant Surgeon: Irvin Ashley MD    Anesthesia: General endotracheal anesthesia    Indications: Amador Harrison is a 61 y.o. male with a bladder tumor.  He presents today for surgical resection.      Findings:   1.  Trigone, bladder neck with diffuse tumor  2.  Unable to identify bilateral ureteral orifices  3.  Bladder neck and trigone tumor resected     Estimated Blood Loss: min    Drains: 22 Fr 3 way mcgee catheter    Procedure in detail:  After the risks, benefits and possible complications of the procedure were explained, consents were obtained. The patient was taken to the operating room and placed under anesthesia. Pre-operative antibiotics were administered 30 minutes prior to expected start time. The patient was placed in the dorsal lithotomy position and prepped and draped in the normal and sterile fashion. Time out was performed.      A rigid cystoscope in a 22 Fr sheath was introduced into the bladder per urethra. This passed easily.  The entire urethra was visualized which showed no masses or strictures. There was diffuse tumor along the bladder neck and trigone.  The ureteral orifices could not be identified.      The resectoscope was then assembled with the visual obturator. This was placed  into the bladder via the urethra and the visual obturator was exchanged for the resecting mechanism.  The tumor was then resected, superficially until the base was identified.  Specimens were then removed and passed off the field for pathologic analysis.      The bladder was drained and hemostasis was achieved.  The resectoscope was removed.  A 22 Fr mcgee catheter was placed with 30 cc in the balloon.  The bladder was then irrigated.    The patient tolerated the procedure well and was transferred to recovery in stable condition.    Disposition:  The patient requires radical cystectomy within the next 1-2 weeks as his tumor has advanced on neoadjuvant chemotherapy.  He will be discharged home.     Irvin Ashley MD    As the attending surgeon, Dr. Wilde was present for the entire procedure and performed all key aspects of the operation.

## 2017-06-08 NOTE — DISCHARGE INSTRUCTIONS
Home Care Instructions  CYSTOSCOPY OR  ENDOSCOPIC SURGERY  ACTIVITY LEVEL:  If you received sedation and/or an anesthetic, you may feel sleepy for several hours. Rest until you feel more  awake. Gradually resume your normal activities.  DIET:  At home, continue with liquids. If there is no nausea, you may eat a soft diet and gradually resume your normal  diet. Drink a lot of liquids until you see your doctor.  DISCOMFORT:  You may experience a small amount of discomfort, burning on urination, and/or slight bleeding into the urine.  Sitting in a warm tub of water will relieve many of these symptoms. If needed, you may take Tylenol (normal  recommended dose) every 4 hours, for discomfort.  MEDICATIONS:  You will receive instructions for any pain and/or antibiotic prescriptions. Pain medication should be taken only  if needed, and as directed. Antibiotics should be taken as directed until the entire prescription is completed.    WHEN TO CALL THE DOCTOR:   If you are unable to urinate within six hours after surgery.   Fever over 101°F (38.4°C).  RETURN APPOINTMENT:  _________________________________________________________________________________________  FOR EMERGENCIES:  If any unusual problems or difficulties occur, contact Dr. Molina or the resident at [phone] (page ) or at the Clinic office, [phone].    Home Care Instructions  SELF-CARE OF  INDWELLING CATHETERS  PATIENT EDUCATION:  1. Wash hands before and after handling the catheter.  2. Wash around urinary opening daily, taking care to avoid pulling on the catheter during cleansing.  3. Drink 8-12 glasses of fluids daily; increase fluid intake if urine becomes dark and concentrated.  4. Wipe all connecting junctions with alcohol or betadine before changing from leg-bag drainage to  overnight bag drainage.  5. Keep the drainage bag at a lower level than the bladder; do not place the bag on your chair.  6. Avoid letting the bag lay on its side  as urine may flow back into the drainage tube.  7. Usually the catheter is not changed except when blocked or a malfunction occurs.  **MALE**  If you are uncircumcised, pull skin back over glans (head) of penis. Cleanse from the catheter outward to  include entire glans. After cleansing return foreskin to its normal position. Continue with cleansing the rest  of your genitalia, without returning to this glans area.    BATHING: You may take a shower. Do not soak in a tub.      Discharge Instructions: Caring for Your Indwelling Urinary Catheter  You have been discharged with an indwelling urinary catheter (also called a Perez catheter). A catheter is a thin, flexible tube. An indwelling urinary catheter has two parts. The first part is a tube that drains urine from your bladder. The second part is a bag or other device that collects the urine.  The most important thing to remember is that you want to prevent infection. Always wash your hands before handling your catheter bag or tubing.  Draining the bedside bag  · Wash your hands with soap and clean, running water or an alcohol-based hand  that contains at least 60% alcohol.  · Hold the drainage tube over a toilet or measuring container.  · Unclamp the tube and let the bag drain.  · Dont touch the tip of the drainage tube or let it touch the toilet or container.  Cleaning the drainage tube  · When the bag is empty, clean the tip of the drainage tube with an alcohol wipe.  · Clamp the tube.  · Reinsert the tube into the pocket on the drainage bag.  Cleaning your skin and tubing  · Clean the skin near the catheter with soap and water.  · Wash your genital area from front to back.  · Wash the catheter tubing. Always wash the catheter in the direction away from your body.  · You will be told when and how to change your bag and tubing.  · Dont try to remove the catheter by yourself.  · You may shower with the catheter in place.  Emptying a leg bag  · Wash your  hands.  · Remove the stopper on the bag.  · Drain the bag into the toilet or a measuring container. Dont let the tip of the drainage tube touch anything, including your fingers.  · Clean the tip of the drainage tube with alcohol.  · Replace the stopper.  Follow-up  Make a follow-up appointment as directed by your healthcare provider  When to call your healthcare provider  Call your healthcare provider right away if you have any of the following:  · Chills or fever above 100.4°F (38°C)  · Leakage around the catheter insertion site  · Increased spasms (uncontrollable twitching) in your legs, abdomen, or bladder. Occasional mild spasms are normal.  · Burning in the urinary tract, penis, or genital area  · Nausea and vomiting  · Aching in the lower back  · Cloudy or bloody (pink or red) urine, sediment or mucus in the urine, or foul-smelling urine   Date Last Reviewed: 9/24/2014  © 1999-8986 [employer]. [address] [place] [address], [place] [address]. All rights reserved. This information is not intended as a substitute for professional medical care. Always follow your healthcare professional's instructions.

## 2017-06-12 NOTE — SUBJECTIVE & OBJECTIVE
Past Medical History:   Diagnosis Date    Cancer     Hypertension     Hypertrophy of prostate without urinary obstruction and other lower urinary tract symptoms (LUTS)     Nocturia     Nocturia        Past Surgical History:   Procedure Laterality Date    CARPAL TUNNEL RELEASE      Left hand    CYSTOSCOPY      CYSTOSCOPY W/ URETERAL STENT PLACEMENT      HERNIA REPAIR Bilateral     x 2 separate surgeries. 1 as an infant and another at approx age 18    hydrocele      at age 3    TONSILLECTOMY      turbt 2017      VASECTOMY         Review of patient's allergies indicates:  No Known Allergies    Family History     None          Social History Main Topics    Smoking status: Former Smoker     Quit date: 2/27/1994    Smokeless tobacco: Never Used    Alcohol use Yes      Comment: 2 beers or wine daily    Drug use: No    Sexual activity: Yes       Review of Systems   Constitutional: Negative for chills and fever.   HENT: Negative for trouble swallowing.    Respiratory: Negative for cough and shortness of breath.    Cardiovascular: Negative for chest pain and palpitations.   Gastrointestinal: Negative for abdominal pain, constipation, diarrhea, nausea and vomiting.   Genitourinary: Positive for flank pain. Negative for difficulty urinating, dysuria and hematuria.   Neurological: Negative for weakness.       Objective:     Temp:  [98.5 °F (36.9 °C)] 98.5 °F (36.9 °C)  Pulse:  [71-80] 80  Resp:  [18] 18  SpO2:  [97 %-98 %] 98 %  BP: (127-159)/(65-81) 151/81     Body mass index is 26.63 kg/m².    No intake/output data recorded.       Drains          No matching active lines, drains, or airways          Physical Exam   Constitutional: He is oriented to person, place, and time. He appears well-developed and well-nourished. He appears ill. No distress.   HENT:   Head: Normocephalic and atraumatic.   Eyes: No scleral icterus.   Neck: No JVD present.   Cardiovascular: Normal rate and regular rhythm.     Pulmonary/Chest: Effort normal. No respiratory distress.   Abdominal: Soft. He exhibits no distension. There is no tenderness. There is no rebound, no guarding and no CVA tenderness.   Neurological: He is alert and oriented to person, place, and time.   Skin: He is not diaphoretic.     Psychiatric: He has a normal mood and affect. His behavior is normal. Thought content normal.       Significant Labs:    BMP:    Recent Labs  Lab 06/12/17  0919   *   K 3.7   CL 92*   CO2 27   BUN 48*   CREATININE 3.8*   CALCIUM 9.1       CBC:    Recent Labs  Lab 06/12/17  0919   WBC 9.23   HGB 9.3*   HCT 26.5*   *       All pertinent labs results from the past 24 hours have been reviewed.    Significant Imaging:  All pertinent imaging results/findings from the past 24 hours have been reviewed.

## 2017-06-12 NOTE — PROGRESS NOTES
Subjective:       Patient ID: Amador Harrison is a 61 y.o. male.    Chief Complaint: No chief complaint on file.      HPI: Amador Harrison is a 61 y.o. White male who returns today in follow-up for invasive bladder cancer.    The patient presented with gross hematuria and was found to have a bladder tumor which was resected on 3/3/2017. Pathology from that TURBT revealed high-grade T2 bladder cancer.    The patient had a CT urogram on 1/31/2017 that did not show any locally advanced or distant metastatic abdominal disease.    He has a remote smoking history.    After that initial visit, the patient began neoadjuvant chemotherapy with Dr. Austin until recently when his serum creatinine began to rise. An ultrasound showed that he had right hydronephrosis. On 6/8/17, we performed cystoscopy, TURBT which demonstrated a tumor infiltration of the trigone. We could not identify either ureteral orifice for stent placement and decompression of his upper tracts.    The patient presents today for catheter removal and a discussion of how best to proceed.    Review of patient's allergies indicates:  No Known Allergies    Current Outpatient Prescriptions   Medication Sig Dispense Refill    docusate sodium (COLACE) 100 MG capsule Take 1 capsule (100 mg total) by mouth 2 (two) times daily.  0    doxazosin (CARDURA) 4 MG tablet Take 1 tablet (4 mg total) by mouth every evening. (Patient taking differently: Take 4 mg by mouth once daily. ) 90 tablet 3    etodolac (LODINE) 400 MG tablet Take 400 mg by mouth once daily.      glucosamine-chondroitin 500-400 mg tablet Take 1 tablet by mouth 3 (three) times daily.      indapamide (LOZOL) 1.25 MG Tab Take 2.5 mg by mouth once daily.      indapamide (LOZOL) 2.5 MG Tab       lactulose (CHRONULAC) 10 gram/15 mL solution       lidocaine-prilocaine (EMLA) cream Apply to port 30-45 minutes prior to being accessed. 5 g 0    lubiprostone (AMITIZA) 24 MCG Cap Take 1 capsule (24 mcg  total) by mouth 2 (two) times daily. 60 capsule 2    magnesium oxide (MAGOX) 400 mg tablet Take 1 tablet (400 mg total) by mouth once daily. 60 tablet 1    metoprolol succinate (TOPROL-XL) 100 MG 24 hr tablet       metoprolol succinate (TOPROL-XL) 50 MG 24 hr tablet Take 50 mg by mouth nightly.       multivitamin (ONE DAILY MULTIVITAMIN) per tablet Take 1 tablet by mouth once daily.      nitrofurantoin, macrocrystal-monohydrate, (MACROBID) 100 MG capsule Take 100 mg by mouth every 12 (twelve) hours.      ondansetron (ZOFRAN-ODT) 8 MG TbDL Take 1 tablet (8 mg total) by mouth every 12 (twelve) hours as needed. 30 tablet 1    oxybutynin (DITROPAN) 5 MG Tab       oxybutynin (DITROPAN) 5 MG Tab       oxycodone-acetaminophen (PERCOCET) 5-325 mg per tablet Take 1-2 tablets by mouth every 4 (four) hours as needed. 31 tablet 0    phenazopyridine (PYRIDIUM) 100 MG tablet Take 2 tablets (200 mg total) by mouth 3 (three) times daily as needed for Pain (Burning). 60 tablet 1    polyethylene glycol (GLYCOLAX) 17 gram PwPk Take 17 g by mouth once daily. 30 packet 0    tamsulosin (FLOMAX) 0.4 mg Cp24       valsartan (DIOVAN) 160 MG tablet Take 320 mg by mouth once daily.        No current facility-administered medications for this visit.        Past Medical History:   Diagnosis Date    Cancer     Hypertension     Hypertrophy of prostate without urinary obstruction and other lower urinary tract symptoms (LUTS)     Nocturia     Nocturia        Past Surgical History:   Procedure Laterality Date    CARPAL TUNNEL RELEASE      Left hand    CYSTOSCOPY      CYSTOSCOPY W/ URETERAL STENT PLACEMENT      HERNIA REPAIR Bilateral     x 2 separate surgeries. 1 as an infant and another at approx age 18    hydrocele      at age 3    TONSILLECTOMY      turbt 2017      VASECTOMY         History reviewed. No pertinent family history.    Review of Systems    Review of Systems   Constitutional: Negative for fever, chills,  activity change, appetite change and unexpected weight change.   HENT: Negative for congestion, nosebleeds, sneezing, sore throat and trouble swallowing.    Eyes: Negative for pain, discharge, redness and visual disturbance.   Respiratory: Negative for cough, choking, chest tightness and shortness of breath.    Cardiovascular: Negative for chest pain, palpitations and leg swelling.   Gastrointestinal: Negative for nausea, vomiting, abdominal pain, diarrhea, blood in stool, abdominal distention and anal bleeding.  Genitourinary: As documented per HPI   Endocrine: Negative for cold intolerance, heat intolerance, polydipsia, polyphagia and polyuria.   Musculoskeletal: Negative for myalgias, gait problem, neck pain and neck stiffness.   Skin: Negative for color change, pallor, rash and wound.   Allergic/Immunologic: Negative for immunocompromised state.   Neurological: Negative for seizures, facial asymmetry, speech difficulty, weakness and light-headedness.   Hematological: Negative for adenopathy. Does not bruise/bleed easily.   Psychiatric/Behavioral: Negative for hallucinations, behavioral problems, self-injury and agitation. The patient is not hyperactive.    All other systems were reviewed and were negative.      Objective:     Vitals:    06/12/17 0803   BP: (!) 159/70   Pulse: 75     Physical Exam   Vitals reviewed.  Constitutional: He is oriented to person, place, and time. He appears well-developed and well-nourished. No distress.   HENT:   Head: Normocephalic and atraumatic.   Right Ear: External ear normal.   Left Ear: External ear normal.   Nose: Nose normal.   Eyes: EOM are normal. Pupils are equal, round, and reactive to light. Right eye exhibits no discharge. Left eye exhibits no discharge.   Neck: Normal range of motion. Neck supple. No tracheal deviation present. No thyroid enlargement or tenderness.   Cardiovascular: Regular rhythm and intact distal pulses. No signs of peripheral edema.     Pulmonary/Chest: Effort normal and breath sounds normal. No stridor. No respiratory distress. He has no wheezes.   Abdominal: Soft. Bowel sounds are normal. He exhibits no distension. There is no tenderness. Hernia confirmed negative in the right inguinal area and confirmed negative in the left inguinal area. No hepatic or splenic enlargement or tenderness.  Genitourinary: Penis normal. Right testis shows no mass, no swelling and no tenderness. Right testis is descended. Left testis shows no mass, no swelling and no tenderness. Left testis is descended. Circumcised. No phimosis or hypospadias. Catheter draining clear urine.  LYLY: Deferred  Musculoskeletal: Normal range of motion. He exhibits no edema.   Neurological: He is alert and oriented to person, place, and time. He exhibits normal muscle tone. Coordination normal.   Skin: Skin is warm. No rash noted.   Lymphatic: No palpable lymph nodes.  Psychiatric: He has a normal mood and affect. His behavior is normal. Judgment and thought content normal.     No results found for: PSA  Lab Results   Component Value Date    CREATININE 3.8 (H) 06/12/2017     Lab Results   Component Value Date    EGFRNONAA 15.9 (A) 06/12/2017     Lab Results   Component Value Date    ESTGFRAFRICA 18.4 (A) 06/12/2017     I personally reviewed all the patient's imaging studies.    CT urogram (1/31/2017): Thickening of the posterior bladder wall, slightly irregular appearance may represent cystitis versus neoplasm.    CT abdomen/pelvis with contrast (5/19/17): Although the patient has a history of malignancy, no measurable lesions per the RECIST criteria are identified.    CT chest (5/19/17): Examination of the lung fields demonstrates no evidence of consolidation, mass, or significant pleural thickening, nor is there evidence of pleural fluid.    Assessment:       1. Malignant neoplasm of ureteric orifice    2. Ureteral obstruction, right    3. Malignant neoplasm of trigone of urinary  bladder        Plan:     Diagnoses and all orders for this visit:    Malignant neoplasm of ureteric orifice  -     Diet NPO; Standing  -     Case Request Operating Room: ROBOTIC ASSISTED LAPAROSCOPIC CYSTECTOMY W/ ILEAL CONDUIT  -     Admit to Inpatient; Standing  -     CBC auto differential; Future  -     Comprehensive metabolic panel; Future  -     Albumin; Future  -     Prealbumin; Future  -     Vitamin A; Future  -     Vitamin D; Future  -     Vitamin E; Future  -     Vitamin K; Future    Ureteral obstruction, right  -     Diet NPO; Standing  -     Case Request Operating Room: ROBOTIC ASSISTED LAPAROSCOPIC CYSTECTOMY W/ ILEAL CONDUIT  -     Admit to Inpatient; Standing  -     CBC auto differential; Future  -     Comprehensive metabolic panel; Future  -     Albumin; Future  -     Prealbumin; Future  -     Vitamin A; Future  -     Vitamin D; Future  -     Vitamin E; Future  -     Vitamin K; Future    Malignant neoplasm of trigone of urinary bladder  -     Diet NPO; Standing  -     Case Request Operating Room: ROBOTIC ASSISTED LAPAROSCOPIC CYSTECTOMY W/ ILEAL CONDUIT  -     Admit to Inpatient; Standing  -     CBC auto differential; Future  -     Comprehensive metabolic panel; Future  -     Albumin; Future  -     Prealbumin; Future  -     Vitamin A; Future  -     Vitamin D; Future  -     Vitamin E; Future  -     Vitamin K; Future    Other orders  -     High Risk of VTE; Standing  -     enoxaparin injection 40 mg; Inject 0.4 mLs (40 mg total) into the skin every 24 hours.    I had a lengthy discussion with the patient and his wife about his recent developments.    His most recent imaging is promising, however he does appear to have progression of his local disease after two cycles of chemotherapy. I discussed the situation with Dr. Austni at length, and we both feel that it is in the patient's best interest to proceed directly to surgery at this point in light of these local findings.    With regard to surgery, I  explained that there are 3 basic categories of modern urinary diversions: (1) incontinent bowel conduit (e.g. ileal conduit) (2) continent cutaneous stomal reservoire (e.g. [place] pouch) (3) continent orthotopic urethral diversion (ileal neopbladder).      I explained that an ileal conduit is the simplest, most time-tested urinary diversion that requires the least operative time and arguably is associated with the fewest complications. I described that a short piece of ileum is anastamosed to the ureters and brought onto the skin.  A life-long urostomy appliance to collect urine is required.   Although time-tested, short-term and long-term issues with ileal conduit diversions are not uncommon.  I quoted the best data available, which in my view is the 2003 [facility] series that included long-term bladder cancer survivors who had at least 5 years of follow-up.  In this series, issues that were seen with ileal conduits were as follows: ureteral stricture/obstruction (~15%), recurrent pyelonephritis (~ 25%), urinary calculi (this complication often occurred beyond 5 to 10 years after surgery, ~10% ), impaired kidney function due to obstruction or ureteral reflux (~27%, only 2% required dialysis and all of whom had compromised kidney function prior to surgery), and stomal complications (~25%) such as parastomal hernia, stensosis, and bleeding/skin irritation.  Furthermore up to 25% of patients had bowel complications, which included small bowel obstruction, fistula formation, and diarrhea.  I explained that although many issues that arise can be handled conservatively, some do require re-operation.      Then we discussed the ileal neobladder.  I explained that the neobladder is constructed from detubularized bowel, which is then sewn into a spherical reservoir for urine storage.  The neobladder is anastamosed to the ureters and to the urethra, to mimic functions of the native bladder. The goal is for the  patient to void spontaneously using the Valsalva menuver.  Choice of proceeding with the neobladder vs. ileal conduit must be balanced against additional issues and risks.  Along with risks listed for ileal conduit diversion, I explained the additional potential problems that can arise with the ileal neobladder.   I described risks of urinary leaks from a number of suture lines and explained that in general these can be managed conservatively.  I stressed that a life-long risk of pouch perforation exists.  We discussed risks of incontinence and hypercontinence.  I explained that it is critical to integrate these risks into the decision to proceed with the neobladder.  I quoted the risks of day-time incontinence to be on the order of 10%, while the risk of nighttime incontinence to be approximately 20%.  With regard to hypercontinence and poor pouch emptying, I explained that approximately 5% of male patients will require life-long intermittent catheterization.  Such risks can reach 30% in female patients with neobladders.  Furthemore, neobladder-vaginal fistula formation is a great concern when a neobladder is constructed in women (~5%).  I explained that continent urinary diversions are contra-indicated in patients with renal insufficiency, but did explain that metabolic disturbances are possible even in patients with intact renal function.    With regard to [place] pouch, we discussed that this diversion consists of a pouch constructed from detubularized right colon.  The pouch is catheterized through the native ileocecal valve via a segment of the ileum that is brought onto the skin.  I explained that all risks that pertain to ileal conduits and neobladders generally apply to patients who undergo a cutaneous continent diversion.  These include risks of stomal stenosis, pouch stomal formation, incontinence, and metabolic disturbances. Furthermore, because, the ileocecal valve is resected, some post-operative  diarrhea can be expected.  Such symptoms usually can be controlled with over-the-counter medications.  We reviewed the life-long catheterization and irrigation regimen that is required with this diversion.  I explained that patients who chose to undergo continent diversions  such as an [place] pouch or neobladder, need to be prepared to commit to the following in the post-operative period:   Keep drains as long as needed (for neobladder for example, the catheter and suprapubic tube stay on the order of 2-3 weeks, but possibly longer)   Return for appointments as often as needed.   Empty neobladder/[place] pouch every 2 hours for 1 month   Empty neobladder/[place] pouch every 3 hours for 1 month   Empty neobladder/[place] pouch every 4 hour for the rest of my life    Risks of sexual dysfunction:  We discussed that in males rates of erectile dysfunction are substantial, since removal of the prostate - an integral part of radical cystectomy - results in compromise of the neurovascular bundles that innervate the penile corpora.  I explained that I perform nerve-sparing cystoprostatectomy whenever possible.    Other risks of the surgery were discussed in detail including medical and anesthetic complications (e.g. heart attack, stroke, deep vein thrombosis, pulmonary embolism, infection (pneumonia, etc), bleeding with possible need for transfusion, adjacent organ involvement or injury (including possible need for permanent or temporary colostomy), wound complications, reaction to medications).  We reviewed what to expect with regard to incisions, post-operative pain, and risks of subsequent hernias.     After our discussion, the patient and his wife were amenable to proceeding to surgery at this point. The patient would like to proceed with a robotic radical cystectomy and creation of an ileal conduit.    I will recheck the patient's blood work today to ensure that his renal function is stable.    I answered all his  and his wife's questions.    At the conclusion of our discussion, the patient was agreeable to proceeding as outlined above.     I encouraged him or any of his family members to call or email me with questions and/or concerns.    I spent 45 minutes with the patient of which more than half was spent in coordinating the patient's care as well as in direct consultation with the patient in regards to our treatment and plan.

## 2017-06-12 NOTE — H&P
[facility]  Urology  History & Physical    Patient Name: Amador Harrison  MRN: [id number]  Admission Date: 6/12/2017  Code Status: Full Code   Attending Provider: Larry Wilde MD  Primary Care Physician: Waqar Brady MD  Principal Problem:<principal problem not specified>    Subjective:     HPI:  Amador Harrison is a 61 y.o. male with muscle invasive bladder cancer. He originally presented with gross hematuria and underwent TURBT, pathology T2.     He then began neoadjuvant chemotherapy with Dr. Austin until recently when his serum creatinine began to rise. An ultrasound showed that he had right hydronephrosis. On 6/8/17, we performed cystoscopy, TURBT which demonstrated a tumor infiltration of the trigone. Neither ureteral orifice could be identified.     He was seen by Dr. Wilde in clinic today where his catheter was removed. He has been doing well since the procedure however labs today showed his creatinine is up to 3.8 from 1.9 a week ago. He was sent to the ED for admission.     He is complaining of left sided back pain that has been present for a week. He denies nausea, fevers, chills. He has urinated since his cathter was removed.     Past Medical History:   Diagnosis Date    Cancer     Hypertension     Hypertrophy of prostate without urinary obstruction and other lower urinary tract symptoms (LUTS)     Nocturia     Nocturia        Past Surgical History:   Procedure Laterality Date    CARPAL TUNNEL RELEASE      Left hand    CYSTOSCOPY      CYSTOSCOPY W/ URETERAL STENT PLACEMENT      HERNIA REPAIR Bilateral     x 2 separate surgeries. 1 as an infant and another at approx age 18    hydrocele      at age 3    TONSILLECTOMY      turbt 2017      VASECTOMY         Review of patient's allergies indicates:  No Known Allergies    Family History     None          Social History Main Topics    Smoking status: Former Smoker     Quit date: 2/27/1994    Smokeless tobacco: Never Used     Alcohol use Yes      Comment: 2 beers or wine daily    Drug use: No    Sexual activity: Yes       Review of Systems   Constitutional: Negative for chills and fever.   HENT: Negative for trouble swallowing.    Respiratory: Negative for cough and shortness of breath.    Cardiovascular: Negative for chest pain and palpitations.   Gastrointestinal: Negative for abdominal pain, constipation, diarrhea, nausea and vomiting.   Genitourinary: Positive for flank pain. Negative for difficulty urinating, dysuria and hematuria.   Neurological: Negative for weakness.       Objective:     Temp:  [98.5 °F (36.9 °C)] 98.5 °F (36.9 °C)  Pulse:  [71-80] 80  Resp:  [18] 18  SpO2:  [97 %-98 %] 98 %  BP: (127-159)/(65-81) 151/81     Body mass index is 26.63 kg/m².    No intake/output data recorded.       Drains          No matching active lines, drains, or airways          Physical Exam   Constitutional: He is oriented to person, place, and time. He appears well-developed and well-nourished. He appears ill. No distress.   HENT:   Head: Normocephalic and atraumatic.   Eyes: No scleral icterus.   Neck: No JVD present.   Cardiovascular: Normal rate and regular rhythm.    Pulmonary/Chest: Effort normal. No respiratory distress.   Abdominal: Soft. He exhibits no distension. There is no tenderness. There is no rebound, no guarding and no CVA tenderness.   Neurological: He is alert and oriented to person, place, and time.   Skin: He is not diaphoretic.     Psychiatric: He has a normal mood and affect. His behavior is normal. Thought content normal.       Significant Labs:    BMP:    Recent Labs  Lab 06/12/17  0919   *   K 3.7   CL 92*   CO2 27   BUN 48*   CREATININE 3.8*   CALCIUM 9.1       CBC:    Recent Labs  Lab 06/12/17  0919   WBC 9.23   HGB 9.3*   HCT 26.5*   *       All pertinent labs results from the past 24 hours have been reviewed.    Significant Imaging:  All pertinent imaging results/findings from the past 24  hours have been reviewed.      Assessment and Plan:     LOIDA (acute kidney injury)    Amador Harrison is a 61 y.o. male with T2 bladder cancer, currently undergoing neoadjuvant chemotherapy now with LOIDA and known right hydronephrosis    - Admit to Dr. Wilde  - STAT CT to evaluate for new left hydronephrosis  - NPO  - Will discuss nephrostomy tube placement with IR  - Restart home meds  - Ambulate/OOB  - Urine culture  - IVF at 125  - ALEJANDRO/SCDs            VTE Risk Mitigation         Ordered     Medium Risk of VTE  Once      06/12/17 1312     Place ALEJANDRO hose  Until discontinued      06/12/17 1312     Place sequential compression device  Until discontinued      06/12/17 1312          Wendy Del Valle MD  Urology  [facility]

## 2017-06-12 NOTE — ED NOTES
Pt presents with increased crt 3.8. Pt has hx bladder CA and has done 2 rounds of chemo, states his bladder is to come out next week. Denies CP, SOB, N/V/D, no trouble urinating. States scant amount of blood in urine.    LOC: The patient is awake, alert and aware of environment with an appropriate affect, the patient is oriented x 3 and speaking appropriately.  APPEARANCE: Patient resting comfortably and in no acute distress, patient is clean and well groomed  SKIN: The skin is warm and dry, color consistent with ethnicity, patient has normal skin turgor and moist mucus membranes, skin intact, no breakdown or brusing noted.  MUSCULOSKELETAL: Patient moving all extremities well, no obvious swelling or deformities noted.   RESPIRATORY: Airway is open and patent, breath sounds clear throughout all lung fields; respirations are spontaneous, patient has a normal effort and rate, no accessory muscle use noted.   CARDIAC: Patient has no peripheral edema noted, capillary refill < 3 seconds. No complaints of chest pain   ABDOMEN: Soft and non tender to palpation, no distention noted. Bowel sounds present x 4  NEUROLOGIC: PERRL, 3mm bilaterally, eyes open spontaneously, behavior appropriate to situation, follows commands, facial expression symmetrical, bilateral hand grasp equal and even, purposeful motor response noted, normal sensation in all extremities when touched with a finger.  : Pt  Has hx of bladder CA and hematuria, reports tenderness to palpation to lower back, reports having a cystoscope last week which showed the CA has returned, pt scheduled to have bladder removed next week.

## 2017-06-12 NOTE — HPI
Amador Harrison is a 61 y.o. male with muscle invasive bladder cancer. He originally presented with gross hematuria and underwent TURBT, pathology T2.     He then began neoadjuvant chemotherapy with Dr. Austin until recently when his serum creatinine began to rise. An ultrasound showed that he had right hydronephrosis. On 6/8/17, we performed cystoscopy, TURBT which demonstrated a tumor infiltration of the trigone. Neither ureteral orifice could be identified.     He was seen by Dr. Wilde in clinic today where his catheter was removed. He has been doing well since the procedure however labs today showed his creatinine is up to 3.8 from 1.9 a week ago. He was sent to the ED for admission.     He is complaining of left sided back pain that has been present for a week. He denies nausea, fevers, chills. He has urinated since his cathter was removed.

## 2017-06-12 NOTE — PATIENT INSTRUCTIONS
Cystectomy  Cystectomy is surgery to remove the urinary bladder. It may be done in certain cases of bladder cancer. Your doctor can discuss the risks and benefits of the surgery with you.     In men, the bladder, lymph nodes, prostate, and often the urethra are removed.         In women, the bladder, uterus, cervix, lymph nodes, urethra, and sometimes part of the vagina are removed.      Preparing for surgery  Youll be told how to prepare for surgery. These instructions may include:  · Donating your own blood a few days before the surgery. This is in case you need a transfusion during the surgery.  · Taking antibiotics before surgery. This is to help prevent infection.  · Not eating or drinking anything a certain amount of time before surgery.  · Clearing your intestine. You may do this by drinking a special liquid at home. Or you may be admitted to the hospital the night before surgery and given medication and enemas to empty the intestine.  Removing the bladder  The surgery is done in a hospital. It takes about 4 to 6 hours. Just before surgery, youll be given medication called general anesthesia to prevent pain. This puts you into a state like deep sleep during the surgery. An incision is then made near your bellybutton. This exposes the bladder. The area around your bladder is examined to see if the cancer has spread. If it has, the surgery may not be continued. If the cancer is only in the bladder, the bladder is removed. Other organs near the bladder may be removed as well. This is in case cancer cells may have spread to those organs.  Creating a new path for urine  After the bladder is removed, you will need a new way to store and release urine. There are 3 different options to accomplish this task. Talk to your health care provider about which option would be best for you. The options include:  · Incontinent diversion: A short piece of intestine is removed and connected to the ureters while the other end  is connected to the skin on the front of the abdomen forming an opening called a stoma. This procedure is called a urostomy. A small bag is placed over the stoma to collect the urine.   · Continent diversion: A valve is created in a pouch made from a piece of intestine. You can empty the pouch several times a day by inserting a drainage tube (catheter) into the stoma through the valve. This method does not require a collection bag.   · Neobladder: A new bladder is created from a piece of intestine and urine is routed back into the urethra, restoring urination. Over time most people are able return to normal urination during the day but many may still have incontinence at night.   Risks and possible complications  · Infection  · Bleeding that requires a transfusion  · Blockage of intestine  · Inability to have an erection  · Blood clot in the veins because of physical inactivity   Date Last Reviewed: 9/22/2014  © 8693-9239 [employer] [address] [place] [address], [place] [address]. All rights reserved. This information is not intended as a substitute for professional medical care. Always follow your healthcare professional's instructions.

## 2017-06-13 NOTE — SEDATION DOCUMENTATION
Bilateral nephrostomy tube placement complete. Procedure site (bilateral flanks) dressing CDI. Pt tolerated well.

## 2017-06-13 NOTE — SUBJECTIVE & OBJECTIVE
Interval History:   No acute events overnight  Denies pain  No nausea    Review of Systems  Objective:     Temp:  [98.4 °F (36.9 °C)-99.1 °F (37.3 °C)] 98.7 °F (37.1 °C)  Pulse:  [64-85] 80  Resp:  [16-18] 18  SpO2:  [94 %-99 %] 96 %  BP: (125-159)/(65-81) 125/66     Body mass index is 26.63 kg/m².            Drains          No matching active lines, drains, or airways          Physical Exam   Constitutional: He is oriented to person, place, and time. He appears well-developed and well-nourished. He appears ill. No distress.   HENT:   Head: Normocephalic and atraumatic.   Eyes: No scleral icterus.   Neck: No JVD present.   Cardiovascular: Normal rate and regular rhythm.    Pulmonary/Chest: Effort normal. No respiratory distress.   Abdominal: Soft. He exhibits no distension. There is no tenderness. There is no rebound, no guarding and no CVA tenderness.   Neurological: He is alert and oriented to person, place, and time.   Skin: He is not diaphoretic.     Psychiatric: He has a normal mood and affect. His behavior is normal. Thought content normal.       Significant Labs:    BMP:    Recent Labs  Lab 06/12/17  0919 06/13/17  0434   * 128*   K 3.7 3.7   CL 92* 93*   CO2 27 22*   BUN 48* 55*   CREATININE 3.8* 5.6*   CALCIUM 9.1 8.0*       CBC:     Recent Labs  Lab 06/12/17  0919 06/13/17  0434   WBC 9.23 10.09   HGB 9.3* 8.6*   HCT 26.5* 25.7*   * 143*       All pertinent labs results from the past 24 hours have been reviewed.    Significant Imaging:  All pertinent imaging results/findings from the past 24 hours have been reviewed.

## 2017-06-13 NOTE — PROCEDURES
Radiology Post-Procedure Note    Pre Op Diagnosis: Bilateral hydronephrosis    Post Op Diagnosis: Bilateral hydronephrosis    Procedure:Bilateral percutaneous nephrostomy    Procedure performed by: Nadja Boles and Alan Lyons    Written Informed Consent Obtained: Yes    Specimen Removed: NO    Estimated Blood Loss: Minimal    Findings: Local anesthesia and moderate sedation were used.      Ultrasound and fluoroscopy were used to localize the bilateral collecting system and a percutaneous catheter was introduced.  Position of the catheter in the collecting system was confirmed using injection of iodinated contrast.      The patient tolerated the procedure well and there were no complications.  A specimen was sent to the lab for further analysis and culture. Please see Imaging report for further details.    Nadja Boles MD  PGY-3  Department of Radiology  Pager: [phone]

## 2017-06-13 NOTE — PROGRESS NOTES
Pt arrived to  for bilateral nephrostomy tube placement.  Name verified using two identifiers.  Allergies verified.  Waiting for consent.  Will continue to monitor.

## 2017-06-13 NOTE — H&P
Inpatient Radiology Pre-procedure Note    History of Present Illness:  Amador Harrison is a 61 y.o. male with bladder cancer and bilateral hydronephrosis who presents for bilateral nephrostomy tube placement.  Admission H&P reviewed.  Past Medical History:   Diagnosis Date    Cancer     Hypertension     Hypertrophy of prostate without urinary obstruction and other lower urinary tract symptoms (LUTS)     Nocturia     Nocturia      Past Surgical History:   Procedure Laterality Date    CARPAL TUNNEL RELEASE      Left hand    CYSTOSCOPY      CYSTOSCOPY W/ URETERAL STENT PLACEMENT      HERNIA REPAIR Bilateral     x 2 separate surgeries. 1 as an infant and another at approx age 18    hydrocele      at age 3    TONSILLECTOMY      turbt 2017      VASECTOMY         Review of Systems:   As documented in primary team H&P    Home Meds:   Prior to Admission medications    Medication Sig Start Date End Date Taking? Authorizing Provider   docusate sodium (COLACE) 100 MG capsule Take 1 capsule (100 mg total) by mouth 2 (two) times daily. 3/7/17   RAMEZ Steen MD   doxazosin (CARDURA) 4 MG tablet Take 1 tablet (4 mg total) by mouth every evening.  Patient taking differently: Take 4 mg by mouth once daily.  9/9/16   RAMEZ Steen MD   etodolac (LODINE) 400 MG tablet Take 400 mg by mouth once daily.    Historical Provider, MD   glucosamine-chondroitin 500-400 mg tablet Take 1 tablet by mouth 3 (three) times daily.    Historical Provider, MD   indapamide (LOZOL) 1.25 MG Tab Take 2.5 mg by mouth once daily.    Historical Provider, MD   indapamide (LOZOL) 2.5 MG Tab  3/8/17   Historical Provider, MD   lactulose (CHRONULAC) 10 gram/15 mL solution  5/23/17   Historical Provider, MD   lidocaine-prilocaine (EMLA) cream Apply to port 30-45 minutes prior to being accessed. 3/13/17   Marion Flores, SIOBHAN   lubiprostone (AMITIZA) 24 MCG Cap Take 1 capsule (24 mcg total) by mouth 2 (two) times daily. 3/7/17   RAMEZ Lam  MD Enio   magnesium oxide (MAGOX) 400 mg tablet Take 1 tablet (400 mg total) by mouth once daily. 4/10/17 4/10/18  Marion Flores NP   metoprolol succinate (TOPROL-XL) 100 MG 24 hr tablet  4/6/17   Historical Provider, MD   metoprolol succinate (TOPROL-XL) 50 MG 24 hr tablet Take 50 mg by mouth nightly.     Historical Provider, MD   multivitamin (ONE DAILY MULTIVITAMIN) per tablet Take 1 tablet by mouth once daily.    Historical Provider, MD   nitrofurantoin, macrocrystal-monohydrate, (MACROBID) 100 MG capsule Take 100 mg by mouth every 12 (twelve) hours.    Historical Provider, MD   ondansetron (ZOFRAN-ODT) 8 MG TbDL Take 1 tablet (8 mg total) by mouth every 12 (twelve) hours as needed. 3/13/17 3/13/18  Marion Flores NP   oxybutynin (DITROPAN) 5 MG Tab  5/7/17   Historical Provider, MD   oxybutynin (DITROPAN) 5 MG Tab  5/7/17   Historical Provider, MD   oxycodone-acetaminophen (PERCOCET) 5-325 mg per tablet Take 1-2 tablets by mouth every 4 (four) hours as needed. 6/8/17   Irvin Ashley MD   oxycodone-acetaminophen (PERCOCET) 5-325 mg per tablet Take 1 tablet by mouth every 4 (four) hours as needed for Pain. 6/13/17   Wendy Del Valle MD   phenazopyridine (PYRIDIUM) 100 MG tablet Take 2 tablets (200 mg total) by mouth 3 (three) times daily as needed for Pain (Burning). 5/1/17   Marion Flores NP   polyethylene glycol (GLYCOLAX) 17 gram PwPk Take 17 g by mouth once daily. 6/8/17   Irvin Ashley MD   polyethylene glycol (GLYCOLAX) 17 gram PwPk Take 17 g by mouth once daily. 6/13/17   Wendy Del Valle MD   tamsulosin (FLOMAX) 0.4 mg Cp24  5/15/17   Historical Provider, MD   valsartan (DIOVAN) 160 MG tablet Take 320 mg by mouth once daily.     Historical Provider, MD     Scheduled Meds:    ampicillin IVPB  1 g Intravenous Q4H    bisacodyl  10 mg Rectal Daily    cefTRIAXone (ROCEPHIN) IVPB  2 g Intravenous Q24H    docusate sodium  100 mg Oral BID    doxazosin  4 mg Oral Daily     lubiprostone  24 mcg Oral BID    metoprolol succinate  50 mg Oral Nightly    sodium chloride 0.9%  3 mL Intravenous Q8H    valsartan  320 mg Oral Daily     Continuous Infusions:    sodium chloride 0.9% 125 mL/hr at 06/12/17 1334     PRN Meds:ondansetron, oxycodone-acetaminophen, oxycodone-acetaminophen  Anticoagulants/Antiplatelets: no anticoagulation    Allergies: Review of patient's allergies indicates:  No Known Allergies  Sedation Hx: have not been any systemic reactions    Labs:    Recent Labs  Lab 06/12/17  1604   INR 1.0       Recent Labs  Lab 06/13/17  0434   WBC 10.09   HGB 8.6*   HCT 25.7*   MCV 87   *      Recent Labs  Lab 06/12/17  0919 06/13/17  0434    107   * 128*   K 3.7 3.7   CL 92* 93*   CO2 27 22*   BUN 48* 55*   CREATININE 3.8* 5.6*   CALCIUM 9.1 8.0*   ALT 10  --    AST 15  --    ALBUMIN 3.1*  3.1*  --    BILITOT 0.4  --          Vitals:  Temp: 98.7 °F (37.1 °C) (06/13/17 0449)  Pulse: 80 (06/13/17 0449)  Resp: 18 (06/13/17 0449)  BP: 125/66 (06/13/17 0449)  SpO2: 96 % (06/13/17 0449)     Physical Exam:  ASA: 2  Mallampati: 3    General: no acute distress  Mental Status: alert and oriented to person, place and time  HEENT: normocephalic, atraumatic  Chest: unlabored breathing  Heart: regular heart rate  Abdomen: nondistended  Extremity: moves all extremities    Plan: bilateral nephrostomy tube placement  Sedation Plan: moderate    Nadja Boles MD  PGY-3  Department of Radiology  Pager: [phone]

## 2017-06-13 NOTE — PROGRESS NOTES
Pt arrived to [facility] 2. Report received from EDITH Romo. Dressing to back dry and intact.

## 2017-06-13 NOTE — SEDATION DOCUMENTATION
Report received from EDITH Finley. Pt resting comfortably on table with access to Right kidney by . No complaints at this time. Will resume care and continue to monitor.

## 2017-06-13 NOTE — PROGRESS NOTES
[facility]  Urology  Progress Note    Patient Name: Amador Harrison  MRN: [id number]  Admission Date: 6/12/2017  Hospital Length of Stay: 1 days  Code Status: Full Code   Attending Provider: Larry Wilde MD   Primary Care Physician: Waqar Brady MD    Subjective:     HPI:  Amador Harrison is a 61 y.o. male with muscle invasive bladder cancer. He originally presented with gross hematuria and underwent TURBT, pathology T2.     He then began neoadjuvant chemotherapy with Dr. Austin until recently when his serum creatinine began to rise. An ultrasound showed that he had right hydronephrosis. On 6/8/17, we performed cystoscopy, TURBT which demonstrated a tumor infiltration of the trigone. Neither ureteral orifice could be identified.     He was seen by Dr. Wilde in clinic today where his catheter was removed. He has been doing well since the procedure however labs today showed his creatinine is up to 3.8 from 1.9 a week ago. He was sent to the ED for admission.     He is complaining of left sided back pain that has been present for a week. He denies nausea, fevers, chills. He has urinated since his cathter was removed.     Interval History:   No acute events overnight  Denies pain  No nausea    Review of Systems  Objective:     Temp:  [98.4 °F (36.9 °C)-99.1 °F (37.3 °C)] 98.7 °F (37.1 °C)  Pulse:  [64-85] 80  Resp:  [16-18] 18  SpO2:  [94 %-99 %] 96 %  BP: (125-159)/(65-81) 125/66     Body mass index is 26.63 kg/m².            Drains          No matching active lines, drains, or airways          Physical Exam   Constitutional: He is oriented to person, place, and time. He appears well-developed and well-nourished. He appears ill. No distress.   HENT:   Head: Normocephalic and atraumatic.   Eyes: No scleral icterus.   Neck: No JVD present.   Cardiovascular: Normal rate and regular rhythm.    Pulmonary/Chest: Effort normal. No respiratory distress.   Abdominal: Soft. He exhibits no distension.  There is no tenderness. There is no rebound, no guarding and no CVA tenderness.   Neurological: He is alert and oriented to person, place, and time.   Skin: He is not diaphoretic.     Psychiatric: He has a normal mood and affect. His behavior is normal. Thought content normal.       Significant Labs:    BMP:    Recent Labs  Lab 06/12/17  0919 06/13/17  0434   * 128*   K 3.7 3.7   CL 92* 93*   CO2 27 22*   BUN 48* 55*   CREATININE 3.8* 5.6*   CALCIUM 9.1 8.0*       CBC:     Recent Labs  Lab 06/12/17  0919 06/13/17  0434   WBC 9.23 10.09   HGB 9.3* 8.6*   HCT 26.5* 25.7*   * 143*       All pertinent labs results from the past 24 hours have been reviewed.    Significant Imaging:  All pertinent imaging results/findings from the past 24 hours have been reviewed.      Assessment/Plan:     * LOIDA (acute kidney injury)    Amador Harrison is a 61 y.o. male with T2 bladder cancer, currently undergoing neoadjuvant chemotherapy now with LOIDA and known right hydronephrosis    - NPO   - To IR this am for bilateral neph tube placement  - IVF  - ok for diet after procedure  - ambulate/oob  - ALEJANDRO/SCDs            VTE Risk Mitigation         Ordered     Medium Risk of VTE  Once      06/12/17 1312     Place ALEJANDRO hose  Until discontinued      06/12/17 1312     Place sequential compression device  Until discontinued      06/12/17 1312          Wendy Del Valle MD  Urology  [facility]

## 2017-06-13 NOTE — PROGRESS NOTES
Dr. cartwright  Notified  Of  Pt's  Urine  Output   Of  150cc   Of  Bloody   Urine  .  Dr. cartwright   Stated   Ok.  No  New  Orders   Given.

## 2017-06-14 NOTE — PHYSICIAN QUERY
"PT Name: Amador Harrison  MR #: [id number]    Physician Query Form - Hematology Clarification      CDS/: Brandy E Capley               Contact information:  Ext.  [phone]    This form is a permanent document in the medical record.      Query Date: June 14, 2017    By submitting this query, we are merely seeking further clarification of documentation. Please utilize your independent clinical judgment when addressing the question(s) below.    The Medical record contains the following:   Indicators  Supporting Clinical Findings Location in Medical Record    "Anemia" documented     X H & H = Hemoglobin/ Hematocrit= 9.3/26.5 --> 8.6/25.7 --> 8.2/24.5 Labs 6/12 --> 6/13 --> 6/14    BP =                     HR=      "GI bleeding" documented      Acute bleeding (Non GI site)      Transfusion(s)      Treatment:     X Other:  Dr. cartwright  Notified  Of  Pt's  Urine  Output   Of  150cc   Of  Bloody   Urine  .  Dr. cartwright   Stated   Ok.  No  New  Orders   Given.    Procedure:      Bilateral percutaneous nephrostomy   Estimated Blood Loss: Minimal      * LOIDA (acute kidney injury)  Amador Harrison is a 61 y.o. male with T2 bladder cancer, currently undergoing neoadjuvant chemotherapy now with LOIDA and known right hydronephrosis      Genitourinary Comments: Bilateral neph tubes in place draining clear red urine    Nursing progress note 6/13          IR procedure  Note 6/13      Urology progress note 6/14          Urology progress note 6/14             Provider, please specify diagnosis or diagnoses associated with above clinical findings.    [  ] Acute blood loss anemia  [  ] Chronic blood loss anemia  [ x ] Anemia of chronic disease ( Specify chronic disease)               [ x ] Neoplastic disease               [ x ] Due to Chemotherapy               [  ] Other (Specify) _______________________________              [  ] Clinically Undetermined   [  ] Other Hematological Diagnosis (please specify): " _________________________________  [  ] Clinically Undetermined       Please document in your progress notes daily for the duration of treatment, until resolved, and include in your discharge summary.

## 2017-06-14 NOTE — DISCHARGE SUMMARY
[facility]  Urology  Discharge Summary      Patient Name: Amador Harrison  MRN: [id number]  Admission Date: 6/12/2017  Hospital Length of Stay: 2 days  Discharge Date and Time:  06/14/2017 1:09 PM  Attending Physician: Larry Wilde MD  Discharging Provider: Wendy Del Valle MD  Primary Care Physician: Waqar Brady MD    HPI:   Amador Harrison is a 61 y.o. male with muscle invasive bladder cancer. He originally presented with gross hematuria and underwent TURBT, pathology T2.      He then began neoadjuvant chemotherapy with Dr. Austin until recently when his serum creatinine began to rise. An ultrasound showed that he had right hydronephrosis. On 6/8/17, we performed cystoscopy, TURBT which demonstrated a tumor infiltration of the trigone. Neither ureteral orifice could be identified.      He was seen by Dr. Wilde in clinic today where his catheter was removed. He has been doing well since the procedure however labs today showed his creatinine is up to 3.8 from 1.9 a week ago. He was sent to the ED for admission.      He is complaining of left sided back pain that has been present for a week. He denies nausea, fevers, chills. He has urinated since his cathter was removed.     * No surgery found *     Indwelling Lines/Drains at time of discharge:   Lines/Drains/Airways     Drain                 Nephrostomy 06/13/17 1005 Left 8 Fr. 1 day         Nephrostomy 06/13/17 1124 Right 8 Fr. 1 day                Hospital Course (synopsis of major diagnoses, care, treatment, and services provided during the course of the hospital stay): Patient was sent to the ED from clinic with elevated Cr. He underwent CT which showed bilateral hydronephrosis. On HD 1 he underwent bilateral nephrostomy tube placement. His creatine began trending down following this. He was discharged home on HD 2.     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    Specialty:  Urology  Contact information:  Chaitanya CACERES  [facility] [id number] [phone]                 Patient Instructions:     CBC auto differential   Standing Status: Future  Standing Exp. Date: 08/13/18     Diet general     Activity as tolerated     Call MD for:  temperature >100.4     Call MD for:  persistent nausea and vomiting or diarrhea     Call MD for:  severe uncontrolled pain     Leave dressing on - Keep it clean, dry, and intact until clinic visit       Medications:  Reconciled Home Medications:   Discharge Medication List as of 6/14/2017 10:38 AM      START taking these medications    Details   !! oxycodone-acetaminophen (PERCOCET) 5-325 mg per tablet Take 1 tablet by mouth every 4 (four) hours as needed for Pain., Starting Tue 6/13/2017, Print      !! polyethylene glycol (GLYCOLAX) 17 gram PwPk Take 17 g by mouth once daily., Starting Tue 6/13/2017, Print       !! - Potential duplicate medications found. Please discuss with provider.      CONTINUE these medications which have NOT CHANGED    Details   docusate sodium (COLACE) 100 MG capsule Take 1 capsule (100 mg total) by mouth 2 (two) times daily., Starting 3/7/2017, Until Discontinued, OTC      doxazosin (CARDURA) 4 MG tablet Take 1 tablet (4 mg total) by mouth every evening., Starting 9/9/2016, Until Discontinued, Normal      etodolac (LODINE) 400 MG tablet Take 400 mg by mouth once daily., Historical Med      glucosamine-chondroitin 500-400 mg tablet Take 1 tablet by mouth 3 (three) times daily., Until  Discontinued, Historical Med      !! indapamide (LOZOL) 1.25 MG Tab Take 2.5 mg by mouth once daily., Until Discontinued, Historical Med      !! indapamide (LOZOL) 2.5 MG Tab Starting Wed 3/8/2017, Historical Med      lactulose (CHRONULAC) 10 gram/15 mL solution Starting Tue 5/23/2017, Historical Med      lidocaine-prilocaine (EMLA) cream Apply to port 30-45 minutes prior to being accessed., Normal      lubiprostone (AMITIZA) 24 MCG Cap Take 1 capsule (24 mcg total) by mouth 2 (two) times daily., Starting 3/7/2017, Until Discontinued, Normal      magnesium oxide (MAGOX) 400 mg tablet Take 1 tablet (400 mg total) by mouth once daily., Starting 4/10/2017, Until Tue 4/10/18, Normal      !! metoprolol succinate (TOPROL-XL) 100 MG 24 hr tablet Starting u 4/6/2017, Historical Med      !! metoprolol succinate (TOPROL-XL) 50 MG 24 hr tablet Take 50 mg by mouth nightly. , Until Discontinued, Historical Med      multivitamin (ONE DAILY MULTIVITAMIN) per tablet Take 1 tablet by mouth once daily., Until Discontinued, Historical Med      nitrofurantoin, macrocrystal-monohydrate, (MACROBID) 100 MG capsule Take 100 mg by mouth every 12 (twelve) hours., Historical Med      ondansetron (ZOFRAN-ODT) 8 MG TbDL Take 1 tablet (8 mg total) by mouth every 12 (twelve) hours as needed., Starting 3/13/2017, Until Tue 3/13/18, Normal      !! oxybutynin (DITROPAN) 5 MG Tab Starting Sun 5/7/2017, Historical Med      !! oxybutynin (DITROPAN) 5 MG Tab Starting Sun 5/7/2017, Historical Med      !! oxycodone-acetaminophen (PERCOCET) 5-325 mg per tablet Take 1-2 tablets by mouth every 4 (four) hours as needed., Starting Thu 6/8/2017, Print      phenazopyridine (PYRIDIUM) 100 MG tablet Take 2 tablets (200 mg total) by mouth 3 (three) times daily as needed for Pain (Burning)., Starting 5/1/2017, Until Discontinued, Normal      !! polyethylene glycol (GLYCOLAX) 17 gram PwPk Take 17 g by mouth once daily., Starting Thu 6/8/2017, Print      tamsulosin  (FLOMAX) 0.4 mg Cp24 Starting Mon 5/15/2017, Historical Med      valsartan (DIOVAN) 160 MG tablet Take 320 mg by mouth once daily. , Until Discontinued, Historical Med       !! - Potential duplicate medications found. Please discuss with provider.          Time spent on the discharge of patient: 20 minutes    Wendy Del Valle MD  Urology  [facility]

## 2017-06-14 NOTE — PROGRESS NOTES
Pt discharged in stable condition, discharge instructions and prescriptions given, pt verbalized understanding. Surgical site intact.pt waiting on removal of Port

## 2017-06-14 NOTE — TELEPHONE ENCOUNTER
----- Message from Wedny Del Valle MD sent at 6/14/2017  8:53 AM CDT -----  Hey June,   Can you please set this patient up with Dr. Wilde on Monday with a cbc prior?    Thanks,  wendy

## 2017-06-14 NOTE — PROGRESS NOTES
Right upper arm port currently accessed; paged on-call to obtain heparin flush orders to deaccess prior to discharge. Awaiting return response, will continue to monitor.

## 2017-06-14 NOTE — SUBJECTIVE & OBJECTIVE
Interval History:   No acute events overnight  Bilateral neph tubes placed without issues  Pain is well controlled this am  Ambulating well  No nausea, tolerating diet    Review of Systems  Objective:     Temp:  [98.4 °F (36.9 °C)-99.8 °F (37.7 °C)] 98.7 °F (37.1 °C)  Pulse:  [64-96] 92  Resp:  [10-19] 17  SpO2:  [95 %-100 %] 97 %  BP: (113-156)/(53-78) 113/60     Body mass index is 26.63 kg/m².            Drains     Drain                 Nephrostomy 06/13/17 1005 Left 8 Fr. less than 1 day         Nephrostomy 06/13/17 1124 Right 8 Fr. less than 1 day                Physical Exam   Constitutional: He is oriented to person, place, and time. He appears well-developed and well-nourished. He appears ill. No distress.   HENT:   Head: Normocephalic and atraumatic.   Eyes: No scleral icterus.   Neck: No JVD present.   Cardiovascular: Normal rate and regular rhythm.    Pulmonary/Chest: Effort normal. No respiratory distress.   Abdominal: Soft. He exhibits no distension. There is no tenderness. There is no rebound, no guarding and no CVA tenderness.   Genitourinary:   Genitourinary Comments: Bilateral neph tubes in place draining clear red urine   Neurological: He is alert and oriented to person, place, and time.   Skin: He is not diaphoretic.     Psychiatric: He has a normal mood and affect. His behavior is normal. Thought content normal.       Significant Labs:    BMP:    Recent Labs  Lab 06/13/17  0434 06/13/17  1630 06/14/17  0435   * 131* 136   K 3.7 3.4* 4.0   CL 93* 96 101   CO2 22* 22* 28   BUN 55* 49* 40*   CREATININE 5.6* 4.2* 2.7*   CALCIUM 8.0* 8.2* 7.9*       CBC:     Recent Labs  Lab 06/12/17  0919 06/13/17  0434 06/14/17  0435   WBC 9.23 10.09 8.59   HGB 9.3* 8.6* 8.2*   HCT 26.5* 25.7* 24.5*   * 143* 147*       All pertinent labs results from the past 24 hours have been reviewed.    Significant Imaging:  All pertinent imaging results/findings from the past 24 hours have been  reviewed.

## 2017-06-14 NOTE — PROGRESS NOTES
[facility]  Urology  Progress Note    Patient Name: Amador Harrison  MRN: [id number]  Admission Date: 6/12/2017  Hospital Length of Stay: 2 days  Code Status: Full Code   Attending Provider: Larry Wilde MD   Primary Care Physician: Waqar Brady MD    Subjective:     HPI:  Amador Harrison is a 61 y.o. male with muscle invasive bladder cancer. He originally presented with gross hematuria and underwent TURBT, pathology T2.     He then began neoadjuvant chemotherapy with Dr. Austin until recently when his serum creatinine began to rise. An ultrasound showed that he had right hydronephrosis. On 6/8/17, we performed cystoscopy, TURBT which demonstrated a tumor infiltration of the trigone. Neither ureteral orifice could be identified.     He was seen by Dr. Wilde in clinic today where his catheter was removed. He has been doing well since the procedure however labs today showed his creatinine is up to 3.8 from 1.9 a week ago. He was sent to the ED for admission.     He is complaining of left sided back pain that has been present for a week. He denies nausea, fevers, chills. He has urinated since his cathter was removed.     Interval History:   No acute events overnight  Bilateral neph tubes placed without issues  Pain is well controlled this am  Ambulating well  No nausea, tolerating diet    Review of Systems  Objective:     Temp:  [98.4 °F (36.9 °C)-99.8 °F (37.7 °C)] 98.7 °F (37.1 °C)  Pulse:  [64-96] 92  Resp:  [10-19] 17  SpO2:  [95 %-100 %] 97 %  BP: (113-156)/(53-78) 113/60     Body mass index is 26.63 kg/m².            Drains     Drain                 Nephrostomy 06/13/17 1005 Left 8 Fr. less than 1 day         Nephrostomy 06/13/17 1124 Right 8 Fr. less than 1 day                Physical Exam   Constitutional: He is oriented to person, place, and time. He appears well-developed and well-nourished. He appears ill. No distress.   HENT:   Head: Normocephalic and atraumatic.   Eyes: No  scleral icterus.   Neck: No JVD present.   Cardiovascular: Normal rate and regular rhythm.    Pulmonary/Chest: Effort normal. No respiratory distress.   Abdominal: Soft. He exhibits no distension. There is no tenderness. There is no rebound, no guarding and no CVA tenderness.   Genitourinary:   Genitourinary Comments: Bilateral neph tubes in place draining clear red urine   Neurological: He is alert and oriented to person, place, and time.   Skin: He is not diaphoretic.     Psychiatric: He has a normal mood and affect. His behavior is normal. Thought content normal.       Significant Labs:    BMP:    Recent Labs  Lab 06/13/17  0434 06/13/17  1630 06/14/17  0435   * 131* 136   K 3.7 3.4* 4.0   CL 93* 96 101   CO2 22* 22* 28   BUN 55* 49* 40*   CREATININE 5.6* 4.2* 2.7*   CALCIUM 8.0* 8.2* 7.9*       CBC:     Recent Labs  Lab 06/12/17  0919 06/13/17  0434 06/14/17  0435   WBC 9.23 10.09 8.59   HGB 9.3* 8.6* 8.2*   HCT 26.5* 25.7* 24.5*   * 143* 147*       All pertinent labs results from the past 24 hours have been reviewed.    Significant Imaging:  All pertinent imaging results/findings from the past 24 hours have been reviewed.                  Assessment/Plan:     * LOIDA (acute kidney injury)    Amador Harrison is a 61 y.o. male with T2 bladder cancer, currently undergoing neoadjuvant chemotherapy now with LOIDA and known right hydronephrosis    - Regular diet  - Creatine much improved after neph tube placement  - ambulate/oob  - ALEJANDRO/SCDs    DC home today, plan for cystectomy next Wednesday             VTE Risk Mitigation         Ordered     Medium Risk of VTE  Once      06/12/17 1312     Place ALEJANDRO hose  Until discontinued      06/12/17 1312     Place sequential compression device  Until discontinued      06/12/17 1312          Wendy Del Valle MD  Urology  [facility]

## 2017-06-16 NOTE — PRE ADMISSION SCREENING
Anesthesia Assessment: Preoperative EQUATION    Planned Procedure: Procedure(s) (LRB):  ROBOTIC ASSISTED LAPAROSCOPIC CYSTECTOMY W/ ILEAL CONDUIT (N/A)  Requested Anesthesia Type:General  Surgeon: Larry Wilde MD  Service: Urology  Known or anticipated Date of Surgery:6/21/2017    Surgeon notes: reviewed    Electronic QUestionnaire Assessment completed via nurse interview with patient.      NO AQ    Triage considerations:     The patient has no apparent active cardiac condition (No unstable coronary Syndrome such as severe unstable angina or recent [<1 month] myocardial infarction, decompensated CHF, severe valvular   disease or significant arrhythmia)    Previous anesthesia records:LMA General    06/08/17 0849  Inserted by: CRNA; Airway Device: LMA; Intubated: Postinduction; Airway Device Size: 4.0; Style: Cuffed; Cuff Inflation: Minimal occlusive pressure; Complicating Factors: None; Intubation Findings: Positive EtCO2, Bilateral breath sounds, Atraumatic/Condition of teeth unchanged; Securment: Lips; Complications: None; Removal Date: 06/08/17;  Removal Time: 0932    Airway/Jaw/Neck:  Airway Findings: Mouth Opening: Normal Tongue: Normal  General Airway Assessment: Adult  Mallampati: I  TM Distance: 4 - 6 cm  Jaw/Neck Findings:  Neck ROM: Normal ROM      Dental:  Dental Findings: In tact     Last PCP note: outside [facility]   Subspecialty notes: Hematology/Oncology  6/5/17    Other important co-morbidities:  HTN, LOIDA, S/P Chemotherapy 6/5/17, Has Port in arm     Tests already available:  Available tests,  within 1 month , within [facility] . 6/2017  CBC, BMP, CMP, PT/INR, PTT            Instructions given. (See in Nurse's note)    Optimization:  Anesthesia Preop Clinic Assessment  Indicated-Not required due to recent anesthesia record.    Medical Opinion Indicated-Indicated per Dr España           Plan:    Testing:  CBC, T&S and T&C        Consultation:[facility]-Dr Gabriel  6/26     Patient   has previously scheduled Medical Appointment:  6/19 Lab and Dr Wilde    Navigation: Tests Scheduled. T&S, T&C 2 U PRBCS, CBC  6/26                         Results will be tracked by Preop Clinic  6/26/17  Patient is optimized  for the planned surgery   Dr NOAH Gabriel MD [facility]   [facility]    Christina Almonte RN

## 2017-06-16 NOTE — LETTER
June 16, 2017      Larry Wilde MD  [address]  [facility] [address]           [facility]  [address]  [facility] [address]  Phone: [phone]          Patient: Amador Harrison   MR Number: [id number]   YOB: 1955   Date of Visit: 6/16/2017       Dear Dr. Larry Wilde:    Thank you for referring Amador Harrison to me for evaluation. Attached you will find relevant portions of my assessment and plan of care.    If you have questions, please do not hesitate to call me. I look forward to following Amador Harrison along with you.    Sincerely,    Argelia Nichols, CNS    Enclosure  CC:  No Recipients    If you would like to receive this communication electronically, please contact [email or web address] or [phone] to request more information on [employer] access.    For providers and/or their staff who would like to refer a patient to [facility], please contact us through our one-stop-shop provider referral line, [facility] , at [phone].    If you feel you have received this communication in error or would no longer like to receive these types of communications, please e-mail [email or web address]

## 2017-06-16 NOTE — PROGRESS NOTES
Subjective:       Patient ID: Amador Harrison is a 61 y.o. male.    Chief Complaint: Pre-op Marking and Urostomy    This is new pt to enterostomal therapy for pre op teaching and marking for urinary stoma. 6/21 with Dr Wilde. Pt comes with wife today. He has 2 nephrostomy tubes that are bothersome and almost looking forward to the urostomy.       Review of Systems   Constitutional: Negative for activity change, appetite change and fever.   Respiratory: Negative for cough and shortness of breath.    Cardiovascular: Positive for leg swelling. Negative for chest pain.   Gastrointestinal: Negative for abdominal distention, nausea and vomiting.   Genitourinary: Negative for dysuria and flank pain.   Musculoskeletal: Negative.    Skin: Negative for color change, rash and wound.   Neurological: Negative for weakness and headaches.   Psychiatric/Behavioral: Negative.        Objective:      Physical Exam   Constitutional: He is oriented to person, place, and time. He appears well-developed and well-nourished.   Pulmonary/Chest: Effort normal. No respiratory distress. He has no wheezes.   Abdominal: Soft. Bowel sounds are normal. He exhibits no distension.   Musculoskeletal: Normal range of motion. He exhibits no edema.   Neurological: He is alert and oriented to person, place, and time.   Skin: Skin is warm and dry. No erythema.   Psychiatric: He has a normal mood and affect.     Pre op Ostomy marking:    This patient was seen today per request 1 in preparation for upcoming ostomy surgery on 6/21  Stoma marking/siting was performed per protocol and patient marked with a permanent marker and skin staining with silver nitrate.     The proposed surgery was discussed and patient educated on expected postoperative course and expectations. The patient was allowed to ask questions and was also given pre-op information kit from  the [employer] for review at home prior to surgery.    Assessment:       1.  Attention to urostomy    2. Encounter for ostomy care education        Plan:       Pre op siting per guidelines for stoma placement  Pre op teaching for living with urostomy  Answered questions  Assessed neph tubes and hoepfully remedied leak  I have reviewed the plan of care with the patient and/ wife and they express understanding. I spent over 50% of this 20 minute visit in face to face counseling.

## 2017-06-19 NOTE — PATIENT INSTRUCTIONS
Cystectomy  Cystectomy is surgery to remove the urinary bladder. It may be done in certain cases of bladder cancer. Your doctor can discuss the risks and benefits of the surgery with you.     In men, the bladder, lymph nodes, prostate, and often the urethra are removed.         In women, the bladder, uterus, cervix, lymph nodes, urethra, and sometimes part of the vagina are removed.      Preparing for surgery  Youll be told how to prepare for surgery. These instructions may include:  · Donating your own blood a few days before the surgery. This is in case you need a transfusion during the surgery.  · Taking antibiotics before surgery. This is to help prevent infection.  · Not eating or drinking anything a certain amount of time before surgery.  · Clearing your intestine. You may do this by drinking a special liquid at home. Or you may be admitted to the hospital the night before surgery and given medication and enemas to empty the intestine.  Removing the bladder  The surgery is done in a hospital. It takes about 4 to 6 hours. Just before surgery, youll be given medication called general anesthesia to prevent pain. This puts you into a state like deep sleep during the surgery. An incision is then made near your bellybutton. This exposes the bladder. The area around your bladder is examined to see if the cancer has spread. If it has, the surgery may not be continued. If the cancer is only in the bladder, the bladder is removed. Other organs near the bladder may be removed as well. This is in case cancer cells may have spread to those organs.  Creating a new path for urine  After the bladder is removed, you will need a new way to store and release urine. There are 3 different options to accomplish this task. Talk to your health care provider about which option would be best for you. The options include:  · Incontinent diversion: A short piece of intestine is removed and connected to the ureters while the other end  is connected to the skin on the front of the abdomen forming an opening called a stoma. This procedure is called a urostomy. A small bag is placed over the stoma to collect the urine.   · Continent diversion: A valve is created in a pouch made from a piece of intestine. You can empty the pouch several times a day by inserting a drainage tube (catheter) into the stoma through the valve. This method does not require a collection bag.   · Neobladder: A new bladder is created from a piece of intestine and urine is routed back into the urethra, restoring urination. Over time most people are able return to normal urination during the day but many may still have incontinence at night.   Risks and possible complications  · Infection  · Bleeding that requires a transfusion  · Blockage of intestine  · Inability to have an erection  · Blood clot in the veins because of physical inactivity   Date Last Reviewed: 9/22/2014  © 5186-1936 [employer] [address] [place] [address], [place] [address]. All rights reserved. This information is not intended as a substitute for professional medical care. Always follow your healthcare professional's instructions.

## 2017-06-19 NOTE — PROGRESS NOTES
Subjective:       Patient ID: Amador Harrison is a 61 y.o. male.    Chief Complaint: No chief complaint on file.      HPI: Amador Harrison is a 61 y.o. White male who returns today in follow-up for invasive bladder cancer s/p bilateral percutaneous nephrostomy tube placement on 6/13/17.    The patient presented with gross hematuria and was found to have a bladder tumor which was resected on 3/3/2017. Pathology from that TURBT revealed high-grade T2 bladder cancer.    The patient had a CT urogram on 1/31/2017 that did not show any locally advanced or distant metastatic abdominal disease.    He has a remote smoking history.    After that initial visit, the patient began neoadjuvant chemotherapy with Dr. Austin until recently when his serum creatinine began to rise. An ultrasound showed that he had right hydronephrosis. On 6/8/17, we performed cystoscopy, TURBT which demonstrated a tumor infiltration of the trigone. We could not identify either ureteral orifice for stent placement and decompression of his upper tracts.    The patient required bilateral nephrostomy tube placement due to acute kidney injury and bilateral hydronephrosis. His renal function has responded appropriately with decompression.    Prior to discharge from the hospital, he was anemic and relatively thrombocytopenic due to his chemotherapy. He returns today to recheck his blood work before proceeding with radical cystectomy.    He is doing well with his nephrostomy tubes in place with the expected complaints. He is having some bladder spasms which are bothersome.    Review of patient's allergies indicates:  No Known Allergies    Current Outpatient Prescriptions   Medication Sig Dispense Refill    docusate sodium (COLACE) 100 MG capsule Take 1 capsule (100 mg total) by mouth 2 (two) times daily.  0    doxazosin (CARDURA) 4 MG tablet Take 1 tablet (4 mg total) by mouth every evening. (Patient taking differently: Take 4 mg by mouth once daily. )  90 tablet 3    etodolac (LODINE) 400 MG tablet Take 400 mg by mouth once daily.      glucosamine-chondroitin 500-400 mg tablet Take 1 tablet by mouth 3 (three) times daily.      indapamide (LOZOL) 1.25 MG Tab Take 2.5 mg by mouth once daily.      indapamide (LOZOL) 2.5 MG Tab       lactulose (CHRONULAC) 10 gram/15 mL solution       lidocaine-prilocaine (EMLA) cream Apply to port 30-45 minutes prior to being accessed. 5 g 0    lubiprostone (AMITIZA) 24 MCG Cap Take 1 capsule (24 mcg total) by mouth 2 (two) times daily. 60 capsule 2    magnesium oxide (MAGOX) 400 mg tablet Take 1 tablet (400 mg total) by mouth once daily. 60 tablet 1    metoprolol succinate (TOPROL-XL) 100 MG 24 hr tablet       metoprolol succinate (TOPROL-XL) 50 MG 24 hr tablet Take 50 mg by mouth nightly.       multivitamin (ONE DAILY MULTIVITAMIN) per tablet Take 1 tablet by mouth once daily.      nitrofurantoin, macrocrystal-monohydrate, (MACROBID) 100 MG capsule Take 100 mg by mouth every 12 (twelve) hours.      ondansetron (ZOFRAN-ODT) 8 MG TbDL Take 1 tablet (8 mg total) by mouth every 12 (twelve) hours as needed. 30 tablet 1    oxybutynin (DITROPAN) 5 MG Tab       oxybutynin (DITROPAN) 5 MG Tab       oxycodone-acetaminophen (PERCOCET) 5-325 mg per tablet Take 1-2 tablets by mouth every 4 (four) hours as needed. 31 tablet 0    oxycodone-acetaminophen (PERCOCET) 5-325 mg per tablet Take 1 tablet by mouth every 4 (four) hours as needed for Pain. 21 tablet 0    phenazopyridine (PYRIDIUM) 100 MG tablet Take 2 tablets (200 mg total) by mouth 3 (three) times daily as needed for Pain (Burning). 60 tablet 1    polyethylene glycol (GLYCOLAX) 17 gram PwPk Take 17 g by mouth once daily. 30 packet 0    polyethylene glycol (GLYCOLAX) 17 gram PwPk Take 17 g by mouth once daily. 30 packet 0    tamsulosin (FLOMAX) 0.4 mg Cp24       valsartan (DIOVAN) 160 MG tablet Take 320 mg by mouth once daily.        No current facility-administered  medications for this visit.        Past Medical History:   Diagnosis Date    Cancer     Hypertension     Hypertrophy of prostate without urinary obstruction and other lower urinary tract symptoms (LUTS)     Nocturia     Nocturia        Past Surgical History:   Procedure Laterality Date    CARPAL TUNNEL RELEASE      Left hand    CYSTOSCOPY      CYSTOSCOPY W/ URETERAL STENT PLACEMENT      HERNIA REPAIR Bilateral     x 2 separate surgeries. 1 as an infant and another at approx age 18    hydrocele      at age 3    TONSILLECTOMY      turbt 2017      VASECTOMY         History reviewed. No pertinent family history.    Review of Systems    Review of Systems   Constitutional: Negative for fever, chills, activity change, appetite change and unexpected weight change.   HENT: Negative for congestion, nosebleeds, sneezing, sore throat and trouble swallowing.    Eyes: Negative for pain, discharge, redness and visual disturbance.   Respiratory: Negative for cough, choking, chest tightness and shortness of breath.    Cardiovascular: Negative for chest pain, palpitations and leg swelling.   Gastrointestinal: Negative for nausea, vomiting, abdominal pain, diarrhea, blood in stool, abdominal distention and anal bleeding.  Genitourinary: As documented per HPI   Endocrine: Negative for cold intolerance, heat intolerance, polydipsia, polyphagia and polyuria.   Musculoskeletal: Negative for myalgias, gait problem, neck pain and neck stiffness.   Skin: Negative for color change, pallor, rash and wound.   Allergic/Immunologic: Negative for immunocompromised state.   Neurological: Negative for seizures, facial asymmetry, speech difficulty, weakness and light-headedness.   Hematological: Negative for adenopathy. Does not bruise/bleed easily.   Psychiatric/Behavioral: Negative for hallucinations, behavioral problems, self-injury and agitation. The patient is not hyperactive.    All other systems were reviewed and were negative.       Objective:     Vitals:    06/19/17 0839   BP: (!) 147/89   Pulse: 84     Physical Exam   Vitals reviewed.  Constitutional: He is oriented to person, place, and time. He appears well-developed and well-nourished. No distress.   HENT:   Head: Normocephalic and atraumatic.   Right Ear: External ear normal.   Left Ear: External ear normal.   Nose: Nose normal.   Eyes: EOM are normal. Pupils are equal, round, and reactive to light. Right eye exhibits no discharge. Left eye exhibits no discharge.   Neck: Normal range of motion. Neck supple. No tracheal deviation present. No thyroid enlargement or tenderness.   Cardiovascular: Regular rhythm and intact distal pulses. No signs of peripheral edema.    Pulmonary/Chest: Effort normal and breath sounds normal. No stridor. No respiratory distress. He has no wheezes.   Abdominal: Soft. Bowel sounds are normal. He exhibits no distension. There is no tenderness. Hernia confirmed negative in the right inguinal area and confirmed negative in the left inguinal area. No hepatic or splenic enlargement or tenderness.  Genitourinary: Penis normal. Right testis shows no mass, no swelling and no tenderness. Right testis is descended. Left testis shows no mass, no swelling and no tenderness. Left testis is descended. Circumcised. No phimosis or hypospadias. Catheter draining clear urine.  LYLY: Deferred  Musculoskeletal: Normal range of motion. He exhibits no edema.   Neurological: He is alert and oriented to person, place, and time. He exhibits normal muscle tone. Coordination normal.   Skin: Skin is warm. No rash noted.   Lymphatic: No palpable lymph nodes.  Psychiatric: He has a normal mood and affect. His behavior is normal. Judgment and thought content normal.     No results found for: PSA  Lab Results   Component Value Date    CREATININE 2.7 (H) 06/14/2017     Lab Results   Component Value Date    EGFRNONAA 24.0 (A) 06/14/2017     Lab Results   Component Value Date    ESTGFRAFRICA  27.7 (A) 06/14/2017     I personally reviewed all the patient's imaging studies.    CT urogram (1/31/2017): Thickening of the posterior bladder wall, slightly irregular appearance may represent cystitis versus neoplasm.    CT abdomen/pelvis with contrast (5/19/17): Although the patient has a history of malignancy, no measurable lesions per the RECIST criteria are identified.    CT chest (5/19/17): Examination of the lung fields demonstrates no evidence of consolidation, mass, or significant pleural thickening, nor is there evidence of pleural fluid.    Assessment:       1. Malignant neoplasm of ureteric orifice    2. Bilateral ureteral obstruction    3. Anemia of chronic disease        Plan:     Diagnoses and all orders for this visit:    Malignant neoplasm of ureteric orifice    Bilateral ureteral obstruction    Anemia of chronic disease    I had a lengthy discussion again with the patient and his wife about his recent developments.    His most recent imaging is promising, however he does appear to have progression of his local disease after two cycles of chemotherapy. I discussed the situation with Dr. Austin at length, and we both feel that it is in the patient's best interest to proceed directly to surgery at this point in light of these local findings.    With regard to surgery, I explained that there are 3 basic categories of modern urinary diversions: (1) incontinent bowel conduit (e.g. ileal conduit) (2) continent cutaneous stomal reservoire (e.g. [place] pouch) (3) continent orthotopic urethral diversion (ileal neopbladder).      I explained that an ileal conduit is the simplest, most time-tested urinary diversion that requires the least operative time and arguably is associated with the fewest complications. I described that a short piece of ileum is anastamosed to the ureters and brought onto the skin.  A life-long urostomy appliance to collect urine is required.   Although time-tested, short-term and  long-term issues with ileal conduit diversions are not uncommon.  I quoted the best data available, which in my view is the 2003 [facility] series that included long-term bladder cancer survivors who had at least 5 years of follow-up.  In this series, issues that were seen with ileal conduits were as follows: ureteral stricture/obstruction (~15%), recurrent pyelonephritis (~ 25%), urinary calculi (this complication often occurred beyond 5 to 10 years after surgery, ~10% ), impaired kidney function due to obstruction or ureteral reflux (~27%, only 2% required dialysis and all of whom had compromised kidney function prior to surgery), and stomal complications (~25%) such as parastomal hernia, stensosis, and bleeding/skin irritation.  Furthermore up to 25% of patients had bowel complications, which included small bowel obstruction, fistula formation, and diarrhea.  I explained that although many issues that arise can be handled conservatively, some do require re-operation.      Then we discussed the ileal neobladder.  I explained that the neobladder is constructed from detubularized bowel, which is then sewn into a spherical reservoir for urine storage.  The neobladder is anastamosed to the ureters and to the urethra, to mimic functions of the native bladder. The goal is for the patient to void spontaneously using the Valsalva menuver.  Choice of proceeding with the neobladder vs. ileal conduit must be balanced against additional issues and risks.  Along with risks listed for ileal conduit diversion, I explained the additional potential problems that can arise with the ileal neobladder.   I described risks of urinary leaks from a number of suture lines and explained that in general these can be managed conservatively.  I stressed that a life-long risk of pouch perforation exists.  We discussed risks of incontinence and hypercontinence.  I explained that it is critical to integrate these risks into the decision  to proceed with the neobladder.  I quoted the risks of day-time incontinence to be on the order of 10%, while the risk of nighttime incontinence to be approximately 20%.  With regard to hypercontinence and poor pouch emptying, I explained that approximately 5% of male patients will require life-long intermittent catheterization.  Such risks can reach 30% in female patients with neobladders.  Furthemore, neobladder-vaginal fistula formation is a great concern when a neobladder is constructed in women (~5%).  I explained that continent urinary diversions are contra-indicated in patients with renal insufficiency, but did explain that metabolic disturbances are possible even in patients with intact renal function.    With regard to [place] pouch, we discussed that this diversion consists of a pouch constructed from detubularized right colon.  The pouch is catheterized through the native ileocecal valve via a segment of the ileum that is brought onto the skin.  I explained that all risks that pertain to ileal conduits and neobladders generally apply to patients who undergo a cutaneous continent diversion.  These include risks of stomal stenosis, pouch stomal formation, incontinence, and metabolic disturbances. Furthermore, because, the ileocecal valve is resected, some post-operative diarrhea can be expected.  Such symptoms usually can be controlled with over-the-counter medications.  We reviewed the life-long catheterization and irrigation regimen that is required with this diversion.  I explained that patients who chose to undergo continent diversions  such as an [place] pouch or neobladder, need to be prepared to commit to the following in the post-operative period:   Keep drains as long as needed (for neobladder for example, the catheter and suprapubic tube stay on the order of 2-3 weeks, but possibly longer)   Return for appointments as often as needed.   Empty neobladder/[place] pouch every 2 hours for 1  month   Empty neobladder/[place] pouch every 3 hours for 1 month   Empty neobladder/[place] pouch every 4 hour for the rest of my life    Risks of sexual dysfunction:  We discussed that in males rates of erectile dysfunction are substantial, since removal of the prostate - an integral part of radical cystectomy - results in compromise of the neurovascular bundles that innervate the penile corpora.  I explained that I perform nerve-sparing cystoprostatectomy whenever possible.    Other risks of the surgery were discussed in detail including medical and anesthetic complications (e.g. heart attack, stroke, deep vein thrombosis, pulmonary embolism, infection (pneumonia, etc), bleeding with possible need for transfusion, adjacent organ involvement or injury (including possible need for permanent or temporary colostomy), wound complications, reaction to medications).  We reviewed what to expect with regard to incisions, post-operative pain, and risks of subsequent hernias.     The patient's CBC is marginally better today, however his anemia is relatively severe. I had a long discussion with the patient and his wife about postponing surgery another couple of weeks to allow his anemia to improve. This extra time will also allow the patient to improve his strength and nutrition.    The patient and his wife were agreeable to this plan of action.    I answered all his and his wife's questions.    I encouraged him or any of his family members to call or email me with questions and/or concerns.    I spent 30 minutes with the patient of which more than half was spent in coordinating the patient's care as well as in direct consultation with the patient in regards to our treatment and plan.

## 2017-06-22 NOTE — ANESTHESIA PREPROCEDURE EVALUATION
Anesthesia Assessment: Preoperative EQUATION     Planned Procedure: Procedure(s) (LRB):  ROBOTIC ASSISTED LAPAROSCOPIC CYSTECTOMY W/ ILEAL CONDUIT (N/A)  Requested Anesthesia Type:General  Surgeon: Larry Wilde MD  Service: Urology  Known or anticipated Date of Surgery:6/21/2017    Updated Date of Surgery: 7/5/17   Surgeon notes: reviewed     Electronic QUestionnaire Assessment completed via nurse interview with patient.     NO AQ     Triage considerations:      The patient has no apparent active cardiac condition (No unstable coronary Syndrome such as severe unstable angina or recent [<1 month] myocardial infarction, decompensated CHF, severe valvular   disease or significant arrhythmia)     Previous anesthesia records:LMA General    06/08/17 0849  Inserted by: CRNA; Airway Device: LMA; Intubated: Postinduction; Airway Device Size: 4.0; Style: Cuffed; Cuff Inflation: Minimal occlusive pressure; Complicating Factors: None; Intubation Findings: Positive EtCO2, Bilateral breath sounds, Atraumatic/Condition of teeth unchanged; Securment: Lips; Complications: None; Removal Date: 06/08/17;  Removal Time: 0932     Airway/Jaw/Neck:  Airway Findings: Mouth Opening: Normal Tongue: Normal  General Airway Assessment: Adult  Mallampati: I  TM Distance: 4 - 6 cm  Jaw/Neck Findings:  Neck ROM: Normal ROM      Dental:  Dental Findings: In tact      Last PCP note: outside [facility]   Subspecialty notes: Hematology/Oncology  6/5/17     Other important co-morbidities:  HTN, LOIDA, S/P Chemotherapy 6/5/17, Has Port in arm     Tests already available:  Available tests,  within 1 month , within [facility] . 6/2017  CBC, BMP, CMP, PT/INR, PTT     Instructions given. (See in Nurse's note)     Optimization:  Anesthesia Preop Clinic Assessment  Indicated-Not required due to recent anesthesia record.    Medical Opinion Indicated-Indicated per Dr España     Plan:    Testing:  T&S and T&C, CBC                       Consultation: Dr Gabriel   6/26/17                                                 Patient  has previously scheduled Medical Appointment:  6/19 Lab and Dr Wilde     Navigation: Tests Scheduled. T&S, T&C 2 U PRBCS  CBC  6/26                                     Results will be tracked by Preop Clinic.     6/26/17  Patient is optimized  for the planned surgery   Dr NOAH Gabriel MD MRCP ( ),[facility]  Christina Almonte RN                                         Electronically signed by Christina Almonte RN at 6/16/2017 11:21 AM                                                                                                                     06/22/2017  Amador Harrison is a 61 y.o., male.    Anesthesia Evaluation    I have reviewed the Patient Summary Reports.    I have reviewed the Nursing Notes.   I have reviewed the Medications.     Review of Systems  Anesthesia Hx:  No problems with previous Anesthesia    Hematology/Oncology:         -- Anemia: s/p chemotherapy Current/Recent Cancer. chemotherapy Oncology Comments: Bladder CA     EENT/Dental:EENT/Dental Normal   Cardiovascular:   Hypertension, well controlled    Pulmonary:  Pulmonary Normal    Renal/:   Chronic Renal Disease 6/12/17 In hospital for Acute Kidney Injury   Hepatic/GI:  Hepatic/GI Normal    Neurological:  Neurology Normal    Endocrine:  Endocrine Normal        Physical Exam  General:  Well nourished    Airway/Jaw/Neck:  Airway Findings: Mouth Opening: Normal Tongue: Normal  General Airway Assessment: Adult  Mallampati: I      Dental:  Dental Findings: In tact   Chest/Lungs:  Chest/Lungs Findings: Clear to auscultation, Normal Respiratory Rate     Heart/Vascular:  Heart Findings: Rate: Normal  Rhythm: Regular Rhythm        Mental Status:  Mental Status Findings:  Cooperative, Alert and Oriented         Anesthesia Plan  Type of Anesthesia, risks & benefits discussed:  Anesthesia Type:  general  Patient's Preference:   Intra-op  Monitoring Plan:   Intra-op Monitoring Plan Comments:   Post Op Pain Control Plan:   Post Op Pain Control Plan Comments:   Induction:   IV  Beta Blocker:  Patient is on a Beta-Blocker and has received one dose within the past 24 hours (No further documentation required).       Informed Consent: Patient understands risks and agrees with Anesthesia plan.  Questions answered. Anesthesia consent signed with patient.  ASA Score: 3     Day of Surgery Review of History & Physical:    H&P update referred to the surgeon.         Ready For Surgery From Anesthesia Perspective.

## 2017-06-25 NOTE — PROGRESS NOTES
Chief complaint-Preoperative evaluation , Perioperative Medical management, complication reduction plan     Date of Evaluation- 06/26/2017    PCP-  Waqar Brady MD    Future cases for Amador Harrison [[id number]]     Case ID Status Date Time Carl Procedure Provider Location    [id number] [facility] 7/5/2017  7:00  ROBOTIC ASSISTED LAPAROSCOPIC CYSTECTOMY W/ ILEAL CONDUIT Larry Wilde MD [[id number]] [facility] OR 2ND FLR          History of present illness- I had the pleasure of meeting this pleasant 61 y.o. lady in the pre op clinic prior to his elective Urological surgery. The patient is new to me . Amador was accompanied by wife Joselin.    I have obtained the history by speaking to the patient and by reviewing the electronic health records.    Events leading up to surgery / History of presenting illness -      Presented with gross hematuria in jan 2017 , around [place] holiday  and was found to have a bladder tumor which was resected on 3/3/2017. Pathology from that TURBT revealed high-grade T2 bladder cancer    End of May 2017 , when his serum creatinine began to rise. An ultrasound showed that he had right hydronephrosis. On 6/8/17, he had  cystoscopy, TURBT which demonstrated a tumor infiltration of the trigone   s/p bilateral percutaneous nephrostomy tube placement on 6/13/17.    He has been troubled with  mild- moderate and severe  lower abdominal  pain for 4 months  . Pain increases with voiding and decreases with when does not void and pain medication.    Still has  bloody urine through penis   Rt sided Nephrostomy tube leaking as it enters the bag    Relevant health conditions of significance for the perioperative period/ History of presenting illness -      HTN  Avg 130/70's     Port in  Rt arm  Currently not getting chemo   Last chemo - Around May 2017    Enlarged prostate   Urine flow helped by Cardura   No recent problem with prostate    Had some chest discomfort at rest while doing stress full job, about 20 years  ago  Had exercise Stress tests , twice No CAD, heart disease to their knowledge     LOIDA   Peak creatinine was 5.6 on 6/13 - Before nephrostomy tubes   Most recent Creatinine was 2.7 from 6/14     Not known to have Diabetes Mellitus, Heart disease       Active cardiac conditions- none    Revised cardiac risk index predictors- None     Functional capacity -Examples of physical activity- active person, until recently ,   can take 1 flight of stairs,  cutting the grass with a mower,  raking lawn , painting,  planting shrubs,  weeding garden,  dancing  and  digging ----- He can undertake all the above activities without  chest pain,chest tightness, Shortness of breath ,dizziness,lightheadedness making his exercise tolerance more  than 4 Mets.        Review of symptoms    ROS    Constitutional - weight loss 5-10pounds over 4 months  ,No fever  Eyes- No new vision changes  ENT- STOP BANG - elevated blood pressure, age over 50, neck circumference over 40 cm and male sex  Body mass index is 26.62 kg/m².  Cardiac-As above   Respiratory- No cough, expectoration and  no hemoptysis  GI- Bowel movements  regular  No overt GI/ blood losses   -some hesitancy  MS- No muscle,Joint pain  Neurologic-No unilateral weakness   Psychiatric- No depression,Anxiety  Endocrine-  Prednisone use for over 3 weeks -no  Hematological/Lymphatic-No spontaneous bruising, bleeding    Past Medical history- reviewed in EPIC    Pertinent negatives-   DVT-  Pulmonary embolism-  Heart disease -  Vascular stenting -    Past Surgical history - reviewed in EPIC-    Most recent surgery / procedure-Nephrotomies - June 2017  No Anaesthetic,Bleeding ,Cardiac problems with previous surgeries-  No history of delirium-   No history of post operative  nausea, vomiting -    Family history- reviewed in EPIC-    Heart disease-father - age of onset ?-63   Thrombosis- None-  Anaesthetic complications- None-  Diabetes Mellitus - none-    Social history- reviewed in  EPIC    Lives with wife   Help available post op     Medications and Allergies - reviewed in EPIC    Exam    Physical Exam  Constitutional- Vitals - Body mass index is 26.62 kg/m².,   Vitals:    06/26/17 0853   BP: (!) 142/72   Pulse: 61   Temp: 97.3 °F (36.3 °C)   sat 98%  General appearance-Conscious,Coherent  Eyes- No conjunctival icterus,pupils  round  and reactive to light   ENT-Oral cavity- moist  , Hearing grossly normal   Neck- No thyromegaly ,Trachea -central, No jugular venous distension,   No Carotid Bruit   Cardiovascular -Heart Sounds- Normal  and  no murmur   , No gallop rhythm   Respiratory - Normal Respiratory Effort, Normal breath sounds,  Crepitationsrt mid, lower and  no wheeze    Peripheral pitting pedal edema--moderate  and  bilateral lower extremity telangiectasia , no calf pain   Gastrointestinal -Soft abdomen, No palpable masses, Non Tender,Liver,Spleen not palpable. No-- free fluid and shifting dullness  Musculoskeletal- No finger Clubbing. Strength grossly normal   Lymphatic-No Palpable cervical, axillary,Inguinal lymphadenopathy   Psychiatric - normal effect,Orientation  Rt Dorsalis pedis pulses-palpable    Lt Dorsalis pedis pulses- palpable   Rt Posterior tibial pulses -palpable   Left posterior tibial pulses -palpable   Miscellaneous -  no renal bruit, palpable abdominal aorta and  bowel sounds positive     Investigations    Review of Medicine tests    EKG- I had independently reviewed the EKG from--2/27/2017   It was reported to be showing     Sinus bradycardia  Otherwise normal ECG  No previous ECGs available    Review of clinical lab tests-  Date--6/27/2017Hemoglobin--8.7 Platelet count--162    Review of old records- Was done and information gathered regards to events leading to surgery and health conditions of significance in the perioperative period    Orders placed-      Assessment and plan    New problems to me      Preoperative  risk assessment    Cardiac    Revised cardiac risk  index ( RCRI ) predictors- 0---.Functional capacity  Is more than 4 Mets. He will be undergoing a Urological procedure that carries a intermediate risk     The estimated risk of the rate of adverse cardiac outcomes   0.4%   No further cardiac work up is indicated prior to proceeding with the surgery     American Society of Anesthesiologists Physical status classification ( ASA ) class- - 2    Postoperative pulmonary complication risk assessment    ARISCAT ( Canet) risk index- risk class -intermediate        Perioperative Medical management / Optimization    Opioid related constipation   Constipation- I suggest giving laxative regimen as opioid use,reduced ambulation  can increase the constipation     Hypertension-  Blood pressure is acceptable . I suggest continuation of -toprol XL------- during the entire perioperative period. I suggest holding HCTZ, Diovan- on the morning of the surgery and can continue that  post operatively under blood pressure, electrolyte and renal function monitoring as long as they are acceptable.I suggest addressing pain control as uncontrolled pain can increased blood pressure     Anemia:  I suggest monitoring his Hemoglobin perioperatively in view of his history of pre existing anemia.  He does not have any objections for transfusions  I suggest restrictive transfusion strategy , as long as the clinical situation permits     Low serum albumin   Suggested protein intake to help with healing      Port in  Rt arm  Currently not getting chemo   Last chemo - Around May 2017    Enlarged prostate   Understand that he will bed having that removed     LOIDA   Peak creatinine was 5.6 on 6/13 - Before nephrostomy tubes   Most recent Creatinine was 2.7 from 6/14  Checking BMP today    Edema- I suggested avoidance of added salt,avoidance of NSAID's and suggested Limb elevation and ciera hose use       Preventive perioperative care    Thromboembolic prophylaxis:  His risk factors for thrombosis include  cancer, surgical procedure and age.I suggest  thromboembolic prophylaxis ( mechanical/pharmacological, weighing the risk benefits of pharmacological agent use considering earline procedural bleeding )  during the perioperative period.I suggested being active in the post operative period.     Postoperative pulmonary complication prophylaxis-Risk factors for post operative pulmonary complications include  surgery lasting over 3 hours- I suggest head end of bed elevation, oral care, incentive Spirometry use ,  early ambulation  and  end tidal carbon dioxide  Monitoring       Renal complication prophylaxis-Risk factors for renal complications include pre-existing renal disease and Hypertension . I suggest/ Suggested  keeping him well hydrated and avoidance/ minimizing the use of  NSAID's,LEI 2 Inhibitors ,IV contrast if possible in the perioperative period.I suggested drinking 2 litre's of water a day     Surgical site Infection Prophylaxis-I  suggest appropriate antibiotic for Prophylaxis against Surgical site infections    In view of  urological procedure  the patient  is at risk of postoperative urinary retention.  I suggest avoidance / minimizing the of  Benzodiazepines,Anticholinergic medication,antihistamines ( Benadryl) , if possible in the perioperative period. I suggest using the minimum possible use of opioids for the minimum period of time in the perioperative period. Benadryl avoidance suggested     This visit was focussed on Preoperative evaluation , Perioperative Medical management, complication reduction plans and I suggest that the patient follows up with the primary care ,relevant sub specialists for on going health care      I appreciate the opportunity to be involved in this  pleasant  gentleman's care.Please feel free to contact me if there were any questions about this consultation     Patient is optimized  for the planned surgery    Dr NOAH Gabriel MD MRCP ( ),[facility]   [facility]  [facility]  [facility]   Pager [phone], Cell [phone]  ------------------------------------    6/26- 17 13          BMP showed thus his creatinine is close to his base line of about 1      Palb abdominal aorta in the epigastric region- no aortic bruit- CT -from 6/12-  The aorta demonstrates mild atherosclerotic calcification throughout its course    [facility] - 5/19/2017 CT chest- Examination of the lung fields demonstrates no evidence of consolidation, mass, or significant pleural thickening, nor is there evidence of pleural fluid  Released lab through patient portal  Called to discuss lab-  Spoke to patient   Hydration , NSAID avoidance suggested to improve renal function further  ---------------------------------------------------    6/30- 11 03     Called to follow up   Ready for surgery   Hydrating well

## 2017-06-26 PROBLEM — N40.0 ENLARGED PROSTATE: Status: ACTIVE | Noted: 2017-01-01

## 2017-06-26 PROBLEM — D64.9 ANEMIA: Status: ACTIVE | Noted: 2017-01-01

## 2017-06-26 PROBLEM — K59.00 CONSTIPATION: Status: ACTIVE | Noted: 2017-01-01

## 2017-06-26 PROBLEM — N40.0 HYPERTROPHY OF PROSTATE WITHOUT URINARY OBSTRUCTION AND OTHER LOWER URINARY TRACT SYMPTOMS (LUTS): Status: ACTIVE | Noted: 2017-01-01

## 2017-06-26 PROBLEM — Z95.828 PORT-A-CATH IN PLACE: Status: ACTIVE | Noted: 2017-01-01

## 2017-06-26 PROBLEM — N17.9 AKI (ACUTE KIDNEY INJURY): Status: RESOLVED | Noted: 2017-01-01 | Resolved: 2017-01-01

## 2017-06-26 PROBLEM — E88.09 SERUM ALBUMIN DECREASED: Status: ACTIVE | Noted: 2017-01-01

## 2017-06-26 PROBLEM — I10 HYPERTENSION: Status: ACTIVE | Noted: 2017-01-01

## 2017-06-26 PROBLEM — R60.9 EDEMA: Status: ACTIVE | Noted: 2017-01-01

## 2017-06-26 NOTE — LETTER
June 26, 2017      Rhonda G Leopold, MD  [address]  [place] [address]           [facility]  [address] [facility] [address]  Phone: [phone]          Patient: Amador Harrison   MR Number: [id number]   YOB: 1955   Date of Visit: 6/26/2017       Dear Dr. Rhonda G Leopold:    Thank you for referring Amador Harrison to me for evaluation. Attached you will find relevant portions of my assessment and plan of care.    If you have questions, please do not hesitate to call me. I look forward to following Amador Harrison along with you.    Sincerely,    Jannie Gabriel MD    Enclosure  CC:  MD Larry Robert MD Marc R Matrana, MD    If you would like to receive this communication electronically, please contact [email or web address] or [phone] to request more information on [employer] access.    For providers and/or their staff who would like to refer a patient to [facility], please contact us through our one-stop-shop provider referral line, [facility] [place], at [phone].    If you feel you have received this communication in error or would no longer like to receive these types of communications, please e-mail [email or web address]

## 2017-07-03 NOTE — TELEPHONE ENCOUNTER
Called pt to confirm arrival time of 6am for procedure on 7/5/2017. Gave pt NPO instructions and gave pt opportunity to ask questions. Pt verbalized understanding.

## 2017-07-05 PROBLEM — C67.6 MALIGNANT NEOPLASM OF URETERIC ORIFICE: Status: ACTIVE | Noted: 2017-01-01

## 2017-07-05 NOTE — ANESTHESIA RELEASE NOTE
"Anesthesia Release from PACU Note    Patient: Amador Harrison    Procedure(s) Performed: Procedure(s) (LRB):  ROBOTIC ASSISTED LAPAROSCOPIC CYSTECTOMY W/ ILEAL CONDUIT/ Converted to OPen (N/A)    Anesthesia type: general    Post pain: Adequate analgesia    Post assessment: no apparent anesthetic complications    Last Vitals:   Visit Vitals  BP (!) 101/59   Pulse 73   Temp 36.6 °C (97.9 °F) (Temporal)   Resp 15   Ht 5' 6" (1.676 m)   Wt 74.8 kg (165 lb)   SpO2 100%   BMI 26.63 kg/m²       Post vital signs: stable    Level of consciousness: awake and alert     Nausea/Vomiting: no nausea/no vomiting    Complications: none    Airway Patency: patent    Respiratory: unassisted, spontaneous ventilation    Cardiovascular: stable and blood pressure at baseline    Hydration: euvolemic  "

## 2017-07-05 NOTE — ANESTHESIA PROCEDURE NOTES
Arterial    Diagnosis: bladder Ca    Patient location during procedure: done in OR  Procedure start time: 7/5/2017 10:02 AM  Timeout: 7/5/2017 10:02 AM  Procedure end time: 7/5/2017 10:02 AM  Staffing  Anesthesiologist: IRINA MOHAN  Resident/CRNA: DAVID CAVANAUGH  Performed: resident/CRNA   Anesthesiologist was present at the time of the procedure.  Preanesthetic Checklist  Completed: patient identified, site marked, surgical consent, pre-op evaluation, timeout performed, IV checked, risks and benefits discussed, monitors and equipment checked and anesthesia consent givenArterial  Skin Prep: chlorhexidine gluconate  Local Infiltration: none  Orientation: left  Location: radial  Catheter Size: 20 G  Catheter placement by Anatomical landmarks. Heme positive aspiration all ports.Insertion Attempts: 1  Assessment  Dressing: secured with tape and tegaderm

## 2017-07-05 NOTE — NURSING TRANSFER
Nursing Transfer Note      7/5/2017     Transfer To: 628    Transfer via stretcher    Transfer with IV pole    Transported by PCT    Medicines sent: none    Chart send with patient: Yes    Notified: spouse    Patient reassessed at: 7142

## 2017-07-05 NOTE — INTERVAL H&P NOTE
The patient has been examined and the H&P has been reviewed:    I concur with the findings and no changes have occurred since H&P was written.    Anesthesia/Surgery risks, benefits and alternative options discussed and understood by patient/family.          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

## 2017-07-05 NOTE — H&P (VIEW-ONLY)
Subjective:       Patient ID: Amador Harrison is a 61 y.o. male.    Chief Complaint: No chief complaint on file.      HPI: Amador Harrison is a 61 y.o. White male who returns today in follow-up for invasive bladder cancer s/p bilateral percutaneous nephrostomy tube placement on 6/13/17.    The patient presented with gross hematuria and was found to have a bladder tumor which was resected on 3/3/2017. Pathology from that TURBT revealed high-grade T2 bladder cancer.    The patient had a CT urogram on 1/31/2017 that did not show any locally advanced or distant metastatic abdominal disease.    He has a remote smoking history.    After that initial visit, the patient began neoadjuvant chemotherapy with Dr. Austin until recently when his serum creatinine began to rise. An ultrasound showed that he had right hydronephrosis. On 6/8/17, we performed cystoscopy, TURBT which demonstrated a tumor infiltration of the trigone. We could not identify either ureteral orifice for stent placement and decompression of his upper tracts.    The patient required bilateral nephrostomy tube placement due to acute kidney injury and bilateral hydronephrosis. His renal function has responded appropriately with decompression.    Prior to discharge from the hospital, he was anemic and relatively thrombocytopenic due to his chemotherapy. He returns today to recheck his blood work before proceeding with radical cystectomy.    He is doing well with his nephrostomy tubes in place with the expected complaints. He is having some bladder spasms which are bothersome.    Review of patient's allergies indicates:  No Known Allergies    Current Outpatient Prescriptions   Medication Sig Dispense Refill    docusate sodium (COLACE) 100 MG capsule Take 1 capsule (100 mg total) by mouth 2 (two) times daily.  0    doxazosin (CARDURA) 4 MG tablet Take 1 tablet (4 mg total) by mouth every evening. (Patient taking differently: Take 4 mg by mouth once daily. )  90 tablet 3    etodolac (LODINE) 400 MG tablet Take 400 mg by mouth once daily.      glucosamine-chondroitin 500-400 mg tablet Take 1 tablet by mouth 3 (three) times daily.      indapamide (LOZOL) 1.25 MG Tab Take 2.5 mg by mouth once daily.      indapamide (LOZOL) 2.5 MG Tab       lactulose (CHRONULAC) 10 gram/15 mL solution       lidocaine-prilocaine (EMLA) cream Apply to port 30-45 minutes prior to being accessed. 5 g 0    lubiprostone (AMITIZA) 24 MCG Cap Take 1 capsule (24 mcg total) by mouth 2 (two) times daily. 60 capsule 2    magnesium oxide (MAGOX) 400 mg tablet Take 1 tablet (400 mg total) by mouth once daily. 60 tablet 1    metoprolol succinate (TOPROL-XL) 100 MG 24 hr tablet       metoprolol succinate (TOPROL-XL) 50 MG 24 hr tablet Take 50 mg by mouth nightly.       multivitamin (ONE DAILY MULTIVITAMIN) per tablet Take 1 tablet by mouth once daily.      nitrofurantoin, macrocrystal-monohydrate, (MACROBID) 100 MG capsule Take 100 mg by mouth every 12 (twelve) hours.      ondansetron (ZOFRAN-ODT) 8 MG TbDL Take 1 tablet (8 mg total) by mouth every 12 (twelve) hours as needed. 30 tablet 1    oxybutynin (DITROPAN) 5 MG Tab       oxybutynin (DITROPAN) 5 MG Tab       oxycodone-acetaminophen (PERCOCET) 5-325 mg per tablet Take 1-2 tablets by mouth every 4 (four) hours as needed. 31 tablet 0    oxycodone-acetaminophen (PERCOCET) 5-325 mg per tablet Take 1 tablet by mouth every 4 (four) hours as needed for Pain. 21 tablet 0    phenazopyridine (PYRIDIUM) 100 MG tablet Take 2 tablets (200 mg total) by mouth 3 (three) times daily as needed for Pain (Burning). 60 tablet 1    polyethylene glycol (GLYCOLAX) 17 gram PwPk Take 17 g by mouth once daily. 30 packet 0    polyethylene glycol (GLYCOLAX) 17 gram PwPk Take 17 g by mouth once daily. 30 packet 0    tamsulosin (FLOMAX) 0.4 mg Cp24       valsartan (DIOVAN) 160 MG tablet Take 320 mg by mouth once daily.        No current facility-administered  medications for this visit.        Past Medical History:   Diagnosis Date    Cancer     Hypertension     Hypertrophy of prostate without urinary obstruction and other lower urinary tract symptoms (LUTS)     Nocturia     Nocturia        Past Surgical History:   Procedure Laterality Date    CARPAL TUNNEL RELEASE      Left hand    CYSTOSCOPY      CYSTOSCOPY W/ URETERAL STENT PLACEMENT      HERNIA REPAIR Bilateral     x 2 separate surgeries. 1 as an infant and another at approx age 18    hydrocele      at age 3    TONSILLECTOMY      turbt 2017      VASECTOMY         History reviewed. No pertinent family history.    Review of Systems    Review of Systems   Constitutional: Negative for fever, chills, activity change, appetite change and unexpected weight change.   HENT: Negative for congestion, nosebleeds, sneezing, sore throat and trouble swallowing.    Eyes: Negative for pain, discharge, redness and visual disturbance.   Respiratory: Negative for cough, choking, chest tightness and shortness of breath.    Cardiovascular: Negative for chest pain, palpitations and leg swelling.   Gastrointestinal: Negative for nausea, vomiting, abdominal pain, diarrhea, blood in stool, abdominal distention and anal bleeding.  Genitourinary: As documented per HPI   Endocrine: Negative for cold intolerance, heat intolerance, polydipsia, polyphagia and polyuria.   Musculoskeletal: Negative for myalgias, gait problem, neck pain and neck stiffness.   Skin: Negative for color change, pallor, rash and wound.   Allergic/Immunologic: Negative for immunocompromised state.   Neurological: Negative for seizures, facial asymmetry, speech difficulty, weakness and light-headedness.   Hematological: Negative for adenopathy. Does not bruise/bleed easily.   Psychiatric/Behavioral: Negative for hallucinations, behavioral problems, self-injury and agitation. The patient is not hyperactive.    All other systems were reviewed and were negative.       Objective:     Vitals:    06/19/17 0839   BP: (!) 147/89   Pulse: 84     Physical Exam   Vitals reviewed.  Constitutional: He is oriented to person, place, and time. He appears well-developed and well-nourished. No distress.   HENT:   Head: Normocephalic and atraumatic.   Right Ear: External ear normal.   Left Ear: External ear normal.   Nose: Nose normal.   Eyes: EOM are normal. Pupils are equal, round, and reactive to light. Right eye exhibits no discharge. Left eye exhibits no discharge.   Neck: Normal range of motion. Neck supple. No tracheal deviation present. No thyroid enlargement or tenderness.   Cardiovascular: Regular rhythm and intact distal pulses. No signs of peripheral edema.    Pulmonary/Chest: Effort normal and breath sounds normal. No stridor. No respiratory distress. He has no wheezes.   Abdominal: Soft. Bowel sounds are normal. He exhibits no distension. There is no tenderness. Hernia confirmed negative in the right inguinal area and confirmed negative in the left inguinal area. No hepatic or splenic enlargement or tenderness.  Genitourinary: Penis normal. Right testis shows no mass, no swelling and no tenderness. Right testis is descended. Left testis shows no mass, no swelling and no tenderness. Left testis is descended. Circumcised. No phimosis or hypospadias. Catheter draining clear urine.  LYLY: Deferred  Musculoskeletal: Normal range of motion. He exhibits no edema.   Neurological: He is alert and oriented to person, place, and time. He exhibits normal muscle tone. Coordination normal.   Skin: Skin is warm. No rash noted.   Lymphatic: No palpable lymph nodes.  Psychiatric: He has a normal mood and affect. His behavior is normal. Judgment and thought content normal.     No results found for: PSA  Lab Results   Component Value Date    CREATININE 2.7 (H) 06/14/2017     Lab Results   Component Value Date    EGFRNONAA 24.0 (A) 06/14/2017     Lab Results   Component Value Date    ESTGFRAFRICA  27.7 (A) 06/14/2017     I personally reviewed all the patient's imaging studies.    CT urogram (1/31/2017): Thickening of the posterior bladder wall, slightly irregular appearance may represent cystitis versus neoplasm.    CT abdomen/pelvis with contrast (5/19/17): Although the patient has a history of malignancy, no measurable lesions per the RECIST criteria are identified.    CT chest (5/19/17): Examination of the lung fields demonstrates no evidence of consolidation, mass, or significant pleural thickening, nor is there evidence of pleural fluid.    Assessment:       1. Malignant neoplasm of ureteric orifice    2. Bilateral ureteral obstruction    3. Anemia of chronic disease        Plan:     Diagnoses and all orders for this visit:    Malignant neoplasm of ureteric orifice    Bilateral ureteral obstruction    Anemia of chronic disease    I had a lengthy discussion again with the patient and his wife about his recent developments.    His most recent imaging is promising, however he does appear to have progression of his local disease after two cycles of chemotherapy. I discussed the situation with Dr. Austin at length, and we both feel that it is in the patient's best interest to proceed directly to surgery at this point in light of these local findings.    With regard to surgery, I explained that there are 3 basic categories of modern urinary diversions: (1) incontinent bowel conduit (e.g. ileal conduit) (2) continent cutaneous stomal reservoire (e.g. [place] pouch) (3) continent orthotopic urethral diversion (ileal neopbladder).      I explained that an ileal conduit is the simplest, most time-tested urinary diversion that requires the least operative time and arguably is associated with the fewest complications. I described that a short piece of ileum is anastamosed to the ureters and brought onto the skin.  A life-long urostomy appliance to collect urine is required.   Although time-tested, short-term and  long-term issues with ileal conduit diversions are not uncommon.  I quoted the best data available, which in my view is the 2003 [facility] series that included long-term bladder cancer survivors who had at least 5 years of follow-up.  In this series, issues that were seen with ileal conduits were as follows: ureteral stricture/obstruction (~15%), recurrent pyelonephritis (~ 25%), urinary calculi (this complication often occurred beyond 5 to 10 years after surgery, ~10% ), impaired kidney function due to obstruction or ureteral reflux (~27%, only 2% required dialysis and all of whom had compromised kidney function prior to surgery), and stomal complications (~25%) such as parastomal hernia, stensosis, and bleeding/skin irritation.  Furthermore up to 25% of patients had bowel complications, which included small bowel obstruction, fistula formation, and diarrhea.  I explained that although many issues that arise can be handled conservatively, some do require re-operation.      Then we discussed the ileal neobladder.  I explained that the neobladder is constructed from detubularized bowel, which is then sewn into a spherical reservoir for urine storage.  The neobladder is anastamosed to the ureters and to the urethra, to mimic functions of the native bladder. The goal is for the patient to void spontaneously using the Valsalva menuver.  Choice of proceeding with the neobladder vs. ileal conduit must be balanced against additional issues and risks.  Along with risks listed for ileal conduit diversion, I explained the additional potential problems that can arise with the ileal neobladder.   I described risks of urinary leaks from a number of suture lines and explained that in general these can be managed conservatively.  I stressed that a life-long risk of pouch perforation exists.  We discussed risks of incontinence and hypercontinence.  I explained that it is critical to integrate these risks into the decision  to proceed with the neobladder.  I quoted the risks of day-time incontinence to be on the order of 10%, while the risk of nighttime incontinence to be approximately 20%.  With regard to hypercontinence and poor pouch emptying, I explained that approximately 5% of male patients will require life-long intermittent catheterization.  Such risks can reach 30% in female patients with neobladders.  Furthemore, neobladder-vaginal fistula formation is a great concern when a neobladder is constructed in women (~5%).  I explained that continent urinary diversions are contra-indicated in patients with renal insufficiency, but did explain that metabolic disturbances are possible even in patients with intact renal function.    With regard to [place] pouch, we discussed that this diversion consists of a pouch constructed from detubularized right colon.  The pouch is catheterized through the native ileocecal valve via a segment of the ileum that is brought onto the skin.  I explained that all risks that pertain to ileal conduits and neobladders generally apply to patients who undergo a cutaneous continent diversion.  These include risks of stomal stenosis, pouch stomal formation, incontinence, and metabolic disturbances. Furthermore, because, the ileocecal valve is resected, some post-operative diarrhea can be expected.  Such symptoms usually can be controlled with over-the-counter medications.  We reviewed the life-long catheterization and irrigation regimen that is required with this diversion.  I explained that patients who chose to undergo continent diversions  such as an [place] pouch or neobladder, need to be prepared to commit to the following in the post-operative period:   Keep drains as long as needed (for neobladder for example, the catheter and suprapubic tube stay on the order of 2-3 weeks, but possibly longer)   Return for appointments as often as needed.   Empty neobladder/[place] pouch every 2 hours for 1  month   Empty neobladder/[place] pouch every 3 hours for 1 month   Empty neobladder/[place] pouch every 4 hour for the rest of my life    Risks of sexual dysfunction:  We discussed that in males rates of erectile dysfunction are substantial, since removal of the prostate - an integral part of radical cystectomy - results in compromise of the neurovascular bundles that innervate the penile corpora.  I explained that I perform nerve-sparing cystoprostatectomy whenever possible.    Other risks of the surgery were discussed in detail including medical and anesthetic complications (e.g. heart attack, stroke, deep vein thrombosis, pulmonary embolism, infection (pneumonia, etc), bleeding with possible need for transfusion, adjacent organ involvement or injury (including possible need for permanent or temporary colostomy), wound complications, reaction to medications).  We reviewed what to expect with regard to incisions, post-operative pain, and risks of subsequent hernias.     The patient's CBC is marginally better today, however his anemia is relatively severe. I had a long discussion with the patient and his wife about postponing surgery another couple of weeks to allow his anemia to improve. This extra time will also allow the patient to improve his strength and nutrition.    The patient and his wife were agreeable to this plan of action.    I answered all his and his wife's questions.    I encouraged him or any of his family members to call or email me with questions and/or concerns.    I spent 30 minutes with the patient of which more than half was spent in coordinating the patient's care as well as in direct consultation with the patient in regards to our treatment and plan.

## 2017-07-05 NOTE — ANESTHESIA POSTPROCEDURE EVALUATION
"Anesthesia Post Evaluation    Patient: Amador Harrison    Procedure(s) Performed: Procedure(s) (LRB):  ROBOTIC ASSISTED LAPAROSCOPIC CYSTECTOMY W/ ILEAL CONDUIT/ Converted to OPen (N/A)    Final Anesthesia Type: general  Patient location during evaluation: PACU  Patient participation: Yes- Able to Participate  Level of consciousness: awake and alert and oriented  Post-procedure vital signs: reviewed and stable  Pain management: adequate  Airway patency: patent  PONV status at discharge: No PONV  Anesthetic complications: no      Cardiovascular status: blood pressure returned to baseline  Respiratory status: unassisted and spontaneous ventilation  Hydration status: euvolemic  Follow-up not needed.        Visit Vitals  BP (!) 99/57   Pulse 70   Temp 36.6 °C (97.9 °F) (Temporal)   Resp 12   Ht 5' 6" (1.676 m)   Wt 74.8 kg (165 lb)   SpO2 100%   BMI 26.63 kg/m²       Pain/Katalina Score: Pain Assessment Performed: Yes (7/5/2017  4:30 PM)  Presence of Pain: complains of pain/discomfort (7/5/2017  4:30 PM)  Pain Rating Prior to Med Admin: 6 (7/5/2017  2:54 PM)  Katalina Score: 10 (7/5/2017  4:30 PM)      "

## 2017-07-05 NOTE — OP NOTE
[facility]  Operative Note    Date: 07/05/2017     Pre-Op Diagnosis:    1. 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 Bilateral hydroureteronephrosis  3. Chronic kidney disease, stage II  Patient Active Problem List   Diagnosis    Gross hematuria    Hydronephrosis, right    Malignant neoplasm of trigone of urinary bladder    Encounter for chemotherapy management    Hydronephrosis of right kidney    Hypertension    Hypertrophy of prostate without urinary obstruction and other lower urinary tract symptoms (LUTS)    Constipation- opioid related    Anemia    Serum albumin decreased    Port-a-cath in place- Rt upper arm    Enlarged prostate    Edema    Malignant neoplasm of ureteric orifice       Post-Op Diagnosis: same    Procedure(s) Performed:   1.  Open radical cystoprostatectomy (converted from robotic-assisted laparoscopic) - 22 modifier  2.  Bilateral pelvic lymph node dissection  3.  Creation of ileal conduit  4.  Bilateral open ureterotomy and placement of ureteral stents.     Reason for 22-modifier: patient s/p neoadjuvant chemotherapy with bilateral hydroureteronephrosis, thrombocytopenia, anemia. > 60% increased difficulty of radical cystoprostatectomy due to locally advanced cT3 disease, difficulty with mobilization of the ureters, small bowel and colonic adhesions in the pelvis.    Staff Surgeon: Larry Wilde MD    Assistant Surgeon: Albert Calabrese MD    Anesthesia:  General endotracheal anesthesia    Indications: 61 year old male with  [id number] urothelial carcinoma of the bladder, with a history of bilateral hydroureteronephrosis s/p bilateral nephrostomy tube placement.    Findings:    -  Radical cystoprostatectomy performed  -  Robotic-assisted laparoscopic was attempted, but was converted to open due to narrow pelvis, bilateral dilated ureters which were adherent to underlying common iliac artery  -  Bilateral pelvic lymph node dissection  performed  -  Bilateral ureterotomy performed for placement of ureteral stents and ileal conduit creation, no evidence of leak at ureteral enteral anastomosis  -  20 Fr mcgee catheter placed as pelvic drain  -  15 mm alea drain placed to LUQ    Estimated Blood Loss: 1300 mL    Fluids:  4 U pRBC  162 cc Platelets (half 6-pack)  1 L 5% albumin  2 L crystalloid    Drains:   1.  20 Fr mcgee catheter to pelvis with 20 mL sterile water in the balloon  2.  15 mm Alea to LUQ  3.  Bilateral single J ureteral stents    Specimen(s):   1.  Right external, internal, and common iliac lymph nodes  2.  Right obturator lymph nodes  3.  Left external iliac lymph nodes  4.  Left internal iliac and obturator lymph nodes  5.  Left common iliac lymph nodes  6.  Bladder, prostate and seminal vesicles    Complications:  None apparent    Procedure in Detail: After discussion of risks and benefits of the procedure, informed consent was obtained. The patient was brought to the operating room and placed supine on the operating table. SCDs were applied and working prior to induction of anesthesia. General anesthesia was administered. An OG tube was placed. The patient was then moved into the dorsal lithotomy position and appropriately padded. The patient was then prepped and draped in the usual sterile fashion. Timeout was performed and preoperative antibiotics were confirmed.  The stoma site was marked with a blunt tip needle.     A 16 Fr Mcgee catheter was placed in the standard fashion with 10 mL sterile water used to inflate the balloon.     2 penetrating towel clamps were used to elevate the anterior abdominal wall.  The Veress needle was introduced through the umbilicus.  Entry into the peritoneal cavity was confirmed with the drop test and an initial insufflation pressure of <10mmHg. Aspiration during our drop test revealed no blood or succus. The peritoneal cavity was insufflated up to 15 mmHg.  The pubis was marked with a marking pen  and a point 15 cm cranial to the pubis was marked.      A supraumbilical midline incision was marked with a marking pen. This was incised using the Bovie on cut.  The 8 mm camera port was then introduced. The camera was passed through the port and the abdomen was inspected and found to be free of bowel or vascular injury from introduction of both the port and Veress needle. Using direct vision the other 3 robotic ports were placed and the assistant port was placed, all under direct vision, in the standard fashion. The robot was docked.     There were small bowel and colon adhesions in the right lower quadrant and left lower quadrant. Lysis of adhesions was performed. The vas deferens on each side was identified, as well as the bilateral ureters. The ureters were significantly dilated. We attempted to isolate the ureters bilaterally, but this was difficult as they were somewhat stuck to overlying peritoneum and underlying iliac arteries. Mobilizing the ureters bilaterally was difficult for this reason. The typical planes developed in robotic cystectomy were more difficult to develop as it seemed his disease was more advanced locally as anticipated. At this point we turned our attention to the pelvis. The pelvis was very narrow, also making a posterior dissection very difficult. For these reasons, we decided to convert to open radical cystectomy. The robot was undocked, and the trochars were removed. The patient was removed from Trendelenburg.    A midline incision was marked from umbilicus to pubis.  This was incised sharply using a 15 blade.  Bovie electrocautery was used to dissect down through the subcutaneous tissues until the anterior rectus sheath was cleared.  The fascia was opened in the lower midline using the Bovie.  The rectus muscle was split in the midline and the retropubic space was entered.  The fascial incision was extended along the length of our skin incision.  The bladder was mobilized from the  anterior abdominal wall bluntly.  Bilateral pelvic and retroperitoneal tunnels were created bluntly to further mobilize the bladder.     The urachus was divided using the Bovie and the remaining lateral peritoneal attachments were freed.      The left ureter was then isolated.  It was mobilized proximally and distally.  Distally, it was transected at its entrance into the bladder. The proximal end was tagged with a 4-0 chromic suture. The same was performed on the right side. The lateral bladder pedicle was ligated using the ligasure as they were encountered.  The endopelvic fascia on the left was then entered.  The exact same steps were then taken on the contralateral (right) side.    The bookwalter was assembled and retractors were placed.  The posterior peritoneum of the bladder was incised sharply, with care to avoid rectal injury. The plane between the rectum and the prostate was then opened carefully all the way to the distal urethra. The bladder pedicles were then further ligated using the ligasure with care to avoid rectal injury.     The dorsal venous complex was then cauterized and transected using the ligasure. The DVC was also oversewn using a 0 vicryl.     The urethra was fully mobilized, isolated and divided anteriorly.  The mcgee catheter was delivered and clamped with a Joy.  The posterior urethra was then divided sharply carefully avoiding rectal injury.  The rectourethralis was divided and the specimen was removed.     A tunnel was bluntly created behind the sigmoid colon just anterior to the bifurcation of the iliac vessels. The left ureter was then passed through this tunnel to the right for creation of our conduit.      The pelvis was irrigated with bacitracin and excellent hemostasis was achieved. The rectum was identified and uninjured.  A mcgee catheter was inserted into the urethra, and the balloon was inflated with 30 mL of sterile water, to serve as a pelvic drain.    The left pelvic  lymph node dissection was then performed by skeletonizing the common iliac, internal, and external iliac arteries as well as the external iliac vein using the genitofemoral nerve as the extent of our lateral dissection.  Lymphatics were controlled with metal clips.  The same was performed on the right.    The terminal ileum was identified and examined. The mesentery was then examined by transillumination and the arterial arcades were identified. The distal segment was selected 15 cm from the ileocecal valve. The ligasure device was used to divide the mesenteric vessels. A CHELSIE 75 stapler was then used to divide the distal end of the bowel anastomosis.     The site was selected for the proximal incision. The length of conduit was made to be 15-20 cm. Again the CHELSIE 75 stapler was used to come across the bowel at this point.  The conduit and its mesentery were positioned below the bowel anastomosis.    Attention was placed to the two cut ends of the ileum. The antimesenteric ends were cut and a staple technique using the 75 CHELSIE stapler was used to reestablish bowel continuity. 3-0 silk sutures were placed at the junction to take the tension off of this area. After placing intestinal Allis clamps, taking care not to overlap the previous staples lines, the CHELSIE stapler was used to close the top of the anastomosis. The digestive bowel anastomosis was lying above the ileal conduit segment.    We next performed the ileal ureteral anastomosis beginning with the left ureter. A segment of ileum was incised with navas scissors until the mucosal layer could be seen. The mucosa was then incised. The distal ureter was cut with tenotomy scissors leaving a small segment as a handle. The ureter was then spatulated with tenotomy scissors to ensure an adequate size for the anastomosis.  3 4-0 vicryls were used to anchor the apex of the spatulation to the enterotomy. After 3 anchoring sutures, 4-0 vicryl was then used in a running fashion  to allow mucosa-to-mucosa apposition of the ureter and conduit.  Prior to closing the anastomosis, a blue J stent was placed in the standard fashion. The anastomosis was then completed. The stomal end was irrigated with a bulb syringe with no evidence of leak. The distal margin of the ureters were sent as permanent specimens.    This procedure was then repeated with the right ureter, using a red single J stent on that side.     Construction of the stoma then began with the excision of a circular plug of skin around the stoma site on the patient's right. Dissection was carried down to the anterior rectus fascia and an incision was made in this layer. 2 fingers were able to be placed in this incision.    A Charles was then passed through the rectus muscle. The stents followed by conduit were then grasped and brought through the stoma site. Two 2-0 vicryl sutures were used to secure the conduit to the skin in the 12 and 6 o'clock position to create a rosebud stoma. The mucosal edges of the ileum were secured to the skin with interrupted 3-0 vicryl sutures.     A 15 mm alea drain was then placed in the left lower quadrant into the pelvis. Nathan was placed on the pelvic floor.  Hemostasis was visualized. The intestines were placed in their normal anatomic position.     The fascia was then closed using 0 looped PDS. The subcutaneous tissues were closed in two layers using 3-0 vicryl and the overlying skin was closed using 4-0 Monocryl in a running subcuticular fashion.     The skin incisions were anesthetized using Xperal.  The drain was secured with a 2-0 nylon suture.  A urostomy appliance was placed over the stoma.     Disposition:  The patient will be admitted to the Urology service for close observation and post operative recovery.     As the attending surgeon, Dr. Wilde was present for the entire procedure and performed all key aspects of the operation.

## 2017-07-05 NOTE — TRANSFER OF CARE
"Anesthesia Transfer of Care Note    Patient: Amador Harrison    Procedure(s) Performed: Procedure(s) (LRB):  ROBOTIC ASSISTED LAPAROSCOPIC CYSTECTOMY W/ ILEAL CONDUIT/ Converted to [place] (N/A)    Patient location: PACU    Anesthesia Type: general    Transport from OR: Transported from OR on 6-10 L/min O2 by face mask with adequate spontaneous ventilation    Post pain: adequate analgesia    Post assessment: no apparent anesthetic complications and tolerated procedure well    Post vital signs: stable    Level of consciousness: awake and responds to stimulation    Nausea/Vomiting: no nausea/vomiting    Complications: none    Transfer of care protocol was followed      Last vitals:   Visit Vitals  /72   Pulse 69   Temp 36.6 °C (97.9 °F) (Temporal)   Resp 16   Ht 5' 6" (1.676 m)   Wt 74.8 kg (165 lb)   SpO2 100%   BMI 26.63 kg/m²     "

## 2017-07-06 NOTE — PT/OT/SLP EVAL
Occupational Therapy  Evaluation    Amador Harrison   MRN: [id number]   Admitting Diagnosis: <principal problem not specified>    OT Date of Treatment: 07/06/17   OT Start Time: 1040  OT Stop Time: 1107  OT Total Time (min): 27 min    Billable Minutes:  Evaluation 27    Diagnosis: malignant neoplasm of ureteric orifice  S/p cystectomy on 7/6    Past Medical History:   Diagnosis Date    Cancer     Disorder of kidney and ureter     Encounter for blood transfusion     2017    Hypertension     Hypertrophy of prostate without urinary obstruction and other lower urinary tract symptoms (LUTS)     Nocturia     Nocturia       Past Surgical History:   Procedure Laterality Date    CARPAL TUNNEL RELEASE      Left hand    CYSTOSCOPY      CYSTOSCOPY W/ URETERAL STENT PLACEMENT      HERNIA REPAIR Bilateral     x 2 separate surgeries. 1 as an infant and another at approx age 18    hydrocele      at age 3    TONSILLECTOMY      turbt 2017      VASECTOMY         Referring physician: Albert Calabrese MD  Date referred to OT: 7/5/17    General Precautions: Standard, fall  Orthopedic Precautions: N/A  Braces: N/A    Do you have any cultural, spiritual, Adventist conflicts, given your current situation?: No     Patient History:  Living Environment  Living Environment Comment: Pt lives with spouse in a SSH with 1 MERLY. PTA, pt reports (I) with self-care tasks and ambulation, occasionally utilizing a SPC for community distances. Pt will have 24/7 assistance upon DC.  Equipment Currently Used at Home: cane, straight (Pt owns a RW but does not use)    Prior level of function:   Bed Mobility/Transfers: independent  Grooming: independent  Bathing: independent  Upper Body Dressing: independent  Lower Body Dressing: independent  Toileting: independent  Home Management Skills: independent  Driving License: Yes  Mode of Transportation: Car, Friends, Family  Occupation: Retired     Dominant hand: left    Subjective:  Communicated with RN  "prior to session.  "I'm looking better than I thought I would" -pt   Chief Complaint: pain  Patient/Family stated goals: return home    Pain/Comfort  Pain Rating 1: 6/10  Location - Side 1: Bilateral  Location - Orientation 1: generalized  Location 1: abdomen  Pain Addressed 1: Reposition, Distraction, Nurse notified  Pain Rating Post-Intervention 1: 6/10    Objective:  Patient found with: JONI drain, mcgee catheter (blood transfusion)    Cognitive Exam:  Oriented to: Person, Place, Time and Situation  Follows Commands/attention: Follows multistep  commands  Communication: clear/fluent  Memory:  No Deficits noted  Safety awareness/insight to disability: intact  Coping skills/emotional control: Appropriate to situation    Visual/perceptual:  Intact    Physical Exam:  Postural examination/scapula alignment: Rounded shoulder and Head forward  Skin integrity: Visible skin intact  Edema: None noted to BUE    Sensation:   Intact    Upper Extremity Range of Motion:  Right Upper Extremity: WFL  Left Upper Extremity: WFL    Upper Extremity Strength:  Right Upper Extremity: WFL  Left Upper Extremity: WFL   Strength: WFL    Fine motor coordination:   Intact    Gross motor coordination: WFL    Functional Mobility:  Bed Mobility:  Rolling/Turning Right: Minimum assistance, With side rail (HOB flat; Min A for sequencing and technique to reduce abdominal strain)  Supine to Sit: Minimum Assistance, WIth side rail (HOB flat; Min A for sequencing and technique to reduce adominal strain)    Transfers:  Sit <> Stand Assistance: Stand By Assistance (EOB>chair; VCs for hand placement with RW)  Sit <> Stand Assistive Device: Rolling Walker    Functional Ambulation: Pt performed functional mobility ~60ft with CGA and RW.    Activities of Daily Living:     UE Dressing Level of Assistance: Activity did not occur (Pt found wearing front and rear gown)  LE Dressing Level of Assistance: Activity did not occur (2* pt fatigue)  Grooming " "Position: Standing at sink  Grooming Level of Assistance: Stand by assistance (Pt washed face, brushed teeth, and utilized mouth wash)              Therapeutic Activities and Exercises:  -Pt educated on OT role and POC  -Pt educated on bed mobility technique to reduce abdominal strain during transitions  -Pt educated on safety with daily tasks OOB, and importance of participating in daily ax. Pt whiteboard updated.      AM-PAC 6 CLICK ADL  How much help from another person does this patient currently need?  1 = Unable, Total/Dependent Assistance  2 = A lot, Maximum/Moderate Assistance  3 = A little, Minimum/Contact Guard/Supervision  4 = None, Modified [place]/Independent    Putting on and taking off regular lower body clothing? : 3  Bathing (including washing, rinsing, drying)?: 3  Toileting, which includes using toilet, bedpan, or urinal? : 1  Putting on and taking off regular upper body clothing?: 3  Taking care of personal grooming such as brushing teeth?: 4  Eating meals?: 4  Total Score: 18    AM-PAC Raw Score CMS "G-Code Modifier Level of Impairment Assistance   6 % Total / Unable   7 - 9 CM 80 - 100% Maximal Assist   10-14 CL 60 - 80% Moderate Assist   15 - 19 CK 40 - 60% Moderate Assist   20 - 22 CJ 20 - 40% Minimal Assist   23 CI 1-20% SBA / CGA   24 CH 0% Independent/ Mod I       Patient left up in chair with all lines intact, call button in reach and RN notified    Assessment:  Amador Harrison is a 61 y.o. male with a medical diagnosis specified above. Pt tolerated OT evaluation well and put forth good effort to participate. Pt required increased physical A for bed mobility transitions on this date for sequencing and technique. During sink-side grooming tasks pt demonstrated decreased functional endurance and slight instability with required seated rest break and utilizing external support throughout task. Evaluation of ADLs and self-care tasks limited on this date to grooming tasks alone " due to reported pain and fatigue. Overall, pt presents with decreased functional endurance, decreased stability, and weakness limiting pt's (I) in performing self-care tasks at functional baseline. However, pt is motivated to return to PLOF and is expected to progress quickly, especially once pain is controlled. Pt will benefit from continued OT services in order to maximize independence and increase safety during ADL tasks.     Rehab identified problem list/impairments: Rehab identified problem list/impairments: weakness, impaired endurance, gait instability, impaired self care skills, impaired functional mobilty, pain    Rehab potential is good.    Activity tolerance: Good    Discharge recommendations: Discharge Facility/Level Of Care Needs: home     Barriers to discharge: Barriers to Discharge: None    Equipment recommendations: none     GOALS:    Occupational Therapy Goals        Problem: Occupational Therapy Goal    Goal Priority Disciplines Outcome Interventions   Occupational Therapy Goal     OT, PT/OT Ongoing (interventions implemented as appropriate)    Description:  Goals to be met by:  2 Weeks 17    Patient will increase functional independence with ADLs by performin. Supine to sit with Supervision with HOB flat.   2. Toilet transfer to toilet with Supervision.  3. Grooming while standing at sink with Supervision.  4. UE Dressing with Supervision.  5. LE Dressing with Supervision.                         PLAN:  Patient to be seen 3 x/week to address the above listed problems via self-care/home management, community/work re-entry, therapeutic activities, therapeutic exercises  Plan of Care expires: 17  Plan of Care reviewed with: patient       VIKY Brown  2017

## 2017-07-06 NOTE — PROGRESS NOTES
[facility]  Urology  Progress Note    Patient Name: Amador Harrison  MRN: [id number]  Admission Date: 7/5/2017  Hospital Length of Stay: 1 days  Code Status: Prior   Attending Provider: Larry Wilde MD   Primary Care Physician: Waqar Brady MD    Subjective:     HPI:  Amador Harrison is a 61 y.o. White male who returns today in follow-up for invasive bladder cancer s/p bilateral percutaneous nephrostomy tube placement on 6/13/17.     The patient presented with gross hematuria and was found to have a bladder tumor which was resected on 3/3/2017. Pathology from that TURBT revealed high-grade T2 bladder cancer.     The patient had a CT urogram on 1/31/2017 that did not show any locally advanced or distant metastatic abdominal disease.     He has a remote smoking history.     After that initial visit, the patient began neoadjuvant chemotherapy with Dr. Austin until recently when his serum creatinine began to rise. An ultrasound showed that he had right hydronephrosis. On 6/8/17, we performed cystoscopy, TURBT which demonstrated a tumor infiltration of the trigone. We could not identify either ureteral orifice for stent placement and decompression of his upper tracts.     The patient required bilateral nephrostomy tube placement due to acute kidney injury and bilateral hydronephrosis. His renal function has responded appropriately with decompression.     Prior to discharge from the hospital, he was anemic and relatively thrombocytopenic due to his chemotherapy. He returns today to recheck his blood work before proceeding with radical cystectomy.     He is doing well with his nephrostomy tubes in place with the expected complaints. He is having some bladder spasms which are bothersome.    He is s/p cystectomy on 7/6    Interval History:   Did well overnight  Had one bolus for BP last night  No N/V  Pain well controlled  Tolerating clears    Review of Systems  Objective:     Temp:  [97 °F (36.1  °C)-98.5 °F (36.9 °C)] 98.5 °F (36.9 °C)  Pulse:  [63-96] 79  Resp:  [12-18] 16  SpO2:  [96 %-100 %] 96 %  BP: ()/(51-72) 110/62  Arterial Line BP: (114-144)/(42-53) 124/43     Body mass index is 26.63 kg/m².            Drains     Drain                 Nephrostomy 06/13/17 1005 Left 8 Fr. 22 days         Nephrostomy 06/13/17 1124 Right 8 Fr. 22 days         Closed/Suction Drain 07/05/17 1241 Left Abdomen Bulb 15 Fr. less than 1 day         Ureteral Drain/Stent 07/05/17 1218 Right ureter 5 Fr. less than 1 day         Urethral Catheter 07/05/17 0856 Straight-tip;Non-latex 20 Fr. less than 1 day         Urostomy 07/05/17 1226 ileal conduit RLQ less than 1 day                Physical Exam   Constitutional: He appears well-developed and well-nourished. No distress.   HENT:   Head: Normocephalic and atraumatic.   Eyes: Conjunctivae are normal. Pupils are equal, round, and reactive to light. No scleral icterus.   Neck: Normal range of motion. Neck supple.   Cardiovascular: Normal rate and regular rhythm.    Pulmonary/Chest: Effort normal. No respiratory distress. He has no wheezes.   Abdominal: Soft. He exhibits no distension and no mass. There is tenderness. There is no rebound and no guarding.   Appropriate tenderness  Incision CDI  Urostomy p/p/p draining clear yellow urine  Ureteral stents in place  JONI draining SS  Pelvic drain SS   Musculoskeletal: Normal range of motion.   SCDs in place   Neurological: He is alert.   Skin: Skin is warm and dry. He is not diaphoretic.     Psychiatric: He has a normal mood and affect.       Significant Labs:    BMP:    Recent Labs  Lab 07/05/17  1038 07/05/17  1418   * 130*   K 3.1* 3.1*   CL 97 100   CO2 23 21*   BUN 13 13   CREATININE 1.1 0.9   CALCIUM 8.5* 8.0*       CBC:     Recent Labs  Lab 07/05/17  0849 07/05/17  1038  07/05/17  1202 07/05/17  1301 07/05/17  1409   WBC 7.22 4.85  --   --   --  10.00   HGB 7.1* 8.9*  --   --   --  9.1*   HCT 20.5* 25.8*  < > 26* 25*  26.6*   * 146*  --   --   --  109*   < > = values in this interval not displayed.    All pertinent labs results from the past 24 hours have been reviewed.    Significant Imaging:  All pertinent imaging results/findings from the past 24 hours have been reviewed.                  Assessment/Plan:     Malignant neoplasm of ureteric orifice    Amador Harrison is a 61 y.o. male s/p cystectomy    -continue post cystectomy pathway  -ofirmev, narcotics PRN pain control  -decrease MIVF to 84  -heparin PPX/protonix/SCDs/IS  -PT/OT/stoma care consults  -maintain pelvic drain  -OOB/ambulate  -follow up a.m. Labs   -transfuse 1 unit PRBCs            VTE Risk Mitigation         Ordered     Medium Risk of VTE  Once      07/05/17 1414     Place sequential compression device  Until discontinued      07/05/17 1408          Ashley Petersen MD  Urology  [facility]

## 2017-07-06 NOTE — PLAN OF CARE
S/P Open radical cystoprostatectomy, bilateral pelvic lymph node dissection, creation of ileal conduit, open ureterostomy, and placement of ureteral stents on 7/5/17. CM visited with pt and family member. CM informed them of LOS and dc plan. Plan to dc home with home health, IVF, and lovenox. CM offered HH list, he has no preference. Pt states has right arm port from chemo. Pt gets medications filled at [facility] on [address]. CM informed him will call in lovenox for price. CM also informed him wound care would come and do teaching with him. Pt states Man from wound care has already come by. Pt agrees with plan. Will cont to follow.        07/06/17 1502   Discharge Assessment   Assessment Type Discharge Planning Assessment   Confirmed/corrected address and phone number on facesheet? Yes   Assessment information obtained from? Patient   Expected Length of Stay (days) 5   Communicated expected length of stay with patient/caregiver yes   Prior to hospitilization cognitive status: Alert/Oriented   Prior to hospitalization functional status: Independent   Current cognitive status: Alert/Oriented   Current Functional Status: Independent   Arrived From admitted as an inpatient   Lives With spouse   Able to Return to Prior Arrangements yes   Is patient able to care for self after discharge? Yes   How many people do you have in your home that can help with your care after discharge? 1   Who are your caregiver(s) and their phone number(s)? Joselin/wife [phone]   Patient's perception of discharge disposition admitted as an inpatient   Readmission Within The Last 30 Days no previous admission in last 30 days   Patient currently being followed by outpatient case management? No   Patient currently receives home health services? No   Does the patient currently use HME? No   Patient currently receives private duty nursing? No   Patient currently receives any other outside agency services? No   Do you have any  problems affording any of your prescribed medications? No   Is the patient taking medications as prescribed? yes   Do you have any financial concerns preventing you from receiving the healthcare you need? No   Does the patient have transportation to healthcare appointments? Yes   Transportation Available family or friend will provide   On Dialysis? No   Does the patient receive services at the [facility]madin Clinic? No   Are there any open cases? No   Discharge Plan A Home with family;Home Health   Discharge Plan B Home with family   Patient/Family In Agreement With Plan yes

## 2017-07-06 NOTE — HPI
Amador Harrison is a 61 y.o. White male who returns today in follow-up for invasive bladder cancer s/p bilateral percutaneous nephrostomy tube placement on 6/13/17.     The patient presented with gross hematuria and was found to have a bladder tumor which was resected on 3/3/2017. Pathology from that TURBT revealed high-grade T2 bladder cancer.     The patient had a CT urogram on 1/31/2017 that did not show any locally advanced or distant metastatic abdominal disease.     He has a remote smoking history.     After that initial visit, the patient began neoadjuvant chemotherapy with Dr. Austin until recently when his serum creatinine began to rise. An ultrasound showed that he had right hydronephrosis. On 6/8/17, we performed cystoscopy, TURBT which demonstrated a tumor infiltration of the trigone. We could not identify either ureteral orifice for stent placement and decompression of his upper tracts.     The patient required bilateral nephrostomy tube placement due to acute kidney injury and bilateral hydronephrosis. His renal function has responded appropriately with decompression.     Prior to discharge from the hospital, he was anemic and relatively thrombocytopenic due to his chemotherapy. He returns today to recheck his blood work before proceeding with radical cystectomy.     He is doing well with his nephrostomy tubes in place with the expected complaints. He is having some bladder spasms which are bothersome.    He is s/p cystectomy on 7/6

## 2017-07-06 NOTE — PT/OT/SLP EVAL
Physical Therapy  Evaluation/ Treatment    Amador Harrison   MRN: [id number]   Admitting Diagnosis: malignant neoplasm of ureteric orifice     PT Received On: 07/06/17  PT Start Time: 1040     PT Stop Time: 1107    PT Total Time (min): 27 min       Billable Minutes:  Evaluation 17 and Therapeutic Activity 10    Diagnosis:  malignant neoplasm of ureteric orifice  History: personal factors and/or comorbidities that impact the plan of care: none  Evaluation of Body Systems: cardiovascular/pulmonary, integumentary, musculoskeletal, neuromuscular, cognition/communication  Functional Outcome Tools: AMPAC, ROM, MMT  Clinical Presentation: stable    Evaluation Complexity Level: Low    Past Medical History:   Diagnosis Date    Cancer     Disorder of kidney and ureter     Encounter for blood transfusion     2017    Hypertension     Hypertrophy of prostate without urinary obstruction and other lower urinary tract symptoms (LUTS)     Nocturia     Nocturia       Past Surgical History:   Procedure Laterality Date    CARPAL TUNNEL RELEASE      Left hand    CYSTOSCOPY      CYSTOSCOPY W/ URETERAL STENT PLACEMENT      HERNIA REPAIR Bilateral     x 2 separate surgeries. 1 as an infant and another at approx age 18    hydrocele      at age 3    TONSILLECTOMY      turbt 2017      VASECTOMY         Referring physician: JON Mcclain  Date referred to PT: 07/05/2017    General Precautions: Standard, fall  Orthopedic Precautions: N/A   Braces: N/A            Patient History:  Lives With: spouse  Living Arrangements: house  Home Layout: Able to live on 1st floor  Living Environment Comment: Pt lives with wife in 1-story house with threshold MERLY. Pt reports (I) with ADLs and amb and occasonal use of SC for community amb. Pt wife able to provide assist upon d/c.   Equipment Currently Used at Home: cane, straight, walker, rolling  DME owned (not currently used): rolling walker    Previous Level of Function:  Ambulation Skills:  independent  Transfer Skills: independent  ADL Skills: independent    Subjective:  Communicated with RN prior to session.  Pt agreeable to therapy session.   Chief Complaint: abd pain  Patient goals: return home    Pain/Comfort  Pain Rating 1: 6/10  Location - Side 1: Bilateral  Location - Orientation 1: generalized  Location 1: abdomen  Pain Addressed 1: Reposition, Distraction  Pain Rating Post-Intervention 1: 6/10      Objective:   Patient found with: JONI drain, mcgee catheter (blood transfusion)     Cognitive Exam:  Oriented to: Person, Place, Time and Situation    Follows Commands/attention: Follows multistep  commands  Communication: clear/fluent  Safety awareness/insight to disability: intact    Physical Exam:  Postural examination/scapula alignment: Rounded shoulder    Skin integrity: Visible skin intact  Edema: Moderate B LE    Sensation:   Intact  light/touch B LE    Lower Extremity Range of Motion:  Right Lower Extremity: WFL  Left Lower Extremity: WFL    Lower Extremity Strength:  Right Lower Extremity: WFL  Left Lower Extremity: WFL     Gross motor coordination: WFL    Functional Mobility:  Bed Mobility:  Supine to Sit: Minimum Assistance    Transfers:  Sit <> Stand Assistance: Stand By Assistance (vc's for hand placement)  Sit <> Stand Assistive Device: Rolling Walker    Gait:   Gait Distance: ~60ft decreased david, vc's for AD management; no LOB and mild SOB  Assistance 1: Contact Guard Assistance  Gait Assistive Device: Rolling walker  Gait Pattern: reciprocal  Gait Deviation(s): decreased david, decreased step length, decreased stride length    Balance:   Static Sit: GOOD: Takes MODERATE challenges from all directions  Dynamic Sit: GOOD: Maintains balance through MODERATE excursions of active trunk movement  Static Stand: FAIR+: Takes MINIMAL challenges from all directions  Dynamic stand: FAIR: Needs CONTACT GUARD during gait    Therapeutic Activities and Exercises:  Pt educated on role of  PT/POC.  Pt performed dynamic stance at sink SBA with RW; performed ADLs.  Pt safe to amb in hallway with RN staff with RW.     AM-PAC 6 CLICK MOBILITY  How much help from another person does this patient currently need?   1 = Unable, Total/Dependent Assistance  2 = A lot, Maximum/Moderate Assistance  3 = A little, Minimum/Contact Guard/Supervision  4 = None, Modified [place]/Independent    Turning over in bed (including adjusting bedclothes, sheets and blankets)?: 4  Sitting down on and standing up from a chair with arms (e.g., wheelchair, bedside commode, etc.): 3  Moving from lying on back to sitting on the side of the bed?: 3  Moving to and from a bed to a chair (including a wheelchair)?: 3  Need to walk in hospital room?: 3  Climbing 3-5 steps with a railing?: 3  Total Score: 19     AM-PAC Raw Score CMS G-Code Modifier Level of Impairment Assistance   6 % Total / Unable   7 - 9 CM 80 - 100% Maximal Assist   10 - 14 CL 60 - 80% Moderate Assist   15 - 19 CK 40 - 60% Moderate Assist   20 - 22 CJ 20 - 40% Minimal Assist   23 CI 1-20% SBA / CGA   24 CH 0% Independent/ Mod I     Patient left up in chair with all lines intact, call button in reach and RN notified.    Assessment:   Amador Harrison is a 61 y.o. male with a medical diagnosis of malignant neoplasm of ureteric orifice and presents with increased pain and decreased balance, endurance, strength and overall functional mobility. Pt performed bed mobility min A and transfers SBA with RW. Pt amb ~60ft CGA with RW, decreased david and vc's for AD management; no LOB and mild SOB. Pt will benefit from skilled PT to improve deficits and increase overall functional mobility.     Rehab identified problem list/impairments: Rehab identified problem list/impairments: weakness, impaired endurance, impaired functional mobilty, impaired balance, gait instability, pain    Rehab potential is good.    Activity tolerance: Good    Discharge recommendations:  Discharge Facility/Level Of Care Needs: home     Barriers to discharge: Barriers to Discharge: None    Equipment recommendations: Equipment Needed After Discharge: none     GOALS:    Physical Therapy Goals        Problem: Physical Therapy Goal    Goal Priority Disciplines Outcome Goal Variances Interventions   Physical Therapy Goal     PT/OT, PT Ongoing (interventions implemented as appropriate)     Description:  Goals to be met by:      Patient will increase functional independence with mobility by performin. Supine to sit with Stand-by Assistance  2. Sit to supine with Stand-by Assistance  3. Sit to stand transfer with Modified [place]  4. Gait  x 200 feet with Supervision with or without appropriate AD.   5. Lower extremity exercise program x15 reps per handout, with independence                      PLAN:    Patient to be seen 3 x/week to address the above listed problems via gait training, therapeutic activities, therapeutic exercises  Plan of Care expires: 17  Plan of Care reviewed with: patient          LALY DECLAN, PT  2017

## 2017-07-06 NOTE — PLAN OF CARE
Problem: Physical Therapy Goal  Goal: Physical Therapy Goal  Goals to be met by:      Patient will increase functional independence with mobility by performin. Supine to sit with Stand-by Assistance  2. Sit to supine with Stand-by Assistance  3. Sit to stand transfer with Modified [place]  4. Gait  x 200 feet with Supervision with or without appropriate AD.   5. Lower extremity exercise program x15 reps per handout, with independence    Outcome: Ongoing (interventions implemented as appropriate)  Pt evaluation complete.     LALY MANRIQUEZ, PT  2017

## 2017-07-06 NOTE — ASSESSMENT & PLAN NOTE
Amador ZITA Harrison is a 61 y.o. male s/p cystectomy    -continue post cystectomy pathway  -ofirmev, narcotics PRN pain control  -decrease MIVF  -heparin PPX/protonix/SCDs/IS  -PT/OT/stoma care consults  -likely remove pelvic drain today  -OOB/ambulate  -follow up a.m. Labs

## 2017-07-06 NOTE — CONSULTS
Patient seen for urostomy education. Patient POD 1. Educated patient on pouch care: how often, removing pouch, cleaning peristomal skin, measuring stoma, cutting/applying pouch, emptying pouch, and connecting to over night drainage bag. Patient's stoma well budded, red, two stents in place, serosanguinous drainage noted in pouch. Answered patient's questions. Plan to follow-up tomorrow for additional lesson and pouch change. Educational booklet left at bedside. Nursing to continue care.

## 2017-07-06 NOTE — PLAN OF CARE
Problem: Occupational Therapy Goal  Goal: Occupational Therapy Goal  Goals to be met by:  2 Weeks 17    Patient will increase functional independence with ADLs by performin. Supine to sit with Supervision with HOB flat.   2. Toilet transfer to toilet with Supervision.  3. Grooming while standing at sink with Supervision.  4. UE Dressing with Supervision.  5. LE Dressing with Supervision.       Outcome: Ongoing (interventions implemented as appropriate)  OT initial eval completed and goals set.  VIKY Brown  2017

## 2017-07-06 NOTE — PLAN OF CARE
Problem: Patient Care Overview  Goal: Plan of Care Review  Outcome: Ongoing (interventions implemented as appropriate)  POC reviewed with patient who verbalized understanding. AAOX4. Pt O2 sats stable on RA. Left abd JONI intact, patent, and draining moderate amount of bloody drainage. Right urostomy intact, patent, and draining moderate amount of pink urine. Perez intact, patent with small amount of bloody drainage. Md aware of all three. Pt tolerating diet well with no nausea. Pt had Bm today.  Pt worked well with therapy today and sat in chair. Left wrist Iv intact and patent. Pt received 1 unit of blood and tolerated it with no issues.  Pain being controlled with PRN pain medication. Pt remained free from falls throughout the shift. VSS. Will continue to monitor.

## 2017-07-06 NOTE — SUBJECTIVE & OBJECTIVE
Interval History:   Did well overnight  Had one bolus for BP last night  No N/V  Pain well controlled  Tolerating clears    Review of Systems  Objective:     Temp:  [97 °F (36.1 °C)-98.5 °F (36.9 °C)] 98.5 °F (36.9 °C)  Pulse:  [63-96] 79  Resp:  [12-18] 16  SpO2:  [96 %-100 %] 96 %  BP: ()/(51-72) 110/62  Arterial Line BP: (114-144)/(42-53) 124/43     Body mass index is 26.63 kg/m².            Drains     Drain                 Nephrostomy 06/13/17 1005 Left 8 Fr. 22 days         Nephrostomy 06/13/17 1124 Right 8 Fr. 22 days         Closed/Suction Drain 07/05/17 1241 Left Abdomen Bulb 15 Fr. less than 1 day         Ureteral Drain/Stent 07/05/17 1218 Right ureter 5 Fr. less than 1 day         Urethral Catheter 07/05/17 0856 Straight-tip;Non-latex 20 Fr. less than 1 day         Urostomy 07/05/17 1226 ileal conduit RLQ less than 1 day                Physical Exam   Constitutional: He appears well-developed and well-nourished. No distress.   HENT:   Head: Normocephalic and atraumatic.   Eyes: Conjunctivae are normal. Pupils are equal, round, and reactive to light. No scleral icterus.   Neck: Normal range of motion. Neck supple.   Cardiovascular: Normal rate and regular rhythm.    Pulmonary/Chest: Effort normal. No respiratory distress. He has no wheezes.   Abdominal: Soft. He exhibits no distension and no mass. There is tenderness. There is no rebound and no guarding.   Appropriate tenderness  Incision CDI  Urostomy p/p/p draining clear yellow urine  Ureteral stents in place  JONI draining SS  Pelvic drain SS   Musculoskeletal: Normal range of motion.   SCDs in place   Neurological: He is alert.   Skin: Skin is warm and dry. He is not diaphoretic.     Psychiatric: He has a normal mood and affect.       Significant Labs:    BMP:    Recent Labs  Lab 07/05/17  1038 07/05/17  1418   * 130*   K 3.1* 3.1*   CL 97 100   CO2 23 21*   BUN 13 13   CREATININE 1.1 0.9   CALCIUM 8.5* 8.0*       CBC:     Recent Labs  Lab  07/05/17  0849 07/05/17  1038  07/05/17  1202 07/05/17  1301 07/05/17  1409   WBC 7.22 4.85  --   --   --  10.00   HGB 7.1* 8.9*  --   --   --  9.1*   HCT 20.5* 25.8*  < > 26* 25* 26.6*   * 146*  --   --   --  109*   < > = values in this interval not displayed.    All pertinent labs results from the past 24 hours have been reviewed.    Significant Imaging:  All pertinent imaging results/findings from the past 24 hours have been reviewed.

## 2017-07-06 NOTE — PHYSICIAN QUERY
"PT Name: Amador Harrison  MR #: [id number]    Physician Query Form - Hematology Clarification      CDS/: India KYLE, RN, CCDS            Contact information: [email or web address]    This form is a permanent document in the medical record.      Query Date: July 6, 2017    By submitting this query, we are merely seeking further clarification of documentation. Please utilize your independent clinical judgment when addressing the question(s) below.    The Medical record contains the following:   Indicators  Supporting Clinical Findings Location in Medical Record    x "Anemia" documented  Anemia of chronic disease  Urology H&P 7/5/17    x H & H =  Hgb 7.1--> 9.1--> 7.1   Hct 20.5--> 26.6--> 20.5  Lab 7/5/17 --> 7/6/17    BP =                     HR=      "GI bleeding" documented      x Acute bleeding (Non GI site)  Estimated Blood Loss: 1300 mL      Urology Op note 7/5/17    x Transfusion(s)  -transfuse 1 unit PRBCs       4 U pRBC   Urology Progress 7/6/17     Urology Op note 7/5/17      x Treatment:  162 cc Platelets (half 6-pack)  1 L 5% albumin   Urology Op note 7/5/17    Other:        Provider, please specify diagnosis or diagnoses associated with above clinical findings.    [ x ] Acute blood loss anemia expected post-operatively    [  ] Acute blood loss anemia    [  ] Other Hematological Diagnosis (please specify): _________________________________    [  ] Clinically Undetermined       Please document in your progress notes daily for the duration of treatment, until resolved, and include in your discharge summary.                                                                                                      "

## 2017-07-07 NOTE — PLAN OF CARE
CM called [facility] [phone] in [facility] and spoke with Pharmacist Mervin. CM called lovenox prescription and price is $20. CM informed pharmacist to fill medication. CM will inform pt.

## 2017-07-07 NOTE — PLAN OF CARE
Problem: Patient Care Overview  Goal: Plan of Care Review  Outcome: Ongoing (interventions implemented as appropriate)  Pt AAOx4. Slept between care. Pt tolerating clear liquid diet with no complaints of discomfort or nausea. Pain managed with scheduled Toradol and tylenol. VSS on RA. Perez intact and patent, free of dependent loops and kinks. L side JONI drain leaking from site. Urostomy intact. SCDs on. Call light in reach and bed in lowest position. Pt remains free of falls and injury. No acute events this shift. Will continue to monitor.

## 2017-07-07 NOTE — PLAN OF CARE
Problem: Patient Care Overview  Goal: Plan of Care Review  Outcome: Ongoing (interventions implemented as appropriate)  POC reviewed with patient who verbalized understanding. AAOX4. Pt O2 sats stable on RA. Left abd JONI intact, patent, and draining moderate amount of bloody drainage. Left wrist IV intact and patent. Right urostomy tube intact, patent with adequate urine output. Pt tolerating diet well with no nausea.  Pt worked well with therapy today and walked around room and sotelo X1. Pain being controlled with PRN pain medication. Pt remained free from falls throughout the shift. VSS. Will continue to monitor.

## 2017-07-07 NOTE — PLAN OF CARE
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       No future appointments.  Follow-up Information     Larry Wilde MD In 2 weeks.    Specialty:  Urology  Contact information:  [facility]  [facility] [id number] [phone]                     I have seen and examined this patient face to face today. My clinical findings that support the need for the home health skilled services and home bound status are the following:  Weakness/numbness causing balance and gait disturbance due to Weakness/Debility and Surgery making it taxing to leave home.  Medical restrictions requiring assistance of another human to leave home due to  IV infusion Needs and Newly placed G-tube/ostomy.    Allergies:Review of patient's allergies indicates:  No Known Allergies    Diet: regular diet    Activities: activity as tolerated, no driving while on analgesics and no heavy lifting for 6 weeks    Nursing:   SN to complete comprehensive assessment including routine vital signs. Instruct on disease process and s/s of complications to report to MD. Review/verify medication list sent home with the patient at time of discharge  and instruct patient/caregiver as needed. Frequency may  be adjusted depending on start of care date.    Notify MD if SBP > 160 or < 90; DBP > 90 or < 50; HR > 120 or < 50; Temp > 101; Other:         CONSULTS:    Physical Therapy to evaluate and treat. Evaluate for home safety and equipment needs; Establish/upgrade home exercise program. Perform / instruct on therapeutic exercises, gait training, transfer training, and Range of Motion.  Occupational Therapy to evaluate and treat. Evaluate home environment for safety and equipment needs. Perform/Instruct on transfers, ADL training, ROM, and therapeutic exercises.    MISCELLANEOUS CARE:  Colostomy Care:  Instruct patient/caregiver to empty bag when full and PRN., Change and clean site every 48 hours and Monitor skin integrity.  Home Infusion Therapy:   SN to perform Infusion Therapy/Central Line Care.  Review Central Line Care & Central Line Flush with patient.    Administer (drug and dose): IV 0.9% Normal Saline 1000mL every Monday, Wednesday, Friday for 2 weeks.  Last dose given: 2 weeks after discharge                Home dose due: 1st day after discharge    Scrub the Hub: Prior to accessing the line, always perform a 30 second alcohol scrub  Each lumen of the central line is to be flushed at least daily with 10 mL Normal Saline and 3 mL Heparin flush (100 units/mL)  Skilled Nurse (SN) may draw blood from IV access  Blood Draw Procedure:   - Aspirate at least 5 mL of blood   - Discard   - Obtain specimen   - Change posiflow cap   - Flush with 20 mL Normal Saline followed by a                 3-5 mL Heparin flush (100 units/mL)  Central :   - Sterile dressing changes are done weekly and as needed.   - Use chlor-hexadine scrub to cleanse site, apply Biopatch to insertion site,       apply securement device dressing   - Posi-flow caps are changed weekly and after EVERY lab draw.   - If sterile gauze is under dressing to control oozing,                 dressing change must be performed every 24 hours until  gauze is not needed.      Lovenox 40 mg subcutaneous injection daily for 30 days  Last dose given: 30 days after discharge    Home dose due: 1st day after discharge      WOUND CARE ORDERS  n/a      Medications: Review discharge medications with patient and family and provide education.    Current Discharge Medication List      START taking these medications    Details   0.9 % SODIUM CHLORIDE (SODIUM CHLORIDE 0.9%) 0.9 % solution Inject 1,000 mLs into the vein continuous.  Qty: 1000 mL, Refills: 6      enoxaparin (LOVENOX) 40 mg/0.4 mL Syrg Inject 0.4 mLs (40 mg total) into the skin once daily at 6am.  Qty: 12 mL, Refills: 0         CONTINUE these medications which have NOT CHANGED    Details   docusate sodium (COLACE) 100 MG capsule Take 1 capsule (100 mg total) by mouth 2 (two) times daily.  Refills: 0    Associated Diagnoses: Drug-induced constipation      doxazosin (CARDURA) 4 MG tablet Take 1 tablet (4 mg total) by mouth every evening.  Qty: 90 tablet, Refills: 3      glucosamine-chondroitin 500-400 mg tablet Take 1 tablet by mouth 3 (three) times daily.      ibuprofen (ADVIL,MOTRIN) 200 MG tablet Take 200 mg by mouth every 6 (six) hours as needed for Pain.      indapamide (LOZOL) 2.5 MG Tab 2.5 mg every morning.       lidocaine-prilocaine (EMLA) cream Apply to port 30-45 minutes prior to being accessed.  Qty: 5 g, Refills: 0    Associated Diagnoses: Malignant neoplasm of trigone of urinary bladder; Encounter for chemotherapy management; Chemotherapy induced nausea and vomiting      lubiprostone (AMITIZA) 24 MCG Cap Take 1 capsule (24 mcg total) by mouth 2 (two) times daily.  Qty: 60 capsule, Refills: 2    Associated Diagnoses: Drug-induced constipation      metoprolol succinate (TOPROL-XL) 50 MG 24 hr tablet Take 50 mg by mouth nightly.       multivitamin (ONE DAILY MULTIVITAMIN) per tablet Take 1 tablet by mouth once daily.      ondansetron (ZOFRAN-ODT) 8 MG TbDL Take 1 tablet (8 mg total) by mouth every 12  (twelve) hours as needed.  Qty: 30 tablet, Refills: 1    Associated Diagnoses: Malignant neoplasm of trigone of urinary bladder; Encounter for chemotherapy management; Chemotherapy induced nausea and vomiting      oxybutynin (DITROPAN) 5 MG Tab as needed.       oxycodone-acetaminophen (PERCOCET) 5-325 mg per tablet Take 1 tablet by mouth every 4 (four) hours as needed.  Qty: 31 tablet, Refills: 0    Associated Diagnoses: Malignant neoplasm of trigone of urinary bladder      polyethylene glycol (GLYCOLAX) 17 gram PwPk Take 17 g by mouth once daily.  Qty: 30 packet, Refills: 0      etodolac (LODINE) 400 MG tablet Take 400 mg by mouth once daily.      lactulose (CHRONULAC) 10 gram/15 mL solution as needed.       magnesium oxide (MAGOX) 400 mg tablet Take 1 tablet (400 mg total) by mouth once daily.  Qty: 60 tablet, Refills: 1    Associated Diagnoses: Hypomagnesemia      valsartan (DIOVAN) 160 MG tablet Take 320 mg by mouth after lunch.          STOP taking these medications       nitrofurantoin, macrocrystal-monohydrate, (MACROBID) 100 MG capsule Comments:   Reason for Stopping:         phenazopyridine (PYRIDIUM) 100 MG tablet Comments:   Reason for Stopping:         tamsulosin (FLOMAX) 0.4 mg Cp24 Comments:   Reason for Stopping:               I certify that this patient is confined to his home and needs intermittent skilled nursing care, physical therapy and occupational therapy.    Yvon Barrientos MD  Urology, PGY2

## 2017-07-07 NOTE — ASSESSMENT & PLAN NOTE
Amador AHUMADA Hunter is a 61 y.o. male s/p cystectomy    -continue post cystectomy pathway  -ofirmev, narcotics PRN pain control  -HLIV  - DC entereg and suppository  -lovenox/protonix/SCDs/IS  -PT/OT/stoma care consults  -remove pelvic drain  -OOB/ambulate  - will speak with SW about HH

## 2017-07-07 NOTE — PLAN OF CARE
Received phone call from Brianna from Dr. Blake's office and she stated ok for pt to have port used for IVF as long as HH nurse is trained. Will update team.

## 2017-07-07 NOTE — PLAN OF CARE
SW spoke with the CM and the pt has no preference.  SW sent the referral to [facility] for IV fluids.  Pt might dc over the weekend.  RENETTA will f/u shortly.    Rowan Perry, [facility]  X [phone]

## 2017-07-07 NOTE — PROGRESS NOTES
Patient seen for urostomy education. Patient's wife at bedside, attentive and involved in care. Educated patient and wife of ostomy care: how often, removing pouch, cleaning peristomal skin, measuring stoma, cutting/applying pouch and emptying pouch, and connecting to night time drainage bag. Patient's stoma red slightly budded, measured approx 38mm, two stents in place, yellow urine noted. Peristomal skin intact. New coloplast mayank pouch applied. Patient tolerated well. Answered patient's and wife's questions. Extra supplies left at bedside. [employer] contacted for samples. Educational booklet at bedside. Nursing to continue care.

## 2017-07-07 NOTE — PLAN OF CARE
Pt has been accepted by [facility].  Mariah taught the pt.  The pt might dc tomorrow.    Rowan Perry, [facility] x [phone]

## 2017-07-07 NOTE — TELEPHONE ENCOUNTER
----- Message from Fawad Chand sent at 7/7/2017 10:36 AM CDT -----  Contact: Case Managment  Needs information regarding patient care.    [phone]    Spoke with . Per dr schwarz, is it k to use the patient's port.

## 2017-07-07 NOTE — PLAN OF CARE
CM spoke with pt about lovenox price of $20. Pt states he can pay for it. Instructed to  at his pharmacy. He also stated Lisha with [facility] has spoke with him. CM informed him ok to use port. CM informed him [facility] will most likely be his HH. Once confirmed this information will be in AVS. Pt to call HH once gets home. Pt also stated has done his ostomy teaching. No further CM needs.

## 2017-07-07 NOTE — PROGRESS NOTES
[facility]  Urology  Progress Note    Patient Name: Amador Harrison  MRN: [id number]  Admission Date: 7/5/2017  Hospital Length of Stay: 2 days  Code Status: Prior   Attending Provider: Larry Wilde MD   Primary Care Physician: Waqar Brady MD    Subjective:     HPI:  Amador Harrison is a 61 y.o. White male who returns today in follow-up for invasive bladder cancer s/p bilateral percutaneous nephrostomy tube placement on 6/13/17.     The patient presented with gross hematuria and was found to have a bladder tumor which was resected on 3/3/2017. Pathology from that TURBT revealed high-grade T2 bladder cancer.     The patient had a CT urogram on 1/31/2017 that did not show any locally advanced or distant metastatic abdominal disease.     He has a remote smoking history.     After that initial visit, the patient began neoadjuvant chemotherapy with Dr. Austin until recently when his serum creatinine began to rise. An ultrasound showed that he had right hydronephrosis. On 6/8/17, we performed cystoscopy, TURBT which demonstrated a tumor infiltration of the trigone. We could not identify either ureteral orifice for stent placement and decompression of his upper tracts.     The patient required bilateral nephrostomy tube placement due to acute kidney injury and bilateral hydronephrosis. His renal function has responded appropriately with decompression.     Prior to discharge from the hospital, he was anemic and relatively thrombocytopenic due to his chemotherapy. He returns today to recheck his blood work before proceeding with radical cystectomy.     He is doing well with his nephrostomy tubes in place with the expected complaints. He is having some bladder spasms which are bothersome.    He is s/p cystectomy on 7/6    Interval History:   NAEON.   Patient pain minimal  Having bowel movements, would like to discontinue bowel regimen  Tolerating diet  On pathway  Ambulating to chair and through  halls    Review of Systems  Objective:     Temp:  [97.4 °F (36.3 °C)-99.2 °F (37.3 °C)] 98.1 °F (36.7 °C)  Pulse:  [77-94] 87  Resp:  [18] 18  SpO2:  [95 %-98 %] 96 %  BP: ()/(55-66) 137/66     Body mass index is 26.63 kg/m².      Date 07/07/17 0700 - 07/08/17 0659   Shift 3865-4324 1429-9475 1655-5053 24 Hour Total   I  N  T  A  K  E   P.O. 240   240    I.V.  (mL/kg) 96.6  (1.3)   96.6  (1.3)    Shift Total  (mL/kg) 336.6  (4.5)   336.6  (4.5)   O  U  T  P  U  T   Urine  (mL/kg/hr) 150   150    Drains 50   50    Shift Total  (mL/kg) 200  (2.7)   200  (2.7)   Weight (kg) 74.8 74.8 74.8 74.8          Drains     Drain                 Nephrostomy 06/13/17 1005 Left 8 Fr. 23 days         Nephrostomy 06/13/17 1124 Right 8 Fr. 23 days         Closed/Suction Drain 07/05/17 1241 Left Abdomen Bulb 15 Fr. 1 day         Ureteral Drain/Stent 07/05/17 1218 Right ureter 5 Fr. 1 day         Urethral Catheter 07/05/17 0856 Straight-tip;Non-latex 20 Fr. 1 day         Urostomy 07/05/17 1226 ileal conduit RLQ 1 day                Physical Exam   Constitutional: He appears well-developed and well-nourished. No distress.   HENT:   Head: Normocephalic and atraumatic.   Eyes: Conjunctivae are normal. Pupils are equal, round, and reactive to light. No scleral icterus.   Neck: Normal range of motion. Neck supple.   Cardiovascular: Normal rate and regular rhythm.    Pulmonary/Chest: Effort normal. No respiratory distress. He has no wheezes.   Abdominal: Soft. He exhibits no distension and no mass. There is tenderness. There is no rebound and no guarding.   Appropriate tenderness  Incision CDI  Urostomy p/p/p draining clear yellow urine  Ureteral stents in place  JONI draining SS  Pelvic drain SS   Musculoskeletal: Normal range of motion.   SCDs in place   Neurological: He is alert.   Skin: Skin is warm and dry. He is not diaphoretic.     Psychiatric: He has a normal mood and affect.       Significant Labs:    BMP:    Recent Labs  Lab  07/05/17  1418 07/06/17  0522 07/07/17  0534   * 130* 134*   K 3.1* 4.8 3.8    103 106   CO2 21* 21* 22*   BUN 13 16 12   CREATININE 0.9 1.1 0.9   CALCIUM 8.0* 7.8* 8.1*       CBC:     Recent Labs  Lab 07/06/17  0523 07/06/17  1416 07/07/17  0534   WBC 9.58 10.96 8.84   HGB 7.1* 8.7* 8.0*   HCT 20.5* 24.7* 23.3*   * 125* 115*       All pertinent labs results from the past 24 hours have been reviewed.    Significant Imaging:  All pertinent imaging results/findings from the past 24 hours have been reviewed.                  Assessment/Plan:     Malignant neoplasm of ureteric orifice    Amador Harrison is a 61 y.o. male s/p cystectomy    -continue post cystectomy pathway  -ofirmev, narcotics PRN pain control  -HLIV  - DC entereg and suppository  -lovenox/protonix/SCDs/IS  -PT/OT/stoma care consults  -remove pelvic drain  -OOB/ambulate  - will speak with SW about HH              VTE Risk Mitigation         Ordered     enoxaparin injection 40 mg  Daily     Route:  Subcutaneous        07/07/17 0824     Medium Risk of VTE  Once      07/05/17 1414     Place sequential compression device  Until discontinued      07/05/17 1408          Yvon Barrientos MD  Urology  [facility]

## 2017-07-07 NOTE — SUBJECTIVE & OBJECTIVE
Interval History:   NAEON.   Patient pain minimal  Having bowel movements, would like to discontinue bowel regimen  Tolerating diet  On pathway  Ambulating to chair and through halls    Review of Systems  Objective:     Temp:  [97.4 °F (36.3 °C)-99.2 °F (37.3 °C)] 98.1 °F (36.7 °C)  Pulse:  [77-94] 87  Resp:  [18] 18  SpO2:  [95 %-98 %] 96 %  BP: ()/(55-66) 137/66     Body mass index is 26.63 kg/m².      Date 07/07/17 0700 - 07/08/17 0659   Shift 6030-8921 5582-5214 4092-0516 24 Hour Total   I  N  T  A  K  E   P.O. 240   240    I.V.  (mL/kg) 96.6  (1.3)   96.6  (1.3)    Shift Total  (mL/kg) 336.6  (4.5)   336.6  (4.5)   O  U  T  P  U  T   Urine  (mL/kg/hr) 150   150    Drains 50   50    Shift Total  (mL/kg) 200  (2.7)   200  (2.7)   Weight (kg) 74.8 74.8 74.8 74.8          Drains     Drain                 Nephrostomy 06/13/17 1005 Left 8 Fr. 23 days         Nephrostomy 06/13/17 1124 Right 8 Fr. 23 days         Closed/Suction Drain 07/05/17 1241 Left Abdomen Bulb 15 Fr. 1 day         Ureteral Drain/Stent 07/05/17 1218 Right ureter 5 Fr. 1 day         Urethral Catheter 07/05/17 0856 Straight-tip;Non-latex 20 Fr. 1 day         Urostomy 07/05/17 1226 ileal conduit RLQ 1 day                Physical Exam   Constitutional: He appears well-developed and well-nourished. No distress.   HENT:   Head: Normocephalic and atraumatic.   Eyes: Conjunctivae are normal. Pupils are equal, round, and reactive to light. No scleral icterus.   Neck: Normal range of motion. Neck supple.   Cardiovascular: Normal rate and regular rhythm.    Pulmonary/Chest: Effort normal. No respiratory distress. He has no wheezes.   Abdominal: Soft. He exhibits no distension and no mass. There is tenderness. There is no rebound and no guarding.   Appropriate tenderness  Incision CDI  Urostomy p/p/p draining clear yellow urine  Ureteral stents in place  JONI draining SS  Pelvic drain SS   Musculoskeletal: Normal range of motion.   SCDs in place    Neurological: He is alert.   Skin: Skin is warm and dry. He is not diaphoretic.     Psychiatric: He has a normal mood and affect.       Significant Labs:    BMP:    Recent Labs  Lab 07/05/17  1418 07/06/17  0522 07/07/17  0534   * 130* 134*   K 3.1* 4.8 3.8    103 106   CO2 21* 21* 22*   BUN 13 16 12   CREATININE 0.9 1.1 0.9   CALCIUM 8.0* 7.8* 8.1*       CBC:     Recent Labs  Lab 07/06/17  0523 07/06/17  1416 07/07/17  0534   WBC 9.58 10.96 8.84   HGB 7.1* 8.7* 8.0*   HCT 20.5* 24.7* 23.3*   * 125* 115*       All pertinent labs results from the past 24 hours have been reviewed.    Significant Imaging:  All pertinent imaging results/findings from the past 24 hours have been reviewed.

## 2017-07-07 NOTE — PLAN OF CARE
CM called [facility] ext [phone] pressed 2.   sent message to Dr. Blake's staff to see if ok to use port for IVF. CM will await for return phone call.

## 2017-07-08 NOTE — PLAN OF CARE
Problem: Patient Care Overview  Goal: Plan of Care Review  Outcome: Ongoing (interventions implemented as appropriate)  POC reviewed with pt. Pt verbalized understanding of POC. Pt AAOx4. VSS. Pain controlled with PRN pain medication. Pt ambulating in room and sotelo without difficulty. No acute changes or distress noted. Pt tolerating diet. Denies N/V. Pulse ox stable on RA. Denies SOB. Pt oriented to room and call bell and will call if assistance is needed. Call bell within reach. WCTM.

## 2017-07-08 NOTE — PLAN OF CARE
Problem: Patient Care Overview  Goal: Plan of Care Review  Outcome: Ongoing (interventions implemented as appropriate)  Pt AAOx4. Slept between care. Pt tolerating regular diet with no complaints of discomfort or nausea. Pain managed with scheduled Toradol. VSS on RA. L side JONI drain leaking from site. Urostomy intact, to drainage bag. MDL incision dry and intact. SCDs on. LBM 7/7.  Call light in reach and bed in lowest position. Pt remains free of falls and injury. No acute events this shift. Will continue to monitor.

## 2017-07-08 NOTE — ASSESSMENT & PLAN NOTE
Amador Harrison is a 61 y.o. male s/p cystectomy 7/5/17    -continue post cystectomy pathway  -ofirmev, narcotics PRN pain control  - HLIV  -lovenox/protonix/SCDs/IS  -PT/OT/stoma care consults  -OOB/ambulate  -  set-up for patient to go home this weekend  - Patient will follow-up in [facility] Thursday 7/13 for antegrade nephrostograms and NT removal  - possible JONI Cr tomorrow depending on drain output

## 2017-07-08 NOTE — PROGRESS NOTES
[facility]  Urology  Progress Note    Patient Name: Amador Harrison  MRN: [id number]  Admission Date: 7/5/2017  Hospital Length of Stay: 3 days  Code Status: Prior   Attending Provider: Larry Wilde MD   Primary Care Physician: Waqar Brady MD    Subjective:     HPI:  Amador Harrison is a 61 y.o. White male who returns today in follow-up for invasive bladder cancer s/p bilateral percutaneous nephrostomy tube placement on 6/13/17.     The patient presented with gross hematuria and was found to have a bladder tumor which was resected on 3/3/2017. Pathology from that TURBT revealed high-grade T2 bladder cancer.     The patient had a CT urogram on 1/31/2017 that did not show any locally advanced or distant metastatic abdominal disease.     He has a remote smoking history.     After that initial visit, the patient began neoadjuvant chemotherapy with Dr. Austin until recently when his serum creatinine began to rise. An ultrasound showed that he had right hydronephrosis. On 6/8/17, we performed cystoscopy, TURBT which demonstrated a tumor infiltration of the trigone. We could not identify either ureteral orifice for stent placement and decompression of his upper tracts.     The patient required bilateral nephrostomy tube placement due to acute kidney injury and bilateral hydronephrosis. His renal function has responded appropriately with decompression.     Prior to discharge from the hospital, he was anemic and relatively thrombocytopenic due to his chemotherapy. He returns today to recheck his blood work before proceeding with radical cystectomy.     He is doing well with his nephrostomy tubes in place with the expected complaints. He is having some bladder spasms which are bothersome.    He is s/p cystectomy on 7/6    Interval History:   NAEON  +BM, +flatus  Pain well controlled  Tolerating regular diet  Ambulated multiple times in halls    Review of Systems  Objective:     Temp:  [97.5 °F  (36.4 °C)-100 °F (37.8 °C)] 97.5 °F (36.4 °C)  Pulse:  [75-89] 75  Resp:  [16-18] 18  SpO2:  [95 %-99 %] 95 %  BP: ()/(54-66) 110/57     Body mass index is 26.63 kg/m².            Drains     Drain                 Nephrostomy 06/13/17 1005 Left 8 Fr. 25 days         Nephrostomy 06/13/17 1124 Right 8 Fr. 24 days         Closed/Suction Drain 07/05/17 1241 Left Abdomen Bulb 15 Fr. 2 days         Ureteral Drain/Stent 07/05/17 1218 Right ureter 5 Fr. 2 days         Urostomy 07/05/17 1226 ileal conduit RLQ 2 days                Physical Exam   Constitutional: He appears well-developed and well-nourished. No distress.   HENT:   Head: Normocephalic and atraumatic.   Eyes: Conjunctivae are normal. Pupils are equal, round, and reactive to light. No scleral icterus.   Neck: Normal range of motion. Neck supple.   Cardiovascular: Normal rate and regular rhythm.    Pulmonary/Chest: Effort normal. No respiratory distress. He has no wheezes.   Abdominal: Soft. He exhibits no distension and no mass. There is tenderness. There is no rebound and no guarding.   Appropriate tenderness  Incision CDI  Urostomy p/p/p draining clear yellow urine  Ureteral stents in place  JONI draining SS  B NT clamped   Musculoskeletal: Normal range of motion.   SCDs in place   Neurological: He is alert.   Skin: Skin is warm and dry. He is not diaphoretic.     Psychiatric: He has a normal mood and affect.       Significant Labs:    BMP:    Recent Labs  Lab 07/06/17  0522 07/07/17  0534 07/08/17  0557   * 134* 135*   K 4.8 3.8 3.9    106 104   CO2 21* 22* 26   BUN 16 12 15   CREATININE 1.1 0.9 1.0   CALCIUM 7.8* 8.1* 8.4*       CBC:     Recent Labs  Lab 07/06/17  1416 07/07/17  0534 07/08/17  0557   WBC 10.96 8.84 9.55   HGB 8.7* 8.0* 8.3*   HCT 24.7* 23.3* 24.4*   * 115* 139*       All pertinent labs results from the past 24 hours have been reviewed.    Significant Imaging:  All pertinent imaging results/findings from the past 24  hours have been reviewed.                  Assessment/Plan:     * Malignant neoplasm of ureteric orifice    Amador Harrison is a 61 y.o. male s/p cystectomy 7/5/17    -continue post cystectomy pathway  -ofirmev, narcotics PRN pain control  - HLIV  -lovenox/protonix/SCDs/IS  -PT/OT/stoma care consults  -OOB/ambulate  -  set-up for patient to go home this weekend  - Patient will follow-up in [facility] Thursday 7/13 for antegrade nephrostograms and NT removal  - possible JONI Cr tomorrow depending on drain output              VTE Risk Mitigation         Ordered     enoxaparin injection 40 mg  Daily     Route:  Subcutaneous        07/07/17 0824     Medium Risk of VTE  Once      07/05/17 1414     Place sequential compression device  Until discontinued      07/05/17 1408          Albert Calabrese MD  Urology  [facility]

## 2017-07-08 NOTE — SUBJECTIVE & OBJECTIVE
Interval History:   NAEON  +BM, +flatus  Pain well controlled  Tolerating regular diet  Ambulated multiple times in halls    Review of Systems  Objective:     Temp:  [97.5 °F (36.4 °C)-100 °F (37.8 °C)] 97.5 °F (36.4 °C)  Pulse:  [75-89] 75  Resp:  [16-18] 18  SpO2:  [95 %-99 %] 95 %  BP: ()/(54-66) 110/57     Body mass index is 26.63 kg/m².            Drains     Drain                 Nephrostomy 06/13/17 1005 Left 8 Fr. 25 days         Nephrostomy 06/13/17 1124 Right 8 Fr. 24 days         Closed/Suction Drain 07/05/17 1241 Left Abdomen Bulb 15 Fr. 2 days         Ureteral Drain/Stent 07/05/17 1218 Right ureter 5 Fr. 2 days         Urostomy 07/05/17 1226 ileal conduit RLQ 2 days                Physical Exam   Constitutional: He appears well-developed and well-nourished. No distress.   HENT:   Head: Normocephalic and atraumatic.   Eyes: Conjunctivae are normal. Pupils are equal, round, and reactive to light. No scleral icterus.   Neck: Normal range of motion. Neck supple.   Cardiovascular: Normal rate and regular rhythm.    Pulmonary/Chest: Effort normal. No respiratory distress. He has no wheezes.   Abdominal: Soft. He exhibits no distension and no mass. There is tenderness. There is no rebound and no guarding.   Appropriate tenderness  Incision CDI  Urostomy p/p/p draining clear yellow urine  Ureteral stents in place  JONI draining SS  B NT clamped   Musculoskeletal: Normal range of motion.   SCDs in place   Neurological: He is alert.   Skin: Skin is warm and dry. He is not diaphoretic.     Psychiatric: He has a normal mood and affect.       Significant Labs:    BMP:    Recent Labs  Lab 07/06/17  0522 07/07/17  0534 07/08/17  0557   * 134* 135*   K 4.8 3.8 3.9    106 104   CO2 21* 22* 26   BUN 16 12 15   CREATININE 1.1 0.9 1.0   CALCIUM 7.8* 8.1* 8.4*       CBC:     Recent Labs  Lab 07/06/17  1416 07/07/17  0534 07/08/17  0557   WBC 10.96 8.84 9.55   HGB 8.7* 8.0* 8.3*   HCT 24.7* 23.3* 24.4*   PLT  125* 115* 139*       All pertinent labs results from the past 24 hours have been reviewed.    Significant Imaging:  All pertinent imaging results/findings from the past 24 hours have been reviewed.

## 2017-07-09 NOTE — PLAN OF CARE
Problem: Occupational Therapy Goal  Goal: Occupational Therapy Goal  Goals to be met by:  2 Weeks 17    Patient will increase functional independence with ADLs by performin. Supine to sit with Supervision with HOB flat.  MET   2. Toilet transfer to toilet with Supervision. MET   3. Grooming while standing at sink with Supervision. MET   4. UE Dressing with Supervision. MET   5. LE Dressing with Supervision.  MET        Outcome: Outcome(s) achieved Date Met: 17  All goals met this date and pt DC'd from OT 2* high (I) demonstrated during tx. Pt does not require further OT at this time due to performing functional tasks at baseline.  KYLER Carreno  2017

## 2017-07-09 NOTE — PT/OT/SLP PROGRESS
"Occupational Therapy  Treatment and Discharge    Amador Harrison   MRN: [id number]   Admitting Diagnosis: Malignant neoplasm of ureteric orifice    OT Date of Treatment: 07/09/17   OT Start Time: 0901  OT Stop Time: 0912  OT Total Time (min): 11 min    Billable Minutes:  Self Care/Home Management 11    General Precautions: Standard, fall  Orthopedic Precautions:    Braces:      Do you have any cultural, spiritual, Faith conflicts, given your current situation?: No    Subjective:  Communicated with RN prior to session.  "I am leaving today."    "What do you need to see?"     Pain/Comfort  Pain Rating 1: 6/10  Location - Side 1: Bilateral  Location - Orientation 1: lower  Location 1: abdomen  Pain Addressed 1: Reposition, Distraction, Nurse notified  Pain Rating Post-Intervention 1: 7/10    Objective:  Patient found with: mcgee catheter     Functional Mobility:  Bed Mobility:  Rolling/Turning Right: Independent  Scooting/Bridging: Independent  Supine to Sit: Supervision (VCs for safety technique with HOB flat and no handrail utilized)  Sit to Supine: Supervision (HOB flat, no handrail utilized)    Transfers:   Sit <> Stand Assistance: Independent (EOB>chair)  Sit <> Stand Assistive Device: No Assistive Device  Toilet Transfer Technique: Stand Pivot  Toilet Transfer Assistance: Independent  Toilet Transfer Assistive Device: No Assistive Device    Functional Ambulation: Pt performed functional mobility ~30ft within room with (I) and no AD. Pt seen ambulating in [place] x2 this AM with wife and no AD.      Activities of Daily Living:    UE Dressing Level of Assistance: Independent (standing to doff/don personal robe)  LE Dressing Level of Assistance: Supervision (doff/don socks seated with slight increased time for RLE )  Grooming Position: Standing at sink  Grooming Level of Assistance: Independent (wash hands)     Therapeutic Activities and Exercises:  Pt educated on safety with daily tasks OOB, and importance of " "participating in daily ax upon DC. Pt whiteboard updated.      AM-PAC 6 CLICK ADL   How much help from another person does this patient currently need?   1 = Unable, Total/Dependent Assistance  2 = A lot, Maximum/Moderate Assistance  3 = A little, Minimum/Contact Guard/Supervision  4 = None, Modified Harrison/Independent    Putting on and taking off regular lower body clothing? : 4  Bathing (including washing, rinsing, drying)?: 4  Toileting, which includes using toilet, bedpan, or urinal? : 4  Putting on and taking off regular upper body clothing?: 4  Taking care of personal grooming such as brushing teeth?: 4  Eating meals?: 4  Total Score: 24     AM-PAC Raw Score CMS "G-Code Modifier Level of Impairment Assistance   6 % Total / Unable   7 - 8 CM 80 - 100% Maximal Assist   9-13 CL 60 - 80% Moderate Assist   14 - 19 CK 40 - 60% Moderate Assist   20 - 22 CJ 20 - 40% Minimal Assist   23 CI 1-20% SBA / CGA   24 CH 0% Independent/ Mod I       Patient left up in chair with all lines intact, call button in reach, RN notified and wife present    ASSESSMENT:  Amador Harrison is a 61 y.o. male with a medical diagnosis of Malignant neoplasm of ureteric orifice. Pt tolerated session well and put forth good effort to participate. Pt presented with high (I) and only slight deficits in functional performance. All OT goals met this session. Pt being DC'd by acute OT 2* performing at baseline and demonstrating no significant decline in functional performance. Pt required no further OT at this time.         Rehab identified problem list/impairments: Rehab identified problem list/impairments: pain    Rehab potential is good.    Activity tolerance: Good    Discharge recommendations: Discharge Facility/Level Of Care Needs: home     Barriers to discharge: Barriers to Discharge: None    Equipment recommendations: none     GOALS:    Occupational Therapy Goals     Not on file          Multidisciplinary Problems (Resolved)     "    Problem: Occupational Therapy Goal    Goal Priority Disciplines Outcome Interventions   Occupational Therapy Goal   (Resolved)     OT, PT/OT Outcome(s) achieved    Description:  Goals to be met by:  2 Weeks 17    Patient will increase functional independence with ADLs by performin. Supine to sit with Supervision with HOB flat.  MET   2. Toilet transfer to toilet with Supervision. MET   3. Grooming while standing at sink with Supervision. MET   4. UE Dressing with Supervision. MET   5. LE Dressing with Supervision.  MET                          Plan:  DC from acute OT.  Plan of Care reviewed with: patient, spouse (spouse entered room near end of session)         KYLER Carreno  2017

## 2017-07-09 NOTE — PROGRESS NOTES
[facility]  Urology  Progress Note    Patient Name: Amador Harrison  MRN: [id number]  Admission Date: 7/5/2017  Hospital Length of Stay: 4 days  Code Status: Prior   Attending Provider: Larry Wilde MD   Primary Care Physician: Waqar Brady MD    Subjective:     HPI:  Amador Harrison is a 61 y.o. White male who returns today in follow-up for invasive bladder cancer s/p bilateral percutaneous nephrostomy tube placement on 6/13/17.     The patient presented with gross hematuria and was found to have a bladder tumor which was resected on 3/3/2017. Pathology from that TURBT revealed high-grade T2 bladder cancer.     The patient had a CT urogram on 1/31/2017 that did not show any locally advanced or distant metastatic abdominal disease.     He has a remote smoking history.     After that initial visit, the patient began neoadjuvant chemotherapy with Dr. Austin until recently when his serum creatinine began to rise. An ultrasound showed that he had right hydronephrosis. On 6/8/17, we performed cystoscopy, TURBT which demonstrated a tumor infiltration of the trigone. We could not identify either ureteral orifice for stent placement and decompression of his upper tracts.     The patient required bilateral nephrostomy tube placement due to acute kidney injury and bilateral hydronephrosis. His renal function has responded appropriately with decompression.     Prior to discharge from the hospital, he was anemic and relatively thrombocytopenic due to his chemotherapy. He returns today to recheck his blood work before proceeding with radical cystectomy.     He is doing well with his nephrostomy tubes in place with the expected complaints. He is having some bladder spasms which are bothersome.    He is s/p cystectomy on 7/6    Interval History:   NAEON  +BM, +flatus  Pain well controlled  Tolerating regular diet  Ambulated multiple times in halls.  No complaints.    Review of Systems    Objective:      Temp:  [97.6 °F (36.4 °C)-99.2 °F (37.3 °C)] 97.8 °F (36.6 °C)  Pulse:  [] 100  Resp:  [18-20] 18  SpO2:  [95 %-99 %] 98 %  BP: ()/(53-71) 97/53     Body mass index is 26.63 kg/m².            Drains     Drain                 Nephrostomy 06/13/17 1005 Left 8 Fr. 25 days         Nephrostomy 06/13/17 1124 Right 8 Fr. 24 days         Closed/Suction Drain 07/05/17 1241 Left Abdomen Bulb 15 Fr. 2 days         Ureteral Drain/Stent 07/05/17 1218 Right ureter 5 Fr. 2 days         Urostomy 07/05/17 1226 ileal conduit RLQ 2 days                Physical Exam   Constitutional: He appears well-developed and well-nourished. No distress.   HENT:   Head: Normocephalic and atraumatic.   Eyes: Conjunctivae are normal. Pupils are equal, round, and reactive to light. No scleral icterus.   Neck: Normal range of motion. Neck supple.   Cardiovascular: Normal rate and regular rhythm.    Pulmonary/Chest: Effort normal. No respiratory distress. He has no wheezes.   Abdominal: Soft. He exhibits no distension and no mass. There is tenderness. There is no rebound and no guarding.   Appropriate tenderness  Incision CDI  Urostomy p/p/p draining clear yellow urine  Ureteral stents in place  JONI draining SS  B NT clamped   Musculoskeletal: Normal range of motion.   SCDs in place   Neurological: He is alert.   Skin: Skin is warm and dry. He is not diaphoretic.     Psychiatric: He has a normal mood and affect.       Significant Labs:    BMP:    Recent Labs  Lab 07/07/17  0534 07/08/17  0557 07/09/17  0548   * 135* 138   K 3.8 3.9 4.2    104 107   CO2 22* 26 25   BUN 12 15 18   CREATININE 0.9 1.0 1.0   CALCIUM 8.1* 8.4* 8.6*       CBC:     Recent Labs  Lab 07/07/17  0534 07/08/17  0557 07/09/17  0548   WBC 8.84 9.55 9.30   HGB 8.0* 8.3* 8.4*   HCT 23.3* 24.4* 25.0*   * 139* 151       All pertinent labs results from the past 24 hours have been reviewed.    Significant Imaging:  All pertinent imaging results/findings  from the past 24 hours have been reviewed.                  Assessment/Plan:     * Malignant neoplasm of ureteric orifice    Amador Harrison is a 61 y.o. male s/p cystectomy 7/5/17    -continue post cystectomy pathway  -ofirmev, narcotics PRN pain control  - HLIV  -lovenox/protonix/SCDs/IS  -PT/OT/stoma care consults  -OOB/ambulate  -  set-up for patient to go home this weekend  - Patient will follow-up in [facility] Thursday 7/13 for antegrade nephrostograms and NT removal  - JONI drain discontinued  - Dispo: will discharge home with HH today and rx for pain medication and 1 week of Augmentin              VTE Risk Mitigation         Ordered     enoxaparin injection 40 mg  Daily     Route:  Subcutaneous        07/07/17 0824     Medium Risk of VTE  Once      07/05/17 1414     Place sequential compression device  Until discontinued      07/05/17 1408          Zabrina Knight MD  Urology  [facility]

## 2017-07-09 NOTE — SUBJECTIVE & OBJECTIVE
Interval History:   NAEON  +BM, +flatus  Pain well controlled  Tolerating regular diet  Ambulated multiple times in halls.  No complaints.    Review of Systems    Objective:     Temp:  [97.6 °F (36.4 °C)-99.2 °F (37.3 °C)] 97.8 °F (36.6 °C)  Pulse:  [] 100  Resp:  [18-20] 18  SpO2:  [95 %-99 %] 98 %  BP: ()/(53-71) 97/53     Body mass index is 26.63 kg/m².            Drains     Drain                 Nephrostomy 06/13/17 1005 Left 8 Fr. 25 days         Nephrostomy 06/13/17 1124 Right 8 Fr. 24 days         Closed/Suction Drain 07/05/17 1241 Left Abdomen Bulb 15 Fr. 2 days         Ureteral Drain/Stent 07/05/17 1218 Right ureter 5 Fr. 2 days         Urostomy 07/05/17 1226 ileal conduit RLQ 2 days                Physical Exam   Constitutional: He appears well-developed and well-nourished. No distress.   HENT:   Head: Normocephalic and atraumatic.   Eyes: Conjunctivae are normal. Pupils are equal, round, and reactive to light. No scleral icterus.   Neck: Normal range of motion. Neck supple.   Cardiovascular: Normal rate and regular rhythm.    Pulmonary/Chest: Effort normal. No respiratory distress. He has no wheezes.   Abdominal: Soft. He exhibits no distension and no mass. There is tenderness. There is no rebound and no guarding.   Appropriate tenderness  Incision CDI  Urostomy p/p/p draining clear yellow urine  Ureteral stents in place  JONI draining SS  B NT clamped   Musculoskeletal: Normal range of motion.   SCDs in place   Neurological: He is alert.   Skin: Skin is warm and dry. He is not diaphoretic.     Psychiatric: He has a normal mood and affect.       Significant Labs:    BMP:    Recent Labs  Lab 07/07/17  0534 07/08/17  0557 07/09/17  0548   * 135* 138   K 3.8 3.9 4.2    104 107   CO2 22* 26 25   BUN 12 15 18   CREATININE 0.9 1.0 1.0   CALCIUM 8.1* 8.4* 8.6*       CBC:     Recent Labs  Lab 07/07/17  0534 07/08/17  0557 07/09/17  0548   WBC 8.84 9.55 9.30   HGB 8.0* 8.3* 8.4*   HCT 23.3*  24.4* 25.0*   * 139* 151       All pertinent labs results from the past 24 hours have been reviewed.    Significant Imaging:  All pertinent imaging results/findings from the past 24 hours have been reviewed.

## 2017-07-09 NOTE — ASSESSMENT & PLAN NOTE
Amador Harrison is a 61 y.o. male s/p cystectomy 7/5/17    -continue post cystectomy pathway  -ofirmev, narcotics PRN pain control  - HLIV  -lovenox/protonix/SCDs/IS  -PT/OT/stoma care consults  -OOB/ambulate  -  set-up for patient to go home this weekend  - Patient will follow-up in [facility] Thursday 7/13 for antegrade nephrostograms and NT removal  - JONI drain discontinued  - Dispo: will discharge home with HH today and rx for pain medication and 1 week of Augmentin

## 2017-07-09 NOTE — NURSING
DC instructions reviewed with pt. Pt verbalized understanding of DC instructions. Pt AAOx4. VSS. Denies pain at this time. Prescriptions in hand. No acute changes or distress noted. Transport called. Will continue to monitor patient until patient leaves unit.

## 2017-07-09 NOTE — PLAN OF CARE
Problem: Patient Care Overview  Goal: Plan of Care Review  Outcome: Ongoing (interventions implemented as appropriate)  Plan of care reviewed with patient who verbalized understanding. Alert and oriented when awake, slept well between care. Vitals stable and within patient's baseline since admission on room air. Pain controlled on current regimen. Complains of incisional abodminal pain. Incision open to air with dermabond, clean and intact. Left abdomen JONI drain to bulb suction - output in flowsheets. Tolerating regular diet, no nausea reported. Up independently, repositions in bed frequently. Urine output adequate overnight via urostomy. No BM overnight, passing flatus. TEDs in place. Ambulates well in room.. Instructed to call for help as needed, call light in reach. Bed in lowest position and brake set. Frequent rounds made for safety. See flowsheets for detailed assessment. Continuing to monitor.

## 2017-07-10 NOTE — DISCHARGE SUMMARY
[facility]  Urology  Discharge Summary      Patient Name: Amador Harrison  MRN: [id number]  Admission Date: 7/5/2017  Hospital Length of Stay: 4 days  Discharge Date and Time: 7/9/2017  2:45 PM  Attending Physician: Larry Wilde MD  Discharging Provider: Zabrina Knight MD  Primary Care Physician: Waqar Brady MD    HPI: Amador Harrison is a 61 y.o. White male who returns today in follow-up for invasive bladder cancer s/p bilateral percutaneous nephrostomy tube placement on 6/13/17.     The patient presented with gross hematuria and was found to have a bladder tumor which was resected on 3/3/2017. Pathology from that TURBT revealed high-grade T2 bladder cancer.     The patient had a CT urogram on 1/31/2017 that did not show any locally advanced or distant metastatic abdominal disease.     He has a remote smoking history.     After that initial visit, the patient began neoadjuvant chemotherapy with Dr. Austin until recently when his serum creatinine began to rise. An ultrasound showed that he had right hydronephrosis. On 6/8/17, we performed cystoscopy, TURBT which demonstrated a tumor infiltration of the trigone. We could not identify either ureteral orifice for stent placement and decompression of his upper tracts.     The patient required bilateral nephrostomy tube placement due to acute kidney injury and bilateral hydronephrosis. His renal function has responded appropriately with decompression.     Prior to discharge from the hospital, he was anemic and relatively thrombocytopenic due to his chemotherapy. He returns today to recheck his blood work before proceeding with radical cystectomy.     He is doing well with his nephrostomy tubes in place with the expected complaints. He is having some bladder spasms which are bothersome.     He is s/p cystectomy on 7/6    Procedure(s) (LRB):  ROBOTIC ASSISTED LAPAROSCOPIC CYSTECTOMY W/ ILEAL CONDUIT/ Converted to OPen (N/A)     Indwelling  Lines/Drains at time of discharge:   Lines/Drains/Airways     Drain                 Nephrostomy 06/13/17 1005 Left 8 Fr. 26 days         Nephrostomy 06/13/17 1124 Right 8 Fr. 26 days         Ureteral Drain/Stent 07/05/17 1218 Right ureter 5 Fr. 4 days         Urostomy 07/05/17 1226 ileal conduit RLQ 4 days                Hospital Course (synopsis of major diagnoses, care, treatment, and services provided during the course of the hospital stay): Mr. Harrison underwent the above procedure on 7/5/2017 as treatment for his high grade [id number] urothelial cell carcinoma of the bladder, bilateral hydroureteronephrosis. Conversion to open was necessary due to very narrow pelvic anatomy. He tolerated the procedure well and his post operative course was uncomplicated. Prior to his discharge on POD 4 his pain was well controled, he tolerated his diet without nausea or vomiting, he ambulated, he experienced return of bowel function, he voided per urostomy, and underwent ostomy teaching. He was discharged to go home with home health for IVF administration, physical therapy, and lovenox administration. Prior to his discharge his pelvic drain and JONI drains were discontinued. He was discharged home in good condition on 7/9/2017.      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    Specialty:  Urology  Contact information:  [address] [place] [address]  [phone]             [facility].    Specialties:  DME Provider, Home Health Services  Why:  Home Health- The nurse will see the patient shabnam day after discharge.  Contact information:  [address]  [place] [address]  [phone]             [facility].    Specialties:  Pharmacist, DME Provider, IV Infusion  Why:  Infusion - They will deliver the IV fluids and teach the patient.  Contact information:  [address]  [facility] [address]  [phone]             Urology. Schedule an appointment as soon as possible for a visit on 7/12/2017.    Why:  NT study  Contact information:  [facility] [address]                Patient Instructions:     Referral to Home health   Referral Priority: Routine Referral Type: Home Health Care   Referral Reason: Specialty Services Required    Requested Specialty: Home Health Services    Number of Visits Requested: 1      Diet general     Activity as tolerated   Order Comments: Do not drive while on pain medication.  Do not lift anything heavier than 10 lbs. for 6 weeks.  Shower after 48 hrs. Do not submerge incision for 6 weeks.     Call MD for:  temperature >100.4     Call MD for:  persistent nausea and vomiting or diarrhea     Call MD for:  severe uncontrolled pain     Call MD for:  redness, tenderness, or signs of infection (pain, swelling, redness, odor or green/yellow discharge around incision site)     Call MD for:  difficulty breathing or increased cough     Call MD for:  severe persistent headache     Call MD for:   worsening rash     Call MD for:  persistent dizziness, light-headedness, or visual disturbances     Call MD for:  increased confusion or weakness     Change dressing (specify)   Order Comments: As directed by the Wound Care team.     No dressing needed   Order Comments: May shower. Do not submerge incision for 6 weeks.       Medications:  Reconciled Home Medications:   Discharge Medication List as of 7/9/2017  2:05 PM      START taking these medications    Details   0.9 % SODIUM CHLORIDE (SODIUM CHLORIDE 0.9%) 0.9 % solution Inject 1,000 mLs into the vein continuous., Starting Fri 7/7/2017, Print      amoxicillin-clavulanate 500-125mg (AUGMENTIN) 500-125 mg Tab Take 1 tablet (500 mg total) by mouth 2 (two) times daily., Starting Sun 7/9/2017, Until Sun 7/16/2017, Print      enoxaparin (LOVENOX) 40 mg/0.4 mL Syrg Inject 0.4 mLs (40 mg total) into the skin once daily at 6am., Starting Fri 7/7/2017, Until Sun 8/6/2017, Print      oxycodone (ROXICODONE) 10 mg Tab immediate release tablet Take 1 tablet (10 mg total) by mouth every 4 (four) hours as needed for Pain., Starting Sun 7/9/2017, Print      polyethylene glycol (GLYCOLAX) 17 gram/dose powder Take 17 g by mouth once daily., Starting Sun 7/9/2017, Until Wed 8/9/2017, Print         CONTINUE these medications which have NOT CHANGED    Details   docusate sodium (COLACE) 100 MG capsule Take 1 capsule (100 mg total) by mouth 2 (two) times daily., Starting 3/7/2017, Until Discontinued, OTC      doxazosin (CARDURA) 4 MG tablet Take 1 tablet (4 mg total) by mouth every evening., Starting 9/9/2016, Until Discontinued, Normal      etodolac (LODINE) 400 MG tablet Take 400 mg by mouth once daily., Historical Med      glucosamine-chondroitin 500-400 mg tablet Take 1 tablet by mouth 3 (three) times daily., Until Discontinued, Historical Med      ibuprofen (ADVIL,MOTRIN) 200 MG tablet Take 200 mg by mouth every 6 (six) hours as needed for Pain., Historical Med      indapamide  (LOZOL) 2.5 MG Tab 2.5 mg every morning. , Starting Wed 3/8/2017, Historical Med      lactulose (CHRONULAC) 10 gram/15 mL solution as needed. , Starting Tue 5/23/2017, Historical Med      lidocaine-prilocaine (EMLA) cream Apply to port 30-45 minutes prior to being accessed., Normal      lubiprostone (AMITIZA) 24 MCG Cap Take 1 capsule (24 mcg total) by mouth 2 (two) times daily., Starting 3/7/2017, Until Discontinued, Normal      magnesium oxide (MAGOX) 400 mg tablet Take 1 tablet (400 mg total) by mouth once daily., Starting 4/10/2017, Until Tue 4/10/18, Normal      metoprolol succinate (TOPROL-XL) 50 MG 24 hr tablet Take 50 mg by mouth nightly. , Until Discontinued, Historical Med      multivitamin (ONE DAILY MULTIVITAMIN) per tablet Take 1 tablet by mouth once daily., Until Discontinued, Historical Med      ondansetron (ZOFRAN-ODT) 8 MG TbDL Take 1 tablet (8 mg total) by mouth every 12 (twelve) hours as needed., Starting 3/13/2017, Until Tue 3/13/18, Normal      oxybutynin (DITROPAN) 5 MG Tab as needed. , Starting Sun 5/7/2017, Historical Med      oxycodone-acetaminophen (PERCOCET) 5-325 mg per tablet Take 1 tablet by mouth every 4 (four) hours as needed., Starting Tue 6/27/2017, Print      polyethylene glycol (GLYCOLAX) 17 gram PwPk Take 17 g by mouth once daily., Starting Thu 6/8/2017, Print      valsartan (DIOVAN) 160 MG tablet Take 320 mg by mouth after lunch. , Historical Med         STOP taking these medications       nitrofurantoin, macrocrystal-monohydrate, (MACROBID) 100 MG capsule Comments:   Reason for Stopping:         phenazopyridine (PYRIDIUM) 100 MG tablet Comments:   Reason for Stopping:         tamsulosin (FLOMAX) 0.4 mg Cp24 Comments:   Reason for Stopping:               Zabrina Knight MD  Urology  [facility]

## 2017-07-11 NOTE — TELEPHONE ENCOUNTER
Called pt's OT and faxed an order to the provided phone number requesting a bedside commode for the pt.

## 2017-07-11 NOTE — TELEPHONE ENCOUNTER
----- Message from Lora Alejandra sent at 7/11/2017 12:52 PM CDT -----  Contact: [facility] #[phone]  Pt needs a bed side commode   Fax#[phone]

## 2017-07-12 NOTE — PT/OT/SLP DISCHARGE
Physical Therapy Discharge Summary    Amador Harrison  MRN: [id number]   Malignant neoplasm of ureteric orifice   Patient Discharged from acute Physical Therapy on 2017.  Please refer to prior PT noted date on 2017 for functional status.     Assessment:   Patient appropriate for care in another setting.  GOALS:    Physical Therapy Goals     Not on file          Multidisciplinary Problems (Resolved)        Problem: Physical Therapy Goal    Goal Priority Disciplines Outcome Goal Variances Interventions   Physical Therapy Goal   (Resolved)     PT/OT, PT Outcome(s) achieved     Description:  Goals to be met by:      Patient will increase functional independence with mobility by performin. Supine to sit with Stand-by Assistance  2. Sit to supine with Stand-by Assistance  3. Sit to stand transfer with Modified [place]  4. Gait  x 200 feet with Supervision with or without appropriate AD.   5. Lower extremity exercise program x15 reps per handout, with independence                    Reasons for Discontinuation of Therapy Services  Transfer to alternate level of care.      Plan:  Patient Discharged to: Home no PT services needed.    LALY MANRIQUEZ, PT  2017

## 2017-07-13 PROBLEM — Z01.818 PREOP TESTING: Status: ACTIVE | Noted: 2017-01-01

## 2017-07-13 NOTE — ANESTHESIA POSTPROCEDURE EVALUATION
"Anesthesia Post Evaluation    Patient: Amador Harrison    Procedure(s) Performed: Procedure(s) (LRB):  NEPHROSTOGRAM - ANTEGRADE/NEPHROSTOMY TUBE REMOVAL (Bilateral)    Final Anesthesia Type: general  Patient location during evaluation: PACU  Patient participation: Yes- Able to Participate  Level of consciousness: awake and alert and oriented  Post-procedure vital signs: reviewed and stable  Pain management: adequate  Airway patency: patent  PONV status at discharge: No PONV  Anesthetic complications: no      Cardiovascular status: stable  Respiratory status: unassisted, spontaneous ventilation and room air  Hydration status: euvolemic  Follow-up not needed.        Visit Vitals  BP (!) 106/57 (BP Location: Left arm, Patient Position: Lying, BP Method: cNIBP)   Pulse 66   Temp 37.1 °C (98.8 °F) (Temporal)   Resp 20   Ht 5' 6" (1.676 m)   Wt 76.7 kg (169 lb)   SpO2 100%   BMI 27.28 kg/m²       Pain/Katalina Score: Pain Assessment Performed: Yes (7/13/2017  4:45 PM)  Presence of Pain: denies (7/13/2017  4:45 PM)  Pain Rating Prior to Med Admin: 7 (7/13/2017  3:40 PM)  Pain Rating Post Med Admin: 0 (7/13/2017  4:45 PM)  Katalina Score: 10 (7/13/2017  4:45 PM)      "

## 2017-07-13 NOTE — INTERVAL H&P NOTE
The patient has been examined and the H&P has been reviewed:    I concur with the findings and no changes have occurred since H&P was written.     Anesthesia/Surgery risks, benefits and alternative options discussed and understood by patient/family.          Active Hospital Problems    Diagnosis  POA    Preop testing [Z01.818]  Not Applicable      Resolved Hospital Problems    Diagnosis Date Resolved POA   No resolved problems to display.

## 2017-07-13 NOTE — DISCHARGE SUMMARY
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       Discharged Condition: good    Hospital Course: Patient was admitted for an outpatient procedure and tolerated the procedure well with no complications.    Final Diagnoses: Same as principal problem.    Disposition: Home or Self Care    Follow up/Patient Instructions:    Medications:  Reconciled Home Medications:   Current Discharge Medication List      CONTINUE these medications which have NOT CHANGED    Details   amoxicillin-clavulanate 500-125mg (AUGMENTIN) 500-125 mg Tab Take 1 tablet (500 mg total) by mouth 2 (two) times daily.  Qty: 14 tablet, Refills: 0      docusate sodium (COLACE) 100 MG capsule Take 1 capsule (100 mg total) by mouth 2 (two) times daily.  Refills: 0    Associated Diagnoses: Drug-induced constipation      enoxaparin (LOVENOX) 40 mg/0.4 mL Syrg Inject 0.4 mLs (40 mg total) into the skin once daily at 6am.  Qty: 12 mL, Refills: 0      indapamide (LOZOL) 2.5 MG Tab 2.5 mg every morning.       lactobacillus acidophilus & bulgar (LACTINEX) 100 million cell packet Take 1 tablet by mouth 2 (two) times daily.      metoprolol succinate (TOPROL-XL) 50 MG 24 hr tablet Take 50 mg by mouth nightly.       oxycodone (ROXICODONE) 10 mg Tab immediate release tablet Take 1 tablet (10 mg total) by mouth every 4 (four) hours as needed for Pain.  Qty: 61  tablet, Refills: 0      polyethylene glycol (GLYCOLAX) 17 gram PwPk Take 17 g by mouth once daily.  Qty: 30 packet, Refills: 0      valsartan (DIOVAN) 160 MG tablet Take 320 mg by mouth after lunch.       0.9 % SODIUM CHLORIDE (SODIUM CHLORIDE 0.9%) 0.9 % solution Inject 1,000 mLs into the vein continuous.  Qty: 1000 mL, Refills: 6      doxazosin (CARDURA) 4 MG tablet Take 1 tablet (4 mg total) by mouth every evening.  Qty: 90 tablet, Refills: 3      etodolac (LODINE) 400 MG tablet Take 400 mg by mouth once daily.      glucosamine-chondroitin 500-400 mg tablet Take 1 tablet by mouth 3 (three) times daily.      ibuprofen (ADVIL,MOTRIN) 200 MG tablet Take 200 mg by mouth every 6 (six) hours as needed for Pain.      lactulose (CHRONULAC) 10 gram/15 mL solution as needed.       lidocaine-prilocaine (EMLA) cream Apply to port 30-45 minutes prior to being accessed.  Qty: 5 g, Refills: 0    Associated Diagnoses: Malignant neoplasm of trigone of urinary bladder; Encounter for chemotherapy management; Chemotherapy induced nausea and vomiting      lubiprostone (AMITIZA) 24 MCG Cap Take 1 capsule (24 mcg total) by mouth 2 (two) times daily.  Qty: 60 capsule, Refills: 2    Associated Diagnoses: Drug-induced constipation      magnesium oxide (MAGOX) 400 mg tablet Take 1 tablet (400 mg total) by mouth once daily.  Qty: 60 tablet, Refills: 1    Associated Diagnoses: Hypomagnesemia      multivitamin (ONE DAILY MULTIVITAMIN) per tablet Take 1 tablet by mouth once daily.      ondansetron (ZOFRAN-ODT) 8 MG TbDL Take 1 tablet (8 mg total) by mouth every 12 (twelve) hours as needed.  Qty: 30 tablet, Refills: 1    Associated Diagnoses: Malignant neoplasm of trigone of urinary bladder; Encounter for chemotherapy management; Chemotherapy induced nausea and vomiting      oxybutynin (DITROPAN) 5 MG Tab as needed.       oxycodone-acetaminophen (PERCOCET) 5-325 mg per tablet Take 1 tablet by mouth every 4 (four) hours as needed.  Qty: 31  tablet, Refills: 0    Associated Diagnoses: Malignant neoplasm of trigone of urinary bladder      polyethylene glycol (GLYCOLAX) 17 gram/dose powder Take 17 g by mouth once daily.  Qty: 527 g, Refills: 0             Discharge Procedure Orders  Diet general           Discharge Procedure Orders (must include Diet, Follow-up, Activity):    Discharge Procedure Orders (must include Diet, Follow-up, Activity)  Diet general

## 2017-07-13 NOTE — ANESTHESIA PREPROCEDURE EVALUATION
07/13/2017  Amador Harrison is a 61 y.o., male.    Anesthesia Evaluation    I have reviewed the Patient Summary Reports.    I have reviewed the Nursing Notes.   I have reviewed the Medications.     Review of Systems  Anesthesia Hx:  No problems with previous Anesthesia    Social:  Non-Smoker, No Alcohol Use    Hematology/Oncology:         -- Anemia: s/p chemotherapy Current/Recent Cancer. chemotherapy Oncology Comments: Bladder CA     EENT/Dental:EENT/Dental Normal   Cardiovascular:   Hypertension, well controlled    Pulmonary:  Pulmonary Normal    Renal/:   Chronic Renal Disease 6/12/17 In hospital for Acute Kidney Injury   Hepatic/GI:  Hepatic/GI Normal    Neurological:  Neurology Normal    Endocrine:  Endocrine Normal        Physical Exam  General:  Well nourished    Airway/Jaw/Neck:  Airway Findings: Mouth Opening: Normal Tongue: Normal  General Airway Assessment: Adult  Mallampati: I  TM Distance: 4 - 6 cm  Jaw/Neck Findings:  Neck ROM: Normal ROM      Dental:  Dental Findings: In tact   Chest/Lungs:  Chest/Lungs Findings: Normal Respiratory Rate     Heart/Vascular:  Heart Findings: Rate: Normal        Mental Status:  Mental Status Findings:  Cooperative, Alert and Oriented         Anesthesia Plan  Type of Anesthesia, risks & benefits discussed:  Anesthesia Type:  general  Patient's Preference:   Intra-op Monitoring Plan: standard ASA monitors  Intra-op Monitoring Plan Comments:   Post Op Pain Control Plan:   Post Op Pain Control Plan Comments:   Induction:   IV  Beta Blocker:  Patient is on a Beta-Blocker and has received one dose within the past 24 hours (No further documentation required).       Informed Consent: Patient understands risks and agrees with Anesthesia plan.  Questions answered. Anesthesia consent signed with patient.  ASA Score: 3     Day of Surgery Review of History & Physical:             Ready For Surgery From Anesthesia Perspective.

## 2017-07-13 NOTE — ANESTHESIA RELEASE NOTE
"Anesthesia Release from PACU Note    Patient: Amador Harrison    Procedure(s) Performed: Procedure(s) (LRB):  NEPHROSTOGRAM - ANTEGRADE/NEPHROSTOMY TUBE REMOVAL (Bilateral)    Anesthesia type: GEN    Post pain: Adequate analgesia reported    Post assessment: no apparent anesthetic complications, tolerated procedure well and no evidence of recall    Post vital signs: BP (!) 106/57 (BP Location: Left arm, Patient Position: Lying, BP Method: cNIBP)   Pulse 66   Temp 37.1 °C (98.8 °F) (Temporal)   Resp 20   Ht 5' 6" (1.676 m)   Wt 76.7 kg (169 lb)   SpO2 100%   BMI 27.28 kg/m²     Level of consciousness: awake, alert and oriented    Nausea/Vomiting: no nausea/no vomiting    Complications: none    Airway Patency: patent    Respiratory: unassisted, spontaneous ventilation, room air    Cardiovascular: stable and blood pressure at baseline    Hydration: euvolemic    "

## 2017-07-13 NOTE — PROGRESS NOTES
Notified Dr. Maurice that patient has met all criteria for release from Anesthesia's care.  Dr. Maurice stated that he would release patient from Anesthesia's care.

## 2017-07-13 NOTE — OP NOTE
[facility]  Operative Note    Date: 07/13/2017    Pre-Op Diagnosis:  Bladder cancer  Indwelling nephrostomy tubes due to bilateral ureteral obstruction  Patient Active Problem List   Diagnosis    Gross hematuria    Hydronephrosis, right    Malignant neoplasm of trigone of urinary bladder    Encounter for chemotherapy management    Hydronephrosis of right kidney    Hypertension    Hypertrophy of prostate without urinary obstruction and other lower urinary tract symptoms (LUTS)    Constipation- opioid related    Anemia    Serum albumin decreased    Port-a-cath in place- Rt upper arm    Enlarged prostate    Edema    Malignant neoplasm of ureteric orifice    Preop testing       Post-Op Diagnosis: same    Procedure(s) Performed:   1.  Bilateral antegrade nephrostogram  2.  Bilateral nephrostomy tube removal  3.  Fluoroscopy less than one hour    Specimen(s): none    Staff Surgeon: Dr. Andry MD    Assistant Surgeon: Ashley Petersen MD    Anesthesia:  Monitored Local Anesthesia with Sedation    Indications: Amador Harrison is a 61 y.o. male s/p recent cystectomy with indwelling nephrostomy tubes presenting for surgical intervention.        Findings:    1.  Bilateral antegrade nephrostograms performed  2.  Bilateral nephrostomy tube removel  3.  Bilateral ureteral stents in good position before and after removing nephrostomy tubes    Estimated Blood Loss: none    Drains:   1.  Bilateral ureteral stents    Procedure in detail:  After informed consent was obtained and all questions were answered, the patient was brought to the operating suite and placed int he supine position.  MAC anesthesia was administered.  TEDs and SCDs were applied and working prior to induction of anesthesia.  That patient was then prepped and draped in the usual sterile fashion.  Time out was performed and preoperative antibiotics were confirmed.      A right antegrade nephrostogram was performed via the R nephrostomy  tube.  There were no abnormalities seen.  The R nephrostomy tube was removed under fluoroscopy and the ureteral stent remained in good position.  This was repeated on the L.    The patient tolerated the procedure well and was transferred to the PACU in stable condition.      Disposition:  The patient will be discharged home.    Ashley Petersen MD    As the attending surgeon, Dr. Wilde was present for the entire procedure and performed all key aspects of the operation.

## 2017-07-13 NOTE — H&P (VIEW-ONLY)
[facility]  Urology  Progress Note    Patient Name: Amador Harrison  MRN: [id number]  Admission Date: 7/5/2017  Hospital Length of Stay: 4 days  Code Status: Prior   Attending Provider: Larry Wilde MD   Primary Care Physician: Waqar Brady MD    Subjective:     HPI:  Amador Harrison is a 61 y.o. White male who returns today in follow-up for invasive bladder cancer s/p bilateral percutaneous nephrostomy tube placement on 6/13/17.     The patient presented with gross hematuria and was found to have a bladder tumor which was resected on 3/3/2017. Pathology from that TURBT revealed high-grade T2 bladder cancer.     The patient had a CT urogram on 1/31/2017 that did not show any locally advanced or distant metastatic abdominal disease.     He has a remote smoking history.     After that initial visit, the patient began neoadjuvant chemotherapy with Dr. Austin until recently when his serum creatinine began to rise. An ultrasound showed that he had right hydronephrosis. On 6/8/17, we performed cystoscopy, TURBT which demonstrated a tumor infiltration of the trigone. We could not identify either ureteral orifice for stent placement and decompression of his upper tracts.     The patient required bilateral nephrostomy tube placement due to acute kidney injury and bilateral hydronephrosis. His renal function has responded appropriately with decompression.     Prior to discharge from the hospital, he was anemic and relatively thrombocytopenic due to his chemotherapy. He returns today to recheck his blood work before proceeding with radical cystectomy.     He is doing well with his nephrostomy tubes in place with the expected complaints. He is having some bladder spasms which are bothersome.    He is s/p cystectomy on 7/6    Interval History:   NAEON  +BM, +flatus  Pain well controlled  Tolerating regular diet  Ambulated multiple times in halls.  No complaints.    Review of Systems    Objective:      Temp:  [97.6 °F (36.4 °C)-99.2 °F (37.3 °C)] 97.8 °F (36.6 °C)  Pulse:  [] 100  Resp:  [18-20] 18  SpO2:  [95 %-99 %] 98 %  BP: ()/(53-71) 97/53     Body mass index is 26.63 kg/m².            Drains     Drain                 Nephrostomy 06/13/17 1005 Left 8 Fr. 25 days         Nephrostomy 06/13/17 1124 Right 8 Fr. 24 days         Closed/Suction Drain 07/05/17 1241 Left Abdomen Bulb 15 Fr. 2 days         Ureteral Drain/Stent 07/05/17 1218 Right ureter 5 Fr. 2 days         Urostomy 07/05/17 1226 ileal conduit RLQ 2 days                Physical Exam   Constitutional: He appears well-developed and well-nourished. No distress.   HENT:   Head: Normocephalic and atraumatic.   Eyes: Conjunctivae are normal. Pupils are equal, round, and reactive to light. No scleral icterus.   Neck: Normal range of motion. Neck supple.   Cardiovascular: Normal rate and regular rhythm.    Pulmonary/Chest: Effort normal. No respiratory distress. He has no wheezes.   Abdominal: Soft. He exhibits no distension and no mass. There is tenderness. There is no rebound and no guarding.   Appropriate tenderness  Incision CDI  Urostomy p/p/p draining clear yellow urine  Ureteral stents in place  JONI draining SS  B NT clamped   Musculoskeletal: Normal range of motion.   SCDs in place   Neurological: He is alert.   Skin: Skin is warm and dry. He is not diaphoretic.     Psychiatric: He has a normal mood and affect.       Significant Labs:    BMP:    Recent Labs  Lab 07/07/17  0534 07/08/17  0557 07/09/17  0548   * 135* 138   K 3.8 3.9 4.2    104 107   CO2 22* 26 25   BUN 12 15 18   CREATININE 0.9 1.0 1.0   CALCIUM 8.1* 8.4* 8.6*       CBC:     Recent Labs  Lab 07/07/17  0534 07/08/17  0557 07/09/17  0548   WBC 8.84 9.55 9.30   HGB 8.0* 8.3* 8.4*   HCT 23.3* 24.4* 25.0*   * 139* 151       All pertinent labs results from the past 24 hours have been reviewed.    Significant Imaging:  All pertinent imaging results/findings  from the past 24 hours have been reviewed.                  Assessment/Plan:     * Malignant neoplasm of ureteric orifice    Amador Harrison is a 61 y.o. male s/p cystectomy 7/5/17    -continue post cystectomy pathway  -ofirmev, narcotics PRN pain control  - HLIV  -lovenox/protonix/SCDs/IS  -PT/OT/stoma care consults  -OOB/ambulate  -  set-up for patient to go home this weekend  - Patient will follow-up in [facility] Thursday 7/13 for antegrade nephrostograms and NT removal  - JONI drain discontinued  - Dispo: will discharge home with HH today and rx for pain medication and 1 week of Augmentin              VTE Risk Mitigation         Ordered     enoxaparin injection 40 mg  Daily     Route:  Subcutaneous        07/07/17 0824     Medium Risk of VTE  Once      07/05/17 1414     Place sequential compression device  Until discontinued      07/05/17 1408          Zabrina Knight MD  Urology  [facility]

## 2017-07-13 NOTE — PROGRESS NOTES
"Spoke with Dr. Mckeon in person, notified pt has port to right upper arm; pt stated "only used for fluids."  Okay to use per Dr. Mckeon.  "

## 2017-07-13 NOTE — TRANSFER OF CARE
"Anesthesia Transfer of Care Note    Patient: Amador Harrison    Procedure(s) Performed: Procedure(s) (LRB):  NEPHROSTOGRAM - ANTEGRADE/NEPHROSTOMY TUBE REMOVAL (Bilateral)    Patient location: [facility]    Anesthesia Type: MAC    Transport from OR: Transported from OR on room air with adequate spontaneous ventilation    Post pain: adequate analgesia    Post assessment: no apparent anesthetic complications and tolerated procedure well    Post vital signs: stable    Level of consciousness: awake, alert and oriented    Nausea/Vomiting: no nausea/vomiting    Complications: none    Transfer of care protocol was followed      Last vitals:   Visit Vitals  BP (!) 109/59   Pulse 80   Temp 37.1 °C (98.7 °F) (Oral)   Resp 16   Ht 5' 6" (1.676 m)   Wt 76.7 kg (169 lb)   SpO2 100%   BMI 27.28 kg/m²     "

## 2017-07-13 NOTE — DISCHARGE INSTRUCTIONS
Discharge Instructions: Caring for Your Incision  You are going home with stitches (sutures), surgical staples, special strips of surgical tape called Steri-Strips, or surgical skin glue. One of these items was used to close your incision, help stop bleeding, and speed healing. Follow the tips on this sheet to help your incision heal.  Home care  · Always wash your hands before touching your incision.  · Keep your incision clean and dry.  · Avoid doing things that could cause dirt or sweat to get on your incision.  · Dont pick at scabs. They help protect the wound.  · Keep your incision out of water.  · Take a sponge bath to avoid getting your incision wet, unless your healthcare provider tells you otherwise.  · Ask your provider when can you take a shower or bathe.  · Ask your provider about the best way to keep your incision dry when bathing or showering.  · Pat sutures dry if they get wet. Dont rub.  · Leave the dressing (bandage) in place until you are told to remove it or change it. Change it only as directed, using clean hands.  · After the first 12 hours, change your dressing every 24 hours, or as directed by your healthcare provider.  · Change your dressing if it gets wet or soiled.  Care for specific closures  Follow these guidelines unless your child's healthcare provider tells you otherwise:  · Sutures or staples. Once you no longer need to keep these dry, clean the incision or wound daily. First remove the bandage using clean hands. Then wash the area gently with soap and warm water. Use a wet cotton swab to loosen and remove any blood or crust that forms. After cleaning, put a thin layer of antibiotic ointment on. Then put on a new bandage.  · Skin glue. Dont put liquid, ointment, or cream on your incision or wound while the glue is in place. Avoid activities that cause heavy sweating. Protect the incision or wound from sunlight. Do not scratch, rub, or pick at the glue. Do not put tape directly  over the glue. The glue should peel off within 5 to 10 days.  · Surgical tape. Keep your incision or wound dry. If it gets wet, blot the area dry with a clean towel. Surgical tape usually falls off within 7 to 10 days. If it has not fallen off after 10 days, contact your healthcare provider before taking it off yourself. If you are told to remove the tape, put mineral oil or petroleum jelly on a cotton ball. Gently rub the tape until it is removed.  Follow-up care  Follow up with your healthcare provider to ask how long sutures or staples should be left in place. Be sure to return for suture or staple removal as directed. If dissolving stitches were used in your mouth, these will not need to be removed. They should fall out or dissolve on their own.  If tape closures were used, remove them yourself when your provider recommends if they have not fallen off on their own. If skin glue was used, the glue will wear off by itself.      When to seek medical care  Call your healthcare provider right away if you have any of the following:  · More pain, redness, swelling, bleeding, or foul-smelling discharge around the incision area  · Fever of 100.4°F (38°C) or higher, or as directed by your healthcare provider  · Shaking chills  · Vomiting or nausea that doesnt go away  · Numbness, coldness, or tingling around the incision area, or changes in skin color  · Opening of the sutures or wound  · Stitches or staples come apart or fall out or surgical tape falls off before 7 days, or as directed by your provider   Date Last Reviewed: 10/22/2014  © 1409-4211 [employer]. [address] [place] [address], [place] [address]. All rights reserved. This information is not intended as a substitute for professional medical care. Always follow your healthcare professional's instructions.

## 2017-07-14 NOTE — PLAN OF CARE
Patient and patient's spouse received discharge instructions.  Patient and patient's spouse verbalized understanding of all instructions given and all questions were addressed prior to patient's discharge.  Patient's vital signs are stable and within patient's baseline.  Patient tolerated clear liquids PO.  Patient emptied 650 mL from urostomy.  Patient denies pain.  Patient denies nausea and vomiting at this time.  Patient meets all criteria for discharge at this time.  All required consents present in patient's chart upon patient's discharge.

## 2017-07-24 PROBLEM — Z01.818 PREOP TESTING: Status: RESOLVED | Noted: 2017-01-01 | Resolved: 2017-01-01

## 2017-07-24 PROBLEM — N13.30 HYDRONEPHROSIS, RIGHT: Status: RESOLVED | Noted: 2017-01-01 | Resolved: 2017-01-01

## 2017-07-24 PROBLEM — R31.0 GROSS HEMATURIA: Status: RESOLVED | Noted: 2017-01-01 | Resolved: 2017-01-01

## 2017-07-24 PROBLEM — N40.0 ENLARGED PROSTATE: Status: RESOLVED | Noted: 2017-01-01 | Resolved: 2017-01-01

## 2017-07-24 NOTE — TELEPHONE ENCOUNTER
----- Message from Carmina Alvarez sent at 7/24/2017 10:30 AM CDT -----  Contact: george/brooks - [phone]  Andry - needs new orders for iv fluids - please call george/brooks -

## 2017-07-24 NOTE — PATIENT INSTRUCTIONS
Understanding Bladder Cancer    The urinary system includes the kidneys, ureters, bladder, and urethra. It makes, stores, and gets rid of liquid waste called urine. The two kidneys filter blood to collect waste and make urine. The ureters are small tubes that carry urine from each kidney to the bladder. The bladder is where urine is stored before it leaves the body. The urethra is the tube urine goes through to leave the body.   Bladder cancer means that certain cells in the bladder have changed in ways that aren't normal.  When bladder cancer forms  Cancer is a disease that causes cells to change and multiply out of control. The multiplying cells may form a lump of tissue (tumor). With time, the cancer cells destroy healthy tissue. They may spread to other parts of the body. Why some cells become cancerous is not always clear. But bladder cancer is strongly linked to cigarette smoking. The longer a person smokes and the more a person smokes, the greater that persons chances of developing bladder cancer.  Types of cancer that may form  Bladder cancer may grow in different ways:  · Papillary tumors stick out from the bladder lining on a stalk. They tend to grow into the bladder cavity, away from the bladder wall, instead of deeper into the layers of the bladder wall.  · Flat tumors do not stick out from the bladder lining. These tumors are much more likely than papillary tumors to grow deeper into the layers of the bladder wall.  · Carcinoma in situ (CIS) is a cancerous patch of cells that is only in the inner layer of the bladder lining and has not spread to deeper tissue. The patch may look almost normal or may look inflamed.    Each type of tumor can be present in one or more areas of the bladder, and more than one type can be present at the same time.  Date Last Reviewed: 2/17/2016  © 1572-6930 [employer]. [address] [place] [address], [place] [address]. All rights reserved. This information is not  intended as a substitute for professional medical care. Always follow your healthcare professional's instructions.

## 2017-07-24 NOTE — PROGRESS NOTES
Subjective:       Patient ID: Amador Harrison is a 61 y.o. male.    Chief Complaint: Urostomy    This is a fu to enterostomal therapy for assessment and teaching related to  urinary stoma done 6/21 with Dr Wilde. Pt comes with wife today. He has 2 stents still in place, just came from Dr Wilde. Doing well, just completed his IV fluids with [facility]        Review of Systems   Constitutional: Negative for activity change, appetite change and fever.   Respiratory: Negative for cough and shortness of breath.    Cardiovascular: Negative for chest pain and leg swelling.   Gastrointestinal: Negative for abdominal distention, abdominal pain and constipation.   Genitourinary: Negative for hematuria.   Musculoskeletal: Negative.    Skin: Negative for rash and wound.   Neurological: Negative for weakness and headaches.   Psychiatric/Behavioral: Negative.        Objective:      Physical Exam   Constitutional: He is oriented to person, place, and time. He appears well-developed and well-nourished.   Pulmonary/Chest: Effort normal. No respiratory distress. He has no wheezes.   Abdominal: Soft. Bowel sounds are normal. He exhibits no distension.   Musculoskeletal: Normal range of motion. He exhibits no edema.   Neurological: He is alert and oriented to person, place, and time.   Skin: Skin is warm and dry. No erythema.   Psychiatric: He has a normal mood and affect.       TWO URINARY STENTS removed, stoma is 29 mm with sutures still intact, reviewed care of skin and stoma, applied FLAT  ANDRESSA cut to fit , instructed on remeasuring, use of bedside system   Script to DME for supplies  Will have samples sent from [facility] and belt.      Assessment:       1. Attention to urostomy    2. Encounter for ostomy care education        Plan:       [facility]ents [facility]   Urostomy teaching , reviewed urine specimen protocols  Info on local [facility] group given   Return as needed  I have reviewed the plan of care with the  patient and/ wife and they express understanding. I spent over 50% of this 20 minute visit in face to face counseling.

## 2017-07-24 NOTE — PROGRESS NOTES
Subjective:       Patient ID: Amador Harrison is a 61 y.o. male.    Chief Complaint: No chief complaint on file.      HPI: Amador Harrison is a 61 y.o. White male who returns today in follow-up for invasive bladder cancer s/p 2 cycles of neoadjuvant chemotherapy, bilateral percutaneous nephrostomy tube placement on 6/13/17, and open radical cystectomy, bilateral pelvic lymph node dissection, and creation of ileal conduit on 7/5/17.    The patient initially presented with gross hematuria and was found to have a bladder tumor which was resected on 3/3/2017. Pathology from that TURBT revealed high-grade T2 bladder cancer. The patient had a CT urogram on 1/31/2017 that did not show any locally advanced or distant metastatic abdominal disease.    He has a remote smoking history.    The patient began neoadjuvant chemotherapy with Dr. Austin until early June when his serum creatinine began to rise. An ultrasound showed that he had right hydronephrosis. On 6/8/17, we performed cystoscopy, TURBT which demonstrated tumor infiltration of the trigone. We could not identify either ureteral orifice for stent placement and decompression of his upper tracts. The patient then required bilateral nephrostomy tube placement due to acute kidney injury and bilateral hydronephrosis. His renal function responded appropriately with decompression.    After some delay to allow for his blood counts to improve, the patient underwent a radical cystectomy with ileal conduit creation on 7/5/17.     The patient's surgery and post-operative recovery has gone well.    His pathology demonstrated invasive disease into his perivesical with negative margins. The patient did have some positive lymph nodes. His final pathologic stage is [id number].    He returns today for a routine post-operative visit, doing well with the expected complaints.    Review of patient's allergies indicates:  No Known Allergies    Current Outpatient Prescriptions   Medication  Sig Dispense Refill    0.9 % SODIUM CHLORIDE (SODIUM CHLORIDE 0.9%) 0.9 % solution Inject 1,000 mLs into the vein continuous. 1000 mL 6    enoxaparin (LOVENOX) 40 mg/0.4 mL Syrg Inject 0.4 mLs (40 mg total) into the skin once daily at 6am. 12 mL 0    etodolac (LODINE) 400 MG tablet Take 400 mg by mouth once daily.      glucosamine-chondroitin 500-400 mg tablet Take 1 tablet by mouth 3 (three) times daily.      indapamide (LOZOL) 2.5 MG Tab 2.5 mg every morning.       lactobacillus acidophilus & bulgar (LACTINEX) 100 million cell packet Take 1 tablet by mouth 2 (two) times daily.      lactulose (CHRONULAC) 10 gram/15 mL solution as needed.       lidocaine-prilocaine (EMLA) cream Apply to port 30-45 minutes prior to being accessed. 5 g 0    metoprolol succinate (TOPROL-XL) 50 MG 24 hr tablet Take 50 mg by mouth nightly.       oxycodone (ROXICODONE) 10 mg Tab immediate release tablet Take 1 tablet (10 mg total) by mouth every 4 (four) hours as needed for Pain. 61 tablet 0    oxycodone-acetaminophen (PERCOCET) 5-325 mg per tablet Take 1 tablet by mouth every 4 (four) hours as needed. 31 tablet 0    polyethylene glycol (GLYCOLAX) 17 gram PwPk Take 17 g by mouth once daily. (Patient taking differently: Take 17 g by mouth daily as needed. ) 30 packet 0    valsartan (DIOVAN) 160 MG tablet Take 320 mg by mouth after lunch.       docusate sodium (COLACE) 100 MG capsule Take 1 capsule (100 mg total) by mouth 2 (two) times daily. (Patient taking differently: Take 100 mg by mouth 2 (two) times daily as needed. )  0    ibuprofen (ADVIL,MOTRIN) 200 MG tablet Take 200 mg by mouth every 6 (six) hours as needed for Pain.      lubiprostone (AMITIZA) 24 MCG Cap Take 1 capsule (24 mcg total) by mouth 2 (two) times daily. (Patient taking differently: Take 24 mcg by mouth 2 (two) times daily as needed. ) 60 capsule 2    magnesium oxide (MAGOX) 400 mg tablet Take 1 tablet (400 mg total) by mouth once daily. 60 tablet 1     multivitamin (ONE DAILY MULTIVITAMIN) per tablet Take 1 tablet by mouth once daily.      ondansetron (ZOFRAN-ODT) 8 MG TbDL Take 1 tablet (8 mg total) by mouth every 12 (twelve) hours as needed. 30 tablet 1    oxybutynin (DITROPAN) 5 MG Tab as needed.       polyethylene glycol (GLYCOLAX) 17 gram/dose powder Take 17 g by mouth once daily. 527 g 0     No current facility-administered medications for this visit.        Past Medical History:   Diagnosis Date    Cancer     Disorder of kidney and ureter     Encounter for blood transfusion     2017    Hypertension     Hypertrophy of prostate without urinary obstruction and other lower urinary tract symptoms (LUTS)     Nocturia     Nocturia        Past Surgical History:   Procedure Laterality Date    CARPAL TUNNEL RELEASE      Left hand    CYSTOSCOPY      CYSTOSCOPY W/ URETERAL STENT PLACEMENT      HERNIA REPAIR Bilateral     x 2 separate surgeries. 1 as an infant and another at approx age 18    hydrocele      at age 3    TONSILLECTOMY      turbt 2017      VASECTOMY         History reviewed. No pertinent family history.    Review of Systems    Review of Systems   Constitutional: Negative for fever, chills, activity change, appetite change and unexpected weight change.   HENT: Negative for congestion, nosebleeds, sneezing, sore throat and trouble swallowing.    Eyes: Negative for pain, discharge, redness and visual disturbance.   Respiratory: Negative for cough, choking, chest tightness and shortness of breath.    Cardiovascular: Negative for chest pain, palpitations and leg swelling.   Gastrointestinal: Negative for nausea, vomiting, abdominal pain, diarrhea, blood in stool, abdominal distention and anal bleeding.  Genitourinary: As documented per HPI   Endocrine: Negative for cold intolerance, heat intolerance, polydipsia, polyphagia and polyuria.   Musculoskeletal: Negative for myalgias, gait problem, neck pain and neck stiffness.   Skin: Negative for  color change, pallor, rash and wound.   Allergic/Immunologic: Negative for immunocompromised state.   Neurological: Negative for seizures, facial asymmetry, speech difficulty, weakness and light-headedness.   Hematological: Negative for adenopathy. Does not bruise/bleed easily.   Psychiatric/Behavioral: Negative for hallucinations, behavioral problems, self-injury and agitation. The patient is not hyperactive.    All other systems were reviewed and were negative.      Objective:     Vitals:    07/24/17 0958   BP: 134/70   Pulse: 98     Physical Exam   Vitals reviewed.  Constitutional: He is oriented to person, place, and time. He appears well-developed and well-nourished. No distress.   HENT:   Head: Normocephalic and atraumatic.   Right Ear: External ear normal.   Left Ear: External ear normal.   Nose: Nose normal.   Eyes: EOM are normal. Pupils are equal, round, and reactive to light. Right eye exhibits no discharge. Left eye exhibits no discharge.   Neck: Normal range of motion. Neck supple. No tracheal deviation present. No thyroid enlargement or tenderness.   Cardiovascular: Regular rhythm and intact distal pulses. No signs of peripheral edema.    Pulmonary/Chest: Effort normal and breath sounds normal. No stridor. No respiratory distress. He has no wheezes.   Abdominal: Soft. Bowel sounds are normal. He exhibits no distension. There is no tenderness. Hernia confirmed negative in the right inguinal area and confirmed negative in the left inguinal area. No hepatic or splenic enlargement or tenderness. Well-healing incision. Pink, protuberant stoma, draining clear urine.  Genitourinary: Penis normal. Right testis shows no mass, no swelling and no tenderness. Right testis is descended. Left testis shows no mass, no swelling and no tenderness. Left testis is descended. Circumcised. No phimosis or hypospadias. Catheter draining clear urine.  LYLY: Deferred  Musculoskeletal: Normal range of motion. He exhibits no  edema.   Neurological: He is alert and oriented to person, place, and time. He exhibits normal muscle tone. Coordination normal.   Skin: Skin is warm. No rash noted.   Lymphatic: No palpable lymph nodes.  Psychiatric: He has a normal mood and affect. His behavior is normal. Judgment and thought content normal.     No results found for: PSA  Lab Results   Component Value Date    CREATININE 1.0 07/09/2017     Lab Results   Component Value Date    EGFRNONAA >60.0 07/09/2017     Lab Results   Component Value Date    ESTGFRAFRICA >60.0 07/09/2017     I personally reviewed all the patient's imaging studies.    CT urogram (1/31/2017): Thickening of the posterior bladder wall, slightly irregular appearance may represent cystitis versus neoplasm.    CT abdomen/pelvis with contrast (5/19/17): Although the patient has a history of malignancy, no measurable lesions per the RECIST criteria are identified.    CT chest (5/19/17): Examination of the lung fields demonstrates no evidence of consolidation, mass, or significant pleural thickening, nor is there evidence of pleural fluid.    Assessment:       1. Malignant neoplasm of trigone of urinary bladder    2. Bilateral ureteral obstruction    3. Post-operative state    4. Presence of urostomy        Plan:     Diagnoses and all orders for this visit:    Malignant neoplasm of trigone of urinary bladder  -     US Kidney Only; Future    Bilateral ureteral obstruction  -     US Kidney Only; Future    Post-operative state  -     US Kidney Only; Future    Presence of urostomy  -     US Kidney Only; Future    From a post-surgical standpoint, the patient is doing well. He is doing very well.    His incisions are healing well, too.    We reviewed his pathology at length. I suspect that the patient will need adjuvant therapy at this point, likely immunotherapy. We will arrange a follow-up appointment with Dr. Austin in the next 1-2 weeks.    I would like to see him back in 3 months with a  renal ultrasound to ensure that his conduit has healed without stricture.    I answered all his and his wife's questions.    I encouraged him or any of his family members to call or email me with questions and/or concerns.    I spent 30 minutes with the patient of which more than half was spent in coordinating the patient's care as well as in direct consultation with the patient in regards to our treatment and plan.

## 2017-07-25 NOTE — TELEPHONE ENCOUNTER
----- Message from Nica Mack sent at 7/25/2017  9:57 AM CDT -----  Contact: Viviana from [facility] [phone]  Viviana states she is returning June's call regarding the pt. Please call Viviana.

## 2017-07-25 NOTE — TELEPHONE ENCOUNTER
Called Viviana and told her that dr mayorga said that it was ok for them to d/c the pt's Pic[facility] line.  Viviana verbalized understanding to all.

## 2017-07-28 NOTE — TELEPHONE ENCOUNTER
He does not need a PSA.  His prostate pathology did not show any prostate cancer.      He does not need to follow up with me since he is seeing Dr. Wilde.  Disregard the letter.

## 2017-07-28 NOTE — TELEPHONE ENCOUNTER
Spoke to pt he is still seeing dr.canter [facility] would like to know since he is seeing these doctors does he still have to follow up with . Please advise./vasu

## 2017-07-28 NOTE — TELEPHONE ENCOUNTER
----- Message from Linnea Carrillo sent at 7/28/2017  9:53 AM CDT -----  Contact: self  Patient received a recall letter . The notes states with PSA . He states he no longer has a prostate or bladder . Please advise if he should still f/u & is PSA needed ?     [phone]   LL

## 2017-07-28 NOTE — TELEPHONE ENCOUNTER
Spoke to pt advised doesn't have to follow up with  as long as pt is seeing  an pt doesn't need psa disregard letter./vasu

## 2017-08-07 NOTE — PROGRESS NOTES
"Subjective:       Patient ID: Amador Harrison is a 61 y.o. male.    Chief Complaint: Follow-up    HPI     61 year old  male, follow up, after cystectomy (and after 2 cycles of neoadjuvant cis/gem with PD)  for muscle-invasive bladder cancer.     He denies any mouth sores, nausea, vomiting, diarrhea, constipation, abdominal pain, weight loss or loss of appetite, chest pain, shortness of breath, leg swelling,headache, dizziness, or mood changes.    His ECOG PS is one. He is accompanied to clinic with his wife. He has a remote smoking history.     Oncologic History (From Chart):  61 year old  male, referred by Dr. Wilde, to clinic today for evaluation and management of newly diagnosed muscle-invasive bladder cancer. The patient presented with gross hematuria and was found to have a bladder tumor which was resected on 3/3/2017.  The patient had a CT urogram on 1/31/2017 that did not show any locally advanced or distant metastatic abdominal disease. Pathology from that TURBT revealed high-grade T2 bladder cancer with negative margins. Pathology revealed "WITH AREAS OF SQUAMOUS CELL CARCINOMA DIFFERENTIATION. EXTENSIVE INVOLVEMENT OF THE MUSCULARIS PROPRIA (DETRUSSOR MUSCLE) IS PRESENT."  3/17/17- CT CAP reveals "Injuries patient with a reported history of bladder cancer, the following findings are identified: Persistent though decreased asymmetric thickening of the right posterolateral aspect of the bladder wall.Right-sided double J stent in appropriate position.Subcentimeter hepatic segment III hypodensity, too small to accurately characterized but favored to be benign."  5/19/17-CT CAP reveals "Increased asymmetric right posterior wall thickening of the urinary bladder. Although bladder was incompletely distended on this examination, this finding is nonspecific, but could represent sequela of infection, or recurrence of prior neoplasm. Clinical correlation and further evaluation is recommended.Right " "double-J ureteral stent in appropriate position with adjacent soft tissue stranding about the distal course of the stent. Other incidental findings including stable hypodensity in hepatic segment III small to characterize, mild degenerative changes with vacuum disc phenomenon scattered throughout the thoracolumbar spine. Left nephrolith, aortic atherosclerosis, and stable right-sided portacatheterAlthough the patient has a history of malignancy, no measurable lesions per the RECIST criteria are identified. "    Review of Systems   Constitutional: Positive for fatigue. Negative for activity change, appetite change, fever and unexpected weight change.   HENT: Positive for tinnitus (mild to left ear). Negative for mouth sores, nosebleeds and trouble swallowing.    Eyes: Negative for discharge and visual disturbance.   Respiratory: Negative for cough, shortness of breath and wheezing.    Cardiovascular: Negative for chest pain and leg swelling.   Gastrointestinal: Negative for abdominal distention, abdominal pain, blood in stool, constipation, diarrhea, nausea and vomiting.   Genitourinary: Positive for dysuria. Negative for decreased urine volume and frequency.   Musculoskeletal: Negative for back pain.   Skin: Negative for color change and rash.   Neurological: Negative for weakness and headaches.   Hematological: Negative for adenopathy.   Psychiatric/Behavioral: Negative for sleep disturbance. The patient is not nervous/anxious.    All other systems reviewed and are negative.      Allergies:  Review of patient's allergies indicates:  No Known Allergies    Medications:  Current Outpatient Prescriptions   Medication Sig Dispense Refill    0.9 % SODIUM CHLORIDE (SODIUM CHLORIDE 0.9%) 0.9 % solution Inject 1,000 mLs into the vein continuous. 1000 mL 6    docusate sodium (COLACE) 100 MG capsule Take 1 capsule (100 mg total) by mouth 2 (two) times daily. (Patient taking differently: Take 100 mg by mouth 2 (two) times " daily as needed. )  0    etodolac (LODINE) 400 MG tablet Take 400 mg by mouth once daily.      glucosamine-chondroitin 500-400 mg tablet Take 1 tablet by mouth 3 (three) times daily.      ibuprofen (ADVIL,MOTRIN) 200 MG tablet Take 200 mg by mouth every 6 (six) hours as needed for Pain.      indapamide (LOZOL) 2.5 MG Tab 2.5 mg every morning.       lactobacillus acidophilus & bulgar (LACTINEX) 100 million cell packet Take 1 tablet by mouth 2 (two) times daily.      lactulose (CHRONULAC) 10 gram/15 mL solution as needed.       lidocaine-prilocaine (EMLA) cream Apply to port 30-45 minutes prior to being accessed. 5 g 0    lubiprostone (AMITIZA) 24 MCG Cap Take 1 capsule (24 mcg total) by mouth 2 (two) times daily. (Patient taking differently: Take 24 mcg by mouth 2 (two) times daily as needed. ) 60 capsule 2    magnesium oxide (MAGOX) 400 mg tablet Take 1 tablet (400 mg total) by mouth once daily. 60 tablet 1    metoprolol succinate (TOPROL-XL) 50 MG 24 hr tablet Take 50 mg by mouth nightly.       multivitamin (ONE DAILY MULTIVITAMIN) per tablet Take 1 tablet by mouth once daily.      ondansetron (ZOFRAN-ODT) 8 MG TbDL Take 1 tablet (8 mg total) by mouth every 12 (twelve) hours as needed. 30 tablet 1    oxybutynin (DITROPAN) 5 MG Tab as needed.       oxycodone (ROXICODONE) 10 mg Tab immediate release tablet Take 1 tablet (10 mg total) by mouth every 4 (four) hours as needed for Pain. 61 tablet 0    oxycodone-acetaminophen (PERCOCET) 5-325 mg per tablet Take 1 tablet by mouth every 4 (four) hours as needed. 31 tablet 0    polyethylene glycol (GLYCOLAX) 17 gram PwPk Take 17 g by mouth once daily. (Patient taking differently: Take 17 g by mouth daily as needed. ) 30 packet 0    polyethylene glycol (GLYCOLAX) 17 gram/dose powder Take 17 g by mouth once daily. 527 g 0    valsartan (DIOVAN) 160 MG tablet Take 320 mg by mouth after lunch.        No current facility-administered medications for this visit.         PMH:  Past Medical History:   Diagnosis Date    Cancer     Disorder of kidney and ureter     Encounter for blood transfusion     2017    Hypertension     Hypertrophy of prostate without urinary obstruction and other lower urinary tract symptoms (LUTS)     Nocturia     Nocturia        PSH:  Past Surgical History:   Procedure Laterality Date    CARPAL TUNNEL RELEASE      Left hand    CYSTOSCOPY      CYSTOSCOPY W/ URETERAL STENT PLACEMENT      HERNIA REPAIR Bilateral     x 2 separate surgeries. 1 as an infant and another at approx age 18    hydrocele      at age 3    TONSILLECTOMY      turbt 2017      VASECTOMY         FamHx:  No family history on file.    SocHx:  Social History     Social History    Marital status:      Spouse name: N/A    Number of children: N/A    Years of education: N/A     Occupational History    Not on file.     Social History Main Topics    Smoking status: Former Smoker     Quit date: 2/27/1994    Smokeless tobacco: Never Used    Alcohol use Yes      Comment: 2 beers or wine daily    Drug use: No    Sexual activity: Yes     Other Topics Concern    Not on file     Social History Narrative    No narrative on file     Objective:      Physical Exam   Constitutional: He is oriented to person, place, and time. He appears well-developed and well-nourished.   HENT:   Head: Normocephalic and atraumatic.   Mouth/Throat: Uvula is midline and oropharynx is clear and moist. No oropharyngeal exudate.   Eyes: Conjunctivae and EOM are normal. Pupils are equal, round, and reactive to light.   Neck: Trachea normal and normal range of motion. Neck supple. No tracheal deviation present.   Cardiovascular: Normal rate, regular rhythm, normal heart sounds and normal pulses.    No swelling noted to bilateral lower extremities.   Pulmonary/Chest: Effort normal.   Abdominal: Normal appearance.   Musculoskeletal: He exhibits no edema.   Lymphadenopathy:     He has no cervical  adenopathy.   Neurological: He is alert and oriented to person, place, and time.   Skin: Skin is warm, dry and intact. No rash noted. He is not diaphoretic.   Psychiatric: He has a normal mood and affect. His speech is normal and behavior is normal.   Nursing note and vitals reviewed.      LABS:  WBC   Date Value Ref Range Status   08/07/2017 12.46 3.90 - 12.70 K/uL Final     Hemoglobin   Date Value Ref Range Status   08/07/2017 10.1 (L) 14.0 - 18.0 g/dL Final     POC Hematocrit   Date Value Ref Range Status   07/05/2017 25 (L) 36 - 54 %PCV Final     Hematocrit   Date Value Ref Range Status   08/07/2017 29.3 (L) 40.0 - 54.0 % Final     Platelets   Date Value Ref Range Status   08/07/2017 193 150 - 350 K/uL Final       Chemistry        Component Value Date/Time     (L) 08/07/2017 1253    K 3.1 (L) 08/07/2017 1253    CL 96 08/07/2017 1253    CO2 28 08/07/2017 1253    BUN 18 08/07/2017 1253    CREATININE 0.9 08/07/2017 1253     08/07/2017 1253        Component Value Date/Time    CALCIUM 9.8 08/07/2017 1253    ALKPHOS 75 08/07/2017 1253    AST 14 08/07/2017 1253    ALT 12 08/07/2017 1253    BILITOT 0.4 08/07/2017 1253          Assessment:       1. Malignant neoplasm of trigone of urinary bladder        Plan:       1. Muscle-invasive bladder cancer:    - Now s/p neoadjuvant chemo (2 cycles) with PD, and s/p cystectomy.  Reviewed path with patient.  Recovering well.     RTC 2 wks with labs, CT CAP, and to see me.  If his CT scan show residual disease, we will plan to initiate immunotherapy, if not, we'll monitor closely with subsequent CT scans.      Patient is in agreement with the proposed treatment plan. All questions were answered to the patient's satisfaction. Pt knows to call clinic if anything is needed before the next clinic visit.     More than 25 mins were spent during this encounter, greater than 50% was spent in direct counseling and/or coordination of care.     Antwon Austin M.D., M.S.,  RISA.  Hematology/Oncology Attending  [facility]

## 2017-08-22 NOTE — PROGRESS NOTES
"Subjective:       Patient ID: Amador Harrison is a 61 y.o. male.    Chief Complaint: Malignant neoplasm of trigone of urinary bladder    HPI     61 year old  male to clinic for 2 week follow up and to review CT scans after cystectomy (and after 2 cycles of neoadjuvant cis/gem with PD)  for muscle-invasive bladder cancer. Patient reports fevers better controlled since antibiotics. He has 2 more doses of medication. He notes good appetite but still loosing weight. Weight down 18 lbs since before surgery.    He denies any mouth sores, nausea, vomiting, diarrhea, constipation, abdominal pain, chest pain, shortness of breath, leg swelling,headache, dizziness, or mood changes.    His ECOG PS is one. He is accompanied to clinic with his wife. He has a remote smoking history.     Oncologic History (From Chart):  61 year old  male, referred by Dr. Wilde, to clinic today for evaluation and management of newly diagnosed muscle-invasive bladder cancer. The patient presented with gross hematuria and was found to have a bladder tumor which was resected on 3/3/2017.  The patient had a CT urogram on 1/31/2017 that did not show any locally advanced or distant metastatic abdominal disease. Pathology from that TURBT revealed high-grade T2 bladder cancer with negative margins. Pathology revealed "WITH AREAS OF SQUAMOUS CELL CARCINOMA DIFFERENTIATION. EXTENSIVE INVOLVEMENT OF THE MUSCULARIS PROPRIA (DETRUSSOR MUSCLE) IS PRESENT."  3/17/17- CT CAP reveals "Injuries patient with a reported history of bladder cancer, the following findings are identified: Persistent though decreased asymmetric thickening of the right posterolateral aspect of the bladder wall.Right-sided double J stent in appropriate position.Subcentimeter hepatic segment III hypodensity, too small to accurately characterized but favored to be benign."  5/19/17-CT CAP reveals "Increased asymmetric right posterior wall thickening of the urinary bladder. " "Although bladder was incompletely distended on this examination, this finding is nonspecific, but could represent sequela of infection, or recurrence of prior neoplasm. Clinical correlation and further evaluation is recommended.Right double-J ureteral stent in appropriate position with adjacent soft tissue stranding about the distal course of the stent. Other incidental findings including stable hypodensity in hepatic segment III small to characterize, mild degenerative changes with vacuum disc phenomenon scattered throughout the thoracolumbar spine. Left nephrolith, aortic atherosclerosis, and stable right-sided portacatheterAlthough the patient has a history of malignancy, no measurable lesions per the RECIST criteria are identified. "  8/18/17- CT CAP "In this patient with a history of bladder cancer, there have been interval postoperative changes compatible with cystoprostatectomy and ileal conduit to right lower quadrant stoma. Persistent mild left hydroureteronephrosis with improved minimal right hydronephrosis.Interval development of multiple bilateral pulmonary groundglass opacities and solid nodules. Given the short interval development, these may represent a nonspecific infectious or inflammatory process although metastatic diseases is an additional consideration.Large fluid collection along the left iliac vessels may represent a postoperative seroma, hematoma, abscess, or lymphocele. Smaller fluid collection in the pelvis is also likely postoperative in nature."    Review of Systems   Constitutional: Positive for fatigue, fever and unexpected weight change. Negative for activity change and appetite change.   HENT: Positive for tinnitus (mild to left ear). Negative for mouth sores, nosebleeds and trouble swallowing.    Eyes: Negative for discharge and visual disturbance.   Respiratory: Negative for cough, shortness of breath and wheezing.    Cardiovascular: Negative for chest pain and leg swelling. "   Gastrointestinal: Negative for abdominal distention, abdominal pain, blood in stool, constipation, diarrhea, nausea and vomiting.   Genitourinary: Positive for dysuria. Negative for decreased urine volume and frequency.   Musculoskeletal: Negative for back pain.   Skin: Negative for color change and rash.   Neurological: Negative for weakness and headaches.   Hematological: Negative for adenopathy.   Psychiatric/Behavioral: Negative for sleep disturbance. The patient is not nervous/anxious.    All other systems reviewed and are negative.      Allergies:  Review of patient's allergies indicates:  No Known Allergies    Medications:  Current Outpatient Prescriptions   Medication Sig Dispense Refill    0.9 % SODIUM CHLORIDE (SODIUM CHLORIDE 0.9%) 0.9 % solution Inject 1,000 mLs into the vein continuous. 1000 mL 6    amoxicillin-clavulanate 875-125mg (AUGMENTIN) 875-125 mg per tablet Take 1 tablet by mouth 2 (two) times daily. 28 tablet 0    docusate sodium (COLACE) 100 MG capsule Take 1 capsule (100 mg total) by mouth 2 (two) times daily. (Patient taking differently: Take 100 mg by mouth 2 (two) times daily as needed. )  0    etodolac (LODINE) 400 MG tablet Take 400 mg by mouth once daily.      glucosamine-chondroitin 500-400 mg tablet Take 1 tablet by mouth 3 (three) times daily.      ibuprofen (ADVIL,MOTRIN) 200 MG tablet Take 200 mg by mouth every 6 (six) hours as needed for Pain.      indapamide (LOZOL) 2.5 MG Tab 2.5 mg every morning.       lactobacillus acidophilus & bulgar (LACTINEX) 100 million cell packet Take 1 tablet by mouth 2 (two) times daily.      lactulose (CHRONULAC) 10 gram/15 mL solution as needed.       lidocaine-prilocaine (EMLA) cream Apply to port 30-45 minutes prior to being accessed. 5 g 0    lubiprostone (AMITIZA) 24 MCG Cap Take 1 capsule (24 mcg total) by mouth 2 (two) times daily. (Patient taking differently: Take 24 mcg by mouth 2 (two) times daily as needed. ) 60 capsule 2     magnesium oxide (MAGOX) 400 mg tablet Take 1 tablet (400 mg total) by mouth once daily. 60 tablet 1    metoprolol succinate (TOPROL-XL) 50 MG 24 hr tablet Take 50 mg by mouth nightly.       multivitamin (ONE DAILY MULTIVITAMIN) per tablet Take 1 tablet by mouth once daily.      ondansetron (ZOFRAN-ODT) 8 MG TbDL Take 1 tablet (8 mg total) by mouth every 12 (twelve) hours as needed. 30 tablet 1    oxybutynin (DITROPAN) 5 MG Tab as needed.       oxycodone (ROXICODONE) 10 mg Tab immediate release tablet Take 1 tablet (10 mg total) by mouth every 4 (four) hours as needed for Pain. 61 tablet 0    oxycodone-acetaminophen (PERCOCET) 5-325 mg per tablet Take 1 tablet by mouth every 4 (four) hours as needed. 31 tablet 0    polyethylene glycol (GLYCOLAX) 17 gram PwPk Take 17 g by mouth once daily. (Patient taking differently: Take 17 g by mouth daily as needed. ) 30 packet 0    valsartan (DIOVAN) 160 MG tablet Take 320 mg by mouth after lunch.        No current facility-administered medications for this visit.        PMH:  Past Medical History:   Diagnosis Date    Cancer     Disorder of kidney and ureter     Encounter for blood transfusion     2017    Hypertension     Hypertrophy of prostate without urinary obstruction and other lower urinary tract symptoms (LUTS)     Nocturia     Nocturia        PSH:  Past Surgical History:   Procedure Laterality Date    CARPAL TUNNEL RELEASE      Left hand    CYSTOSCOPY      CYSTOSCOPY W/ URETERAL STENT PLACEMENT      HERNIA REPAIR Bilateral     x 2 separate surgeries. 1 as an infant and another at approx age 18    hydrocele      at age 3    TONSILLECTOMY      turbt 2017      VASECTOMY         FamHx:  No family history on file.    SocHx:  Social History     Social History    Marital status:      Spouse name: N/A    Number of children: N/A    Years of education: N/A     Occupational History    Not on file.     Social History Main Topics    Smoking status:  Former Smoker     Quit date: 2/27/1994    Smokeless tobacco: Never Used    Alcohol use Yes      Comment: 2 beers or wine daily    Drug use: No    Sexual activity: Yes     Other Topics Concern    Not on file     Social History Narrative    No narrative on file     Objective:      Physical Exam   Constitutional: He is oriented to person, place, and time. He appears well-developed and well-nourished.   HENT:   Head: Normocephalic and atraumatic.   Mouth/Throat: Uvula is midline and oropharynx is clear and moist. No oropharyngeal exudate.   Eyes: Conjunctivae and EOM are normal. Pupils are equal, round, and reactive to light.   Neck: Trachea normal and normal range of motion. Neck supple. No tracheal deviation present.   Cardiovascular: Normal rate, regular rhythm, normal heart sounds and normal pulses.    No swelling noted to bilateral lower extremities.   Pulmonary/Chest: Effort normal.   Abdominal: Normal appearance.   Musculoskeletal: He exhibits no edema.   Lymphadenopathy:     He has no cervical adenopathy.   Neurological: He is alert and oriented to person, place, and time.   Skin: Skin is warm, dry and intact. No rash noted. He is not diaphoretic.   Psychiatric: He has a normal mood and affect. His speech is normal and behavior is normal.   Nursing note and vitals reviewed.      LABS:  WBC   Date Value Ref Range Status   08/18/2017 9.57 3.90 - 12.70 K/uL Final     Hemoglobin   Date Value Ref Range Status   08/18/2017 10.3 (L) 14.0 - 18.0 g/dL Final     POC Hematocrit   Date Value Ref Range Status   07/05/2017 25 (L) 36 - 54 %PCV Final     Hematocrit   Date Value Ref Range Status   08/18/2017 30.8 (L) 40.0 - 54.0 % Final     Platelets   Date Value Ref Range Status   08/18/2017 265 150 - 350 K/uL Final       Chemistry        Component Value Date/Time     08/18/2017 0828    K 3.5 08/18/2017 0828    CL 98 08/18/2017 0828    CO2 28 08/18/2017 0828    BUN 19 08/18/2017 0828    CREATININE 0.9 08/18/2017  0828     08/18/2017 0828        Component Value Date/Time    CALCIUM 10.0 08/18/2017 0828    ALKPHOS 65 08/18/2017 0828    AST 20 08/18/2017 0828    ALT 24 08/18/2017 0828    BILITOT 0.4 08/18/2017 0828          Assessment:       1. Malignant neoplasm of trigone of urinary bladder    2. S/P radical cystoprostatectomy    3. Anemia in neoplastic disease    4. Serum albumin decreased    5. Weight loss        Plan:       1,2,3-Muscle-invasive bladder cancer:    - Now s/p neoadjuvant chemo (2 cycles) with PD, and s/p cystectomy.  Reviewed path with patient.  Restaging scans show multiple lung nodules. Radiologist reviewed as likely infectious etiology, which would correlate with his fevers. Discussed with patient that this is unclear and recommend repeat CT scans in 2 months. If his CT scan show metastatic disease, we will plan to initiate immunotherapy, if not, we'll monitor closely with subsequent CT scans.    RTC 2 months with labs, CT CAP, and to see me and Dr. Austin.      3- Mild, will monitor.    4,5- Discussed high-protein, calorie-dense foods.    Patient was also seen and examined by Dr. Austin. Patient is in agreement with the proposed treatment plan. All questions were answered to the patient's satisfaction. Pt knows to call clinic if anything is needed before the next clinic visit.    Marion Flores, CODY-C  Hematology and Medical Oncology        I have reviewed the notes, assessments, and/or procedures performed by the nurse practitioner, as above.  I have personally interviewed the patient at the beside, and rounded with the nurse practitioner. I formulated the plan of care.  I concur with her documentation of Amador Harrison.  More than 25 mins were spent during this encounter, greater than 50% was spent in direct counseling and/or coordination of care.     Antwon Austin M.D., M.S., F.A.C.P.  Hematology/Oncology Attending  [facility]

## 2017-08-23 NOTE — LETTER
[facility]  [address]  [place] [place] [facility] [address]  Phone: [phone] August 23, 2017    Amador Harrison  [address]  [facility] [address]      To Whom It May Concern:    Please excuse Mrs. Joselin Harrison, the wife of the above mentioned patient, from jury duty. We are currently treating Mr. Harrison for bladder cancer and he has recently undergone an extensive surgery and is recuperating. Mrs. Joselin Harrison is the sole caretaker for him at this time.    If you have any questions or concerns, please feel free to call my office.    Sincerely,        Marion Flores NP

## 2017-08-31 NOTE — ED PROVIDER NOTES
Encounter Date: 6/12/2017       History     Chief Complaint   Patient presents with    Other     abnormal lab value      Patient was seen and admitted by the Urological service and was not evaluated by me          Review of patient's allergies indicates:  No Known Allergies  Past Medical History:   Diagnosis Date    Cancer     Disorder of kidney and ureter     Encounter for blood transfusion     2017    Hypertension     Hypertrophy of prostate without urinary obstruction and other lower urinary tract symptoms (LUTS)     Nocturia     Nocturia      Past Surgical History:   Procedure Laterality Date    CARPAL TUNNEL RELEASE      Left hand    CYSTOSCOPY      CYSTOSCOPY W/ URETERAL STENT PLACEMENT      HERNIA REPAIR Bilateral     x 2 separate surgeries. 1 as an infant and another at approx age 18    hydrocele      at age 3    TONSILLECTOMY      turbt 2017      VASECTOMY       History reviewed. No pertinent family history.  Social History   Substance Use Topics    Smoking status: Former Smoker     Quit date: 2/27/1994    Smokeless tobacco: Never Used    Alcohol use Yes      Comment: 2 beers or wine daily     Review of Systems    Physical Exam     Initial Vitals [06/12/17 1232]   BP Pulse Resp Temp SpO2   127/65 71 18 98.5 °F (36.9 °C) 97 %      MAP       85.67         Physical Exam    ED Course   Procedures  Labs Reviewed   LACTIC ACID, PLASMA   PROTIME-INR   APTT                          Attending Attestation:             Attending ED Notes:   Patient was seen and admitted by the Urological service and not seen by me          ED Course     Clinical Impression:   The encounter diagnosis was LOIDA (acute kidney injury).    Disposition:   Disposition: Admitted  Condition: Serious                        Aaron Rose MD  08/31/17 9216

## 2017-09-19 PROBLEM — R06.02 SHORTNESS OF BREATH: Status: ACTIVE | Noted: 2017-01-01

## 2017-09-19 PROBLEM — R91.8 MULTIPLE LUNG NODULES: Status: ACTIVE | Noted: 2017-01-01

## 2017-09-19 NOTE — LETTER
September 19, 2017      Antwon Austin MD  [address] [facility] [address]           [facility]  [address]  [place] [address]  Phone: [phone]          Patient: Amador Harrison   MR Number: [id number]   YOB: 1955   Date of Visit: 9/19/2017       Dear Dr. Antwon Austin:    Thank you for referring Amador Harrison to me for evaluation. Attached you will find relevant portions of my assessment and plan of care.    If you have questions, please do not hesitate to call me. I look forward to following Amador Harrison along with you.    Sincerely,    MAHESH Hale MD    Enclosure  CC:  No Recipients    If you would like to receive this communication electronically, please contact [email or web address] or [phone] to request more information on [facility] [employer] access.    For providers and/or their staff who would like to refer a patient to [facility], please contact us through our one-stop-shop provider referral line, [facility] , at [phone].    If you feel you have received this communication in error or would no longer like to receive these types of communications, please e-mail [email or web address]

## 2017-09-20 NOTE — TELEPHONE ENCOUNTER
Results from repeat CT discussed with patient, images reviewed with IR, plan for biopsy later this week.

## 2017-09-20 NOTE — PROGRESS NOTES
Subjective:       Patient ID: Amador Harrison is a 61 y.o. male.    Chief Complaint: Abnormal Ct Scan    HPI HISTORY OF PRESENT ILLNESS:  Mr. Harrison is a 61-year-old former smoker who   comes for evaluation of abnormalities seen in a recent CT scan of the chest.  He   has the history of a bladder cancer, which was resected in July 2017.  He had   preoperative chemotherapy, but has not had any additional treatment since   his surgical resection.  The pathology report from surgery indicates   there were regional lymph nodes positive for malignancy.    Mr. Harrison reports that during August, he developed a low-grade fever and   cough.  His CT scan showed nodular opacities within the lungs.  He was treated   with Augmentin for possible lung infection.  His fever has not resolved.  He   also describes shortness of breath with moderate daily activities.  He has not   been aware of any wheezing.  He has not had any sputum production or hemoptysis   associated with his cough.    Mr. Harrison does not know of any proven past lung diseases.  He does not   normally use any prescribed medications for respiratory problems.    Mr. Harrison was a former smoker.  He estimates that he smoked as much as 2 or   2-1/2 packs of cigarettes per day for 20 years.  He discontinued smoking in   1994.  He does not know of any important past occupational exposures.  He   believes his family history is negative for lung diseases.    DATA:  I have reviewed the images from the CT scan of the chest done in August.    There are no significant abnormalities with the cardiac silhouette and no acute   cardiovascular changes.  There are no pleural effusions.  There are nodular   opacities scattered throughout both lungs.  These were not present in a CT scan   done in May.      CB/HN  dd: 09/19/2017 20:05:05 (CDT)  td: 09/20/2017 11:55:53 (CDT)  Doc ID   #[id number]  Job ID #[id number]    CC:       Review of Systems   Constitutional: Positive for  fever, weight loss and fatigue.   HENT: Negative for postnasal drip, sinus pressure, voice change and congestion.    Respiratory: Positive for cough, shortness of breath and dyspnea on extertion. Negative for sputum production and wheezing.    Cardiovascular: Negative for chest pain and leg swelling.   Genitourinary: Negative for difficulty urinating.   Musculoskeletal: Positive for back pain. Negative for arthralgias.   Skin: Negative for rash.   Gastrointestinal: Negative for abdominal pain and acid reflux.   Neurological: Negative for dizziness and weakness.   Hematological: Negative for adenopathy.       Objective:      Physical Exam   Constitutional: He is oriented to person, place, and time. He appears well-developed and well-nourished.   HENT:   Head: Normocephalic.   Mouth/Throat: Oropharynx is clear and moist. No oropharyngeal exudate.   Neck: Normal range of motion. Neck supple. No JVD present. No tracheal deviation present. No thyromegaly present.   Cardiovascular: Normal rate, regular rhythm and normal heart sounds.    No murmur heard.  Pulmonary/Chest: Symmetric chest wall expansion. No stridor. He has no wheezes. He has no rhonchi. He has no rales. He exhibits no tenderness.   Abdominal: Soft.   Ostomy bag with urine drainage.   Musculoskeletal: He exhibits no edema.   Lymphadenopathy:     He has no cervical adenopathy.   Neurological: He is alert and oriented to person, place, and time.   Skin: Skin is warm and dry. No rash noted. No erythema. Nails show no clubbing.   Psychiatric: He has a normal mood and affect.   Vitals reviewed.    Personal Diagnostic Review    No flowsheet data found.      Assessment:       1. Malignant neoplasm of trigone of urinary bladder    2. Multiple lung nodules    3. Shortness of breath        Outpatient Encounter Prescriptions as of 9/19/2017   Medication Sig Dispense Refill    0.9 % SODIUM CHLORIDE (SODIUM CHLORIDE 0.9%) 0.9 % solution Inject 1,000 mLs into the vein  continuous. 1000 mL 6    benzonatate (TESSALON PERLES) 100 MG capsule Take 1 capsule (100 mg total) by mouth 3 (three) times daily as needed for Cough. 30 capsule 1    docusate sodium (COLACE) 100 MG capsule Take 1 capsule (100 mg total) by mouth 2 (two) times daily. (Patient taking differently: Take 100 mg by mouth 2 (two) times daily as needed. )  0    etodolac (LODINE) 400 MG tablet Take 400 mg by mouth once daily.      glucosamine-chondroitin 500-400 mg tablet Take 1 tablet by mouth 3 (three) times daily.      ibuprofen (ADVIL,MOTRIN) 200 MG tablet Take 200 mg by mouth every 6 (six) hours as needed for Pain.      indapamide (LOZOL) 2.5 MG Tab 2.5 mg every morning.       lactobacillus acidophilus & bulgar (LACTINEX) 100 million cell packet Take 1 tablet by mouth 2 (two) times daily.      lactulose (CHRONULAC) 10 gram/15 mL solution as needed.       lidocaine-prilocaine (EMLA) cream Apply to port 30-45 minutes prior to being accessed. 5 g 0    lubiprostone (AMITIZA) 24 MCG Cap Take 1 capsule (24 mcg total) by mouth 2 (two) times daily. (Patient taking differently: Take 24 mcg by mouth 2 (two) times daily as needed. ) 60 capsule 2    magnesium oxide (MAGOX) 400 mg tablet Take 1 tablet (400 mg total) by mouth once daily. 60 tablet 1    metoprolol succinate (TOPROL-XL) 50 MG 24 hr tablet Take 50 mg by mouth nightly.       multivitamin (ONE DAILY MULTIVITAMIN) per tablet Take 1 tablet by mouth once daily.      ondansetron (ZOFRAN-ODT) 8 MG TbDL Take 1 tablet (8 mg total) by mouth every 12 (twelve) hours as needed. 30 tablet 1    oxybutynin (DITROPAN) 5 MG Tab as needed.       oxycodone (ROXICODONE) 10 mg Tab immediate release tablet Take 1 tablet (10 mg total) by mouth every 4 (four) hours as needed for Pain. 61 tablet 0    oxycodone-acetaminophen (PERCOCET) 5-325 mg per tablet Take 1 tablet by mouth every 4 (four) hours as needed. 31 tablet 0    polyethylene glycol (GLYCOLAX) 17 gram PwPk Take 17 g  by mouth once daily. (Patient taking differently: Take 17 g by mouth daily as needed. ) 30 packet 0    trazodone (DESYREL) 50 MG tablet Take 1 tablet (50 mg total) by mouth every evening. 30 tablet 1    valsartan (DIOVAN) 160 MG tablet Take 320 mg by mouth after lunch.        No facility-administered encounter medications on file as of 9/19/2017.      Orders Placed This Encounter   Procedures    CT Chest Without Contrast     Standing Status:   Future     Number of Occurrences:   1     Standing Expiration Date:   9/19/2018     Plan:     CT Chest (phone report).  Review imaging with IR for TTNBx.

## 2017-09-22 PROBLEM — I48.91 NEW ONSET A-FIB: Status: ACTIVE | Noted: 2017-01-01

## 2017-09-22 PROBLEM — R00.2 HEART PALPITATIONS: Status: ACTIVE | Noted: 2017-01-01

## 2017-09-22 NOTE — H&P
"[facility] Medicine  History & Physical    Patient Name: Amador Harrison  MRN: [id number]  Admission Date: 9/22/2017  Attending Physician: Eamon Cohn MD   Primary Care Provider: Waqar Brady MD    [facility] Medicine Team: [facility] MED ZITA Guillen NP     Patient information was obtained from patient, spouse/SO, past medical records and ER records.     Subjective:     Principal Problem:New onset a-fib    Chief Complaint:   Chief Complaint   Patient presents with    Palpitations     Pt sent from IR. He was scheduled to have lung biopsy today. He reports palpitations x several weeks. No known history of A. Fib.         HPI: Mr. 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  He was sent from  to the ED for A. Fib with RVR, complaints of SOB and palpitations. He reports that he has been feeling "discomfort from a fast heart beat for about 2 weeks" but that his heart rate was regular this past Tuesday at an appointment with Dr. Hale.      Mr. Harrison was given diltiazem in the ED for rate control with a decrease from high 140's to mid 90's. He denies chest pain, n/v, dizziness, and lightheadedness. It's reported he had an 18 lbs weight loss prior to surgery and within the last month since surgery he's been experiencing daily fevers.     He reports taking his last ASA on Wednesday when they called to give him a last minute opening for this Friday's biopsy of his lung.  This is a contraindication for 5 days prior to procedure. Per IR, they are able to complete the biopsy Monday 9/25 at 9:30 am.  Which at that time he will be ASA free for 5 days.  The naproxen he took has " been cleared by IR to move forward.  CTA shows areas of cavitation in both lobes and increased consolidation in the right middle lobe compared to previous studies.     Past Medical History:   Diagnosis Date    Cancer     Disorder of kidney and ureter     Encounter for blood transfusion     2017    Hypertension     Hypertrophy of prostate without urinary obstruction and other lower urinary tract symptoms (LUTS)     Nocturia     Nocturia        Past Surgical History:   Procedure Laterality Date    BLADDER SURGERY      CARPAL TUNNEL RELEASE      Left hand    CYSTOSCOPY      CYSTOSCOPY W/ URETERAL STENT PLACEMENT      HERNIA REPAIR Bilateral     x 2 separate surgeries. 1 as an infant and another at approx age 18    hydrocele      at age 3    TONSILLECTOMY      turbt 2017      VASECTOMY         Review of patient's allergies indicates:  No Known Allergies    Current Facility-Administered Medications on File Prior to Encounter   Medication    [DISCONTINUED] 0.9%  NaCl infusion    [DISCONTINUED] fentaNYL injection 50 mcg    [DISCONTINUED] midazolam injection 1 mg     Current Outpatient Prescriptions on File Prior to Encounter   Medication Sig    benzonatate (TESSALON PERLES) 100 MG capsule Take 1 capsule (100 mg total) by mouth 3 (three) times daily as needed for Cough.    glucosamine-chondroitin 500-400 mg tablet Take 1 tablet by mouth 3 (three) times daily.    ibuprofen (ADVIL,MOTRIN) 200 MG tablet Take 200 mg by mouth every 6 (six) hours as needed for Pain.    indapamide (LOZOL) 2.5 MG Tab 2.5 mg every morning.     lactobacillus acidophilus & bulgar (LACTINEX) 100 million cell packet Take 1 tablet by mouth 2 (two) times daily.    metoprolol succinate (TOPROL-XL) 50 MG 24 hr tablet Take 50 mg by mouth nightly.     multivitamin (ONE DAILY MULTIVITAMIN) per tablet Take 1 tablet by mouth once daily.    oxycodone-acetaminophen (PERCOCET) 5-325 mg per tablet Take 1 tablet by mouth every 4 (four)  hours as needed.    trazodone (DESYREL) 50 MG tablet Take 1 tablet (50 mg total) by mouth every evening.    valsartan (DIOVAN) 160 MG tablet Take 320 mg by mouth after lunch.     0.9 % SODIUM CHLORIDE (SODIUM CHLORIDE 0.9%) 0.9 % solution Inject 1,000 mLs into the vein continuous.    aspirin (ECOTRIN) 81 MG EC tablet Take 81 mg by mouth once daily.    etodolac (LODINE) 400 MG tablet Take 400 mg by mouth once daily.    fish oil-omega-3 fatty acids 300-1,000 mg capsule Take 2 g by mouth once daily.    lidocaine-prilocaine (EMLA) cream Apply to port 30-45 minutes prior to being accessed.    ondansetron (ZOFRAN-ODT) 8 MG TbDL Take 1 tablet (8 mg total) by mouth every 12 (twelve) hours as needed.    [DISCONTINUED] docusate sodium (COLACE) 100 MG capsule Take 1 capsule (100 mg total) by mouth 2 (two) times daily. (Patient taking differently: Take 100 mg by mouth 2 (two) times daily as needed. )    [DISCONTINUED] lactulose (CHRONULAC) 10 gram/15 mL solution as needed.     [DISCONTINUED] lubiprostone (AMITIZA) 24 MCG Cap Take 1 capsule (24 mcg total) by mouth 2 (two) times daily. (Patient taking differently: Take 24 mcg by mouth 2 (two) times daily as needed. )    [DISCONTINUED] magnesium oxide (MAGOX) 400 mg tablet Take 1 tablet (400 mg total) by mouth once daily.    [DISCONTINUED] oxybutynin (DITROPAN) 5 MG Tab as needed.     [DISCONTINUED] oxycodone (ROXICODONE) 10 mg Tab immediate release tablet Take 1 tablet (10 mg total) by mouth every 4 (four) hours as needed for Pain.    [DISCONTINUED] polyethylene glycol (GLYCOLAX) 17 gram PwPk Take 17 g by mouth once daily. (Patient taking differently: Take 17 g by mouth daily as needed. )     Family History     None        Social History Main Topics    Smoking status: Former Smoker     Packs/day: 2.50     Years: 20.00     Types: Cigarettes, Pipe, Cigars     Quit date: 2/27/1994    Smokeless tobacco: Never Used    Alcohol use Yes      Comment: 2 beers or wine  daily    Drug use: No    Sexual activity: Yes     Review of Systems   Constitutional: Positive for fatigue and fever (daily for the past month). Negative for activity change, appetite change and chills.   HENT: Negative for congestion, rhinorrhea, sinus pressure and sore throat.    Eyes: Negative for pain, redness and visual disturbance.   Respiratory: Positive for shortness of breath. Negative for cough, chest tightness, wheezing and stridor.    Cardiovascular: Positive for palpitations (for the past 2 weeks). Negative for chest pain and leg swelling.   Gastrointestinal: Negative for abdominal distention, abdominal pain, blood in stool, constipation, diarrhea, nausea and vomiting.   Endocrine: Negative for cold intolerance and heat intolerance.   Genitourinary: Negative for difficulty urinating (Urostomy since 7/17).   Musculoskeletal: Positive for myalgias (Left hip pain ). Negative for arthralgias, back pain and neck pain.   Skin: Negative for color change, pallor and rash.   Neurological: Negative for dizziness, tremors, syncope, light-headedness, numbness and headaches.   Hematological: Does not bruise/bleed easily.   Psychiatric/Behavioral: Negative for agitation, confusion and hallucinations. The patient is not nervous/anxious.      Objective:     Vital Signs (Most Recent):  Temp: 98.6 °F (37 °C) (09/22/17 1418)  Pulse: 95 (09/22/17 1418)  Resp: 20 (09/22/17 1418)  BP: (!) 112/57 (09/22/17 1418)  SpO2: 95 % (09/22/17 1418) Vital Signs (24h Range):  Temp:  [98.6 °F (37 °C)-98.7 °F (37.1 °C)] 98.6 °F (37 °C)  Pulse:  [] 95  Resp:  [16-26] 20  SpO2:  [94 %-97 %] 95 %  BP: (101-116)/(57-71) 112/57     Weight: 64.6 kg (142 lb 6.7 oz)  Body mass index is 22.99 kg/m².    Physical Exam   Constitutional: He is oriented to person, place, and time. He appears well-developed and well-nourished. No distress.   HENT:   Head: Normocephalic and atraumatic.   Mouth/Throat: Oropharynx is clear and moist.   Eyes:  Conjunctivae and EOM are normal. No scleral icterus.   Neck: Normal range of motion. Neck supple. No JVD present. No tracheal deviation present. No thyromegaly present.   Cardiovascular: Normal rate and intact distal pulses.  An irregularly irregular rhythm present. Exam reveals no gallop and no friction rub.    No murmur heard.  Pulmonary/Chest: Effort normal and breath sounds normal. No respiratory distress. He has no wheezes. He has no rales.   Abdominal: Soft. Bowel sounds are normal. He exhibits no distension and no mass. There is no tenderness. There is no rebound and no guarding.   Urostomy bag with clear yellow urine   Musculoskeletal: Normal range of motion. He exhibits no edema.   Neurological: He is alert and oriented to person, place, and time. No cranial nerve deficit. He exhibits normal muscle tone.   Skin: Skin is warm and dry. Capillary refill takes less than 2 seconds. No rash noted. No erythema.   Psychiatric: He has a normal mood and affect. His behavior is normal. Judgment and thought content normal.   Nursing note and vitals reviewed.       Significant Labs:   BMP:   Recent Labs  Lab 09/22/17  0925         K 3.4*      CO2 29   BUN 21   CREATININE 0.8   CALCIUM 8.9     CBC:   Recent Labs  Lab 09/22/17  0702 09/22/17  0925   WBC 15.85* 14.48*   HGB 10.3* 9.3*   HCT 31.7* 28.5*    273     CMP:   Recent Labs  Lab 09/22/17  0925      K 3.4*      CO2 29      BUN 21   CREATININE 0.8   CALCIUM 8.9   PROT 6.6   ALBUMIN 2.5*   BILITOT 0.4   ALKPHOS 63   AST 14   ALT 20   ANIONGAP 8   EGFRNONAA >60.0     TSH:   Recent Labs  Lab 09/22/17  0925   TSH 0.773       Significant Imaging: I have reviewed all pertinent imaging results/findings within the past 24 hours.     CTA Chest:     No pulmonary thromboembolism or pulmonary infarction.    There has been progression of disease with interval increase in size and number of previously noted pulmonary and pleural-based  nodules throughout both lungs with some now demonstrating cavitation.  There are large areas of consolidation involving the medial segment of the right middle lobe (measures 3.5 x 2.2 cm; axial series 3 image 22), basal segments of the right lower lobe (measures 2.3 x 2.5 cm; axial series 3 image 47), and apicoposterior segment of the left upper lobe (measures 4.0 x 2.7 cm; axial series 3 image 23).  These findings are again concerning for malignancy and given cavitation, we favor squamous cell carcinoma.    Assessment/Plan:     * New onset a-fib    - RVR in ED, diltiazem was given for rate control with a decrease from high 140's to mid 90's  - Increase home beta blocker   - No anticoagulation in preparation for lung biopsy  - telemetry monitoring  -[facility] 1 (HTN)               Multiple lung nodules    - CTA with areas of cavitation in both lobes and increased consolidation in the right middle lobe     compared to previous studies  - Hold all forms on anticoagulation  - plan for outpatient biopsy Monday morning             Heart palpitations    - See above           Port-a-cath in place- Rt upper arm    - okay to use for med administration           Hypertension    - increase home metoprolol  - valsartan held this afternoon due to systolic  after diltiazem   - continue home indapamide           Malignant neoplasm of trigone of urinary bladder    - Regular ostomy care  - outpatient lung biopsy on Monday to evaluate for metastasis             VTE Risk Mitigation         Ordered     High Risk of VTE  Once      09/22/17 1321     Place ALEJANDRO hose  Until discontinued      09/22/17 1321     Place sequential compression device  Until discontinued      09/22/17 1321     Place ALEJANDRO hose  Until discontinued      09/22/17 1155             Jennifer Guillen NP  Department of Hospital Medicine   [facility]  Spectra:  [phone]  Pager: [phone]

## 2017-09-22 NOTE — PLAN OF CARE
Problem: Patient Care Overview  Goal: Plan of Care Review  Outcome: Ongoing (interventions implemented as appropriate)  Pt resting in chair once arrived to unit from ED. Pt denies any pain or discomfort. No SOB or chest pain. SR on Telemetry. VSS. Skin integrity intact and without breakdown. Call light in reach. Fall precautions in place, pt did not have any falls or injuries this shift. Plan to d/c in AM. Plan of care discussed with patient no questions at this time. Will continue to monitor.

## 2017-09-22 NOTE — ASSESSMENT & PLAN NOTE
- RVR in ED, diltiazem was given for rate control with a decrease from high 140's to mid 90's  - Increase home beta blocker   - No anticoagulation in preparation for lung biopsy  - telemetry monitoring  -Chadsvasc 1 (HTN)

## 2017-09-22 NOTE — ED NOTES
Upon departure of pt from ED, cardiology states they want pt on the 3rd floor.  Pt notified of plan.

## 2017-09-22 NOTE — SUBJECTIVE & OBJECTIVE
Past Medical History:   Diagnosis Date    Cancer     Disorder of kidney and ureter     Encounter for blood transfusion     2017    Hypertension     Hypertrophy of prostate without urinary obstruction and other lower urinary tract symptoms (LUTS)     Nocturia     Nocturia        Past Surgical History:   Procedure Laterality Date    BLADDER SURGERY      CARPAL TUNNEL RELEASE      Left hand    CYSTOSCOPY      CYSTOSCOPY W/ URETERAL STENT PLACEMENT      HERNIA REPAIR Bilateral     x 2 separate surgeries. 1 as an infant and another at approx age 18    hydrocele      at age 3    TONSILLECTOMY      turbt 2017      VASECTOMY         Review of patient's allergies indicates:  No Known Allergies    Current Facility-Administered Medications on File Prior to Encounter   Medication    [DISCONTINUED] 0.9%  NaCl infusion    [DISCONTINUED] fentaNYL injection 50 mcg    [DISCONTINUED] midazolam injection 1 mg     Current Outpatient Prescriptions on File Prior to Encounter   Medication Sig    benzonatate (TESSALON PERLES) 100 MG capsule Take 1 capsule (100 mg total) by mouth 3 (three) times daily as needed for Cough.    glucosamine-chondroitin 500-400 mg tablet Take 1 tablet by mouth 3 (three) times daily.    ibuprofen (ADVIL,MOTRIN) 200 MG tablet Take 200 mg by mouth every 6 (six) hours as needed for Pain.    indapamide (LOZOL) 2.5 MG Tab 2.5 mg every morning.     lactobacillus acidophilus & bulgar (LACTINEX) 100 million cell packet Take 1 tablet by mouth 2 (two) times daily.    metoprolol succinate (TOPROL-XL) 50 MG 24 hr tablet Take 50 mg by mouth nightly.     multivitamin (ONE DAILY MULTIVITAMIN) per tablet Take 1 tablet by mouth once daily.    oxycodone-acetaminophen (PERCOCET) 5-325 mg per tablet Take 1 tablet by mouth every 4 (four) hours as needed.    trazodone (DESYREL) 50 MG tablet Take 1 tablet (50 mg total) by mouth every evening.    valsartan (DIOVAN) 160 MG tablet Take 320 mg by mouth after  lunch.     0.9 % SODIUM CHLORIDE (SODIUM CHLORIDE 0.9%) 0.9 % solution Inject 1,000 mLs into the vein continuous.    aspirin (ECOTRIN) 81 MG EC tablet Take 81 mg by mouth once daily.    etodolac (LODINE) 400 MG tablet Take 400 mg by mouth once daily.    fish oil-omega-3 fatty acids 300-1,000 mg capsule Take 2 g by mouth once daily.    lidocaine-prilocaine (EMLA) cream Apply to port 30-45 minutes prior to being accessed.    ondansetron (ZOFRAN-ODT) 8 MG TbDL Take 1 tablet (8 mg total) by mouth every 12 (twelve) hours as needed.    [DISCONTINUED] docusate sodium (COLACE) 100 MG capsule Take 1 capsule (100 mg total) by mouth 2 (two) times daily. (Patient taking differently: Take 100 mg by mouth 2 (two) times daily as needed. )    [DISCONTINUED] lactulose (CHRONULAC) 10 gram/15 mL solution as needed.     [DISCONTINUED] lubiprostone (AMITIZA) 24 MCG Cap Take 1 capsule (24 mcg total) by mouth 2 (two) times daily. (Patient taking differently: Take 24 mcg by mouth 2 (two) times daily as needed. )    [DISCONTINUED] magnesium oxide (MAGOX) 400 mg tablet Take 1 tablet (400 mg total) by mouth once daily.    [DISCONTINUED] oxybutynin (DITROPAN) 5 MG Tab as needed.     [DISCONTINUED] oxycodone (ROXICODONE) 10 mg Tab immediate release tablet Take 1 tablet (10 mg total) by mouth every 4 (four) hours as needed for Pain.    [DISCONTINUED] polyethylene glycol (GLYCOLAX) 17 gram PwPk Take 17 g by mouth once daily. (Patient taking differently: Take 17 g by mouth daily as needed. )     Family History     None        Social History Main Topics    Smoking status: Former Smoker     Packs/day: 2.50     Years: 20.00     Types: Cigarettes, Pipe, Cigars     Quit date: 2/27/1994    Smokeless tobacco: Never Used    Alcohol use Yes      Comment: 2 beers or wine daily    Drug use: No    Sexual activity: Yes     Review of Systems   Constitutional: Positive for fatigue and fever (daily for the past month). Negative for activity  change, appetite change and chills.   HENT: Negative for congestion, rhinorrhea, sinus pressure and sore throat.    Eyes: Negative for pain, redness and visual disturbance.   Respiratory: Positive for shortness of breath. Negative for cough, chest tightness, wheezing and stridor.    Cardiovascular: Positive for palpitations (for the past 2 weeks). Negative for chest pain and leg swelling.   Gastrointestinal: Negative for abdominal distention, abdominal pain, blood in stool, constipation, diarrhea, nausea and vomiting.   Endocrine: Negative for cold intolerance and heat intolerance.   Genitourinary: Negative for difficulty urinating (Urostomy since 7/17).   Musculoskeletal: Positive for myalgias (Left hip pain ). Negative for arthralgias, back pain and neck pain.   Skin: Negative for color change, pallor and rash.   Neurological: Negative for dizziness, tremors, syncope, light-headedness, numbness and headaches.   Hematological: Does not bruise/bleed easily.   Psychiatric/Behavioral: Negative for agitation, confusion and hallucinations. The patient is not nervous/anxious.      Objective:     Vital Signs (Most Recent):  Temp: 98.6 °F (37 °C) (09/22/17 1418)  Pulse: 95 (09/22/17 1418)  Resp: 20 (09/22/17 1418)  BP: (!) 112/57 (09/22/17 1418)  SpO2: 95 % (09/22/17 1418) Vital Signs (24h Range):  Temp:  [98.6 °F (37 °C)-98.7 °F (37.1 °C)] 98.6 °F (37 °C)  Pulse:  [] 95  Resp:  [16-26] 20  SpO2:  [94 %-97 %] 95 %  BP: (101-116)/(57-71) 112/57     Weight: 64.6 kg (142 lb 6.7 oz)  Body mass index is 22.99 kg/m².    Physical Exam   Constitutional: He is oriented to person, place, and time. He appears well-developed and well-nourished. No distress.   HENT:   Head: Normocephalic and atraumatic.   Mouth/Throat: Oropharynx is clear and moist.   Eyes: Conjunctivae and EOM are normal. No scleral icterus.   Neck: Normal range of motion. Neck supple. No JVD present. No tracheal deviation present. No thyromegaly present.    Cardiovascular: Normal rate and intact distal pulses.  An irregularly irregular rhythm present. Exam reveals no gallop and no friction rub.    No murmur heard.  Pulmonary/Chest: Effort normal and breath sounds normal. No respiratory distress. He has no wheezes. He has no rales.   Abdominal: Soft. Bowel sounds are normal. He exhibits no distension and no mass. There is no tenderness. There is no rebound and no guarding.   Urostomy bag with clear yellow urine   Musculoskeletal: Normal range of motion. He exhibits no edema.   Neurological: He is alert and oriented to person, place, and time. No cranial nerve deficit. He exhibits normal muscle tone.   Skin: Skin is warm and dry. Capillary refill takes less than 2 seconds. No rash noted. No erythema.   Psychiatric: He has a normal mood and affect. His behavior is normal. Judgment and thought content normal.   Nursing note and vitals reviewed.       Significant Labs:   BMP:   Recent Labs  Lab 09/22/17 0925         K 3.4*      CO2 29   BUN 21   CREATININE 0.8   CALCIUM 8.9     CBC:   Recent Labs  Lab 09/22/17  0702 09/22/17 0925   WBC 15.85* 14.48*   HGB 10.3* 9.3*   HCT 31.7* 28.5*    273     CMP:   Recent Labs  Lab 09/22/17 0925      K 3.4*      CO2 29      BUN 21   CREATININE 0.8   CALCIUM 8.9   PROT 6.6   ALBUMIN 2.5*   BILITOT 0.4   ALKPHOS 63   AST 14   ALT 20   ANIONGAP 8   EGFRNONAA >60.0     TSH:   Recent Labs  Lab 09/22/17 0925   TSH 0.773       Significant Imaging: I have reviewed all pertinent imaging results/findings within the past 24 hours.     CTA Chest:     No pulmonary thromboembolism or pulmonary infarction.    There has been progression of disease with interval increase in size and number of previously noted pulmonary and pleural-based nodules throughout both lungs with some now demonstrating cavitation.  There are large areas of consolidation involving the medial segment of the right middle lobe  (measures 3.5 x 2.2 cm; axial series 3 image 22), basal segments of the right lower lobe (measures 2.3 x 2.5 cm; axial series 3 image 47), and apicoposterior segment of the left upper lobe (measures 4.0 x 2.7 cm; axial series 3 image 23).  These findings are again concerning for malignancy and given cavitation, we favor squamous cell carcinoma.

## 2017-09-22 NOTE — HOSPITAL COURSE
Admit to hospital medicine for control of afib / observation. He was given 15 mg iv cardizem x1 in the ED which brought his HR down to the 90's, his home dose of toprol 50 was changed to 75mg tid to gain better rate control. Upon transfer from ED to floor patient transitioned back to SR.  An Echo was ordered upon admit, however was never performed and there is no need to hold up discharge as this can be done as an outpatient.  It is contraindicated to anticoagulate at this time given his cavitary lesions and the plan for biopsy on Monday 9/25 at 9;30am. While we are increasing his beta blocker, we will hold his home valsartan at this time due to lower pressures than normal.  Once he has been cleared by Heme/onc that he may resume his asa as he is [facility]ASC 1  The increased risk of stroke has been discussed with Mr. Harrison and his wife, and they both verbalized understanding.  His dk's vasc is low at 1 (htn), so he is understanding that his only option at present is rate control.  We reviewed s/s of stroke and calling EMS.

## 2017-09-22 NOTE — ED NOTES
Patient identifiers verified and correct for Amador Harrison.    LOC: The patient is awake, alert and aware of environment with an appropriate affect, the patient is oriented x 3 and speaking appropriately.  APPEARANCE: Patient resting comfortably and in no acute distress, patient is clean and well groomed, patient's clothing is properly fastened.  SKIN: The skin is warm and dry, color consistent with ethnicity, patient has normal skin turgor and moist mucus membranes, skin intact, no breakdown or bruising noted.  MUSCULOSKELETAL: Patient moving all extremities spontaneously, no obvious swelling or deformities noted.  RESPIRATORY: Airway is open and patent, respirations are spontaneous, patient has a normal effort and rate, no accessory muscle use noted, bilateral breath sounds clear and present.   ABDOMEN: Soft and non tender to palpation, no distention noted, normoactive bowel sounds present in all four quadrants.  NEUROLOGIC: PERRL, 3 mm bilaterally, eyes open spontaneously, behavior appropriate to situation, follows commands, facial expression symmetrical, bilateral hand grasp equal and even, purposeful motor response noted, normal sensation in all extremities when touched with a finger.

## 2017-09-22 NOTE — ED NOTES
Pt reports SOB, history of bladder CA. He was scheduled for Lung biopsy today see if CA spread. IR staff reports A. Fib with RVR during check in this morning. Pt denies history of A. Fib.

## 2017-09-22 NOTE — NURSING TRANSFER
Nursing Transfer Note      9/22/2017     Transfer From: ED to 329    Transfer via stretcher    Transfer with cardiac monitoring    Transported by Escort    Medicines sent: none    Chart send with patient: Yes    Notified: spouse at bedside    Patient reassessed at: 1418, 9/22 (date, time)    Upon arrival to floor: patient oriented to room, call bell in reach and bed in lowest position, pt sitting up in chair. VSS. Wife at bedside.

## 2017-09-22 NOTE — HPI
"Mr. 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  He was sent from IR to the ED for A. Fib with RVR, complaints of SOB and palpitations. He reports that he has been feeling "discomfort from a fast heart beat for about 2 weeks" but that his heart rate was regular this past Tuesday at an appointment with Dr. Hale.      Mr. Harrison was given diltiazem in the ED for rate control with a decrease from high 140's to mid 90's. He denies chest pain, n/v, dizziness, and lightheadedness. It's reported he had an 18 lbs weight loss prior to surgery and within the last month since surgery he's been experiencing daily fevers.     He reports taking his last ASA on Wednesday when they called to give him a last minute opening for this Friday's biopsy of his lung.  This is a contraindication for 5 days prior to procedure. Per IR, they are able to complete the biopsy Monday 9/25 at 9:30 am.  Which at that time he will be ASA free for 5 days.  The naproxen he took has been cleared by IR to move forward.  CTA shows areas of cavitation in both lobes and increased consolidation in the right middle lobe compared to previous studies.   "

## 2017-09-22 NOTE — ASSESSMENT & PLAN NOTE
- increase home metoprolol  - valsartan held this afternoon due to systolic  after diltiazem   - continue home indapamide

## 2017-09-22 NOTE — ED PROVIDER NOTES
Encounter Date: 9/22/2017    SCRIBE #1 NOTE: I, Jose Booth, am scribing for, and in the presence of,  Dr. Mae . I have scribed the entire note.       History     Chief Complaint   Patient presents with    Palpitations     Pt sent from IR. He was scheduled to have lung biopsy today. He reports palpitations x several weeks. No known history of A. Fib.      Time patient was seen by the provider: 11:16 AM    The patient is a 61 y.o. male with hx of: HTN, nocturia, and bladder cancer that presents to the ED with a complaint of palpitations. Pt was initially at IR for a lung biopsy, but since pt was feeling SOB and having palpitations he was sent to the ED. Pt was diagnosed with bladder cancer in March 2017 and had 2 cycles of chemo. Pt found out the cancer was spreading and pt had a cystectomy in June where they found suspicious nodules and went to IR for the biopsy. Pt endorses fever, losing weight(due to cancer), chest pain, and a productive cough.      The history is provided by the patient.     Review of patient's allergies indicates:  No Known Allergies  Past Medical History:   Diagnosis Date    Cancer     Disorder of kidney and ureter     Encounter for blood transfusion     2017    Hypertension     Hypertrophy of prostate without urinary obstruction and other lower urinary tract symptoms (LUTS)     Nocturia     Nocturia      Past Surgical History:   Procedure Laterality Date    BLADDER SURGERY      CARPAL TUNNEL RELEASE      Left hand    CYSTOSCOPY      CYSTOSCOPY W/ URETERAL STENT PLACEMENT      HERNIA REPAIR Bilateral     x 2 separate surgeries. 1 as an infant and another at approx age 18    hydrocele      at age 3    TONSILLECTOMY      turbt 2017      VASECTOMY       Family History   Problem Relation Age of Onset    Asthma Neg Hx     Emphysema Neg Hx      Social History   Substance Use Topics    Smoking status: Former Smoker     Packs/day: 2.50     Years: 20.00     Types:  Cigarettes, Pipe, Cigars     Quit date: 2/27/1994    Smokeless tobacco: Never Used    Alcohol use Yes      Comment: 2 beers or wine daily     Review of Systems   Constitutional: Positive for fever.   HENT: Negative for sore throat.    Respiratory: Positive for cough (Productive ) and shortness of breath.    Cardiovascular: Positive for chest pain and palpitations.   Gastrointestinal: Negative for nausea.   Genitourinary: Negative for dysuria.   Musculoskeletal: Negative for back pain.   Skin: Negative for rash.   Neurological: Negative for weakness.   Hematological: Does not bruise/bleed easily.       Physical Exam     Initial Vitals [09/22/17 0900]   BP Pulse Resp Temp SpO2   113/66 (!) 137 18 98.6 °F (37 °C) 96 %      MAP       81.67         Physical Exam    Nursing note and vitals reviewed.  Constitutional: He appears well-developed and well-nourished. He is not diaphoretic. No distress.   HENT:   Head: Normocephalic and atraumatic.   Eyes: Pupils are equal, round, and reactive to light.   Neck: Neck supple.   Cardiovascular:   No murmur heard.  Regular irregular heart beat, tachycardia    Pulmonary/Chest: Breath sounds normal. No respiratory distress.   Abdominal: Soft. He exhibits no distension.   Genitourinary:   Genitourinary Comments: Pt has a urostomy bag with yellow urine    Musculoskeletal: He exhibits no edema.   Neurological: He is alert and oriented to person, place, and time.         ED Course   Procedures  Labs Reviewed   CBC W/ AUTO DIFFERENTIAL - Abnormal; Notable for the following:        Result Value    WBC 14.48 (*)     RBC 3.54 (*)     Hemoglobin 9.3 (*)     Hematocrit 28.5 (*)     MCV 81 (*)     MCH 26.3 (*)     MPV 8.4 (*)     Gran # 12.2 (*)     Gran% 84.3 (*)     Lymph% 8.1 (*)     All other components within normal limits   COMPREHENSIVE METABOLIC PANEL - Abnormal; Notable for the following:     Potassium 3.4 (*)     Albumin 2.5 (*)     All other components within normal limits   TSH    MAGNESIUM   PHOSPHORUS   B-TYPE NATRIURETIC PEPTIDE     EKG Readings: (Independently Interpreted)   Initial EKG 9:10AM:  A-fib rate of 135 with RVR  Repeat EKG at 11:04AM: A-fib rate of 106 with RVR        X-Rays:   Independently Interpreted Readings:   Other Readings:  PE: Progression of disease with increased site of pulmonary nodules     Medical Decision Making:   History:   Old Medical Records: I decided to obtain old medical records.  Initial Assessment:   Emergent evaluation of a 61 y.o.male that presents to the ED for palpations.My initial differential diagnoses include but are not limited to: new onset A-fib, pulmonary embolism, PNA, infectious process, and metabolic derangement. Will do labs, CXR, CTA and will treat with Cardizem IV 15 mg, bolus, and continue to monitor.       11:41AM   Pt is rate controlled with Cardizem and bolus. Cardiology contacted and case was discussed. They recommended an anticoagulant with Asprin but will hold until biopsy. Pt has a dk-vasc 2 score of 2. Recommendation is to place pt in observation to possibly expedite the process of the biopsy and controlled rate.  Rate is currently controlled.   Will place consult for interventional radiology. Pt is in agreement with plan.   Independently Interpreted Test(s):   I have ordered and independently interpreted X-rays - see prior notes.  I have ordered and independently interpreted EKG Reading(s) - see prior notes  Clinical Tests:   Lab Tests: Ordered and Reviewed  Radiological Study: Ordered and Reviewed  Medical Tests: Ordered and Reviewed            Scribe Attestation:   Scribe #1: I performed the above scribed service and the documentation accurately describes the services I performed. I attest to the accuracy of the note.    Attending Attestation:         Attending Critical Care:   Critical Care Times:   ==============================================================  · Total Critical Care Time - exclusive of procedural time:  35 minutes.  ==============================================================  Critical care was necessary to treat or prevent imminent or life-threatening deterioration of the following conditions: cardiac arrhythmia.   Critical care was time spent personally by me on the following activities: review of old charts, ordering lab, x-rays, and/or EKG, ordering and performing treatments and interventions, evaluation of patient's response to treatment, discussion with consultants and re-evaluation of patient's conition.   Critical Care Condition: potentially life-threatening     Physician Attestation for Scribe:  Physician Attestation Statement for Scribe #1: I, Dr. Mae , reviewed documentation, as scribed by Jose Booth  in my presence, and it is both accurate and complete.                 ED Course      Clinical Impression:   The primary encounter diagnosis was New onset a-fib. Diagnoses of Dyspnea, Atrial fibrillation, and Cardiac rhythm disorder or disturbance or change were also pertinent to this visit.    Disposition:   Disposition: Placed in Observation  Condition: Diandra Mae MD  09/24/17 1000

## 2017-09-23 NOTE — PLAN OF CARE
Problem: Patient Care Overview  Goal: Plan of Care Review  Outcome: Revised  Pt resting in chair throughout shift. Pt c/o back pain, prn med given. No SOB or chest pain. SR on Telemetry. VSS. Skin integrity intact and without breakdown. Call light in reach. Fall precautions in place, pt did not have any falls or injuries this shift. Plan to d/c today. Mg and K replaced. Plan of care discussed with patient no questions at this time. Will continue to monitor.

## 2017-09-23 NOTE — DISCHARGE SUMMARY
"[facility] Hospital Medicine  Discharge Summary      Patient Name: Amador Harrison  MRN: [id number]  Admission Date: 9/22/2017  Hospital Length of Stay: 0 days  Discharge Date and Time:  09/23/2017 4:44 PM  Attending Physician: Eamon Cohn MD   Discharging Provider: Jennifer Guillen NP  Primary Care Provider: Waqar Brady MD  [facility] Medicine Team: [facility] Jennifer Guillen NP    HPI:   Mr. 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  He was sent from IR to the ED for A. Fib with RVR, complaints of SOB and palpitations. He reports that he has been feeling "discomfort from a fast heart beat for about 2 weeks" but that his heart rate was regular this past Tuesday at an appointment with Dr. Hale.      Mr. Harrison was given diltiazem in the ED for rate control with a decrease from high 140's to mid 90's. He denies chest pain, n/v, dizziness, and lightheadedness. It's reported he had an 18 lbs weight loss prior to surgery and within the last month since surgery he's been experiencing daily fevers.     He reports taking his last ASA on Wednesday when they called to give him a last minute opening for this Friday's biopsy of his lung.  This is a contraindication for 5 days prior to procedure. Per IR, they are able to complete the biopsy Monday 9/25 at 9:30 am.  Which at that time he will be ASA free for 5 days.  The naproxen he took has been cleared by IR to move forward.  CTA shows areas of cavitation in both lobes and increased consolidation in the right middle lobe compared to previous studies.     * No surgery found *      Indwelling Lines/Drains at time of discharge: "   Lines/Drains/Airways     Central Venous Catheter Line                 Port A Cath Single Lumen 03/15/17 0800  192 days          Drain                 Ureteral Drain/Stent 07/05/17 1218 Right ureter 5 Fr. 80 days         Urostomy 07/05/17 1226 ileal conduit RLQ 80 days              Hospital Course:   Admit to hospital medicine for control of afib / observation. He was given 15 mg iv cardizem x1 in the ED which brought his HR down to the 90's, his home dose of toprol 50 was changed to 75mg tid to gain better rate control. Upon transfer from ED to floor patient transitioned back to SR.  An Echo was ordered upon admit, however was never performed and there is no need to hold up discharge as this can be done as an outpatient.  It is contraindicated to anticoagulate at this time given his cavitary lesions and the plan for biopsy on Monday 9/25 at 9;30am. While we are increasing his beta blocker, we will hold his home valsartan at this time due to lower pressures than normal.  Once he has been cleared by Heme/onc that he may resume his asa as he is CHADSVASC 1  The increased risk of stroke has been discussed with Mr. Harrison and his wife, and they both verbalized understanding.  His dk's vasc is low at 1 (htn), so he is understanding that his only option at present is rate control.  We reviewed s/s of stroke and calling EMS.         Consults:   Review of Systems   Constitutional: Positive for fatigue and fever (daily for the past month). Negative for activity change, appetite change and chills.   HENT: Negative for congestion, rhinorrhea, sinus pressure and sore throat.    Eyes: Negative for pain, redness and visual disturbance.   Respiratory: Positive for shortness of breath. Negative for cough, chest tightness, wheezing and stridor.    Cardiovascular: Positive for palpitations (for the past 2 weeks). Negative for chest pain and leg swelling.   Gastrointestinal: Negative for abdominal distention, abdominal pain,  blood in stool, constipation, diarrhea, nausea and vomiting.   Endocrine: Negative for cold intolerance and heat intolerance.   Genitourinary: Negative for difficulty urinating (Urostomy since 7/17).   Musculoskeletal: Positive for myalgias (Left hip pain ). Negative for arthralgias, back pain and neck pain.   Skin: Negative for color change, pallor and rash.   Neurological: Negative for dizziness, tremors, syncope, light-headedness, numbness and headaches.   Hematological: Does not bruise/bleed easily.   Psychiatric/Behavioral: Negative for agitation, confusion and hallucinations. The patient is not nervous/anxious.      Physical Exam   Constitutional: He is oriented to person, place, and time. He appears well-developed and well-nourished. No distress.   HENT:   Head: Normocephalic and atraumatic.   Mouth/Throat: Oropharynx is clear and moist.   Eyes: Conjunctivae and EOM are normal. No scleral icterus.   Neck: Normal range of motion. Neck supple. No JVD present. No tracheal deviation present. No thyromegaly present.   Cardiovascular: Normal rate and intact distal pulses.  A regular rhythm present. Exam reveals no gallop and no friction rub.    No murmur heard.  Pulmonary/Chest: Effort normal and breath sounds normal. No respiratory distress. He has no wheezes. He has no rales.   Abdominal: Soft. Bowel sounds are normal. He exhibits no distension and no mass. There is no tenderness. There is no rebound and no guarding.   Urostomy bag with clear yellow urine   Musculoskeletal: Normal range of motion. He exhibits no edema.   Neurological: He is alert and oriented to person, place, and time. No cranial nerve deficit. He exhibits normal muscle tone.   Skin: Skin is warm and dry. Capillary refill takes less than 2 seconds. No rash noted. No erythema.   Psychiatric: He has a normal mood and affect. His behavior is normal. Judgment and thought content normal.   Nursing note and vitals reviewed.    Significant Diagnostic  Studies: Labs:   BMP:   Recent Labs  Lab 09/22/17  0925 09/23/17  0337 09/23/17  1409    108  --     136  --    K 3.4* 3.3* 4.0    98  --    CO2 29 31*  --    BUN 21 21  --    CREATININE 0.8 0.8  --    CALCIUM 8.9 8.6*  --    MG 2.0 1.5* 3.0*   , CMP   Recent Labs  Lab 09/22/17  0925 09/23/17  0337 09/23/17  1409    136  --    K 3.4* 3.3* 4.0    98  --    CO2 29 31*  --     108  --    BUN 21 21  --    CREATININE 0.8 0.8  --    CALCIUM 8.9 8.6*  --    PROT 6.6  --   --    ALBUMIN 2.5*  --   --    BILITOT 0.4  --   --    ALKPHOS 63  --   --    AST 14  --   --    ALT 20  --   --    ANIONGAP 8 7*  --    ESTGFRAFRICA >60.0 >60.0  --    EGFRNONAA >60.0 >60.0  --    , CBC   Recent Labs  Lab 09/22/17  0702 09/22/17  0925   WBC 15.85* 14.48*   HGB 10.3* 9.3*   HCT 31.7* 28.5*    273   , INR   Lab Results   Component Value Date    INR 1.1 09/22/2017    INR 1.1 07/05/2017    INR 1.0 06/12/2017   , Lipid Panel   Lab Results   Component Value Date    CHOL 77 (L) 09/23/2017    HDL 27 (L) 09/23/2017    LDLCALC 41.2 (L) 09/23/2017    TRIG 44 09/23/2017    CHOLHDL 35.1 09/23/2017   , Troponin   Recent Labs  Lab 09/22/17  1641   TROPONINI <0.006      Radiology: X-Ray: CXR: X-Ray Chest 1 View (CXR):   Results for orders placed or performed during the hospital encounter of 09/22/17   X-Ray Chest 1 View    Narrative    Chest one view    Indication:Shortness of breath    Comparison:CT 9/22/2017    Findings: There has been no significant interval detrimental change in the cardiopulmonary status since the previous CT.  Right PICC catheter stable.  No pneumothorax.    Impression    As above      Electronically signed by: JANETT ENGLE MD  Date:     09/22/17  Time:    11:57      Echo: ordered never done, not holding up discharge    Pending Diagnostic Studies:     Procedure Component Value Units Date/Time    2D echo with color flow doppler [[id number]]     Order Status:  Sent Lab Status:  No result          Final Active Diagnoses:    Diagnosis Date Noted POA    PRINCIPAL PROBLEM:  New onset a-fib [I48.91] 09/22/2017 Yes    Multiple lung nodules [R91.8] 09/19/2017 Yes    Heart palpitations [R00.2] 09/22/2017 Yes    Hypertension [I10] 06/26/2017 Yes    Port-a-cath in place- Rt upper arm [Z95.828] 06/26/2017 Not Applicable    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] 03/21/2017 Yes      Problems Resolved During this Admission:    Diagnosis Date Noted Date Resolved POA      * New onset a-fib    - RVR in ED, diltiazem was given for rate control with a decrease from high 140's to mid 90's and converted to SR between ED and floor   - Increase home beta blocker to 200mg in order to keep HR lower  - No anticoagulation in preparation for lung biopsy and he only needs an asa post procedure as he's only Chadsvasc 1 (HTN)  - ecg in a week             Multiple lung nodules    - CTA with areas of cavitation in both lobes and increased consolidation in the right middle lobe     compared to previous studies  - Hold all forms on anticoagulation  - plan for outpatient biopsy Monday morning             Heart palpitations    - See above           Port-a-cath in place- Rt upper arm    - okay to use for med administration           Hypertension    - increase home metoprolol  - valsartan held due low systolic BP    - continue home indapamide           Malignant neoplasm of trigone of urinary bladder    - Regular ostomy care  - outpatient lung biopsy on Monday to evaluate for metastasis               Discharged Condition: good    Disposition: Home or Self Care    Follow Up:  Follow-up Information     INTERVENTIONAL, RADIOLOGY On 9/25/2017.    Why:  0930 at [facility] center               Patient Instructions:     Diet general   Order Specific Question Answer Comments   Na restriction, if any: 2gNa      Activity as tolerated     Call MD for:  temperature >100.4     Call MD for:  persistent nausea and vomiting  or diarrhea     Call MD for:  severe uncontrolled pain     Call MD for:  redness, tenderness, or signs of infection (pain, swelling, redness, odor or green/yellow discharge around incision site)     Call MD for:  difficulty breathing or increased cough     Call MD for:  severe persistent headache     Call MD for:  worsening rash     Call MD for:  persistent dizziness, light-headedness, or visual disturbances     Call MD for:  increased confusion or weakness       Medications:  Reconciled Home Medications:   Current Discharge Medication List      CONTINUE these medications which have CHANGED    Details   metoprolol succinate (TOPROL-XL) 50 MG 24 hr tablet Take 4 tablets (200 mg total) by mouth nightly.  Qty: 90 tablet, Refills: 0         CONTINUE these medications which have NOT CHANGED    Details   benzonatate (TESSALON PERLES) 100 MG capsule Take 1 capsule (100 mg total) by mouth 3 (three) times daily as needed for Cough.  Qty: 30 capsule, Refills: 1    Associated Diagnoses: Cough      glucosamine-chondroitin 500-400 mg tablet Take 1 tablet by mouth 3 (three) times daily.      indapamide (LOZOL) 2.5 MG Tab 2.5 mg every morning.       lactobacillus acidophilus & bulgar (LACTINEX) 100 million cell packet Take 1 tablet by mouth 2 (two) times daily.      multivitamin (ONE DAILY MULTIVITAMIN) per tablet Take 1 tablet by mouth once daily.      naproxen sodium (ANAPROX) 220 MG tablet Take 220 mg by mouth 2 (two) times daily as needed.      oxycodone-acetaminophen (PERCOCET) 5-325 mg per tablet Take 1 tablet by mouth every 4 (four) hours as needed.  Qty: 31 tablet, Refills: 0    Associated Diagnoses: Malignant neoplasm of trigone of urinary bladder      trazodone (DESYREL) 50 MG tablet Take 1 tablet (50 mg total) by mouth every evening.  Qty: 30 tablet, Refills: 1    Associated Diagnoses: Insomnia, unspecified type      0.9 % SODIUM CHLORIDE (SODIUM CHLORIDE 0.9%) 0.9 % solution Inject 1,000 mLs into the vein  continuous.  Qty: 1000 mL, Refills: 6      fish oil-omega-3 fatty acids 300-1,000 mg capsule Take 2 g by mouth once daily.      lidocaine-prilocaine (EMLA) cream Apply to port 30-45 minutes prior to being accessed.  Qty: 5 g, Refills: 0    Associated Diagnoses: Malignant neoplasm of trigone of urinary bladder; Encounter for chemotherapy management; Chemotherapy induced nausea and vomiting      ondansetron (ZOFRAN-ODT) 8 MG TbDL Take 1 tablet (8 mg total) by mouth every 12 (twelve) hours as needed.  Qty: 30 tablet, Refills: 1    Associated Diagnoses: Malignant neoplasm of trigone of urinary bladder; Encounter for chemotherapy management; Chemotherapy induced nausea and vomiting         STOP taking these medications       ibuprofen (ADVIL,MOTRIN) 200 MG tablet Comments:   Reason for Stopping:         valsartan (DIOVAN) 160 MG tablet Comments:   Reason for Stopping:         aspirin (ECOTRIN) 81 MG EC tablet Comments:   Reason for Stopping:         etodolac (LODINE) 400 MG tablet Comments:   Reason for Stopping:             Time spent on the discharge of patient: 45 minutes      Jennifer Guillen NP  Department of Hospital Medicine  [facility]  Spectra:  [phone]  Pager: [phone]

## 2017-09-23 NOTE — PROGRESS NOTES
Patient discharged home per MD orders. Tele removed. Port de accessed per protocol. Vital signs stable. No apparent distress noted at this time. Patient given and explained medication list and prescriptions. Patient verbalizes complete understanding of all discharge instructions and follow up care. All questions answered to their satisfaction. Patient given printed AVS. Patient family at bedside. Patient awaiting escort. All pt belongings in their possession.

## 2017-09-23 NOTE — PLAN OF CARE
Problem: Patient Care Overview  Goal: Plan of Care Review  Outcome: Ongoing (interventions implemented as appropriate)  Plan of care discussed with pt.  No issues overnight. TD Port draw and flushes w/o difficulty- MD nursing communication stating okay to use.   Tessalon pearls given for cough. Percocet given for back pain-pain relieved.Urostomy bag draining properly to mcgee bag.  Plan to d/c home. VSS & NADN. Pt denies CP, SOB, or pain/discomfort at this time.Pt free of injuries this shift.  All questions addressed.  Will continue to monitor.

## 2017-09-23 NOTE — ASSESSMENT & PLAN NOTE
- RVR in ED, diltiazem was given for rate control with a decrease from high 140's to mid 90's and converted to SR between ED and floor   - Increase home beta blocker to 200mg in order to keep HR lower  - No anticoagulation in preparation for lung biopsy and he only needs an asa post procedure as he's only Chadsvasc 1 (HTN)  - ecg in a week

## 2017-09-23 NOTE — ASSESSMENT & PLAN NOTE
- CTA with areas of cavitation in both lobes and increased consolidation in the right middle lobe     compared to previous studies  - Hold all forms on anticoagulation  - plan for outpatient biopsy Monday morning

## 2017-09-24 NOTE — PLAN OF CARE
09/24/17 1449   Final Note   Assessment Type Final Discharge Note   Discharge Disposition Home   Hospital Follow Up  Appt(s) scheduled? Yes

## 2017-09-25 PROBLEM — R91.8 LUNG MASS: Status: ACTIVE | Noted: 2017-01-01

## 2017-09-25 NOTE — DISCHARGE INSTRUCTIONS
For scheduling: Call Lori at [phone]    For questions or concerns call: CHRISTINE MON-FRI 8 AM- 5PM [phone]. Radiology resident on call [phone].    For immediate concerns that are not emergent, you may call our radiology clinic at: [phone]

## 2017-09-25 NOTE — PROGRESS NOTES
Left lung biopsy complete. No acute distress noted. Patient to have chest xray in one hour and three hours from 1325. Patient to remain supine.  Patient to be transferred to [facility]. Report to be given at the beside.

## 2017-09-25 NOTE — PROGRESS NOTES
Pt arrived to [facility], bay 2. Report received from EDITH Smith. Dressing to back dry and intact. Family at bedside.

## 2017-09-25 NOTE — H&P
Radiology History & Physical      SUBJECTIVE:     Chief Complaint: Multiple lung nodules    History of Present Illness:  Amador Harrison is a 61 y.o. male who presents for CT guided lung biopsy.  Past Medical History:   Diagnosis Date    Cancer     Disorder of kidney and ureter     Encounter for blood transfusion     2017    Hypertension     Hypertrophy of prostate without urinary obstruction and other lower urinary tract symptoms (LUTS)     Nocturia     Nocturia      Past Surgical History:   Procedure Laterality Date    BLADDER SURGERY      CARPAL TUNNEL RELEASE      Left hand    CYSTOSCOPY      CYSTOSCOPY W/ URETERAL STENT PLACEMENT      HERNIA REPAIR Bilateral     x 2 separate surgeries. 1 as an infant and another at approx age 18    hydrocele      at age 3    TONSILLECTOMY      turbt 2017      VASECTOMY         Home Meds:   Prior to Admission medications    Medication Sig Start Date End Date Taking? Authorizing Provider   benzonatate (TESSALON PERLES) 100 MG capsule Take 1 capsule (100 mg total) by mouth 3 (three) times daily as needed for Cough. 9/11/17 9/11/18 Yes Marion Flores NP   indapamide (LOZOL) 2.5 MG Tab 2.5 mg every morning.  3/8/17  Yes Historical Provider, MD   lactobacillus acidophilus & bulgar (LACTINEX) 100 million cell packet Take 1 tablet by mouth 2 (two) times daily.   Yes Historical Provider, MD   metoprolol succinate (TOPROL-XL) 50 MG 24 hr tablet Take 4 tablets (200 mg total) by mouth nightly. 9/23/17  Yes Jennifer Guillen NP   multivitamin (ONE DAILY MULTIVITAMIN) per tablet Take 1 tablet by mouth once daily.   Yes Historical Provider, MD   naproxen sodium (ANAPROX) 220 MG tablet Take 220 mg by mouth 2 (two) times daily as needed.   Yes Historical Provider, MD   oxycodone-acetaminophen (PERCOCET) 5-325 mg per tablet Take 1 tablet by mouth every 4 (four) hours as needed. 6/27/17  Yes Ernestine Mack NP   trazodone (DESYREL) 50 MG tablet Take 1 tablet (50 mg total)  by mouth every evening. 9/11/17 9/11/18 Yes Marion Flores NP   0.9 % SODIUM CHLORIDE (SODIUM CHLORIDE 0.9%) 0.9 % solution Inject 1,000 mLs into the vein continuous. 7/7/17   Yvon Barrientos MD   fish oil-omega-3 fatty acids 300-1,000 mg capsule Take 2 g by mouth once daily.    Historical Provider, MD   glucosamine-chondroitin 500-400 mg tablet Take 1 tablet by mouth 3 (three) times daily.    Historical Provider, MD   lidocaine-prilocaine (EMLA) cream Apply to port 30-45 minutes prior to being accessed. 3/13/17   Marion Flores NP   ondansetron (ZOFRAN-ODT) 8 MG TbDL Take 1 tablet (8 mg total) by mouth every 12 (twelve) hours as needed. 3/13/17 3/13/18  Marion Flores NP     Anticoagulants/Antiplatelets: no anticoagulation    Allergies: Review of patient's allergies indicates:  No Known Allergies  Sedation History:  no adverse reactions    Review of Systems:   Hematological: no known coagulopathies  Respiratory: positive for - shortness of breath  Cardiovascular: no chest pain  Gastrointestinal: Abdominal discomfort  Genito-Urinary: no dysuria  Musculoskeletal: negative  Neurological: no TIA or stroke symptoms         OBJECTIVE:     Vital Signs (Most Recent)  Temp: 98.8 °F (37.1 °C) (09/25/17 1043)  Pulse: 86 (09/25/17 1043)  Resp: 17 (09/25/17 1043)  BP: 119/64 (09/25/17 1045)  SpO2: 97 % (09/25/17 1043)    Physical Exam:  ASA: 2  Mallampati: 2    General: no acute distress  Mental Status: alert and oriented to person, place and time  HEENT: normocephalic, atraumatic  Chest: unlabored breathing  Heart: regular heart rate  Abdomen: nondistended  Extremity: moves all extremities    Laboratory  Lab Results   Component Value Date    INR 1.1 09/22/2017       Lab Results   Component Value Date    WBC 14.48 (H) 09/22/2017    HGB 9.3 (L) 09/22/2017    HCT 28.5 (L) 09/22/2017    MCV 81 (L) 09/22/2017     09/22/2017      Lab Results   Component Value Date     09/23/2017     09/23/2017    K 4.0  09/23/2017    CL 98 09/23/2017    CO2 31 (H) 09/23/2017    BUN 21 09/23/2017    CREATININE 0.8 09/23/2017    CALCIUM 8.6 (L) 09/23/2017    MG 3.0 (H) 09/23/2017    ALT 20 09/22/2017    AST 14 09/22/2017    ALBUMIN 2.5 (L) 09/22/2017    BILITOT 0.4 09/22/2017       ASSESSMENT/PLAN:     Sedation Plan: Moderate   Patient will undergo CT guided lung biopsy.    Sarah Ly MD  Resident  Department of Radiology  Pager: [phone]

## 2017-09-27 NOTE — DISCHARGE SUMMARY
Radiology Discharge Summary      Hospital Course: No complications    Admit Date: 9/25/2017  Discharge Date: 09/27/2017     Instructions Given to Patient: Yes  Diet: Resume prior diet  Activity: activity as tolerated    Description of Condition on Discharge: Stable  Vital Signs (Most Recent): Temp: 98.8 °F (37.1 °C) (09/25/17 1330)  Pulse: 89 (09/25/17 1600)  Resp: 18 (09/25/17 1600)  BP: 118/60 (09/25/17 1600)  SpO2: (!) 93 % (09/25/17 1600)    Discharge Disposition: Home    Discharge Diagnosis: metastatic disease     Follow-up: per primary physician    @SIG@

## 2017-09-29 NOTE — TELEPHONE ENCOUNTER
----- Message from Deisi Bryan sent at 9/29/2017  9:27 AM CDT -----  Contact: Pt wife  Joselin  [phone]  H[place]   -   Needs to speak with the nurse in regards to the lung biopsy ,  Call back number [phone]

## 2017-10-03 PROBLEM — C78.00 LUNG METASTASES: Status: ACTIVE | Noted: 2017-01-01

## 2017-10-03 PROBLEM — C67.0 MALIGNANT NEOPLASM OF TRIGONE OF URINARY BLADDER: Status: ACTIVE | Noted: 2017-01-01

## 2017-10-03 NOTE — TELEPHONE ENCOUNTER
Dr. Austin spoke with Dr. Hale. We will get patient into clinic ASAP. Will look into other alternatives, possible immunotherapy.

## 2017-10-03 NOTE — TELEPHONE ENCOUNTER
----- Message from Alex Lowery sent at 10/3/2017  8:12 AM CDT -----  Contact: Dr. Hale  Will like call regarding Dr. Austin's pt    Contact::[phone]

## 2017-10-04 NOTE — TELEPHONE ENCOUNTER
----- Message from Elida Deleon sent at 10/4/2017  9:32 AM CDT -----  Contact: Nadja with [facility] pharmacy  Nadja calling regarding codeine cough syrup. She wants to know if  is aware that the pt just had Percocet 5/325.     Nadja call back number [phone]

## 2017-10-04 NOTE — TELEPHONE ENCOUNTER
Spoke with Nadja at pharmacy. Dr. Austin aware that he ordered Percocet 10/325, Ativan and codeine cough syrup. Pharmacy states they understand and will fill his prescription

## 2017-10-04 NOTE — Clinical Note
Please schedule for labs and Keytruda C#1 ASAP as soon as it is approved (no need to see us that day), then schedule to see me 3 wks later with labs, Keytruda.

## 2017-10-04 NOTE — PROGRESS NOTES
"Subjective:       Patient ID: Amador Harrison is a 62 y.o. male.    Chief Complaint: Malignant neoplasm of trigone or urinary bladder (biopsy/path results)    HPI     61 year old  male to clinic for 2 week follow up and to review CT scans after cystectomy (and after 2 cycles of neoadjuvant cis/gem with PD)  for muscle-invasive bladder cancer.   Recently dx with met dz in the lungs.      He denies any mouth sores, nausea, vomiting, diarrhea, constipation, abdominal pain, chest pain, shortness of breath, leg swelling,headache, dizziness, or mood changes.    His ECOG PS is one. He is accompanied to clinic with his wife. He has a remote smoking history.     Oncologic History (From Chart):  61 year old  male, referred by Dr. Wilde, to clinic today for evaluation and management of newly diagnosed muscle-invasive bladder cancer. The patient presented with gross hematuria and was found to have a bladder tumor which was resected on 3/3/2017.  The patient had a CT urogram on 1/31/2017 that did not show any locally advanced or distant metastatic abdominal disease. Pathology from that TURBT revealed high-grade T2 bladder cancer with negative margins. Pathology revealed "WITH AREAS OF SQUAMOUS CELL CARCINOMA DIFFERENTIATION. EXTENSIVE INVOLVEMENT OF THE MUSCULARIS PROPRIA (DETRUSSOR MUSCLE) IS PRESENT."  3/17/17- CT CAP reveals "Injuries patient with a reported history of bladder cancer, the following findings are identified: Persistent though decreased asymmetric thickening of the right posterolateral aspect of the bladder wall.Right-sided double J stent in appropriate position.Subcentimeter hepatic segment III hypodensity, too small to accurately characterized but favored to be benign."  5/19/17-CT CAP reveals "Increased asymmetric right posterior wall thickening of the urinary bladder. Although bladder was incompletely distended on this examination, this finding is nonspecific, but could represent sequela " "of infection, or recurrence of prior neoplasm. Clinical correlation and further evaluation is recommended.Right double-J ureteral stent in appropriate position with adjacent soft tissue stranding about the distal course of the stent. Other incidental findings including stable hypodensity in hepatic segment III small to characterize, mild degenerative changes with vacuum disc phenomenon scattered throughout the thoracolumbar spine. Left nephrolith, aortic atherosclerosis, and stable right-sided portacatheterAlthough the patient has a history of malignancy, no measurable lesions per the RECIST criteria are identified. "  8/18/17- CT CAP "In this patient with a history of bladder cancer, there have been interval postoperative changes compatible with cystoprostatectomy and ileal conduit to right lower quadrant stoma. Persistent mild left hydroureteronephrosis with improved minimal right hydronephrosis.Interval development of multiple bilateral pulmonary groundglass opacities and solid nodules. Given the short interval development, these may represent a nonspecific infectious or inflammatory process although metastatic diseases is an additional consideration.Large fluid collection along the left iliac vessels may represent a postoperative seroma, hematoma, abscess, or lymphocele. Smaller fluid collection in the pelvis is also likely postoperative in nature."    Review of Systems   Constitutional: Positive for fatigue, fever and unexpected weight change. Negative for activity change and appetite change.   HENT: Positive for tinnitus (mild to left ear). Negative for mouth sores, nosebleeds and trouble swallowing.    Eyes: Negative for discharge and visual disturbance.   Respiratory: Negative for cough, shortness of breath and wheezing.    Cardiovascular: Negative for chest pain and leg swelling.   Gastrointestinal: Negative for abdominal distention, abdominal pain, blood in stool, constipation, diarrhea, nausea and " vomiting.   Genitourinary: Positive for dysuria. Negative for decreased urine volume and frequency.   Musculoskeletal: Negative for back pain.   Skin: Negative for color change and rash.   Neurological: Negative for weakness and headaches.   Hematological: Negative for adenopathy.   Psychiatric/Behavioral: Negative for sleep disturbance. The patient is not nervous/anxious.    All other systems reviewed and are negative.      Allergies:  Review of patient's allergies indicates:  No Known Allergies    Medications:  Current Outpatient Prescriptions   Medication Sig Dispense Refill    benzonatate (TESSALON PERLES) 100 MG capsule Take 1 capsule (100 mg total) by mouth 3 (three) times daily as needed for Cough. 30 capsule 1    ciprofloxacin HCl (CIPRO) 500 MG tablet Take 1 tablet (500 mg total) by mouth every 12 (twelve) hours. 20 tablet 0    fish oil-omega-3 fatty acids 300-1,000 mg capsule Take 2 g by mouth once daily.      glucosamine-chondroitin 500-400 mg tablet Take 1 tablet by mouth 3 (three) times daily.      indapamide (LOZOL) 2.5 MG Tab 2.5 mg every morning.       lactobacillus acidophilus & bulgar (LACTINEX) 100 million cell packet Take 1 tablet by mouth 2 (two) times daily.      lidocaine-prilocaine (EMLA) cream Apply to port 30-45 minutes prior to being accessed. 5 g 0    metoprolol succinate (TOPROL-XL) 50 MG 24 hr tablet Take 4 tablets (200 mg total) by mouth nightly. 90 tablet 0    multivitamin (ONE DAILY MULTIVITAMIN) per tablet Take 1 tablet by mouth once daily.      naproxen sodium (ANAPROX) 220 MG tablet Take 220 mg by mouth 2 (two) times daily as needed.      ondansetron (ZOFRAN-ODT) 8 MG TbDL Take 1 tablet (8 mg total) by mouth every 12 (twelve) hours as needed. 30 tablet 1    oxycodone-acetaminophen (PERCOCET) 5-325 mg per tablet Take 1 tablet by mouth every 4 (four) hours as needed. 60 tablet 0    trazodone (DESYREL) 50 MG tablet Take 1 tablet (50 mg total) by mouth every evening. 30  tablet 1    valsartan (DIOVAN) 320 MG tablet Take 320 mg by mouth once daily.      0.9 % SODIUM CHLORIDE (SODIUM CHLORIDE 0.9%) 0.9 % solution Inject 1,000 mLs into the vein continuous. 1000 mL 6    codeine-guaifenesin  mg/5 mL Liqd Take 5 mLs by mouth 3 (three) times daily as needed. 473 mL 0    lorazepam (ATIVAN) 0.5 MG tablet Take 1 tablet (0.5 mg total) by mouth every 8 (eight) hours as needed for Anxiety. 60 tablet 0    oxycodone-acetaminophen (PERCOCET)  mg per tablet Take 1 tablet by mouth every 6 (six) hours as needed for Pain. 90 tablet 0     No current facility-administered medications for this visit.        PMH:  Past Medical History:   Diagnosis Date    Cancer     Disorder of kidney and ureter     Encounter for blood transfusion     2017    Hypertension     Hypertrophy of prostate without urinary obstruction and other lower urinary tract symptoms (LUTS)     Nocturia     Nocturia        PSH:  Past Surgical History:   Procedure Laterality Date    BLADDER SURGERY      CARPAL TUNNEL RELEASE      Left hand    CYSTOSCOPY      CYSTOSCOPY W/ URETERAL STENT PLACEMENT      HERNIA REPAIR Bilateral     x 2 separate surgeries. 1 as an infant and another at approx age 18    hydrocele      at age 3    TONSILLECTOMY      turbt 2017      VASECTOMY         FamHx:  Family History   Problem Relation Age of Onset    Asthma Neg Hx     Emphysema Neg Hx        SocHx:  Social History     Social History    Marital status:      Spouse name: N/A    Number of children: N/A    Years of education: N/A     Occupational History    Not on file.     Social History Main Topics    Smoking status: Former Smoker     Packs/day: 2.50     Years: 20.00     Types: Cigarettes, Pipe, Cigars     Quit date: 2/27/1994    Smokeless tobacco: Never Used    Alcohol use Yes      Comment: 2 beers or wine daily    Drug use: No    Sexual activity: Yes     Other Topics Concern    Not on file     Social History  Narrative    No narrative on file     Objective:      Physical Exam   Constitutional: He is oriented to person, place, and time. He appears well-developed and well-nourished.   HENT:   Head: Normocephalic and atraumatic.   Mouth/Throat: Uvula is midline and oropharynx is clear and moist. No oropharyngeal exudate.   Eyes: Conjunctivae and EOM are normal. Pupils are equal, round, and reactive to light.   Neck: Trachea normal and normal range of motion. Neck supple. No tracheal deviation present.   Cardiovascular: Normal rate, regular rhythm, normal heart sounds and normal pulses.    No swelling noted to bilateral lower extremities.   Pulmonary/Chest: Effort normal.   Abdominal: Normal appearance.   Musculoskeletal: He exhibits no edema.   Lymphadenopathy:     He has no cervical adenopathy.   Neurological: He is alert and oriented to person, place, and time.   Skin: Skin is warm, dry and intact. No rash noted. He is not diaphoretic.   Psychiatric: He has a normal mood and affect. His speech is normal and behavior is normal.   Nursing note and vitals reviewed.      LABS:  WBC   Date Value Ref Range Status   09/22/2017 14.48 (H) 3.90 - 12.70 K/uL Final     Hemoglobin   Date Value Ref Range Status   09/22/2017 9.3 (L) 14.0 - 18.0 g/dL Final     POC Hematocrit   Date Value Ref Range Status   07/05/2017 25 (L) 36 - 54 %PCV Final     Hematocrit   Date Value Ref Range Status   09/22/2017 28.5 (L) 40.0 - 54.0 % Final     Platelets   Date Value Ref Range Status   09/22/2017 273 150 - 350 K/uL Final       Chemistry        Component Value Date/Time     09/23/2017 0337    K 4.0 09/23/2017 1409    CL 98 09/23/2017 0337    CO2 31 (H) 09/23/2017 0337    BUN 21 09/23/2017 0337    CREATININE 0.8 09/23/2017 0337     09/23/2017 0337        Component Value Date/Time    CALCIUM 8.6 (L) 09/23/2017 0337    ALKPHOS 63 09/22/2017 0925    AST 14 09/22/2017 0925    ALT 20 09/22/2017 0925    BILITOT 0.4 09/22/2017 0925           Assessment:       1. Malignant neoplasm of trigone of urinary bladder    2. Malignant neoplasm metastatic to lung, unspecified laterality    3. Cancer associated pain        Plan:       1. Muscle-invasive bladder cancer:    - Now s/p neoadjuvant chemo (2 cycles) with PD, and s/p cystectomy.  Reviewed path with patient.  Restaging scans show multiple lung nodules. Bx c/w metastatic disease, we will plan to initiate immunotherapy . Pt understands that his disease is incurable.       Patient was consented for immunotherapy today 10/4/2017 .   An extensive discussion was had which included a thorough discussion of the risk and benefits of treatment and alternatives.  Risks, including but not limited to, possible immediate mediated side effects, hair loss, bone marrow damage (anemia, thrombocytopenia, immune suppression, neutropenia), damage to body organs (brain, heart, liver, kidney, lungs, nervous system, skin, and others), allergic reactions, sterility, nausea/vomiting, constipation/diarrhea, sores in the mouth, secondary cancers, local damage at possible injection sites, and rarely death were all discussed.  The patient agrees with the plan, and all questions have been answered to their satisfaction.  Consent was signed the patient, provider, and a third party witness.      Patient expressed that he wishes to be DNR    2. Symptom Management:  - Increase percocet to 10/325, refilled ativan for anxiety, and added cough syrup.       Patient is in agreement with the proposed treatment plan. All questions were answered to the patient's satisfaction. Pt knows to call clinic if anything is needed before the next clinic visit.    More than 40 mins were spent during this encounter, greater than 50% was spent in direct counseling and/or coordination of care.       Antwon Austin M.D., M.S., F.A.C.P.  Hematology/Oncology Attending  [facility]      Distress Screening Results: Psychosocial Distress screening  score of Distress Score: 6 noted and reviewed. No intervention indicated.

## 2017-10-09 PROBLEM — I50.9 ACUTE DECOMPENSATED HEART FAILURE: Chronic | Status: ACTIVE | Noted: 2017-01-01

## 2017-10-09 PROBLEM — I50.9 ACUTE DECOMPENSATED HEART FAILURE: Status: ACTIVE | Noted: 2017-01-01

## 2017-10-09 PROBLEM — I48.91 ATRIAL FIBRILLATION WITH RVR: Status: ACTIVE | Noted: 2017-01-01

## 2017-10-09 PROBLEM — E87.6 HYPOKALEMIA: Status: ACTIVE | Noted: 2017-01-01

## 2017-10-09 NOTE — ED PROVIDER NOTES
Encounter Date: 10/9/2017    SCRIBE #1 NOTE: I, Lucrecia Mata, am scribing for, and in the presence of,  Dr. Shankar. I have scribed the following portions of the note - the EKG reading and the Resident attestation. Other sections scribed: X-Ray Chest reading.       History     Chief Complaint   Patient presents with    Fatigue     cancer spread to bladder, walking with cane now, feeling very weak luna in legs     HPI   62M with h/o bladder cancer s/p prostatocystectomy in July 2017, recently diagnosed with lung mets 2 weeks ago who c/o new onset worsening shortness of breath and fatigue x 2-3 days.  - SOB drastically worse per pt  - At baseline he could walk around the house and he is now unable to take more than a few steps without feelings weak  - Associated with new leg swelling since last night  - Associated with PND at baseline but worsening orthopnea (pt sits upright to sleep now)  - Denies LOC, fevers, chills, cough/URI sx, change in BM  - Endorses some palpitations     Per chart review: no h/o stroke however noted to be in afib when seen here for lung biopsy and was started on metoprolol succinate with which he is compliant    Review of patient's allergies indicates:  No Known Allergies  Past Medical History:   Diagnosis Date    Cancer     Disorder of kidney and ureter     Encounter for blood transfusion     2017    Hypertension     Hypertrophy of prostate without urinary obstruction and other lower urinary tract symptoms (LUTS)     Nocturia     Nocturia      Past Surgical History:   Procedure Laterality Date    BLADDER SURGERY      CARPAL TUNNEL RELEASE      Left hand    CYSTOSCOPY      CYSTOSCOPY W/ URETERAL STENT PLACEMENT      HERNIA REPAIR Bilateral     x 2 separate surgeries. 1 as an infant and another at approx age 18    hydrocele      at age 3    TONSILLECTOMY      turbt 2017      VASECTOMY       Family History   Problem Relation Age of Onset    Asthma Neg Hx     Emphysema Neg Hx       Social History   Substance Use Topics    Smoking status: Former Smoker     Packs/day: 2.50     Years: 20.00     Types: Cigarettes, Pipe, Cigars     Quit date: 2/27/1994    Smokeless tobacco: Never Used    Alcohol use Yes      Comment: 2 beers or wine daily     Review of Systems   Constitutional: Positive for activity change and fatigue. Negative for chills and fever.   HENT: Negative for sneezing and sore throat.    Respiratory: Positive for chest tightness and shortness of breath. Negative for cough.    Cardiovascular: Positive for palpitations and leg swelling. Negative for chest pain.   Gastrointestinal: Negative for diarrhea and nausea.   Genitourinary: Negative for decreased urine volume and flank pain.   Musculoskeletal: Negative for arthralgias and back pain.   Skin: Negative for pallor and rash.   Neurological: Negative for tremors and weakness.   Hematological: Does not bruise/bleed easily.   Psychiatric/Behavioral: Negative for agitation and behavioral problems.       Physical Exam     Initial Vitals [10/09/17 0840]   BP Pulse Resp Temp SpO2   (!) 141/92 109 (!) 24 98 °F (36.7 °C) 96 %      MAP       108.33         Physical Exam    Nursing note and vitals reviewed.  Constitutional: He appears well-developed.   Pleasant Cauc male, uncomfortable and short of breath, sitting upright, skin clammy   HENT:   Head: Normocephalic and atraumatic.   Nose: Nose normal.   Mouth/Throat: Oropharynx is clear and moist. No oropharyngeal exudate.   Eyes: EOM are normal. Pupils are equal, round, and reactive to light. Right eye exhibits no discharge. Left eye exhibits no discharge. No scleral icterus.   +conjunctival pallor   Neck: Normal range of motion. Neck supple. No thyromegaly present. No tracheal deviation present.   Cardiovascular: Exam reveals no friction rub.    No murmur heard.  tachcyardic from 140s to 170s on bedside tele, distal pulses intact in all extremities; delayed cap refill in bilateral LE after  removal of SCDs   Pulmonary/Chest: Breath sounds normal. He is in respiratory distress. He has no wheezes. He has no rales.   Pt in tripod position, using accessory muscles to breathe   Abdominal: Soft. He exhibits no distension. There is no tenderness.   LLQ cystostomy bag C/D/I with translucent yellow urine in bag   Musculoskeletal: Normal range of motion. He exhibits no tenderness.   Neurological: He is alert and oriented to person, place, and time. No cranial nerve deficit or sensory deficit.   Skin: Capillary refill takes 2 to 3 seconds. No abscess noted. There is pallor.         ED Course   Critical Care  Date/Time: 10/9/2017 1:39 PM  Performed by: EVELINE CESAR.  Authorized by: EVELINE CESAR   Direct patient critical care time: 15 minutes  Additional history critical care time: 5 minutes  Ordering / reviewing critical care time: 5 minutes  Documentation critical care time: 5 minutes  Consulting other physicians critical care time: 10 minutes  Consult with family critical care time: 5 minutes  Total critical care time (exclusive of procedural time) : 45 minutes        Labs Reviewed   CBC W/ AUTO DIFFERENTIAL - Abnormal; Notable for the following:        Result Value    WBC 22.70 (*)     RBC 3.61 (*)     Hemoglobin 9.2 (*)     Hematocrit 28.2 (*)     MCV 78 (*)     MCH 25.5 (*)     RDW 14.6 (*)     Platelets 390 (*)     MPV 9.0 (*)     Gran # 20.8 (*)     Lymph # 0.7 (*)     Mono # 1.2 (*)     Gran% 91.9 (*)     Lymph% 2.9 (*)     All other components within normal limits   COMPREHENSIVE METABOLIC PANEL - Abnormal; Notable for the following:     Sodium 133 (*)     Potassium 3.2 (*)     Chloride 92 (*)     CO2 32 (*)     Glucose 129 (*)     BUN, Bld 26 (*)     Albumin 2.3 (*)     All other components within normal limits   B-TYPE NATRIURETIC PEPTIDE - Abnormal; Notable for the following:     BNP 1,050 (*)     All other components within normal limits   URINALYSIS, REFLEX TO URINE CULTURE - Abnormal; Notable  for the following:     Appearance, UA Hazy (*)     Protein, UA 1+ (*)     Occult Blood UA 1+ (*)     Leukocytes, UA 1+ (*)     All other components within normal limits    Narrative:     Preferred Collection Type->Urine, Clean Catch   URINALYSIS MICROSCOPIC - Abnormal; Notable for the following:     WBC, UA 25 (*)     Yeast, UA Few (*)     All other components within normal limits    Narrative:     Preferred Collection Type->Urine, Clean Catch   CULTURE, BLOOD   CULTURE, BLOOD   CULTURE, URINE   PROTIME-INR   LACTIC ACID, PLASMA   LIPASE   MAGNESIUM   PHOSPHORUS   PROCALCITONIN   TROPONIN I   TSH   APTT     EKG Readings: (Independently Interpreted)   9:14 AM Atrial fibrillation with rapid ventricular response at rate of 146.  There is a normal axis, normal T segments, normal T waves.  10:37 AM Afib at rate of 108.  There is a normal axis, normal ST segments, and normal T waves.       10/09/2017  MDM: 62M with h/o metastatic bladder cancer who c/o shortness of breath, fatigue, and chest tightness. Initial ddx included but was not limited to: ACS, exacerbation of CHF/pulmonary edema, pleural effusion, pulmonary embolism, cardiac tamponade, hyperthyroidism, pneumonia/consolidation, pneumothorax, symptomatic anemia, sepsis. On exam pt tachycardic with waxing and waning max c/w afib.   - Pt meets SIRS criteria for which sepsis w/u initiated however given that pt is afebrile with no recent URI sx or changes in urine output or sick contact lower susp of infectious etiology.   - Given active malignancy and tachycardia/tachypnea high susp of pulmonary embolism; furthermore, he cannot be PERC'd out.   - Will f/u with CBC to eval for symptomatic anemia  - Will f/u with trop and BNP to eval for cardiac etiology  - Will f/u with CXR to f/u on pulmonary parenchymal or pleural pathology  - Will f/u with TSH to eval for thyroid etiology.  - Given that he was recently diagnosed with afib, his sx may be a result of persistent sx  afib; EKG c/w afib with RVR; will initiate dilt bolus at 0.25mg/kg and dilt ggt.   Anticipate admission. D/w Dr. Shankar.   Ino Owen MD  PGY-2 [employer] [facility]  10:09AM    HO-II Update:   - Labs reveal hypokalemia and leukocytosis. Pt given:  10mEg KCl as NPO status undetermined along with - rocephin and vanc. Otherwise trop, TSH, Mg, Phos, lactate wnl.   - XR c/w BL pleural effusion; likely malignant given active mets versus 2/2 heart failure as BNP 1050, markedly higher than previous on Sep 2017 of 295.   - D/w Medical Onc and pt stable for Card and floor management as not currently on chemo; see Onc note for details.   - Cardiology emergently consulted and recommend IV lasix 40mg and 60 PO kayciel as pt does not need to be NPO.   - Pt to be admitted to CCU.   Ino Owen MD  PGY-2 [employer] [facility]  1:32PM    X-Rays:   Independently Interpreted Readings:   Chest X-Ray: He has a central venous catheter, bilateral pleural effusions.     Medical Decision Making:   History:   Old Medical Records: I decided to obtain old medical records.  Independently Interpreted Test(s):   I have ordered and independently interpreted X-rays - see prior notes.  I have ordered and independently interpreted EKG Reading(s) - see prior notes  Clinical Tests:   Lab Tests: Ordered and Reviewed  Radiological Study: Ordered and Reviewed  Medical Tests: Ordered and Reviewed            Scribe Attestation:   Scribe #1: I performed the above scribed service and the documentation accurately describes the services I performed. I attest to the accuracy of the note.    Attending Attestation:   Physician Attestation Statement for Resident:  As the supervising MD   Physician Attestation Statement: I have personally seen and examined this patient.   I agree with the above history. -: 62 y.o. Male with hx of recently diagnosed lung mats and bladder cancer and with recent biopsy, presenting with fatigue.  Pt endorses five days of profound weakness and SOB.  On  exam, he's diminished at bases of his lungs.  He has heart sounds with irregularly irregular rhythm.  No JVD, no edema in his legs.  EKG reviewed.  Presentation is consistent for Afib with RVR.  We obtained rate control with diltiazem bolus plus a drip.  Labs are remarkable for leukocytosis of 23,000 and BMP of 1,050.  Lactate of 1.  Given CXR findings we will obtained cultures and administered HCAP coverage with ceftriaxone and vancomycin; however, there could be a component of heart failure to this presentation that is uncertain at this time.  Hemoc consulted but they recommend medicine admission.  Cardiology consulted who will admit to CCU.     As the supervising MD I agree with the above PE.    As the supervising MD I agree with the above treatment, course, plan, and disposition.          Physician Attestation for Scribe:      Comments: I, Dr. Cleve Shankar, personally performed the services described in this documentation. All medical record entries made by the scribe were at my direction and in my presence.  I have reviewed the chart and agree that the record reflects my personal performance and is accurate and complete. Cleve Shankar MD.  1:39 PM 10/09/2017              ED Course      Clinical Impression:   The primary encounter diagnosis was Atrial fibrillation with RVR. Diagnoses of Fatigue, Bilateral pleural effusion, and HCAP (healthcare-associated pneumonia) were also pertinent to this visit.                           Ino Owen MD  Resident  10/09/17 1331       Cleve Shankar MD  10/09/17 3969

## 2017-10-09 NOTE — ASSESSMENT & PLAN NOTE
Patient w/ new onset CHF (woresening orthopnea, PND, LLE edema), BNP>1000.    - IV lasix 40 BID  - Strict I/Os  - Avoid nephro toxic drugs  - Daily weights   -  monitor lytes and replete as needed   - 2D echo

## 2017-10-09 NOTE — H&P
[facility]  Cardiology  History and Physical     Patient Name: Amador Harrison  MRN: [id number]  Admission Date: 10/9/2017  Code Status: Full Code   Attending Provider: Ewa Anderson MD   Primary Care Physician: Waqar Brady MD  Principal Problem:<principal problem not specified>    Patient information was obtained from patient and ER records.     Subjective:     Chief Complaint:  SOB and fatigue    HPI:  Mr. 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     The patient states he's had baseline SOB since being diagnosed with mets to the lung 2 weeks ago, however since last week it's been progressively getting worse. Previously he could ambulate around the house, shower, make breakfast; however now he needs help and gets SOB w/ minimal exertion (on standing; walking to the restroom). Associated with PND, worsening orthopnea (pt sits upright to sleep now), chronic cough and new leg swelling since last night. Denies any fevers, chills, WG, n/v, CP.         Review of Systems   Constitution: Positive for weakness and malaise/fatigue. Negative for chills, diaphoresis, fever and night sweats.   HENT: Negative for odynophagia and sore throat.    Eyes: Negative for blurred vision and discharge.   Cardiovascular: Positive for dyspnea on exertion, irregular heartbeat, leg swelling, orthopnea, palpitations and paroxysmal nocturnal dyspnea. Negative for chest pain, near-syncope and syncope.   Respiratory: Positive for cough, shortness of breath and sleep disturbances due to breathing. Negative for wheezing.    Skin: Negative for color change and rash.   Musculoskeletal: Positive for back pain. Negative for muscle  cramps and muscle weakness.   Gastrointestinal: Positive for constipation. Negative for diarrhea, nausea and vomiting.   Genitourinary: Negative for flank pain and hematuria.   Neurological: Negative for focal weakness, headaches and light-headedness.     Objective:     Vital Signs (Most Recent):  Temp: 98 °F (36.7 °C) (10/09/17 0840)  Pulse: (!) 144 (10/09/17 1404)  Resp: (!) 38 (10/09/17 1404)  BP: 125/73 (10/09/17 1404)  SpO2: 99 % (10/09/17 1404) Vital Signs (24h Range):  Temp:  [98 °F (36.7 °C)] 98 °F (36.7 °C)  Pulse:  [106-149] 144  Resp:  [20-38] 38  SpO2:  [94 %-99 %] 99 %  BP: ()/(64-92) 125/73     Weight: 64.4 kg (142 lb)  Body mass index is 22.92 kg/m².     SpO2: 99 %  O2 Device (Oxygen Therapy): nasal cannula    No intake or output data in the 24 hours ending 10/09/17 1500    Lines/Drains/Airways     Central Venous Catheter Line                 Port A Cath Single Lumen 03/15/17 0800  208 days          Drain                 Ureteral Drain/Stent 07/05/17 1218 Right ureter 5 Fr. 96 days         Urostomy 07/05/17 1226 ileal conduit RLQ 96 days                Physical Exam   Constitutional: He is oriented to person, place, and time. He appears well-developed and well-nourished.   HENT:   Head: Atraumatic.   Eyes: Conjunctivae and EOM are normal. Pupils are equal, round, and reactive to light.   Neck: Normal range of motion. Neck supple. Hepatojugular reflux and JVD present.   Cardiovascular: An irregularly irregular rhythm present. Tachycardia present.  Exam reveals gallop and S3.    Pulmonary/Chest: No accessory muscle usage. He is in respiratory distress. He has decreased breath sounds in the right middle field, the right lower field, the left middle field and the left lower field. He has rales in the right lower field and the left lower field.   Abdominal: There is no tenderness.   LLQ cystostomy bag C/D/I with translucent yellow urine in bag    Musculoskeletal: He exhibits edema (1+).    Neurological: He is alert and oriented to person, place, and time.       Significant Labs:   CMP   Recent Labs  Lab 10/09/17  1014   *   K 3.2*   CL 92*   CO2 32*   *   BUN 26*   CREATININE 0.7   CALCIUM 9.4   PROT 6.8   ALBUMIN 2.3*   BILITOT 0.5   ALKPHOS 87   AST 23   ALT 29   ANIONGAP 9   ESTGFRAFRICA >60.0   EGFRNONAA >60.0   , CBC   Recent Labs  Lab 10/09/17  1014   WBC 22.70*   HGB 9.2*   HCT 28.2*   *    and Troponin   Recent Labs  Lab 10/09/17  1014   TROPONINI <0.006       Significant Imaging: EKG: No Ischemia and X-Ray: CXR: X-Ray Chest 1 View (CXR): No results found for this visit on 10/09/17.    Assessment and Plan:     Acute decompensated heart failure    Patient w/ new onset CHF (woresening orthopnea, PND, LLE edema), BNP>1000.    - IV lasix 40 BID  - Strict I/Os  - Avoid nephro toxic drugs  - Daily weights   -  monitor lytes and replete as needed   - 2D echo         Atrial fibrillation with RVR    Patient found to be in afib w/ RVR.     - Patient with atrial fibrillation and CHADS-VAS score 2 or > 2; Pt is a candidate for oral anticoagulation for stroke prevention, but currently, not on AC secondary to possible mets to brain, will possible consider pending MRI  - Consult heme/onc  - Continue Diltiazem 10mg (has a titrating order) for heart rate control of atrial fibrillation  - PRN lopressor for heart rate control of atrial fibrillation            Hypertension    - Continue Diltiazem 10mg         Hypoalbuminemia    Most likely secondary to malnutrition          Hypokalemia    - monitor and replete as needed             VTE Risk Mitigation         Ordered     heparin (porcine) injection 5,000 Units  Every 8 hours     Route:  Subcutaneous        10/09/17 1729     Medium Risk of VTE  Once      10/09/17 1729     Place ALEJANRDO hose  Until discontinued      10/09/17 1729     Place sequential compression device  Until discontinued      10/09/17 1729          Malinda Diana  MD  Cardiology   [facility]

## 2017-10-09 NOTE — ASSESSMENT & PLAN NOTE
Patient found to be in afib w/ RVR.     - Patient with atrial fibrillation and CHADS-VAS score 2 or > 2; Pt is a candidate for oral anticoagulation for stroke prevention, but currently, not on AC secondary to possible mets to brain, will possible consider pending MRI  - Consult heme/onc  - Continue Diltiazem 10mg (has a titrating order) for heart rate control of atrial fibrillation  - PRN lopressor for heart rate control of atrial fibrillation

## 2017-10-09 NOTE — CONSULTS
Agree with fellows assessment and plan.    Regla Mckeon MD  Interventional Cardiology          Cardiology Consult Note   Physician Requesting Consultation: Cleve Shankar MD  Reason for Consultation: aFib with RVR    HPI:   This is a 61 y/o man with PMHx of metastatic bladder cancer (s/p resection) with newly discovered lung nodules and recently diagnosed aFib (diagnosed during lung nodule biopsy, placed on metoprolol at that time) who presents to the ED with complaints of fatigue and PHILIP. Patient found to be in aFib with RVR and started on diltiazem GTT.    On assessment, the patient states that he was never told that he had aFib before his biopsy. On the mireya of biopsy, he was told that his heart rate was too fast, so it was controlled in the hospital and he was discharged on a higher dose of metoprolol with his biopsy occurring later. He got the biopsy results today that noted spread of his malignancy to his lungs.     Since that time he has been feeling worse and noted worsening PHILIP and orthopnea over the past few days. Also noted leg swelling yesterday that he put on compression stockings for. Has never had these issues before.     ROS:    Constitution: Negative for fever or chills. Negative for weight loss or gain.   HENT: Negative for sore throat or headaches. Negative for rhinorrhea.  Eyes: Negative for blurred or double vision.   Cardiovascular: See above  Pulmonary: Positive for SOB. Negative for cough.   Gastrointestinal: Negative for abdominal pain. Negative for nausea/ vomiting. Negative for diarrhea.   : Negative for dysuria.   Neurological: Negative for focal weakness or sensory changes.  PMH:     Past Medical History:   Diagnosis Date    Cancer     Disorder of kidney and ureter     Encounter for blood transfusion     2017    Hypertension     Hypertrophy of prostate without urinary obstruction and other lower urinary tract symptoms (LUTS)     Nocturia     Nocturia      Past Surgical  History:   Procedure Laterality Date    BLADDER SURGERY      CARPAL TUNNEL RELEASE      Left hand    CYSTOSCOPY      CYSTOSCOPY W/ URETERAL STENT PLACEMENT      HERNIA REPAIR Bilateral     x 2 separate surgeries. 1 as an infant and another at approx age 18    hydrocele      at age 3    TONSILLECTOMY      turbt 2017      VASECTOMY       Allergies:   Review of patient's allergies indicates:  No Known Allergies    Medications:     No current facility-administered medications on file prior to encounter.      Current Outpatient Prescriptions on File Prior to Encounter   Medication Sig    0.9 % SODIUM CHLORIDE (SODIUM CHLORIDE 0.9%) 0.9 % solution Inject 1,000 mLs into the vein continuous.    benzonatate (TESSALON PERLES) 100 MG capsule Take 1 capsule (100 mg total) by mouth 3 (three) times daily as needed for Cough.    codeine-guaifenesin  mg/5 mL Liqd Take 5 mLs by mouth 3 (three) times daily as needed.    fish oil-omega-3 fatty acids 300-1,000 mg capsule Take 2 g by mouth once daily.    glucosamine-chondroitin 500-400 mg tablet Take 1 tablet by mouth 3 (three) times daily.    indapamide (LOZOL) 2.5 MG Tab 2.5 mg every morning.     lactobacillus acidophilus & bulgar (LACTINEX) 100 million cell packet Take 1 tablet by mouth 2 (two) times daily.    lidocaine-prilocaine (EMLA) cream Apply to port 30-45 minutes prior to being accessed.    lorazepam (ATIVAN) 0.5 MG tablet Take 1 tablet (0.5 mg total) by mouth every 8 (eight) hours as needed for Anxiety.    metoprolol succinate (TOPROL-XL) 50 MG 24 hr tablet Take 4 tablets (200 mg total) by mouth nightly.    multivitamin (ONE DAILY MULTIVITAMIN) per tablet Take 1 tablet by mouth once daily.    naproxen sodium (ANAPROX) 220 MG tablet Take 220 mg by mouth 2 (two) times daily as needed.    ondansetron (ZOFRAN-ODT) 8 MG TbDL Take 1 tablet (8 mg total) by mouth every 12 (twelve) hours as needed.    oxycodone-acetaminophen (PERCOCET)  mg per  tablet Take 1 tablet by mouth every 6 (six) hours as needed for Pain.    oxycodone-acetaminophen (PERCOCET) 5-325 mg per tablet Take 1 tablet by mouth every 4 (four) hours as needed.    trazodone (DESYREL) 50 MG tablet Take 1 tablet (50 mg total) by mouth every evening.    valsartan (DIOVAN) 320 MG tablet Take 320 mg by mouth once daily.         Social History:     Social History   Substance Use Topics    Smoking status: Former Smoker     Packs/day: 2.50     Years: 20.00     Types: Cigarettes, Pipe, Cigars     Quit date: 2/27/1994    Smokeless tobacco: Never Used    Alcohol use Yes      Comment: 2 beers or wine daily     Family History:     Family History   Problem Relation Age of Onset    Asthma Neg Hx     Emphysema Neg Hx      Physical Exam:     Vitals:  Temp:  [98 °F (36.7 °C)]   Pulse:  [106-149]   Resp:  [20-24]   BP: ()/(64-92)   SpO2:  [96 %]  I/O's:  No intake or output data in the 24 hours ending 10/09/17 1203     Constitutional: NAD  HEENT: Sclera anicteric, PERRLA, EOMI  Neck: JVD to the mid neck  CV: irreg irreg, tachycardic  Pulm: Rales to the mid lung fields with decreased breath sounds  GI: Abdomen soft, NTND, +BS  Extremities: Minimal BLE edema, stockings in place    Labs:       Recent Labs  Lab 10/09/17  1014   *   K 3.2*   CL 92*   CO2 32*   BUN 26*   CREATININE 0.7   ANIONGAP 9       Recent Labs  Lab 10/09/17  1014   AST 23   ALT 29   ALKPHOS 87   BILITOT 0.5   ALBUMIN 2.3*       Recent Labs  Lab 10/09/17  1014   CALCIUM 9.4   MG 1.6   PHOS 3.2       Recent Labs  Lab 10/09/17  1014   TROPONINI <0.006   BNP 1,050*       Recent Labs  Lab 10/09/17  1015   NITRITE Negative   LEUKOCYTESUR 1+*   WBCUA 25*   BACTERIA None       Estimated Creatinine Clearance: 96.2 mL/min (based on SCr of 0.7 mg/dL).   Recent Labs  Lab 10/09/17  1014   WBC 22.70*   HGB 9.2*   HCT 28.2*   *   GRAN 91.9*  20.8*       Recent Labs  Lab 10/09/17  1014   INR 1.2       Recent Labs  Lab 10/09/17  1014    LACTATE 1.0     Lab Results   Component Value Date    CHOL 77 (L) 09/23/2017    HDL 27 (L) 09/23/2017    LDLCALC 41.2 (L) 09/23/2017    TRIG 44 09/23/2017     No results found for: HGBA1C  Lab Results   Component Value Date    TSH 1.214 10/09/2017            Imaging:   CXR: 10/9/17  Impression      Although still modest in volume on each side, there has been an increase in the volume of pleural fluid on both sides since 9/25/17.  Appearance of the chest is otherwise unchanged since that time.     No results found for: EF    EKG: 10/9/17  aFib with RVR    POC U/S  Preserved EF      Assessment and Plan:   #aFib with RVR - Incited likely by sympathetic tone from malignancy and ADHF as described below.  - Admit to CCU for diltiazem GTT, diuresis, and up titration of AV daniel blocker  - 2D echo with CFD  - Continue aspirin   - Contact primary oncologist to determine if anticoagulation is acceptable. Will need brain MRI prior to anticoagulation to ensure there are no brain mets.  - Will pursue rate control strategy at this time. If oncology states that anticoagulation is ok, can consider rhythm control strategy with TOBY/DCCV once completely diuresed, but would not cardiovert unless patient is cleared for anticoagulation    #ADHF - HFpEF. New onset.  - 2D echo with CFD  - Diurese with Lasix 80mg IVP as needed to be net negative 2L per day  - K >4 Mag > 2    #Leukocytosis - Likely related to acute condition, but considering malignancy, would investigate for infection  - Trend  - Blood Cultures  - Can consider broad spectrum Abx at the discretion of the primary team    Patient discussed and examined with attending physician, Dr. Mckeon.      Signed:    Sabino Villatoro MD  Chief Cardiology Fellow  Pager: [phone]  10/9/2017 12:03 PM

## 2017-10-09 NOTE — SUBJECTIVE & OBJECTIVE
Review of Systems   Constitution: Positive for weakness and malaise/fatigue. Negative for chills, diaphoresis, fever and night sweats.   HENT: Negative for odynophagia and sore throat.    Eyes: Negative for blurred vision and discharge.   Cardiovascular: Positive for dyspnea on exertion, irregular heartbeat, leg swelling, orthopnea, palpitations and paroxysmal nocturnal dyspnea. Negative for chest pain, near-syncope and syncope.   Respiratory: Positive for cough, shortness of breath and sleep disturbances due to breathing. Negative for wheezing.    Skin: Negative for color change and rash.   Musculoskeletal: Positive for back pain. Negative for muscle cramps and muscle weakness.   Gastrointestinal: Positive for constipation. Negative for diarrhea, nausea and vomiting.   Genitourinary: Negative for flank pain and hematuria.   Neurological: Negative for focal weakness, headaches and light-headedness.     Objective:     Vital Signs (Most Recent):  Temp: 98 °F (36.7 °C) (10/09/17 0840)  Pulse: (!) 144 (10/09/17 1404)  Resp: (!) 38 (10/09/17 1404)  BP: 125/73 (10/09/17 1404)  SpO2: 99 % (10/09/17 1404) Vital Signs (24h Range):  Temp:  [98 °F (36.7 °C)] 98 °F (36.7 °C)  Pulse:  [106-149] 144  Resp:  [20-38] 38  SpO2:  [94 %-99 %] 99 %  BP: ()/(64-92) 125/73     Weight: 64.4 kg (142 lb)  Body mass index is 22.92 kg/m².     SpO2: 99 %  O2 Device (Oxygen Therapy): nasal cannula    No intake or output data in the 24 hours ending 10/09/17 1500    Lines/Drains/Airways     Central Venous Catheter Line                 Port A Cath Single Lumen 03/15/17 0800  208 days          Drain                 Ureteral Drain/Stent 07/05/17 1218 Right ureter 5 Fr. 96 days         Urostomy 07/05/17 1226 ileal conduit RLQ 96 days                Physical Exam   Constitutional: He is oriented to person, place, and time. He appears well-developed and well-nourished.   HENT:   Head: Atraumatic.   Eyes: Conjunctivae and EOM are normal. Pupils  are equal, round, and reactive to light.   Neck: Normal range of motion. Neck supple. Hepatojugular reflux and JVD present.   Cardiovascular: An irregularly irregular rhythm present. Tachycardia present.  Exam reveals gallop and S3.    Pulmonary/Chest: No accessory muscle usage. He is in respiratory distress. He has decreased breath sounds in the right middle field, the right lower field, the left middle field and the left lower field. He has rales in the right lower field and the left lower field.   Abdominal: There is no tenderness.   LLQ cystostomy bag C/D/I with translucent yellow urine in bag    Musculoskeletal: He exhibits edema (1+).   Neurological: He is alert and oriented to person, place, and time.       Significant Labs:   CMP   Recent Labs  Lab 10/09/17  1014   *   K 3.2*   CL 92*   CO2 32*   *   BUN 26*   CREATININE 0.7   CALCIUM 9.4   PROT 6.8   ALBUMIN 2.3*   BILITOT 0.5   ALKPHOS 87   AST 23   ALT 29   ANIONGAP 9   ESTGFRAFRICA >60.0   EGFRNONAA >60.0   , CBC   Recent Labs  Lab 10/09/17  1014   WBC 22.70*   HGB 9.2*   HCT 28.2*   *    and Troponin   Recent Labs  Lab 10/09/17  1014   TROPONINI <0.006       Significant Imaging: EKG: No Ischemia and X-Ray: CXR: X-Ray Chest 1 View (CXR): No results found for this visit on 10/09/17.

## 2017-10-09 NOTE — PLAN OF CARE
Called by ED regarding patient. Patient with new recurrence of bladder cancer. Patient is s/p neoadjuvant cis/gem and resection. Unfortunately recently found to have recurrence and plan to start Keytruda in the near future. In ED, patient found to be in AFib with RVR and new onset heart failure (BNP >1000). While patient does have a h/o malignancy, it is felt that patient would benefit most from admission to hospital medicine/cardiology to address his acute cardiac issues. He should follow up with his primary oncologist after discharge to discuss further treatment. Please do not hesitate to consult Hematology/Oncology Consult Service if there are any specific oncologic questions. Thank you for allowing us to participate in the care of this patient.      Cosme Parada MD (PGY-5)  Hematology/Oncology Fellow  Discussed with staff

## 2017-10-09 NOTE — HPI
Mr. 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     The patient states he's had baseline SOB since being diagnosed with mets to the lung 2 weeks ago, however since last week it's been progressively getting worse. Previously he could ambulate around the house, shower, make breakfast; however now he needs help and gets SOB w/ minimal exertion (on standing; walking to the restroom). Associated with PND, worsening orthopnea (pt sits upright to sleep now), chronic cough and new leg swelling since last night. Denies any fevers, chills, WG, n/v, CP.

## 2017-10-10 PROBLEM — I48.0 PAROXYSMAL ATRIAL FIBRILLATION WITH RVR: Status: ACTIVE | Noted: 2017-01-01

## 2017-10-10 NOTE — PROGRESS NOTES
Dr. Diana called to voice concern that pt should be moved to ICU. He verbalized that this was MD's fist intention on first assessment. Charge nurse notified of this and bed has been aquired. Verbalized to EDITH Culp that pt will be going to room 3088, but that room is not currently clean yet. Pt's VSS and will continue to monitor.

## 2017-10-10 NOTE — ASSESSMENT & PLAN NOTE
Patient found to be in afib w/ RVR.     - Goal HR <100   - Patient with atrial fibrillation and CHADS-VAS score 2 or > 2; Pt is a candidate for oral anticoagulation for stroke prevention, will start xarelto  - Continue Diltiazem 10mg (has a titrating order) for heart rate control of atrial fibrillation  - Lopressor 50 TID for heart rate control of atrial fibrillation

## 2017-10-10 NOTE — PROGRESS NOTES
Pt arrived to unit via stretcher from ED. Pt arrived with Cardizem gtt per MD order at 10mg/ml. Pt is experiencing PHILIP and tachyepnic. Dr. Diana notified of pt's condition and verbalized to give the PRN dose of Metoprolol and the scheduled dose of lasix for tonight. Pt complaining of leg pain of 8/10, MD ordered one time dose of morphine. Pt's VSS and are as follow.

## 2017-10-10 NOTE — ASSESSMENT & PLAN NOTE
Patient w/ new onset CHF (woresening orthopnea, PND, LLE edema), BNP>1000.  Echo done; EF 55%.     - IV lasix 40 BID  - Strict I/Os  - Avoid nephro toxic drugs  - Daily weights   -  monitor lytes and replete as needed

## 2017-10-10 NOTE — PROGRESS NOTES
[facility]  Cardiology  Progress Note    Patient Name: Amador Harrison  MRN: [id number]  Admission Date: 10/9/2017  Hospital Length of Stay: 1 days  Code Status: Full Code   Attending Physician: Ewa Anderson MD   Primary Care Physician: Waqar Brady MD  Expected Discharge Date: 10/13/2017  Principal Problem:<principal problem not specified>    Subjective:     Interval History:   NAEON. Patient reports mild improvement in SOB. Denies any CP, palpations, HA, n/v.      Review of Systems   Constitution: Negative for chills, diaphoresis, fever, weakness, malaise/fatigue and night sweats.   HENT: Negative for odynophagia and sore throat.    Eyes: Negative for blurred vision and discharge.   Cardiovascular: Positive for dyspnea on exertion, irregular heartbeat, leg swelling, orthopnea, palpitations and paroxysmal nocturnal dyspnea. Negative for chest pain, near-syncope and syncope.   Respiratory: Positive for cough, shortness of breath and sleep disturbances due to breathing. Negative for wheezing.    Skin: Negative for color change and rash.   Musculoskeletal: Positive for back pain. Negative for muscle cramps and muscle weakness.   Gastrointestinal: Positive for constipation. Negative for diarrhea, nausea and vomiting.   Genitourinary: Negative for flank pain and hematuria.   Neurological: Negative for focal weakness, headaches and light-headedness.     Objective:     Vital Signs (Most Recent):  Temp: 98 °F (36.7 °C) (10/10/17 1500)  Pulse: 87 (10/10/17 1600)  Resp: 19 (10/10/17 1600)  BP: 123/66 (10/10/17 1600)  SpO2: 97 % (10/10/17 1600) Vital Signs (24h Range):  Temp:  [97.5 °F (36.4 °C)-98.9 °F (37.2 °C)] 98 °F (36.7 °C)  Pulse:  [] 87  Resp:  [15-42] 19  SpO2:  [95 %-99 %] 97 %  BP: (107-152)/(60-84) 123/66     Weight: 65 kg (143 lb 4.8 oz)  Body mass index is 23.13 kg/m².     SpO2: 97 %  O2 Device (Oxygen Therapy): nasal cannula      Intake/Output Summary (Last 24 hours) at  10/10/17 1722  Last data filed at 10/10/17 1600   Gross per 24 hour   Intake           980.25 ml   Output              850 ml   Net           130.25 ml       Lines/Drains/Airways     Central Venous Catheter Line                 Port A Cath Single Lumen 03/15/17 0800  209 days          Drain                 Ureteral Drain/Stent 07/05/17 1218 Right ureter 5 Fr. 97 days         Urostomy 07/05/17 1226 ileal conduit RLQ 97 days                Physical Exam   Constitutional: He is oriented to person, place, and time. He appears well-developed and well-nourished.   HENT:   Head: Atraumatic.   Eyes: Conjunctivae and EOM are normal. Pupils are equal, round, and reactive to light.   Neck: Normal range of motion. Neck supple. Hepatojugular reflux and JVD present.   Cardiovascular: An irregularly irregular rhythm present. Tachycardia present.  Exam reveals gallop and S3.    Pulmonary/Chest: No accessory muscle usage. He is in respiratory distress. He has decreased breath sounds in the right middle field, the right lower field, the left middle field and the left lower field. He has rales in the right lower field and the left lower field.   Abdominal: There is no tenderness.   LLQ cystostomy bag C/D/I with translucent yellow urine in bag    Musculoskeletal: He exhibits edema (1+).   Neurological: He is alert and oriented to person, place, and time.       Significant Labs:   CMP   Recent Labs  Lab 10/09/17  1014 10/10/17  0305 10/10/17  1431   * 134* 133*  133*   K 3.2* 2.8* 3.8  3.8   CL 92* 87* 87*  87*   CO2 32* 35* 34*  34*   * 141* 145*  145*   BUN 26* 23 25*  25*   CREATININE 0.7 0.7 0.9  0.9   CALCIUM 9.4 9.2 9.2  9.2   PROT 6.8  --   --    ALBUMIN 2.3*  --   --    BILITOT 0.5  --   --    ALKPHOS 87  --   --    AST 23  --   --    ALT 29  --   --    ANIONGAP 9 12 12  12   ESTGFRAFRICA >60.0 >60.0 >60.0  >60.0   EGFRNONAA >60.0 >60.0 >60.0  >60.0    and CBC   Recent Labs  Lab 10/09/17  1014  10/10/17  0305   WBC 22.70* 28.14*   HGB 9.2* 9.4*   HCT 28.2* 29.3*   * 409*         Assessment and Plan:       Acute decompensated heart failure    Patient w/ new onset CHF (woresening orthopnea, PND, LLE edema), BNP>1000.  Echo done; EF 55%.     - IV lasix 40 BID  - Strict I/Os  - Avoid nephro toxic drugs  - Daily weights   -  monitor lytes and replete as needed           Atrial fibrillation with RVR    Patient found to be in afib w/ RVR.     - Goal HR <100   - Patient with atrial fibrillation and CHADS-VAS score 2 or > 2; Pt is a candidate for oral anticoagulation for stroke prevention, will start xarelto  - Continue Diltiazem 10mg (has a titrating order) for heart rate control of atrial fibrillation  - Lopressor 50 TID for heart rate control of atrial fibrillation            Hypertension    - Continue Diltiazem 10mg   - Lopressor 50 TID         Hypoalbuminemia    Most likely secondary to malnutrition          Hypokalemia    - monitor and replete as needed             VTE Risk Mitigation         Ordered     rivaroxaban tablet 20 mg  With dinner     Route:  Oral        10/10/17 1042     Medium Risk of VTE  Once      10/09/17 1729     Place ALEJANDRO hose  Until discontinued      10/09/17 1729     Place sequential compression device  Until discontinued      10/09/17 1729          Malinda Diana MD  Cardiology  [facility]

## 2017-10-10 NOTE — NURSING TRANSFER
Nursing Transfer Note      10/10/2017     Transfer To: 3088    Transfer via bed    Transfer with  to O2, cardiac monitoring, diltiazem gtt    Transported by RN    Medicines sent: yes    Chart send with patient: YES    Pt sent with all belonging and medications. Pt had no complaints of chest pain or SOB.

## 2017-10-10 NOTE — SUBJECTIVE & OBJECTIVE
Interval History:   NAEON. Patient reports mild improvement in SOB. Denies any CP, palpations, HA, n/v.      Review of Systems   Constitution: Negative for chills, diaphoresis, fever, weakness, malaise/fatigue and night sweats.   HENT: Negative for odynophagia and sore throat.    Eyes: Negative for blurred vision and discharge.   Cardiovascular: Positive for dyspnea on exertion, irregular heartbeat, leg swelling, orthopnea, palpitations and paroxysmal nocturnal dyspnea. Negative for chest pain, near-syncope and syncope.   Respiratory: Positive for cough, shortness of breath and sleep disturbances due to breathing. Negative for wheezing.    Skin: Negative for color change and rash.   Musculoskeletal: Positive for back pain. Negative for muscle cramps and muscle weakness.   Gastrointestinal: Positive for constipation. Negative for diarrhea, nausea and vomiting.   Genitourinary: Negative for flank pain and hematuria.   Neurological: Negative for focal weakness, headaches and light-headedness.     Objective:     Vital Signs (Most Recent):  Temp: 98 °F (36.7 °C) (10/10/17 1500)  Pulse: 87 (10/10/17 1600)  Resp: 19 (10/10/17 1600)  BP: 123/66 (10/10/17 1600)  SpO2: 97 % (10/10/17 1600) Vital Signs (24h Range):  Temp:  [97.5 °F (36.4 °C)-98.9 °F (37.2 °C)] 98 °F (36.7 °C)  Pulse:  [] 87  Resp:  [15-42] 19  SpO2:  [95 %-99 %] 97 %  BP: (107-152)/(60-84) 123/66     Weight: 65 kg (143 lb 4.8 oz)  Body mass index is 23.13 kg/m².     SpO2: 97 %  O2 Device (Oxygen Therapy): nasal cannula      Intake/Output Summary (Last 24 hours) at 10/10/17 1722  Last data filed at 10/10/17 1600   Gross per 24 hour   Intake           980.25 ml   Output              850 ml   Net           130.25 ml       Lines/Drains/Airways     Central Venous Catheter Line                 Port A Cath Single Lumen 03/15/17 0800  209 days          Drain                 Ureteral Drain/Stent 07/05/17 1218 Right ureter 5 Fr. 97 days         Urostomy 07/05/17  1226 ileal conduit RLQ 97 days                Physical Exam   Constitutional: He is oriented to person, place, and time. He appears well-developed and well-nourished.   HENT:   Head: Atraumatic.   Eyes: Conjunctivae and EOM are normal. Pupils are equal, round, and reactive to light.   Neck: Normal range of motion. Neck supple. Hepatojugular reflux and JVD present.   Cardiovascular: An irregularly irregular rhythm present. Tachycardia present.  Exam reveals gallop and S3.    Pulmonary/Chest: No accessory muscle usage. He is in respiratory distress. He has decreased breath sounds in the right middle field, the right lower field, the left middle field and the left lower field. He has rales in the right lower field and the left lower field.   Abdominal: There is no tenderness.   LLQ cystostomy bag C/D/I with translucent yellow urine in bag    Musculoskeletal: He exhibits edema (1+).   Neurological: He is alert and oriented to person, place, and time.       Significant Labs:   CMP   Recent Labs  Lab 10/09/17  1014 10/10/17  0305 10/10/17  1431   * 134* 133*  133*   K 3.2* 2.8* 3.8  3.8   CL 92* 87* 87*  87*   CO2 32* 35* 34*  34*   * 141* 145*  145*   BUN 26* 23 25*  25*   CREATININE 0.7 0.7 0.9  0.9   CALCIUM 9.4 9.2 9.2  9.2   PROT 6.8  --   --    ALBUMIN 2.3*  --   --    BILITOT 0.5  --   --    ALKPHOS 87  --   --    AST 23  --   --    ALT 29  --   --    ANIONGAP 9 12 12  12   ESTGFRAFRICA >60.0 >60.0 >60.0  >60.0   EGFRNONAA >60.0 >60.0 >60.0  >60.0    and CBC   Recent Labs  Lab 10/09/17  1014 10/10/17  0305   WBC 22.70* 28.14*   HGB 9.2* 9.4*   HCT 28.2* 29.3*   * 409*

## 2017-10-10 NOTE — PLAN OF CARE
Problem: Patient Care Overview  Goal: Plan of Care Review  Outcome: Ongoing (interventions implemented as appropriate)  POC reviewed with patient and spouse verbalized understanding all questions answered.

## 2017-10-10 NOTE — CONSULTS
[facility]  Hematology/Oncology  Consult Note    Patient Name: Amador Harrison  MRN: [id number]  Admission Date: 10/9/2017  Hospital Length of Stay: 1 days  Code Status: Full Code   Attending Provider: Ewa Anderson MD  Consulting Provider: Arabella Alva MD  Primary Care Physician: Waqar Brady MD  Principal Problem:<principal problem not specified>    Consults  Subjective:     HPI: Mr. 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 Underwent tow cycles of neoadjuvant cis+ gem and noted to have some PD and underwent robot assisted laparoscopic cystectomy with ileal conduit 7/5/17. Path returned pT3pN2. In August patient had post op CT scan which was notable for pulmonary nodules and ground glass opacities and suspected to be possibly infectious given the rapid development (prior CT 5/17). Patient developed cough and SOB and repeat CT 9/18/17 was notable for progression of these lesions and more likely metastatic disease. He underwent bronchoscopic biopsy 9/25/17 and path was consistent with urothelial mets. He has been recommended treatment with immunotherapy and awaiting insurance approval. Quickly over time, he has noticed progressive weakness and SOB. Recently diagnosed with afib and had his home metoprolol increase. Yesterday presented to ED with worsening of symptoms and found to be in Afib with RVR. Currently on a diltiazem drip. He now feels better. CXR 10/9/17 suggestive of atelectasis vs pleural effusion bilateral with R>L. Currently on lasix. Cardiology questions the possibility of anticoagulation and wonders if MRI brain needed. Patient has no focal neurological deficits.       Oncology Treatment Plan:   OP MELANOMA PEMBROLIZUMAB    Medications:  Continuous Infusions:   diltiazem 15 mg/hr (10/10/17 1030)     Scheduled Meds:   aspirin  81 mg Oral Daily    furosemide  40 mg  Intravenous BID    magnesium sulfate IVPB  2 g Intravenous Q2H    metoprolol tartrate  50 mg Oral TID    rivaroxaban  20 mg Oral Daily with dinner    senna-docusate 8.6-50 mg  1 tablet Oral Daily     PRN Meds:acetaminophen, dextromethorphan-guaifenesin  mg/5 ml, dextrose 50%, dextrose 50%, glucagon (human recombinant), glucose, glucose, lorazepam, oxycodone-acetaminophen     Review of patient's allergies indicates:  No Known Allergies     Past Medical History:   Diagnosis Date    Cancer     Disorder of kidney and ureter     Encounter for blood transfusion     2017    Hypertension     Hypertrophy of prostate without urinary obstruction and other lower urinary tract symptoms (LUTS)     Nocturia     Nocturia      Past Surgical History:   Procedure Laterality Date    BLADDER SURGERY      CARPAL TUNNEL RELEASE      Left hand    CYSTOSCOPY      CYSTOSCOPY W/ URETERAL STENT PLACEMENT      HERNIA REPAIR Bilateral     x 2 separate surgeries. 1 as an infant and another at approx age 18    hydrocele      at age 3    TONSILLECTOMY      turbt 2017      VASECTOMY       Family History     None        Social History Main Topics    Smoking status: Former Smoker     Packs/day: 2.50     Years: 20.00     Types: Cigarettes, Pipe, Cigars     Quit date: 2/27/1994    Smokeless tobacco: Never Used    Alcohol use Yes      Comment: 2 beers or wine daily    Drug use: No    Sexual activity: Yes       Review of Systems   Constitutional: Positive for activity change and fatigue.   HENT: Negative for congestion and trouble swallowing.    Eyes: Negative for photophobia and visual disturbance.   Respiratory: Positive for cough and shortness of breath.    Cardiovascular: Positive for palpitations. Negative for chest pain and leg swelling.   Gastrointestinal: Negative for abdominal distention and abdominal pain.   Genitourinary: Negative for dysuria and hematuria.   Musculoskeletal: Positive for arthralgias and back  pain.   Skin: Negative for color change and pallor.   Neurological: Negative for light-headedness and headaches.   Hematological: Negative for adenopathy. Does not bruise/bleed easily.   Psychiatric/Behavioral: Negative for agitation.     Objective:     Vital Signs (Most Recent):  Temp: 97.5 °F (36.4 °C) (10/10/17 1100)  Pulse: 95 (10/10/17 0930)  Resp: (!) 22 (10/10/17 0930)  BP: 124/74 (10/10/17 0930)  SpO2: 96 % (10/10/17 0930) Vital Signs (24h Range):  Temp:  [97.5 °F (36.4 °C)-98.9 °F (37.2 °C)] 97.5 °F (36.4 °C)  Pulse:  [] 95  Resp:  [15-42] 22  SpO2:  [94 %-99 %] 96 %  BP: (107-152)/(61-84) 124/74     Weight: 65 kg (143 lb 4.8 oz)  Body mass index is 23.13 kg/m².  Body surface area is 1.74 meters squared.      Intake/Output Summary (Last 24 hours) at 10/10/17 1127  Last data filed at 10/10/17 0900   Gross per 24 hour   Intake           817.75 ml   Output             2175 ml   Net         -1357.25 ml       Physical Exam   Constitutional: He is oriented to person, place, and time. He appears well-developed and well-nourished.   HENT:   Mouth/Throat: Oropharynx is clear and moist.   Eyes: Conjunctivae are normal. Pupils are equal, round, and reactive to light.   Cardiovascular: Normal rate and regular rhythm.    Pulmonary/Chest:   Decreased BS   Abdominal: Soft. Bowel sounds are normal.   Musculoskeletal: Normal range of motion.   Lymphadenopathy:     He has no cervical adenopathy.   Neurological: He is alert and oriented to person, place, and time.   Skin: Skin is warm and dry.   Psychiatric: He has a normal mood and affect. His behavior is normal.       Significant Labs:   CBC:   Recent Labs  Lab 10/09/17  1014 10/10/17  0305   WBC 22.70* 28.14*   HGB 9.2* 9.4*   HCT 28.2* 29.3*   * 409*    and CMP:   Recent Labs  Lab 10/09/17  1014 10/10/17  0305   * 134*   K 3.2* 2.8*   CL 92* 87*   CO2 32* 35*   * 141*   BUN 26* 23   CREATININE 0.7 0.7   CALCIUM 9.4 9.2   PROT 6.8  --    ALBUMIN  2.3*  --    BILITOT 0.5  --    ALKPHOS 87  --    AST 23  --    ALT 29  --    ANIONGAP 9 12   EGFRNONAA >60.0 >60.0       Diagnostic Results:  I have reviewed all pertinent imaging results/findings within the past 24 hours.    Assessment/Plan:     Active Diagnoses:    Diagnosis Date Noted POA    Atrial fibrillation with RVR [I48.91] 10/09/2017 Yes    Acute decompensated heart failure [I50.9] 10/09/2017 Unknown     Chronic    Hypokalemia [E87.6] 10/09/2017 Yes    Hypertension [I10] 06/26/2017 Yes    Hypoalbuminemia [E88.09] 06/26/2017 Yes      Problems Resolved During this Admission:    Diagnosis Date Noted Date Resolved POA       A/P  Mr. Harrison is a 62 year old male with hx of high grade metastatic urothelial cancer with mets to lung. No has Afib with RVR, currently rate controlled. Requiring O2 with CXR suggestive of pleural effusion, diuresing on lasix. May anticoagulate for afib as no contraindication. Patient does not exhibit symptoms of metastatic brain disease and therefore MRI of brain is not mandatory. Should he have symptoms, assessment for brain lesions may be performed and anticoagulation may continue as long as ICH is not found.   Insurance has approved immunotherapy and he will be scheduled for keytruda infusions as soon as he is discharged.     Thank you for your consult.     Arabella Alva MD  Hematology/Oncology  [facility]    Attending Addendum:  The patient was seen, examined, and discussed on rounds with the team.  I agree with the assessment and plan as outlined for Amador Harrison.  Plan for immunotherapy once discharged.    Dhiraj Martini DO, FACP  Hematology & Oncology  [address] [place] [address]  ph. [phone]  Fax. [phone]

## 2017-10-11 PROBLEM — D72.829 LEUKOCYTOSIS: Status: ACTIVE | Noted: 2017-01-01

## 2017-10-11 NOTE — ASSESSMENT & PLAN NOTE
- Flat WBC count 28.3 (10/11); previously 28.14 (10/10)   - Patient remains afebrile  - Blood cultures negative  - Urine cultures; remarkable for coagulase neg staph most likely a contaminant     - CXR notable for bilateral effusions   - Paraneoplastic??

## 2017-10-11 NOTE — PLAN OF CARE
Problem: Patient Care Overview  Goal: Plan of Care Review  Outcome: Ongoing (interventions implemented as appropriate)  Pt free of falls and injury. Skin CDI; VSS. POC reviewed. Pt tolerating plan of care.

## 2017-10-11 NOTE — PROGRESS NOTES
Pt transferred to CSU via wheel chair, on oxygen, telemetry box, chart, belonings and wife traveled with patient.Nurse Jumana notified of patients arrival to room. Chart given to . Patient tolerated transfer well. No signs of distress.

## 2017-10-11 NOTE — ASSESSMENT & PLAN NOTE
Patient w/ new onset CHF (woresening orthopnea, PND, LLE edema), BNP>1000.  Echo done; EF 55%.     - Switched IV lasix 40 BID to lasix PO 40mg   - Strict I/Os  - Avoid nephro toxic drugs  - Daily weights   -  monitor lytes and replete as needed

## 2017-10-11 NOTE — ASSESSMENT & PLAN NOTE
Most likely secondary to malnutrition    - Boost protein shakes  - Encouraging high protein diet, protein bars

## 2017-10-11 NOTE — PROGRESS NOTES
[facility]  Cardiology  Progress Note    Patient Name: Amador Harrison  MRN: [id number]  Admission Date: 10/9/2017  Hospital Length of Stay: 2 days  Code Status: Full Code   Attending Physician: Ewa Anderson MD   Primary Care Physician: Waqar Brady MD  Expected Discharge Date: 10/13/2017  Principal Problem:<principal problem not specified>    Subjective:     Interval History:   NAEON. Patient reports mild improvement in SOB. Denies any CP, cough, palpations, n/v, HA. No complaints.     Review of Systems   Constitution: Negative for chills, diaphoresis, fever, weakness, malaise/fatigue and night sweats.   HENT: Negative for odynophagia and sore throat.    Eyes: Negative for blurred vision and discharge.   Cardiovascular: Positive for dyspnea on exertion, irregular heartbeat, leg swelling, orthopnea, palpitations and paroxysmal nocturnal dyspnea. Negative for chest pain, near-syncope and syncope.   Respiratory: Positive for cough, shortness of breath and sleep disturbances due to breathing. Negative for wheezing.    Skin: Negative for color change and rash.   Musculoskeletal: Positive for back pain. Negative for muscle cramps and muscle weakness.   Gastrointestinal: Positive for constipation. Negative for diarrhea, nausea and vomiting.   Genitourinary: Negative for flank pain and hematuria.   Neurological: Negative for focal weakness, headaches and light-headedness.     Objective:     Vital Signs (Most Recent):  Temp: 97.9 °F (36.6 °C) (10/11/17 1650)  Pulse: 90 (10/11/17 1650)  Resp: (!) 26 (10/11/17 1650)  BP: 116/61 (10/11/17 1650)  SpO2: (!) 94 % (10/11/17 1650) Vital Signs (24h Range):  Temp:  [97.5 °F (36.4 °C)-98.7 °F (37.1 °C)] 97.9 °F (36.6 °C)  Pulse:  [] 90  Resp:  [10-44] 26  SpO2:  [94 %-100 %] 94 %  BP: (102-155)/(61-83) 116/61     Weight: 65 kg (143 lb 4.8 oz)  Body mass index is 23.13 kg/m².     SpO2: (!) 94 %  O2 Device (Oxygen Therapy): nasal  cannula      Intake/Output Summary (Last 24 hours) at 10/11/17 1735  Last data filed at 10/11/17 1200   Gross per 24 hour   Intake           752.93 ml   Output             1115 ml   Net          -362.07 ml       Lines/Drains/Airways     Central Venous Catheter Line                 Port A Cath Single Lumen 03/15/17 0800  210 days          Drain                 Ureteral Drain/Stent 07/05/17 1218 Right ureter 5 Fr. 98 days         Urostomy 07/05/17 1226 ileal conduit RLQ 98 days                Physical Exam   Constitutional: He is oriented to person, place, and time. He appears well-developed and well-nourished.   HENT:   Head: Atraumatic.   Eyes: Conjunctivae and EOM are normal. Pupils are equal, round, and reactive to light.   Neck: Normal range of motion. Neck supple. Hepatojugular reflux present. No JVD present.   Cardiovascular: An irregularly irregular rhythm present. Tachycardia present.  Exam reveals gallop.    Pulmonary/Chest: No accessory muscle usage. No respiratory distress. He has decreased breath sounds in the right middle field, the right lower field and the left lower field. He has rales in the right lower field and the left lower field.   Abdominal: There is no tenderness.   LLQ cystostomy bag C/D/I with translucent yellow urine in bag    Musculoskeletal: He exhibits no edema (1+).   Neurological: He is alert and oriented to person, place, and time.       Significant Labs:   Blood Culture:     Recent Labs  Lab 10/10/17  1355 10/10/17  1400   LABBLOO No Growth to date  No Growth to date No Growth to date  No Growth to date   , CMP     Recent Labs  Lab 10/10/17  0305 10/10/17  1431 10/11/17  0310   * 133*  133* 132*   K 2.8* 3.8  3.8 3.7   CL 87* 87*  87* 84*   CO2 35* 34*  34* 36*   * 145*  145* 141*   BUN 23 25*  25* 30*   CREATININE 0.7 0.9  0.9 0.8   CALCIUM 9.2 9.2  9.2 9.4   ANIONGAP 12 12  12 12   ESTGFRAFRICA >60.0 >60.0  >60.0 >60.0   EGFRNONAA >60.0 >60.0  >60.0 >60.0     and CBC     Recent Labs  Lab 10/10/17  0305 10/11/17  0310   WBC 28.14* 28.30*   HGB 9.4* 9.3*   HCT 29.3* 28.4*   * 419*       Significant Imaging: X-Ray: CXR: X-Ray Chest 1 View (CXR):   Results for orders placed or performed during the hospital encounter of 10/09/17   X-Ray Chest 1 View    Narrative    A PICC line is present and its tip is in the superior vena cava.  Heart size is unchanged.  Moderate bilateral pleural effusions with some loss of volume and consolidation is seen at both lung bases similar to recent exam.    Impression     See above      Electronically signed by: Darien Poe MD  Date:     10/11/17  Time:    13:07      Assessment and Plan:       Acute decompensated heart failure    Patient w/ new onset CHF (woresening orthopnea, PND, LLE edema), BNP>1000.  Echo done; EF 55%.     - Switched IV lasix 40 BID to lasix PO 40mg   - Strict I/Os  - Avoid nephro toxic drugs  - Daily weights   -  monitor lytes and replete as needed           Atrial fibrillation with RVR    Patient found to be in afib w/ RVR. Goal HR <100     -  Switched to Lopressor 50mg QID for heart rate control of atrial fibrillation  - Converted to Normal sinus; started amiodarone  BID (loading dose *14days); switch to 200 mg PO daily afterwards  - Stopped Diltiazem 10mg drip  - Started diltiazem 60mg PO q6 for heart rate control of atrial fibrillation  - Need to assess for bandar tomorrow as patient is on multiple medications     - Patient with atrial fibrillation and CHADS-VAS score 2 or > 2; Pt is a candidate for oral anticoagulation for stroke prevention, will start xarelto          Hypertension    - Diltiazem 60mg Q6  - Lopressor 50 QID         Leukocytosis      - Flat WBC count 28.3 (10/11); previously 28.14 (10/10)   - Patient remains afebrile  - Blood cultures negative  - Urine cultures; remarkable for coagulase neg staph most likely a contaminant     - CXR notable for bilateral effusions   - Paraneoplastic??          Hypoalbuminemia    Most likely secondary to malnutrition    - Boost protein shakes  - Encouraging high protein diet, protein bars           Hypokalemia    - monitor and replete as needed             VTE Risk Mitigation         Ordered     rivaroxaban tablet 20 mg  With dinner     Route:  Oral        10/10/17 1042     Medium Risk of VTE  Once      10/09/17 1729     Place ALEJANDRO hose  Until discontinued      10/09/17 1729     Place sequential compression device  Until discontinued      10/09/17 1729          Malinda Diana MD  Cardiology  [facility]

## 2017-10-11 NOTE — SUBJECTIVE & OBJECTIVE
Interval History:   NAEON. Patient reports mild improvement in SOB. Denies any CP, cough, palpations, n/v, HA. No complaints.     Review of Systems   Constitution: Negative for chills, diaphoresis, fever, weakness, malaise/fatigue and night sweats.   HENT: Negative for odynophagia and sore throat.    Eyes: Negative for blurred vision and discharge.   Cardiovascular: Positive for dyspnea on exertion, irregular heartbeat, leg swelling, orthopnea, palpitations and paroxysmal nocturnal dyspnea. Negative for chest pain, near-syncope and syncope.   Respiratory: Positive for cough, shortness of breath and sleep disturbances due to breathing. Negative for wheezing.    Skin: Negative for color change and rash.   Musculoskeletal: Positive for back pain. Negative for muscle cramps and muscle weakness.   Gastrointestinal: Positive for constipation. Negative for diarrhea, nausea and vomiting.   Genitourinary: Negative for flank pain and hematuria.   Neurological: Negative for focal weakness, headaches and light-headedness.     Objective:     Vital Signs (Most Recent):  Temp: 97.9 °F (36.6 °C) (10/11/17 1650)  Pulse: 90 (10/11/17 1650)  Resp: (!) 26 (10/11/17 1650)  BP: 116/61 (10/11/17 1650)  SpO2: (!) 94 % (10/11/17 1650) Vital Signs (24h Range):  Temp:  [97.5 °F (36.4 °C)-98.7 °F (37.1 °C)] 97.9 °F (36.6 °C)  Pulse:  [] 90  Resp:  [10-44] 26  SpO2:  [94 %-100 %] 94 %  BP: (102-155)/(61-83) 116/61     Weight: 65 kg (143 lb 4.8 oz)  Body mass index is 23.13 kg/m².     SpO2: (!) 94 %  O2 Device (Oxygen Therapy): nasal cannula      Intake/Output Summary (Last 24 hours) at 10/11/17 1735  Last data filed at 10/11/17 1200   Gross per 24 hour   Intake           752.93 ml   Output             1115 ml   Net          -362.07 ml       Lines/Drains/Airways     Central Venous Catheter Line                 Port A Cath Single Lumen 03/15/17 0800  210 days          Drain                 Ureteral Drain/Stent 07/05/17 1218 Right ureter 5  Fr. 98 days         Urostomy 07/05/17 1226 ileal conduit RLQ 98 days                Physical Exam   Constitutional: He is oriented to person, place, and time. He appears well-developed and well-nourished.   HENT:   Head: Atraumatic.   Eyes: Conjunctivae and EOM are normal. Pupils are equal, round, and reactive to light.   Neck: Normal range of motion. Neck supple. Hepatojugular reflux present. No JVD present.   Cardiovascular: An irregularly irregular rhythm present. Tachycardia present.  Exam reveals gallop.    Pulmonary/Chest: No accessory muscle usage. No respiratory distress. He has decreased breath sounds in the right middle field, the right lower field and the left lower field. He has rales in the right lower field and the left lower field.   Abdominal: There is no tenderness.   LLQ cystostomy bag C/D/I with translucent yellow urine in bag    Musculoskeletal: He exhibits no edema (1+).   Neurological: He is alert and oriented to person, place, and time.       Significant Labs:   Blood Culture:     Recent Labs  Lab 10/10/17  1355 10/10/17  1400   LABBLOO No Growth to date  No Growth to date No Growth to date  No Growth to date   , CMP     Recent Labs  Lab 10/10/17  0305 10/10/17  1431 10/11/17  0310   * 133*  133* 132*   K 2.8* 3.8  3.8 3.7   CL 87* 87*  87* 84*   CO2 35* 34*  34* 36*   * 145*  145* 141*   BUN 23 25*  25* 30*   CREATININE 0.7 0.9  0.9 0.8   CALCIUM 9.2 9.2  9.2 9.4   ANIONGAP 12 12  12 12   ESTGFRAFRICA >60.0 >60.0  >60.0 >60.0   EGFRNONAA >60.0 >60.0  >60.0 >60.0    and CBC     Recent Labs  Lab 10/10/17  0305 10/11/17  0310   WBC 28.14* 28.30*   HGB 9.4* 9.3*   HCT 29.3* 28.4*   * 419*       Significant Imaging: X-Ray: CXR: X-Ray Chest 1 View (CXR):   Results for orders placed or performed during the hospital encounter of 10/09/17   X-Ray Chest 1 View    Narrative    A PICC line is present and its tip is in the superior vena cava.  Heart size is unchanged.   Moderate bilateral pleural effusions with some loss of volume and consolidation is seen at both lung bases similar to recent exam.    Impression     See above      Electronically signed by: Darien Poe MD  Date:     10/11/17  Time:    13:07

## 2017-10-11 NOTE — TELEPHONE ENCOUNTER
called pt on today regarding scheduled appointment on 10/17/17, but receive no answer, a detail message was left on v/m to call to discuss times.

## 2017-10-11 NOTE — ASSESSMENT & PLAN NOTE
Patient found to be in afib w/ RVR. Goal HR <100     -  Switched to Lopressor 50mg QID for heart rate control of atrial fibrillation  - Converted to Normal sinus; started amiodarone  BID (loading dose *14days); switch to 200 mg PO daily afterwards  - Stopped Diltiazem 10mg drip  - Started diltiazem 60mg PO q6 for heart rate control of atrial fibrillation  - Need to assess for bandar tomorrow as patient is on multiple medications     - Patient with atrial fibrillation and CHADS-VAS score 2 or > 2; Pt is a candidate for oral anticoagulation for stroke prevention, will start xarelto

## 2017-10-12 PROBLEM — J90 BILATERAL PLEURAL EFFUSION: Status: ACTIVE | Noted: 2017-01-01

## 2017-10-12 NOTE — ASSESSMENT & PLAN NOTE
Patient CXR with findings of bilateral pleural effusion with history of squamous cell bladder cancer with metastasis. Current presentation concerning for malignant pleural effusion due to bilateral involvement of lungs, past medical malignant history, acute onset over a chronic SOB, lack of fever or productive cough. Patient also has associated decompensated CHF in the setting of Afiv FVR that had not been managed prior to admission that could lead to pulmonary edema. At this time would hold on starting antibiotics until thoracentesis is performed to evaluate if meet criteria for infectious effusion. Would recommend therapeutic/diagnostic thoracentesis done by pulmonary or IR service since patient failed to improved with diuresis.     - Thoracentesis for serology, WBC, LDH, culture, protein level, amylase, pH and albumin (must take same samples from serum    to be able to calculate lights criteria)   - no antibiotics at this time

## 2017-10-12 NOTE — ASSESSMENT & PLAN NOTE
-patient currently on NOAC for Atrial fibrillation, will need to hold NOAC for antipcated diagnostic/therapeutic thoracentesis  -Pulmonology to eval for need for thoracentesis

## 2017-10-12 NOTE — NURSING
Spoke with Maximiliano about pt converting to A.fib and complaining of SOB. MD started Amio gtt. Pt still currently still in A.fib. Will continue to monitor.

## 2017-10-12 NOTE — PLAN OF CARE
Problem: Occupational Therapy Goal  Goal: Occupational Therapy Goal  Goals to be met by: 10/19/2017    Patient will increase functional independence with ADLs by performing:    UE Dressing with Set-up Assistance.  LE Dressing with Stand-by Assistance.  Grooming while standing with Contact Guard Assistance.  Toileting from toilet with Stand-by Assistance for hygiene and clothing management.   Stand pivot transfers with Moderate Assistance.  Toilet transfer to toilet with Contact Guard Assistance.    Outcome: Ongoing (interventions implemented as appropriate)  Goals created for pt today    Comments: Cont OT POC

## 2017-10-12 NOTE — ASSESSMENT & PLAN NOTE
Discontinue Amiodarone gtt-restarted po amiodarone    -  Switched to Toprol  mg home dose  - Continue diltiazem 60 mg po Q6 hrs  - Patient with atrial fibrillation and CHADS-VAS score 2 or > 2-started xarelto yesterday-will hold if plan for thoracentesis  -          Echo with preserved EF

## 2017-10-12 NOTE — CONSULTS
[facility]  Infectious Disease  Consult Note    Patient Name: Amador Harrison  MRN: [id number]  Admission Date: 10/9/2017  Hospital Length of Stay: 3 days  Attending Physician: Ewa Anderson MD  Primary Care Provider: Waqar Brady MD     Isolation Status: No active isolations    Patient information was obtained from patient, relative(s) and ER records.      Consults  Assessment/Plan:     Bilateral pleural effusion    Patient CXR with findings of bilateral pleural effusion with history of squamous cell bladder cancer with metastasis. Current presentation concerning for malignant pleural effusion due to bilateral involvement of lungs, past medical malignant history, acute onset over a chronic SOB, lack of fever or productive cough. Patient also has associated decompensated CHF in the setting of Afiv FVR that had not been managed prior to admission that could lead to pulmonary edema. At this time would hold on starting antibiotics until thoracentesis is performed to evaluate if meet criteria for infectious effusion. Would recommend therapeutic/diagnostic thoracentesis done by pulmonary or IR service since patient failed to improved with diuresis.     - Thoracentesis for serology, WBC, LDH, culture, protein level, amylase, pH and albumin (must take same samples from serum    to be able to calculate lights criteria)   - no antibiotics at this time            Leukocytosis    Patient with worsening leukocytosis since 2 weeks ago from review of past CBCs, currently with bilateral pleural effusions and history of bladder cancer. At this time unclear if increase of WBC secondary to infection or malignancy progression. U/C with coag negative staph unlikely to be causing infection and pulmonary findings suggestive of malignant pleural effusion. Would recommend to order CRP, procalcitonin and LAP score to help discern nature of current leukocytosis and rule out infection. Would also recommend to order  lower extremety doppler of left leg since patient reports pain of that extremity and only recently started on anticoagulation. Patient with malignancy and Afib is at risk factor for blood clots which could also present with leukocytosis and fevers.     - Procalcitonin  - LAP score  - CRP   - lower extremity venous doppler               Thank you for your consult. I will follow-up with patient. Please contact us if you have any additional questions.    Braydon Whaley MD  Infectious Disease  [facility]    Subjective:     Principal Problem: Atrial fibrillation with RVR    HPI: Case of 61 y/o male admitted on 10/9/17 PMHx. 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 Patient received 2 cycles of neoadjuvant cis/germ with PD that failed to improve cancer. Patient presented to ED due to progressive worsening of baseline SOB for 1 week evolution. Mentions unable to perform daily living activities, fatigue, low grade subjective fevers. Has to sit up to sleep, unable to tolerate recumbent. Mentions also associated cough and bilateral leg swelling. Since admission patient with Afib FVR not controlled, started this admission on xarelto for anticoagulation. Was consulted to ID at this time for additional recommendations.      Past Medical History:   Diagnosis Date    Cancer     Disorder of kidney and ureter     Encounter for blood transfusion     2017    Hypertension     Hypertrophy of prostate without urinary obstruction and other lower urinary tract symptoms (LUTS)     Nocturia     Nocturia        Past Surgical History:   Procedure Laterality Date    BLADDER SURGERY      CARPAL TUNNEL RELEASE      Left hand    CYSTOSCOPY      CYSTOSCOPY W/ URETERAL STENT PLACEMENT      HERNIA REPAIR Bilateral     x 2 separate surgeries. 1 as an infant and another at approx age 18    hydrocele      at age 3    TONSILLECTOMY      turbt 2017       VASECTOMY         Review of patient's allergies indicates:  No Known Allergies    Medications:  Prescriptions Prior to Admission   Medication Sig    aspirin 81 MG Chew Take 81 mg by mouth once daily.    codeine-guaifenesin  mg/5 mL Liqd Take 5 mLs by mouth 3 (three) times daily as needed.    fish oil-omega-3 fatty acids 300-1,000 mg capsule Take 2 g by mouth once daily.    glucosamine-chondroitin 500-400 mg tablet Take 1 tablet by mouth 3 (three) times daily.    indapamide (LOZOL) 2.5 MG Tab 2.5 mg every morning.     lactobacillus acidophilus & bulgar (LACTINEX) 100 million cell packet Take 1 tablet by mouth 2 (two) times daily.    lidocaine-prilocaine (EMLA) cream Apply to port 30-45 minutes prior to being accessed.    lorazepam (ATIVAN) 0.5 MG tablet Take 1 tablet (0.5 mg total) by mouth every 8 (eight) hours as needed for Anxiety.    metoprolol succinate (TOPROL-XL) 50 MG 24 hr tablet Take 4 tablets (200 mg total) by mouth nightly.    multivitamin (ONE DAILY MULTIVITAMIN) per tablet Take 1 tablet by mouth once daily.    oxycodone-acetaminophen (PERCOCET) 5-325 mg per tablet Take 1 tablet by mouth every 4 (four) hours as needed.     Antibiotics     None        Antifungals     None        Antivirals     None             There is no immunization history on file for this patient.    Family History     None        Social History     Social History    Marital status:      Spouse name: N/A    Number of children: N/A    Years of education: N/A     Social History Main Topics    Smoking status: Former Smoker     Packs/day: 2.50     Years: 20.00     Types: Cigarettes, Pipe, Cigars     Quit date: 2/27/1994    Smokeless tobacco: Never Used    Alcohol use Yes      Comment: 2 beers or wine daily    Drug use: No    Sexual activity: Yes     Other Topics Concern    None     Social History Narrative    None     Review of Systems   Constitutional: Positive for activity change, appetite change,  diaphoresis, fatigue, fever and unexpected weight change. Negative for chills.   HENT: Negative for congestion, mouth sores, rhinorrhea, sneezing and sore throat.    Respiratory: Positive for cough, chest tightness and shortness of breath.    Cardiovascular: Positive for palpitations and leg swelling. Negative for chest pain.   Gastrointestinal: Negative for abdominal distention, abdominal pain, blood in stool, constipation, diarrhea, nausea and vomiting.   Genitourinary: Negative for difficulty urinating, dysuria, flank pain and urgency.   Musculoskeletal: Positive for myalgias. Negative for arthralgias and back pain.        Refers pain stabbing, below knee left mid leg    Psychiatric/Behavioral: Negative for agitation and confusion.     Objective:     Vital Signs (Most Recent):  Temp: 97.8 °F (36.6 °C) (10/12/17 1134)  Pulse: 61 (10/12/17 1134)  Resp: 16 (10/12/17 1134)  BP: 130/64 (10/12/17 1134)  SpO2: 95 % (10/12/17 1134) Vital Signs (24h Range):  Temp:  [97.8 °F (36.6 °C)-98.8 °F (37.1 °C)] 97.8 °F (36.6 °C)  Pulse:  [] 61  Resp:  [16-34] 16  SpO2:  [92 %-97 %] 95 %  BP: (116-151)/(61-89) 130/64     Weight: 59.1 kg (130 lb 4.7 oz)  Body mass index is 21.03 kg/m².    Estimated Creatinine Clearance: 78 mL/min (based on SCr of 0.8 mg/dL).    Physical Exam   Constitutional: He is oriented to person, place, and time.   HENT:   Head: Normocephalic and atraumatic.   Eyes: Conjunctivae and EOM are normal. Pupils are equal, round, and reactive to light.   Neck: Normal range of motion.   Cardiovascular: Normal rate and normal heart sounds.  Exam reveals no gallop.    No murmur heard.  Pulmonary/Chest:   Decreased breath sounds bilaterally 2/3 lung, dull to percussion bilaterally    Abdominal: Soft. Bowel sounds are normal. He exhibits no distension. There is no tenderness. There is no guarding.   Musculoskeletal: He exhibits no edema, tenderness or deformity.   Neurological: He is alert and oriented to person,  place, and time.   Skin: No rash noted. No erythema.       Significant Labs:   Blood Culture:   Recent Labs  Lab 09/22/17  1725 10/09/17  1015 10/09/17  1144 10/10/17  1355 10/10/17  1400   LABBLOO No growth after 5 days. No Growth to date  No Growth to date  No Growth to date  No Growth to date No Growth to date  No Growth to date  No Growth to date  No Growth to date No Growth to date  No Growth to date No Growth to date  No Growth to date     BMP:   Recent Labs  Lab 10/12/17  0419   *   *   K 3.2*   CL 84*   CO2 39*   BUN 42*   CREATININE 0.8   CALCIUM 9.2   MG 2.0     CBC:   Recent Labs  Lab 10/11/17  0310 10/12/17  0545   WBC 28.30* 31.19*   HGB 9.3* 9.6*   HCT 28.4* 30.4*   * 482*     Microbiology Results (last 7 days)     Procedure Component Value Units Date/Time    Blood culture x two cultures. Draw prior to antibiotics. [[id number]] Collected:  10/09/17 1144    Order Status:  Completed Specimen:  Blood from Peripheral, Hand, Left Updated:  10/12/17 1412     Blood Culture, Routine No Growth to date     Blood Culture, Routine No Growth to date     Blood Culture, Routine No Growth to date     Blood Culture, Routine No Growth to date    Narrative:       Aerobic and anaerobic    Blood culture x two cultures. Draw prior to antibiotics. [[id number]] Collected:  10/09/17 1015    Order Status:  Completed Specimen:  Blood from Peripheral, Upper Arm, Right Updated:  10/12/17 1212     Blood Culture, Routine No Growth to date     Blood Culture, Routine No Growth to date     Blood Culture, Routine No Growth to date     Blood Culture, Routine No Growth to date    Narrative:       Aerobic and anaerobic    Blood culture [[id number]] Collected:  10/10/17 1355    Order Status:  Completed Specimen:  Blood from Peripheral, Forearm, Left Updated:  10/11/17 1612     Blood Culture, Routine No Growth to date     Blood Culture, Routine No Growth to date    Blood Culture #1 **CANNOT BE ORDERED STAT**  [[id number]] Collected:  10/10/17 1400    Order Status:  Completed Specimen:  Blood from Peripheral, Forearm, Left Updated:  10/11/17 1612     Blood Culture, Routine No Growth to date     Blood Culture, Routine No Growth to date    Urine culture [[id number]] Collected:  10/09/17 1015    Order Status:  Completed Specimen:  Urine Updated:  10/11/17 1404     Urine Culture, Routine --     COAGULASE-NEGATIVE STAPHYLOCOCCUS SPECIES  > 100,000 cfu/ml  Identification and susceptibility pending      Narrative:       Preferred Collection Type->Urine, Clean Catch          Significant Imaging: I have reviewed all pertinent imaging results/findings within the past 24 hours.

## 2017-10-12 NOTE — SUBJECTIVE & OBJECTIVE
Interval History: Continues to be short of breath with poor reserve, denies any pain.Overnight went into atrial fibrillation -started on amiodarone gtt.converted to sinus rhythm this am.     Review of Systems   All other systems reviewed and are negative.    Objective:     Vital Signs (Most Recent):  Temp: 97.8 °F (36.6 °C) (10/12/17 1134)  Pulse: 61 (10/12/17 1134)  Resp: 16 (10/12/17 1134)  BP: 130/64 (10/12/17 1134)  SpO2: 95 % (10/12/17 1134) Vital Signs (24h Range):  Temp:  [97.8 °F (36.6 °C)-98.8 °F (37.1 °C)] 97.8 °F (36.6 °C)  Pulse:  [] 61  Resp:  [16-34] 16  SpO2:  [92 %-99 %] 95 %  BP: (116-151)/(61-89) 130/64     Weight: 59.1 kg (130 lb 4.7 oz)  Body mass index is 21.03 kg/m².     SpO2: 95 %  O2 Device (Oxygen Therapy): nasal cannula      Intake/Output Summary (Last 24 hours) at 10/12/17 1402  Last data filed at 10/12/17 0600   Gross per 24 hour   Intake              480 ml   Output             2450 ml   Net            -1970 ml       Lines/Drains/Airways     Central Venous Catheter Line                 Port A Cath Single Lumen 03/15/17 0800  211 days          Drain                 Ureteral Drain/Stent 07/05/17 1218 Right ureter 5 Fr. 99 days         Urostomy 07/05/17 1226 ileal conduit RLQ 99 days                Physical Exam   General: alert, awake and oriented x 3  Eyes:PERRL.   Neck:+ JVD   Lungs:  clear to auscultation bilaterally and normal respiratory effort  Cardiovascular: Heart: regular rate and rhythm, S1, S2 normal, no murmur, click, rub or gallop.   Chest Wall: no tenderness.   Extremities: no cyanosis or edema.   Pulses: 2+ and symmetric.  Abdomen/Rectal: Abdomen: soft, non-tender non-distented; bowel sounds normal  Skin: No rashes or lesions  Neurologic: Normal strength and tone. No focal numbness or weakness  Psych/Behavioral:  Normal.      Significant Labs:   CMP   Recent Labs  Lab 10/10/17  1431 10/11/17  0310 10/12/17  0419   *  133* 132* 133*   K 3.8  3.8 3.7 3.2*   CL  87*  87* 84* 84*   CO2 34*  34* 36* 39*   *  145* 141* 149*   BUN 25*  25* 30* 42*   CREATININE 0.9  0.9 0.8 0.8   CALCIUM 9.2  9.2 9.4 9.2   ANIONGAP 12  12 12 10   ESTGFRAFRICA >60.0  >60.0 >60.0 >60.0   EGFRNONAA >60.0  >60.0 >60.0 >60.0    and CBC   Recent Labs  Lab 10/11/17  0310 10/12/17  0545   WBC 28.30* 31.19*   HGB 9.3* 9.6*   HCT 28.4* 30.4*   * 482*       Significant Imaging: Echocardiogram:   2D echo with color flow doppler:   Results for orders placed or performed during the hospital encounter of 10/09/17   2D echo with color flow doppler   Result Value Ref Range    EF 55 55 - 65    Mitral Valve Regurgitation TRIVIAL     Diastolic Dysfunction No     Est. PA Systolic Pressure 41.83 (A)     Tricuspid Valve Regurgitation TRIVIAL TO MILD

## 2017-10-12 NOTE — ASSESSMENT & PLAN NOTE
Acute diastolic in setting of atrial fibrillation  -           Echo done; EF 55%.   - Continue lasix 40 mg po QD  - Strict I/Os  - Avoid nephro toxic drugs  - Daily weights   -  monitor lytes and replete as needed

## 2017-10-12 NOTE — PROGRESS NOTES
Patient primary atrial fibrillation with RVR. Plan to begin Keytruda treatment at d/c. Not a candidate for DMHFP.    Removed from hf list.

## 2017-10-12 NOTE — PLAN OF CARE
Problem: Physical Therapy Goal  Goal: Physical Therapy Goal  Goals to be met by: 10/22/17     Patient will increase functional independence with mobility by performin. Supine to sit with Supervision   2. Sit to stand transfer with Stand-by Assistance  3. Bed to chair transfer with Contact Guard Assistance using Rolling Walker or appropriate AD as needed  4. Gait  x 20 feet with Contact Guard Assistance using Rolling Walker or appropriate AD as needed.   5. Stand for 2 minutes with Stand-by Assistance, while performing dynamic task, using appropriate AD as needed  6. Lower extremity exercise program x15 reps, with supervision, in order to increase LE strength and (I) with functional mobility.     Outcome: Ongoing (interventions implemented as appropriate)  PT evaluation complete and appropriate goals established.    Ivana Jason, PT, DPT   10/12/2017  [phone]

## 2017-10-12 NOTE — PROGRESS NOTES
Called as I was informed that the patient went back into a irregular rhythm. Patient was admitted for Af with RVR and ADHF. Patient was diuresed and started on diltiazem 10 mg gtt for heart rate control. Today was transitioned off of diltiazem 10 mg drip, started on 60 mg q6 hours PO and lopressor 50 mg WID and loaded on amiodarone 400 mg BID for his Afib with RVR. He had converted to NSR. Now, the patient is in atypical aflutter with variable AV conduction. Stopped PO amiodarone and started IV loading dose of amiodarone bolus plus IV gtt. Patient is already anticoagulated with Xarelto. HR is currently 120-130 and patient is normotensive. Of note, patient is net negative 3L, but still has rales on examination and positive JVD in the setting position to mid neck. Ordered Lasix 40 mg IV x 1.     Will discuss with Primary team tomorrow,    Sancho Viera MD, PGY-4  Cardiology fellow  Pager [phone]

## 2017-10-12 NOTE — PLAN OF CARE
10/09/17 1429   Discharge Assessment   Assessment Type Discharge Planning Assessment   Confirmed/corrected address and phone number on facesheet? Yes   Assessment information obtained from? Medical Record   Expected Length of Stay (days) 5   Prior to hospitilization cognitive status: Alert/Oriented   Prior to hospitalization functional status: Needs Assistance;Assistive Equipment   Current cognitive status: Alert/Oriented   Current Functional Status: Assistive Equipment;Needs Assistance   Facility Arrived From: home via ED   Lives With spouse   Able to Return to Prior Arrangements yes   Is patient able to care for self after discharge? Yes   Patient's perception of discharge disposition home health   Readmission Within The Last 30 Days no previous admission in last 30 days   Patient currently being followed by outpatient case management? No   Patient currently receives any other outside agency services? No   Equipment Currently Used at Home walker, rolling;cane, straight;shower chair   Do you have any problems affording any of your prescribed medications? No   Is the patient taking medications as prescribed? yes   Does the patient have transportation home? Yes   Transportation Available car;family or friend will provide   Does the patient receive services at the [facility]? No   Discharge Plan A Home with family;Home Health   Discharge Plan B Hospice/home   Patient/Family In Agreement With Plan yes   Readmission Questionnaire   Living Arrangements house   Have you felt down, depressed, or hopeless? 1   Have you felt little interest or pleasure in doing things? 1     PMHx of metastatic bladder cancer (s/p resection) with newly discovered lung nodules and recently diagnosed aFib (diagnosed during lung nodule biopsy, placed on metoprolol at that time) who presents to the ED with complaints of fatigue and PHILIP. Patient found to be in aFib with RVR and started on diltiazem GTT.

## 2017-10-12 NOTE — ASSESSMENT & PLAN NOTE
- Worsening WBC  - Patient remains afebrile  - Blood cultures negative  - Urine cultures; remarkable for coagulase neg staph most likely a contaminant     - CXR notable for bilateral effusions   - Consult ID  -           Consult pulmonary for thoracentesis-diagnostic + therapeutic for pleural fluid analysis

## 2017-10-12 NOTE — HPI
Mr. Harrison is a 60 yo M with a past medical hx of SCC of the bladder w/ metastatis to lung s/p cystectomy s/p chemotherapy, atrial fibrillation now on NOAC, who presented to the ED with complaints of SOB, PHILIP x 1 week. The patient was diagnosed with lung mets 2 weeks prior, with worsening of pulmonary status to include PHILIP while walking across the room. He denies chronic cough, sputum, fever, chills, nausea, vomiting.

## 2017-10-12 NOTE — PLAN OF CARE
Problem: Patient Care Overview  Goal: Plan of Care Review  Outcome: Ongoing (interventions implemented as appropriate)  Pt converted back to A. Rosalio/ SILAS Bess, pt started on amino gtt. Pt complained of SOB possibly due to rapid HR. Pt encourage to turn to help prevent pressure ulcers from forming . Pt encouraged to call if need to ambulate.

## 2017-10-12 NOTE — SUBJECTIVE & OBJECTIVE
Past Medical History:   Diagnosis Date    Cancer     Disorder of kidney and ureter     Encounter for blood transfusion     2017    Hypertension     Hypertrophy of prostate without urinary obstruction and other lower urinary tract symptoms (LUTS)     Nocturia     Nocturia        Past Surgical History:   Procedure Laterality Date    BLADDER SURGERY      CARPAL TUNNEL RELEASE      Left hand    CYSTOSCOPY      CYSTOSCOPY W/ URETERAL STENT PLACEMENT      HERNIA REPAIR Bilateral     x 2 separate surgeries. 1 as an infant and another at approx age 18    hydrocele      at age 3    TONSILLECTOMY      turbt 2017      VASECTOMY         Review of patient's allergies indicates:  No Known Allergies    Medications:  Prescriptions Prior to Admission   Medication Sig    aspirin 81 MG Chew Take 81 mg by mouth once daily.    codeine-guaifenesin  mg/5 mL Liqd Take 5 mLs by mouth 3 (three) times daily as needed.    fish oil-omega-3 fatty acids 300-1,000 mg capsule Take 2 g by mouth once daily.    glucosamine-chondroitin 500-400 mg tablet Take 1 tablet by mouth 3 (three) times daily.    indapamide (LOZOL) 2.5 MG Tab 2.5 mg every morning.     lactobacillus acidophilus & bulgar (LACTINEX) 100 million cell packet Take 1 tablet by mouth 2 (two) times daily.    lidocaine-prilocaine (EMLA) cream Apply to port 30-45 minutes prior to being accessed.    lorazepam (ATIVAN) 0.5 MG tablet Take 1 tablet (0.5 mg total) by mouth every 8 (eight) hours as needed for Anxiety.    metoprolol succinate (TOPROL-XL) 50 MG 24 hr tablet Take 4 tablets (200 mg total) by mouth nightly.    multivitamin (ONE DAILY MULTIVITAMIN) per tablet Take 1 tablet by mouth once daily.    oxycodone-acetaminophen (PERCOCET) 5-325 mg per tablet Take 1 tablet by mouth every 4 (four) hours as needed.     Antibiotics     None        Antifungals     None        Antivirals     None             There is no immunization history on file for this  patient.    Family History     None        Social History     Social History    Marital status:      Spouse name: N/A    Number of children: N/A    Years of education: N/A     Social History Main Topics    Smoking status: Former Smoker     Packs/day: 2.50     Years: 20.00     Types: Cigarettes, Pipe, Cigars     Quit date: 2/27/1994    Smokeless tobacco: Never Used    Alcohol use Yes      Comment: 2 beers or wine daily    Drug use: No    Sexual activity: Yes     Other Topics Concern    None     Social History Narrative    None     Review of Systems   Constitutional: Positive for activity change, appetite change, diaphoresis, fatigue, fever and unexpected weight change. Negative for chills.   HENT: Negative for congestion, mouth sores, rhinorrhea, sneezing and sore throat.    Respiratory: Positive for cough, chest tightness and shortness of breath.    Cardiovascular: Positive for palpitations and leg swelling. Negative for chest pain.   Gastrointestinal: Negative for abdominal distention, abdominal pain, blood in stool, constipation, diarrhea, nausea and vomiting.   Genitourinary: Negative for difficulty urinating, dysuria, flank pain and urgency.   Musculoskeletal: Positive for myalgias. Negative for arthralgias and back pain.        Refers pain stabbing, below knee left mid leg    Psychiatric/Behavioral: Negative for agitation and confusion.     Objective:     Vital Signs (Most Recent):  Temp: 97.8 °F (36.6 °C) (10/12/17 1134)  Pulse: 61 (10/12/17 1134)  Resp: 16 (10/12/17 1134)  BP: 130/64 (10/12/17 1134)  SpO2: 95 % (10/12/17 1134) Vital Signs (24h Range):  Temp:  [97.8 °F (36.6 °C)-98.8 °F (37.1 °C)] 97.8 °F (36.6 °C)  Pulse:  [] 61  Resp:  [16-34] 16  SpO2:  [92 %-97 %] 95 %  BP: (116-151)/(61-89) 130/64     Weight: 59.1 kg (130 lb 4.7 oz)  Body mass index is 21.03 kg/m².    Estimated Creatinine Clearance: 78 mL/min (based on SCr of 0.8 mg/dL).    Physical Exam   Constitutional: He is  oriented to person, place, and time.   HENT:   Head: Normocephalic and atraumatic.   Eyes: Conjunctivae and EOM are normal. Pupils are equal, round, and reactive to light.   Neck: Normal range of motion.   Cardiovascular: Normal rate and normal heart sounds.  Exam reveals no gallop.    No murmur heard.  Pulmonary/Chest:   Decreased breath sounds bilaterally 2/3 lung, dull to percussion bilaterally    Abdominal: Soft. Bowel sounds are normal. He exhibits no distension. There is no tenderness. There is no guarding.   Musculoskeletal: He exhibits no edema, tenderness or deformity.   Neurological: He is alert and oriented to person, place, and time.   Skin: No rash noted. No erythema.       Significant Labs:   Blood Culture:   Recent Labs  Lab 09/22/17  1725 10/09/17  1015 10/09/17  1144 10/10/17  1355 10/10/17  1400   LABBLOO No growth after 5 days. No Growth to date  No Growth to date  No Growth to date  No Growth to date No Growth to date  No Growth to date  No Growth to date  No Growth to date No Growth to date  No Growth to date No Growth to date  No Growth to date     BMP:   Recent Labs  Lab 10/12/17  0419   *   *   K 3.2*   CL 84*   CO2 39*   BUN 42*   CREATININE 0.8   CALCIUM 9.2   MG 2.0     CBC:   Recent Labs  Lab 10/11/17  0310 10/12/17  0545   WBC 28.30* 31.19*   HGB 9.3* 9.6*   HCT 28.4* 30.4*   * 482*     Microbiology Results (last 7 days)     Procedure Component Value Units Date/Time    Blood culture x two cultures. Draw prior to antibiotics. [[id number]] Collected:  10/09/17 1144    Order Status:  Completed Specimen:  Blood from Peripheral, Hand, Left Updated:  10/12/17 1412     Blood Culture, Routine No Growth to date     Blood Culture, Routine No Growth to date     Blood Culture, Routine No Growth to date     Blood Culture, Routine No Growth to date    Narrative:       Aerobic and anaerobic    Blood culture x two cultures. Draw prior to antibiotics. [[id number]] Collected:   10/09/17 1015    Order Status:  Completed Specimen:  Blood from Peripheral, Upper Arm, Right Updated:  10/12/17 1212     Blood Culture, Routine No Growth to date     Blood Culture, Routine No Growth to date     Blood Culture, Routine No Growth to date     Blood Culture, Routine No Growth to date    Narrative:       Aerobic and anaerobic    Blood culture [[id number]] Collected:  10/10/17 1355    Order Status:  Completed Specimen:  Blood from Peripheral, Forearm, Left Updated:  10/11/17 1612     Blood Culture, Routine No Growth to date     Blood Culture, Routine No Growth to date    Blood Culture #1 **CANNOT BE ORDERED STAT** [[id number]] Collected:  10/10/17 1400    Order Status:  Completed Specimen:  Blood from Peripheral, Forearm, Left Updated:  10/11/17 1612     Blood Culture, Routine No Growth to date     Blood Culture, Routine No Growth to date    Urine culture [[id number]] Collected:  10/09/17 1015    Order Status:  Completed Specimen:  Urine Updated:  10/11/17 1404     Urine Culture, Routine --     COAGULASE-NEGATIVE STAPHYLOCOCCUS SPECIES  > 100,000 cfu/ml  Identification and susceptibility pending      Narrative:       Preferred Collection Type->Urine, Clean Catch          Significant Imaging: I have reviewed all pertinent imaging results/findings within the past 24 hours.

## 2017-10-12 NOTE — PT/OT/SLP EVAL
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 1 as an infant and another at approx age 18    hydrocele      at age 3    TONSILLECTOMY      turbt 2017      VASECTOMY         Referring physician: Bridgette Hayes MD  Date referred to PT: 10/11/17    General Precautions: Standard, fall  Orthopedic Precautions: N/A   Braces: N/A       Do you have any cultural, spiritual, Evangelical conflicts, given your current situation?: none noted     Patient History:  Lives With: spouse  Living Arrangements: house  Transportation Available: family or friend will provide  Living Environment Comment: Pt lives with his wife in a 1SH with  to enter. Pt reports that medical status has recently declined with subsequent decline in mobility. Pt was using SPC for ambulation but has recently begun using RW. Pt reports that he is Mod-I with ADLs with use of shower chair. Pt's wife is able to assist upon d/c.   Equipment Currently Used at Home: walker, rolling, cane, straight, shower chair (Pt's wife reports that she has ordered BSC. )    Previous Level of " "Function:  Ambulation Skills: needs device  Transfer Skills: needs device  ADL Skills: independent    Subjective:  Communicated with RN prior to session.  "It happened quickly." re: decline in mobility   Pt agreeable to therapy.   Chief Complaint: weakness; decreased mobility   Patient goals: return home     Pain/Comfort  Pain Rating 1:  (no pain at rest, but reported pain in LLE with standing )  Pain Addressed 1: Reposition, Distraction, Cessation of Activity      Objective:   Patient found with: telemetry, oxygen, peripheral IV, mcgee catheter     Cognitive Exam:  Oriented to: Person, Place, Time and Situation    Follows Commands/attention: Follows one-step commands  Communication: clear/fluent  Safety awareness/insight to disability: intact    Physical Exam:  Postural examination/scapula alignment: Rounded shoulder and Head forward    Skin integrity: Visible skin intact  Edema: Mild BLE; ALEJANDRO hose donned    Sensation:   Intact    Lower Extremity Range of Motion:  Right Lower Extremity: WFL  Left Lower Extremity: WFL    Lower Extremity Strength:  Right Lower Extremity: grossly 4/5 throughout   Left Lower Extremity: grossly 4/5 throughout      Functional Mobility:  Bed Mobility:  Supine to Sit: Stand by Assistance (with HOB elevated and increased time)    Transfers:  Sit <> Stand Assistance: Contact Guard Assistance, Minimum Assistance (CGA from EOB with RW; Kamari from chair without AD)  Sit <> Stand Assistive Device: No Assistive Device, Rolling Walker  Bed <> Chair Technique: Stand Pivot  Bed <> Chair Assistance: Moderate Assistance, Maximum Assistance (initially modA but progressed to maxA with increased posterior lean)  Bed <> Chair Assistive Device: Rolling Walker    -cues for transfer technique     Gait:   Gait Distance: 2 steps forward and backward  Assistance 1: Minimum assistance, With assist of two  Gait Assistive Device: Hand held assist  Gait Pattern: 2-point gait  Gait Deviation(s): decreased david, " decreased weight-shifting ability, increased time in double stance, decreased velocity of limb motion, decreased step length, decreased stride length  -cues for postural control, sequencing, and weight-shifting     Balance:   Static Sit: supervision-SBA  Dynamic Sit: SBA  Static Stand: CGA-Kamari  Dynamic stand: min-maxA    Therapeutic Activities and Exercises:  -PT/OT eval complete. Pt educated on role of PT and PT POC. Pt verbalized understanding.   -Performed stand pivot transfer bed>chair with modA-maxA and RW. Pt with max posterior lean throughout, requiring assist for anterior weight shift and maintaining balance. Pt reporting that he can feel himself leaning backwards but unable to correct. V/c for increased trunk and hip extension with no corrections noted. Following seated rest in chair, performed gait x2 steps forward and backward with minAx2 (HHAx2). Improvement noted in posture and balance compared to stand pivot, but difficulty advancing LLE 2* pain.   -Mild SOB noted with mobility. Pt cued on pursed lip breathing.   -Pt oriented to call bell and instructed that he must have staff assist for all standing tasks/transfers. Pt v/u.   -RN notified on pt status and level of assist needed for transfers.     AM-PAC 6 CLICK MOBILITY  How much help from another person does this patient currently need?   1 = Unable, Total/Dependent Assistance  2 = A lot, Maximum/Moderate Assistance  3 = A little, Minimum/Contact Guard/Supervision  4 = None, Modified [place]/Independent    Turning over in bed (including adjusting bedclothes, sheets and blankets)?: 3  Sitting down on and standing up from a chair with arms (e.g., wheelchair, bedside commode, etc.): 3  Moving from lying on back to sitting on the side of the bed?: 3  Moving to and from a bed to a chair (including a wheelchair)?: 2  Need to walk in hospital room?: 2  Climbing 3-5 steps with a railing?: 1  Total Score: 14     AM-PAC Raw Score CMS G-Code Modifier  Level of Impairment Assistance   6 % Total / Unable   7 - 9 CM 80 - 100% Maximal Assist   10 - 14 CL 60 - 80% Moderate Assist   15 - 19 CK 40 - 60% Moderate Assist   20 - 22 CJ 20 - 40% Minimal Assist   23 CI 1-20% SBA / CGA   24 CH 0% Independent/ Mod I     Patient left up in chair with all lines intact, call button in reach, RN notified and pt's wife present.    Assessment:   Amador Harrison is a 62 y.o. male with a medical diagnosis of Atrial fibrillation with RVR and presents with recent decline in functional mobility over the past week per pt. Pt with poor standing balance, requiring increased assist to complete standing tasks. During stand pivot transfer with RW, pt demo'd max posterior lean and was unable to correct balance despite assist and cues. Improvement noted with forward/backward steps with HHAx2 (instead of RW), but pt continued to be limited due to LLE pain. Pt would benefit from skilled acute PT in order to address current deficits and progress functional mobility. Will require HH upon d/c pending progress and hospital course.     Rehab identified problem list/impairments: Rehab identified problem list/impairments: weakness, impaired functional mobilty, impaired endurance, impaired self care skills, gait instability, impaired balance, pain, impaired cardiopulmonary response to activity    Rehab potential is good.    Activity tolerance: Good    Discharge recommendations: Discharge Facility/Level Of Care Needs: home with home health (pending progress and hospital course)     Barriers to discharge: Barriers to Discharge: None    Equipment recommendations: Equipment Needed After Discharge:  (TBD)     GOALS:    Physical Therapy Goals        Problem: Physical Therapy Goal    Goal Priority Disciplines Outcome Goal Variances Interventions   Physical Therapy Goal     PT/OT, PT Ongoing (interventions implemented as appropriate)     Description:  Goals to be met by: 10/22/17     Patient will increase  functional independence with mobility by performin. Supine to sit with Supervision   2. Sit to stand transfer with Stand-by Assistance  3. Bed to chair transfer with Contact Guard Assistance using Rolling Walker or appropriate AD as needed  4. Gait  x 20 feet with Contact Guard Assistance using Rolling Walker or appropriate AD as needed.   5. Stand for 2 minutes with Stand-by Assistance, while performing dynamic task, using appropriate AD as needed  6. Lower extremity exercise program x15 reps, with supervision, in order to increase LE strength and (I) with functional mobility.                       PLAN:    Patient to be seen 4 x/week to address the above listed problems via gait training, therapeutic activities, therapeutic exercises, neuromuscular re-education  Plan of Care expires: 11/10/17  Plan of Care reviewed with: patient, spouse        Ivana Jason, PT, DPT   10/12/2017  [phone]

## 2017-10-12 NOTE — CONSULTS
[facility]  Infectious Disease  Consult Note    Patient Name: Amador Harrison  MRN: [id number]  Admission Date: 10/9/2017  Hospital Length of Stay: 3 days  Attending Physician: Ewa Anderson MD  Primary Care Provider: Waqar Brady MD     Isolation Status: No active isolations        Inpatient consult to Infectious Diseases  Consult performed by: RENITA FERNANDEZ  Consult ordered by: ALLEN BULLARD      See consult note

## 2017-10-12 NOTE — PROGRESS NOTES
[facility]  Cardiology  Progress Note    Patient Name: Amador Harrison  MRN: [id number]  Admission Date: 10/9/2017  Hospital Length of Stay: 3 days  Code Status: Full Code   Attending Physician: Ewa Anderson MD   Primary Care Physician: Waqar Brady MD  Expected Discharge Date: 10/13/2017  Principal Problem:Atrial fibrillation with RVR    Subjective:     Hospital Course:   ED course: IV lasix 40, diltazem 5mg drip, rocephin 2g     Interval History: Continues to be short of breath with poor reserve, denies any pain.Overnight went into atrial fibrillation -started on amiodarone gtt.converted to sinus rhythm this am.     Review of Systems   All other systems reviewed and are negative.    Objective:     Vital Signs (Most Recent):  Temp: 97.8 °F (36.6 °C) (10/12/17 1134)  Pulse: 61 (10/12/17 1134)  Resp: 16 (10/12/17 1134)  BP: 130/64 (10/12/17 1134)  SpO2: 95 % (10/12/17 1134) Vital Signs (24h Range):  Temp:  [97.8 °F (36.6 °C)-98.8 °F (37.1 °C)] 97.8 °F (36.6 °C)  Pulse:  [] 61  Resp:  [16-34] 16  SpO2:  [92 %-99 %] 95 %  BP: (116-151)/(61-89) 130/64     Weight: 59.1 kg (130 lb 4.7 oz)  Body mass index is 21.03 kg/m².     SpO2: 95 %  O2 Device (Oxygen Therapy): nasal cannula      Intake/Output Summary (Last 24 hours) at 10/12/17 1402  Last data filed at 10/12/17 0600   Gross per 24 hour   Intake              480 ml   Output             2450 ml   Net            -1970 ml       Lines/Drains/Airways     Central Venous Catheter Line                 Port A Cath Single Lumen 03/15/17 0800  211 days          Drain                 Ureteral Drain/Stent 07/05/17 1218 Right ureter 5 Fr. 99 days         Urostomy 07/05/17 1226 ileal conduit RLQ 99 days                Physical Exam   General: alert, awake and oriented x 3  Eyes:PERRL.   Neck:+ JVD   Lungs:  clear to auscultation bilaterally and normal respiratory effort  Cardiovascular: Heart: regular rate and rhythm, S1, S2 normal, no murmur,  click, rub or gallop.   Chest Wall: no tenderness.   Extremities: no cyanosis or edema.   Pulses: 2+ and symmetric.  Abdomen/Rectal: Abdomen: soft, non-tender non-distented; bowel sounds normal  Skin: No rashes or lesions  Neurologic: Normal strength and tone. No focal numbness or weakness  Psych/Behavioral:  Normal.      Significant Labs:   CMP   Recent Labs  Lab 10/10/17  1431 10/11/17  0310 10/12/17  0419   *  133* 132* 133*   K 3.8  3.8 3.7 3.2*   CL 87*  87* 84* 84*   CO2 34*  34* 36* 39*   *  145* 141* 149*   BUN 25*  25* 30* 42*   CREATININE 0.9  0.9 0.8 0.8   CALCIUM 9.2  9.2 9.4 9.2   ANIONGAP 12  12 12 10   ESTGFRAFRICA >60.0  >60.0 >60.0 >60.0   EGFRNONAA >60.0  >60.0 >60.0 >60.0    and CBC   Recent Labs  Lab 10/11/17  0310 10/12/17  0545   WBC 28.30* 31.19*   HGB 9.3* 9.6*   HCT 28.4* 30.4*   * 482*       Significant Imaging: Echocardiogram:   2D echo with color flow doppler:   Results for orders placed or performed during the hospital encounter of 10/09/17   2D echo with color flow doppler   Result Value Ref Range    EF 55 55 - 65    Mitral Valve Regurgitation TRIVIAL     Diastolic Dysfunction No     Est. PA Systolic Pressure 41.83 (A)     Tricuspid Valve Regurgitation TRIVIAL TO MILD      Assessment and Plan:     Brief HPI: 61 y/o male with h/o bladder cancer s/p cystectomy, now with mets to lung presents with SOB -found to have atrial fibrillation with RVR.    * Atrial fibrillation with RVR    Discontinue Amiodarone gtt-restarted po amiodarone    -  Switched to Toprol  mg home dose  - Continue diltiazem 60 mg po Q6 hrs  - Patient with atrial fibrillation and CHADS-VAS score 2 or > 2-started xarelto yesterday-will hold if plan for thoracentesis  -          Echo with preserved EF         Bilateral pleural effusion    -Patient continues to have persistent b/l pleural effusions inspite of diuresis -seems actually worse + symptomatic  -consult pulmonary for  thoracentesis-will hold xarelto if plan for thoracentesis tomorrow.        Leukocytosis      - Worsening WBC  - Patient remains afebrile  - Blood cultures negative  - Urine cultures; remarkable for coagulase neg staph most likely a contaminant     - CXR notable for bilateral effusions   - Consult ID  -           Consult pulmonary for thoracentesis-diagnostic + therapeutic for pleural fluid analysis        Hypokalemia    - monitor and replete as needed         Acute decompensated heart failure    Acute diastolic in setting of atrial fibrillation  -           Echo done; EF 55%.   - Continue lasix 40 mg po QD  - Strict I/Os  - Avoid nephro toxic drugs  - Daily weights   -  monitor lytes and replete as needed           Hypoalbuminemia    Most likely secondary to malnutrition    - Boost protein shakes  - Encouraging high protein diet, protein bars           Hypertension    - Diltiazem 60mg Q6  - Toprol  mg PO QD            VTE Risk Mitigation         Ordered     Medium Risk of VTE  Once      10/09/17 1729     Place ALEJANDRO hose  Until discontinued      10/09/17 1729     Place sequential compression device  Until discontinued      10/09/17 1729          Vladislav Hayes MD  Cardiology  [facility]

## 2017-10-12 NOTE — PHYSICIAN QUERY
PT Name: Amador Harrison  MR #: [id number]    Physician Query Form - Atrial Fibrillation Specificity     CDS/: Beatriz Ventura               Contact information: [email or web address]      This form is a permanent document in the medical record.     Query Date: October 12, 2017    By submitting this query, we are merely seeking further clarification of documentation. Please utilize your independent clinical judgment when addressing the question(s) below.    The medical record contains the following:   Indicators     Supporting Clinical Findings Location in Medical Record   X Atrial Fibrillation Patient found to be in afib w/ RVR.    H&P 10/9   X EKG results Atrial fibrillation with rapid ventricular response  Diffuse Nonspecific ST and/or T wave abnormalities EKG 10/9    Medication     X Treatment started on diltiazem 10 mg gtt for heart rate control. Today was transitioned off of diltiazem 10 mg drip, started on 60 mg q6 hours PO and lopressor 50 mg WID and loaded on amiodarone 400 mg BID for his Afib with RVR. He had converted to NSR. Cardiology PN 10/12    Other         Provider, please further specify the Atrial Fibrillation diagnosis.    [  ] Chronic  [ x ] Paroxysmal  [  ] Permanent  [  ] Persistent  [  ] Other (please specify): ____________________________  [  ] Clinically Undetermined    Please document in your progress notes daily for the duration of treatment until resolved, and include in your discharge summary.

## 2017-10-12 NOTE — PLAN OF CARE
10/12/17 1440   Discharge Assessment   Assessment Type Discharge Planning Reassessment   Discharge Plan A Home with family;Home Health   Discharge Plan B Hospice/home     Patient transferred form CMICU to step down unit. PT/OT consulted and are both recommending home with home health. Notified Nayla JACKSON to provided the patient and caregiver with a list of HH agencies in their area covered by [facility] [employer] insurance plan.

## 2017-10-12 NOTE — HPI
Case of 63 y/o male admitted on 10/9/17 PMHx. 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 Patient received 2 cycles of neoadjuvant cis/germ with PD that failed to improve cancer. Patient presented to ED due to progressive worsening of baseline SOB for 1 week evolution. Mentions unable to perform daily living activities, fatigue, low grade subjective fevers. Has to sit up to sleep, unable to tolerate recumbent. Mentions also associated cough and bilateral leg swelling. Since admission patient with Afib FVR not controlled, started this admission on xarelto for anticoagulation. Was consulted to ID at this time for additional recommendations.

## 2017-10-12 NOTE — PHYSICIAN QUERY
"PT Name: Amador Harrison  MR #: [id number]    Physician Query Form - Heart  Condition Clarification     CDS/: Beatriz Ventura               Contact information: [email or web address]   This form is a permanent document in the medical record.     Query Date: October 12, 2017    By submitting this query, we are merely seeking further clarification of documentation. Please utilize your independent clinical judgment when addressing the question(s) below.    The medical record contains the following   Indicators     Supporting Clinical Findings Location in Medical Record   X BNP BNP  1050   Lab 10/9   X EF EF 55   2D Echo 10/10    Radiology findings     X Echo Results   1 - Normal left ventricular systolic function (EF 55-60%).     2 - Normal left ventricular diastolic function.     3 - Normal right ventricular systolic function .     4 - Pulmonary hypertension. The estimated PA systolic pressure is 42 mmHg.     5 - Trivial mitral regurgitation.     6 - Trivial to mild tricuspid regurgitation.     7 - Increased central venous pressure.     8 - Mild left atrial enlargement.     9 - No wall motion abnormalities.  2D Echo 10/10    "Ascites" documented     X "SOB" or "PHILIP" documented SOB w/ minimal exertion  H&P 10/9    "Hypoxia" documented     X Heart Failure documented Patient w/ new onset CHF (woresening orthopnea, PND, LLE edema), BNP>1000.    was admitted for Af with RVR and ADHF H&P 10/9      Cardiology PN 10/12    "Edema" documented     X Diuretics/Meds IV lasix 40 BID H&P 10/9    Treatment:      Other:          Provider, please specify diagnosis or diagnoses associated with above clinical findings.                               [  ] Acute Systolic Heart Failure ( EF < 40)*  [  ] Acute Diastolic Heart Failure ( EF > 40)*  [  ] Acute Combined Systolic and Diastolic Heart Failure  [x  ] Other Type of Heart Failure (please specify type): ____volume overload, filling issue due to aflutter_____________________  [  ] " Other (please specify): ___________________________________  [  ] Clinically Undetermined            *American [employer] Association                                                                                                          Please document in your progress notes daily for the duration of treatment until resolved and include in your discharge summary.

## 2017-10-12 NOTE — SUBJECTIVE & OBJECTIVE
Past Medical History:   Diagnosis Date    Cancer     Disorder of kidney and ureter     Encounter for blood transfusion     2017    Hypertension     Hypertrophy of prostate without urinary obstruction and other lower urinary tract symptoms (LUTS)     Nocturia     Nocturia        Past Surgical History:   Procedure Laterality Date    BLADDER SURGERY      CARPAL TUNNEL RELEASE      Left hand    CYSTOSCOPY      CYSTOSCOPY W/ URETERAL STENT PLACEMENT      HERNIA REPAIR Bilateral     x 2 separate surgeries. 1 as an infant and another at approx age 18    hydrocele      at age 3    TONSILLECTOMY      turbt 2017      VASECTOMY         Review of patient's allergies indicates:  No Known Allergies    Family History     None        Social History Main Topics    Smoking status: Former Smoker     Packs/day: 2.50     Years: 20.00     Types: Cigarettes, Pipe, Cigars     Quit date: 2/27/1994    Smokeless tobacco: Never Used    Alcohol use Yes      Comment: 2 beers or wine daily    Drug use: No    Sexual activity: Yes         Review of Systems   Constitutional: Positive for activity change, appetite change, diaphoresis and fatigue. Negative for fever.   HENT: Negative for postnasal drip, rhinorrhea, sore throat and trouble swallowing.    Eyes: Negative for visual disturbance.   Respiratory: Positive for cough, chest tightness and shortness of breath.    Cardiovascular: Positive for palpitations and leg swelling. Negative for chest pain.   Gastrointestinal: Positive for abdominal distention. Negative for abdominal pain, constipation and diarrhea.   Genitourinary: Negative for decreased urine volume, flank pain and urgency.   Musculoskeletal: Negative for arthralgias.   Skin: Negative for color change.   Neurological: Positive for weakness. Negative for seizures and headaches.   Hematological: Negative for adenopathy.   Psychiatric/Behavioral: Negative for agitation.     Objective:     Vital Signs (Most  Recent):  Temp: 97.8 °F (36.6 °C) (10/12/17 1134)  Pulse: 61 (10/12/17 1134)  Resp: 16 (10/12/17 1134)  BP: 130/64 (10/12/17 1134)  SpO2: 95 % (10/12/17 1134) Vital Signs (24h Range):  Temp:  [97.8 °F (36.6 °C)-98.8 °F (37.1 °C)] 97.8 °F (36.6 °C)  Pulse:  [] 61  Resp:  [16-34] 16  SpO2:  [92 %-99 %] 95 %  BP: (116-151)/(61-89) 130/64     Weight: 59.1 kg (130 lb 4.7 oz)  Body mass index is 21.03 kg/m².      Intake/Output Summary (Last 24 hours) at 10/12/17 1239  Last data filed at 10/12/17 0600   Gross per 24 hour   Intake              480 ml   Output             2450 ml   Net            -1970 ml       Physical Exam   HENT:   Mouth/Throat: Oropharynx is clear and moist. No oropharyngeal exudate.   Eyes: EOM are normal. Pupils are equal, round, and reactive to light. Right eye exhibits no discharge. Left eye exhibits no discharge.   Neck: Normal range of motion. JVD present. No tracheal deviation present.   Cardiovascular: Normal rate, regular rhythm and intact distal pulses.  Exam reveals gallop.    No murmur heard.  irregular   Pulmonary/Chest: Effort normal. No respiratory distress. He has no wheezes. He has rales. He exhibits no tenderness.   Abdominal: Soft. Bowel sounds are normal. He exhibits distension. There is no tenderness. There is no guarding.   Cysto bag w. Urine in LLQ   Lymphadenopathy:     He has no cervical adenopathy.       Vents:       Lines/Drains/Airways     Central Venous Catheter Line                 Port A Cath Single Lumen 03/15/17 0800  211 days          Drain                 Ureteral Drain/Stent 07/05/17 1218 Right ureter 5 Fr. 99 days         Urostomy 07/05/17 1226 ileal conduit RLQ 99 days                Significant Labs:    CBC/Anemia Profile:    Recent Labs  Lab 10/11/17  0310 10/12/17  0545   WBC 28.30* 31.19*   HGB 9.3* 9.6*   HCT 28.4* 30.4*   * 482*   MCV 78* 80*   RDW 14.8* 14.6*        Chemistries:    Recent Labs  Lab 10/10/17  1431 10/11/17  0310 10/11/17  1815  10/12/17  0419   *  133* 132*  --  133*   K 3.8  3.8 3.7  --  3.2*   CL 87*  87* 84*  --  84*   CO2 34*  34* 36*  --  39*   BUN 25*  25* 30*  --  42*   CREATININE 0.9  0.9 0.8  --  0.8   CALCIUM 9.2  9.2 9.4  --  9.2   MG 3.5* 2.0 1.8 2.0       Microbiology Results (last 7 days)     Procedure Component Value Units Date/Time    Blood culture x two cultures. Draw prior to antibiotics. [[id number]] Collected:  10/09/17 1015    Order Status:  Completed Specimen:  Blood from Peripheral, Upper Arm, Right Updated:  10/12/17 1212     Blood Culture, Routine No Growth to date     Blood Culture, Routine No Growth to date     Blood Culture, Routine No Growth to date     Blood Culture, Routine No Growth to date    Narrative:       Aerobic and anaerobic    Blood culture [[id number]] Collected:  10/10/17 1355    Order Status:  Completed Specimen:  Blood from Peripheral, Forearm, Left Updated:  10/11/17 1612     Blood Culture, Routine No Growth to date     Blood Culture, Routine No Growth to date    Blood Culture #1 **CANNOT BE ORDERED STAT** [[id number]] Collected:  10/10/17 1400    Order Status:  Completed Specimen:  Blood from Peripheral, Forearm, Left Updated:  10/11/17 1612     Blood Culture, Routine No Growth to date     Blood Culture, Routine No Growth to date    Blood culture x two cultures. Draw prior to antibiotics. [[id number]] Collected:  10/09/17 1144    Order Status:  Completed Specimen:  Blood from Peripheral, Hand, Left Updated:  10/11/17 1412     Blood Culture, Routine No Growth to date     Blood Culture, Routine No Growth to date     Blood Culture, Routine No Growth to date    Narrative:       Aerobic and anaerobic    Urine culture [[id number]] Collected:  10/09/17 1015    Order Status:  Completed Specimen:  Urine Updated:  10/11/17 1404     Urine Culture, Routine --     COAGULASE-NEGATIVE STAPHYLOCOCCUS SPECIES  > 100,000 cfu/ml  Identification and susceptibility pending      Narrative:       Preferred  Collection Type->Urine, Clean Catch            Significant Imaging:   CXR: I have reviewed all pertinent results/findings within the past 24 hours and my personal findings are:  10/11: A PICC line is present and its tip is in the superior vena cava.  Heart size is unchanged.  Moderate bilateral pleural effusions with some loss of volume and consolidation is seen at both lung bases similar to recent exam.

## 2017-10-12 NOTE — PT/OT/SLP EVAL
Occupational Therapy  Evaluation    Amador Harrison   MRN: [id number]   Admitting Diagnosis: Atrial fibrillation with RVR    OT Date of Treatment: 10/12/17   OT Start Time: 0951  OT Stop Time: 1026  OT Total Time (min): 35 min    Billable Minutes:  Evaluation 20  Self Care/Home Management 15    Diagnosis: Atrial fibrillation with RVR     Past Medical History:   Diagnosis Date    Cancer     Disorder of kidney and ureter     Encounter for blood transfusion     2017    Hypertension     Hypertrophy of prostate without urinary obstruction and other lower urinary tract symptoms (LUTS)     Nocturia     Nocturia       Past Surgical History:   Procedure Laterality Date    BLADDER SURGERY      CARPAL TUNNEL RELEASE      Left hand    CYSTOSCOPY      CYSTOSCOPY W/ URETERAL STENT PLACEMENT      HERNIA REPAIR Bilateral     x 2 separate surgeries. 1 as an infant and another at approx age 18    hydrocele      at age 3    TONSILLECTOMY      turbt 2017      VASECTOMY         Referring physician: Bridgette Hayes MD  Date referred to OT: 10/12/2017    General Precautions: Standard, fall        Patient History:  Living Environment  Lives With: spouse  Living Arrangements: house  Transportation Available: family or friend will provide  Living Environment Comment: Pt lives in [facility] w/ wife with only threshold to enter. Pt reports (I) with ADLs; using a tub bench to bathe, and ambulates w/ a RW and SC. Pt wife able to assist upon d/c  Equipment Currently Used at Home: bath bench, walker, rolling, cane, straight (Wife reports she has already ordered a bedside commode and it waiting for it arrival to their home)    Prior level of function:   Bed Mobility/Transfers: needs device  Grooming: independent  Bathing: needs device  Upper Body Dressing: independent  Lower Body Dressing: independent  Toileting: independent  Home Management Skills: independent        Dominant hand: left    Subjective:  Communicated with RN prior to  session.  Pt agreeable to OT/PT  Chief Complaint: SOB  Patient/Family stated goals: none stated    Pain/Comfort  Pain Rating 1: 2/10  Location - Orientation 1: generalized  Pain Rating Post-Intervention 1: 8/10  Pain Rating 2: 8/10  Location - Side 2: Left  Location - Orientation 2: lower  Location 2: leg  Pain Addressed 2: Reposition, Cessation of Activity  Pain Rating Post-Intervention 2: 0/10    Objective:  Patient found with: telemetry, oxygen, peripheral IV, mcgee catheter    Cognitive Exam:  Oriented to: Person and Place  Follows Commands/attention: Follows multistep  commands  Communication: clear/fluent  Memory:  No Deficits noted  Safety awareness/insight to disability: intact  Coping skills/emotional control: Appropriate to situation    Visual/perceptual:  Intact    Physical Exam:  Postural examination/scapula alignment: Rounded shoulder and Head forward  Skin integrity: Visible skin intact  Edema: None noted     Sensation:   Intact    Upper Extremity Range of Motion:  Right Upper Extremity: WFL  Left Upper Extremity: WFL    Upper Extremity Strength:  Right Upper Extremity: WFL  Left Upper Extremity: WFL   Strength: Good    Fine motor coordination:   Intact    Gross motor coordination: WFL    Functional Mobility:  Bed Mobility:  Rolling/Turning to Left: Stand by assistance (increased time)  Scooting/Bridging: Stand by Assistance (increased time)  Supine to Sit: Stand by Assistance (increased time)    Transfers:  Sit <> Stand Assistance: Contact Guard Assistance, Minimum Assistance  Sit <> Stand Assistive Device: Rolling Walker (HHA for second trial)  Bed <> Chair Technique: Stand Pivot  Bed <> Chair Transfer Assistance: Moderate Assistance, Maximum Assistance  Bed <> Chair Assistive Device: Rolling Walker    Functional Ambulation: Pt ambulated 2 steps forward and backward MIN x2    Activities of Daily Living:  LE Dressing Level of Assistance: Minimum assistance (increased time)    Balance:   Static  "Sit: GOOD-: Takes MODERATE challenges from all directions but inconsistently  Dynamic Sit: FAIR+: Maintains balance through MINIMAL excursions of active trunk motion  Static Stand: POOR+: Needs MINIMAL assist to maintain  Dynamic stand: POOR: N/A    Therapeutic Activities and Exercises:  Pt found supine in bed w/ wife present at bedside. Pt and wife educated on OT role and POC. Pt transferred CGA w/ RW the first trial but was noted to lean back raising safety concerns; he transferred MIN A HHA and ambulated 4 steps total. Stand pivot from bed to chair required mod A and progressed to MAX A due to backward posture lean. Pt doffed/donned socks MIN A and increased time to complete. Pt would continue to benefit from OT services to maximize overall functional performance in ADL and functional transfer tasks. OT recommending HH but may change pending progress with therapy.    AM-PAC 6 CLICK ADL  How much help from another person does this patient currently need?  1 = Unable, Total/Dependent Assistance  2 = A lot, Maximum/Moderate Assistance  3 = A little, Minimum/Contact Guard/Supervision  4 = None, Modified [place]/Independent    Putting on and taking off regular lower body clothing? : 3  Bathing (including washing, rinsing, drying)?: 3  Toileting, which includes using toilet, bedpan, or urinal? : 3  Putting on and taking off regular upper body clothing?: 3  Taking care of personal grooming such as brushing teeth?: 3  Eating meals?: 3  Total Score: 18    AM-PAC Raw Score CMS "G-Code Modifier Level of Impairment Assistance   6 % Total / Unable   7 - 9 CM 80 - 100% Maximal Assist   10-14 CL 60 - 80% Moderate Assist   15 - 19 CK 40 - 60% Moderate Assist   20 - 22 CJ 20 - 40% Minimal Assist   23 CI 1-20% SBA / CGA   24 CH 0% Independent/ Mod I       Patient left up in chair with all lines intact, call button in reach and wife present    Assessment:  Amador Harrison is a 62 y.o. male with a medical diagnosis of " Atrial fibrillation with RVR and presents with overall weakness, impaired standing balance and decreased endurance limiting his ability to functional perform tasks.    Rehab identified problem list/impairments: Rehab identified problem list/impairments: weakness, impaired endurance, gait instability, impaired functional mobilty, impaired self care skills, impaired balance    Rehab potential is good.    Activity tolerance: Fair    Discharge recommendations: Discharge Facility/Level Of Care Needs: home with home health (pending progress during hospital stay)     Barriers to discharge: Barriers to Discharge: None    Equipment recommendations: none     GOALS:    Occupational Therapy Goals        Problem: Occupational Therapy Goal    Goal Priority Disciplines Outcome Interventions   Occupational Therapy Goal     OT, PT/OT Ongoing (interventions implemented as appropriate)    Description:  Goals to be met by: 10/19/2017    Patient will increase functional independence with ADLs by performing:    UE Dressing with Set-up Assistance.  LE Dressing with Stand-by Assistance.  Grooming while standing with Contact Guard Assistance.  Toileting from toilet with Stand-by Assistance for hygiene and clothing management.   Stand pivot transfers with Moderate Assistance.  Toilet transfer to toilet with Contact Guard Assistance.                      PLAN:  Patient to be seen 3 x/week to address the above listed problems via self-care/home management, therapeutic activities, therapeutic exercises  Plan of Care expires: 11/09/17  Plan of Care reviewed with: patient, spouse         Beth Heath, OT  10/12/2017

## 2017-10-12 NOTE — ASSESSMENT & PLAN NOTE
Patient with worsening leukocytosis since 2 weeks ago from review of past CBCs, currently with bilateral pleural effusions and history of bladder cancer. At this time unclear if increase of WBC secondary to infection or malignancy progression. U/C with coag negative staph unlikely to be causing infection and pulmonary findings suggestive of malignant pleural effusion. Would recommend to order CRP, procalcitonin and LAP score to help discern nature of current leukocytosis and rule out infection. Would also recommend to order lower extremety doppler of left leg since patient reports pain of that extremity and only recently started on anticoagulation. Patient with malignancy and Afib is at risk factor for blood clots which could also present with leukocytosis and fevers.     - Procalcitonin  - LAP score  - CRP   - lower extremity venous doppler

## 2017-10-12 NOTE — CONSULTS
[facility]  Pulmonology  Consult Note    Patient Name: Amador Harrison  MRN: [id number]  Admission Date: 10/9/2017  Hospital Length of Stay: 3 days  Code Status: Full Code  Attending Physician: Ewa Anderson MD  Primary Care Provider: Waqar Brady MD   Principal Problem: Atrial fibrillation with RVR    Inpatient consult to Pulmonology  Consult performed by: VINCENT HDZ  Consult ordered by: ALLEN BULLARD        Subjective:     HPI:  Mr. 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 The patient was diagnosed with lung mets 2 weeks prior, with worsening of pulmonary status to include PHILIP while walking across the room. He denies chronic cough, sputum, fever, chills, nausea, vomiting. CXR shows bilateral pleural effusions, O2 saturations stable. Septic workup NGTD. Patient is resting comfortably in bed.         Past Medical History:   Diagnosis Date    Cancer     Disorder of kidney and ureter     Encounter for blood transfusion     2017    Hypertension     Hypertrophy of prostate without urinary obstruction and other lower urinary tract symptoms (LUTS)     Nocturia     Nocturia        Past Surgical History:   Procedure Laterality Date    BLADDER SURGERY      CARPAL TUNNEL RELEASE      Left hand    CYSTOSCOPY      CYSTOSCOPY W/ URETERAL STENT PLACEMENT      HERNIA REPAIR Bilateral     x 2 separate surgeries. 1 as an infant and another at approx age 18    hydrocele      at age 3    TONSILLECTOMY      turbt 2017      VASECTOMY         Review of patient's allergies indicates:  No Known Allergies    Family History     None        Social History Main Topics    Smoking status: Former Smoker     Packs/day: 2.50     Years: 20.00     Types: Cigarettes, Pipe, Cigars     Quit date: 2/27/1994     Smokeless tobacco: Never Used    Alcohol use Yes      Comment: 2 beers or wine daily    Drug use: No    Sexual activity: Yes         Review of Systems   Constitutional: Positive for activity change, appetite change, diaphoresis and fatigue. Negative for fever.   HENT: Negative for postnasal drip, rhinorrhea, sore throat and trouble swallowing.    Eyes: Negative for visual disturbance.   Respiratory: Positive for cough, chest tightness and shortness of breath.    Cardiovascular: Positive for palpitations and leg swelling. Negative for chest pain.   Gastrointestinal: Positive for abdominal distention. Negative for abdominal pain, constipation and diarrhea.   Genitourinary: Negative for decreased urine volume, flank pain and urgency.   Musculoskeletal: Negative for arthralgias.   Skin: Negative for color change.   Neurological: Positive for weakness. Negative for seizures and headaches.   Hematological: Negative for adenopathy.   Psychiatric/Behavioral: Negative for agitation.     Objective:     Vital Signs (Most Recent):  Temp: 97.8 °F (36.6 °C) (10/12/17 1134)  Pulse: 61 (10/12/17 1134)  Resp: 16 (10/12/17 1134)  BP: 130/64 (10/12/17 1134)  SpO2: 95 % (10/12/17 1134) Vital Signs (24h Range):  Temp:  [97.8 °F (36.6 °C)-98.8 °F (37.1 °C)] 97.8 °F (36.6 °C)  Pulse:  [] 61  Resp:  [16-34] 16  SpO2:  [92 %-99 %] 95 %  BP: (116-151)/(61-89) 130/64     Weight: 59.1 kg (130 lb 4.7 oz)  Body mass index is 21.03 kg/m².      Intake/Output Summary (Last 24 hours) at 10/12/17 1239  Last data filed at 10/12/17 0600   Gross per 24 hour   Intake              480 ml   Output             2450 ml   Net            -1970 ml       Physical Exam   HENT:   Mouth/Throat: Oropharynx is clear and moist. No oropharyngeal exudate.   Eyes: EOM are normal. Pupils are equal, round, and reactive to light. Right eye exhibits no discharge. Left eye exhibits no discharge.   Neck: Normal range of motion. JVD present. No tracheal deviation  present.   Cardiovascular: Normal rate, regular rhythm and intact distal pulses.  Exam reveals gallop.    No murmur heard.  irregular   Pulmonary/Chest: Effort normal. No respiratory distress. He has no wheezes. He has rales. He exhibits no tenderness.   Abdominal: Soft. Bowel sounds are normal. He exhibits distension. There is no tenderness. There is no guarding.   Cysto bag w. Urine in LLQ   Lymphadenopathy:     He has no cervical adenopathy.       Vents:       Lines/Drains/Airways     Central Venous Catheter Line                 Port A Cath Single Lumen 03/15/17 0800  211 days          Drain                 Ureteral Drain/Stent 07/05/17 1218 Right ureter 5 Fr. 99 days         Urostomy 07/05/17 1226 ileal conduit RLQ 99 days                Significant Labs:    CBC/Anemia Profile:    Recent Labs  Lab 10/11/17  0310 10/12/17  0545   WBC 28.30* 31.19*   HGB 9.3* 9.6*   HCT 28.4* 30.4*   * 482*   MCV 78* 80*   RDW 14.8* 14.6*        Chemistries:    Recent Labs  Lab 10/10/17  1431 10/11/17  0310 10/11/17  1815 10/12/17  0419   *  133* 132*  --  133*   K 3.8  3.8 3.7  --  3.2*   CL 87*  87* 84*  --  84*   CO2 34*  34* 36*  --  39*   BUN 25*  25* 30*  --  42*   CREATININE 0.9  0.9 0.8  --  0.8   CALCIUM 9.2  9.2 9.4  --  9.2   MG 3.5* 2.0 1.8 2.0       Microbiology Results (last 7 days)     Procedure Component Value Units Date/Time    Blood culture x two cultures. Draw prior to antibiotics. [[id number]] Collected:  10/09/17 1015    Order Status:  Completed Specimen:  Blood from Peripheral, Upper Arm, Right Updated:  10/12/17 1212     Blood Culture, Routine No Growth to date     Blood Culture, Routine No Growth to date     Blood Culture, Routine No Growth to date     Blood Culture, Routine No Growth to date    Narrative:       Aerobic and anaerobic    Blood culture [[id number]] Collected:  10/10/17 1355    Order Status:  Completed Specimen:  Blood from Peripheral, Forearm, Left Updated:  10/11/17 1612      Blood Culture, Routine No Growth to date     Blood Culture, Routine No Growth to date    Blood Culture #1 **CANNOT BE ORDERED STAT** [[id number]] Collected:  10/10/17 1400    Order Status:  Completed Specimen:  Blood from Peripheral, Forearm, Left Updated:  10/11/17 1612     Blood Culture, Routine No Growth to date     Blood Culture, Routine No Growth to date    Blood culture x two cultures. Draw prior to antibiotics. [[id number]] Collected:  10/09/17 1144    Order Status:  Completed Specimen:  Blood from Peripheral, Hand, Left Updated:  10/11/17 1412     Blood Culture, Routine No Growth to date     Blood Culture, Routine No Growth to date     Blood Culture, Routine No Growth to date    Narrative:       Aerobic and anaerobic    Urine culture [[id number]] Collected:  10/09/17 1015    Order Status:  Completed Specimen:  Urine Updated:  10/11/17 1404     Urine Culture, Routine --     COAGULASE-NEGATIVE STAPHYLOCOCCUS SPECIES  > 100,000 cfu/ml  Identification and susceptibility pending      Narrative:       Preferred Collection Type->Urine, Clean Catch            Significant Imaging:   CXR: I have reviewed all pertinent results/findings within the past 24 hours and my personal findings are:  10/11: A PICC line is present and its tip is in the superior vena cava.  Heart size is unchanged.  Moderate bilateral pleural effusions with some loss of volume and consolidation is seen at both lung bases similar to recent exam.    Assessment/Plan:     Bilateral pleural effusion    -patient currently on NOAC for Atrial fibrillation, will need to hold NOAC for antipcated diagnostic/therapeutic thoracentesis  -Pulmonology to eval for need for therapeutic thoracentesis +/- cytology; patient is stable on low flow O2 at this time.   -chest imaging this admission shows blunting of bilateral costophrenic angles and improved lung volumes  -to be staffed by Dr. Vincent and addressed on 10/13              Thank you for your consult. I will  follow-up with patient. Please contact us if you have any additional questions.     Pepe So MD  Pulmonology  [facility]

## 2017-10-12 NOTE — ASSESSMENT & PLAN NOTE
-Patient continues to have persistent b/l pleural effusions inspite of diuresis -seems actually worse + symptomatic  -consult pulmonary for thoracentesis-will hold xarelto if plan for thoracentesis tomorrow.

## 2017-10-13 PROBLEM — C67.9 METASTASIS FROM BLADDER CANCER: Status: ACTIVE | Noted: 2017-01-01

## 2017-10-13 PROBLEM — C79.9 METASTASIS FROM BLADDER CANCER: Status: ACTIVE | Noted: 2017-01-01

## 2017-10-13 PROBLEM — D72.829 LEUKOCYTOSIS: Chronic | Status: ACTIVE | Noted: 2017-01-01

## 2017-10-13 PROBLEM — J90 PLEURAL EFFUSION: Status: ACTIVE | Noted: 2017-01-01

## 2017-10-13 NOTE — SUBJECTIVE & OBJECTIVE
Interval History: Patient afebrile, underwent thoracentesis today    Review of Systems   Constitutional: Negative for activity change, appetite change, chills, diaphoresis and fever.   Respiratory: Positive for shortness of breath. Negative for cough, choking and chest tightness.    Gastrointestinal: Negative for abdominal distention, abdominal pain, diarrhea, nausea and vomiting.   Musculoskeletal: Negative for arthralgias and myalgias.     Objective:     Vital Signs (Most Recent):  Temp: 98.3 °F (36.8 °C) (10/13/17 1547)  Pulse: 83 (10/13/17 1547)  Resp: 18 (10/13/17 1547)  BP: 139/66 (10/13/17 1547)  SpO2: (!) 94 % (10/13/17 1547) Vital Signs (24h Range):  Temp:  [97.6 °F (36.4 °C)-98.3 °F (36.8 °C)] 98.3 °F (36.8 °C)  Pulse:  [78-97] 83  Resp:  [18-24] 18  SpO2:  [94 %-97 %] 94 %  BP: (115-170)/(56-80) 139/66     Weight: 59.1 kg (130 lb 4.7 oz)  Body mass index is 21.03 kg/m².    Estimated Creatinine Clearance: 78 mL/min (based on SCr of 0.8 mg/dL).    Physical Exam   Constitutional: He is oriented to person, place, and time. He appears well-developed. No distress.   HENT:   Head: Normocephalic and atraumatic.   Eyes: Conjunctivae and EOM are normal. Pupils are equal, round, and reactive to light.   Neck: Normal range of motion.   Cardiovascular: Normal heart sounds.    Pulmonary/Chest: He has rales.   Abdominal: Soft. Bowel sounds are normal. He exhibits no distension. There is no tenderness.   Musculoskeletal: Normal range of motion. He exhibits no edema or deformity.   Neurological: He is alert and oriented to person, place, and time.   Skin: No rash noted. No erythema.       Significant Labs:   Blood Culture:   Recent Labs  Lab 09/22/17  1725 10/09/17  1015 10/09/17  1144 10/10/17  1355 10/10/17  1400   LABBLOO No growth after 5 days. No Growth to date  No Growth to date  No Growth to date  No Growth to date  No Growth to date No Growth to date  No Growth to date  No Growth to date  No Growth to date   No Growth to date No Growth to date  No Growth to date  No Growth to date  No Growth to date No Growth to date  No Growth to date  No Growth to date  No Growth to date     BMP:   Recent Labs  Lab 10/13/17  0435   *   *   K 4.2   CL 88*   CO2 37*   BUN 40*   CREATININE 0.8   CALCIUM 9.3   MG 1.8     CBC:   Recent Labs  Lab 10/12/17  0545 10/13/17  0435   WBC 31.19* 28.60*   HGB 9.6* 9.2*   HCT 30.4* 29.9*   * 382*     Microbiology Results (last 7 days)     Procedure Component Value Units Date/Time    Blood culture [[id number]] Collected:  10/10/17 1355    Order Status:  Completed Specimen:  Blood from Peripheral, Forearm, Left Updated:  10/13/17 1612     Blood Culture, Routine No Growth to date     Blood Culture, Routine No Growth to date     Blood Culture, Routine No Growth to date     Blood Culture, Routine No Growth to date    Blood Culture #1 **CANNOT BE ORDERED STAT** [[id number]] Collected:  10/10/17 1400    Order Status:  Completed Specimen:  Blood from Peripheral, Forearm, Left Updated:  10/13/17 1612     Blood Culture, Routine No Growth to date     Blood Culture, Routine No Growth to date     Blood Culture, Routine No Growth to date     Blood Culture, Routine No Growth to date    Gram stain [[id number]] Collected:  10/13/17 1346    Order Status:  Sent Specimen:  Body Fluid from Lung, Left Updated:  10/13/17 1449    Fungus culture [[id number]] Collected:  10/13/17 1346    Order Status:  Sent Specimen:  Body Fluid from Lung, Left Updated:  10/13/17 1449    AFB culture (includes stain) [[id number]] Collected:  10/13/17 1346    Order Status:  Sent Specimen:  Body Fluid from Lung, Left Updated:  10/13/17 1449    Blood culture x two cultures. Draw prior to antibiotics. [[id number]] Collected:  10/09/17 1144    Order Status:  Completed Specimen:  Blood from Peripheral, Hand, Left Updated:  10/13/17 1412     Blood Culture, Routine No Growth to date     Blood Culture, Routine No Growth to date      Blood Culture, Routine No Growth to date     Blood Culture, Routine No Growth to date     Blood Culture, Routine No Growth to date    Narrative:       Aerobic and anaerobic    Culture, Respiratory with Gram Stain [[id number]] Collected:  10/13/17 1348    Order Status:  Sent Specimen:  Respiratory from BAL, LLL Updated:  10/13/17 1349    Blood culture x two cultures. Draw prior to antibiotics. [[id number]] Collected:  10/09/17 1015    Order Status:  Completed Specimen:  Blood from Peripheral, Upper Arm, Right Updated:  10/13/17 1212     Blood Culture, Routine No Growth to date     Blood Culture, Routine No Growth to date     Blood Culture, Routine No Growth to date     Blood Culture, Routine No Growth to date     Blood Culture, Routine No Growth to date    Narrative:       Aerobic and anaerobic    Urine culture [[id number]]  (Susceptibility) Collected:  10/09/17 1015    Order Status:  Completed Specimen:  Urine Updated:  10/12/17 1536     Urine Culture, Routine --     STAPHYLOCOCCUS EPIDERMIDIS  > 100,000 cfu/ml      Narrative:       Preferred Collection Type->Urine, Clean Catch          Significant Imaging: I have reviewed all pertinent imaging results/findings within the past 24 hours.

## 2017-10-13 NOTE — PROGRESS NOTES
[facility]  Infectious Disease  Progress Note    Patient Name: Amador Harrison  MRN: [id number]  Admission Date: 10/9/2017  Length of Stay: 4 days  Attending Physician: Ewa Anderson MD  Primary Care Provider: Waqar Brady MD    Isolation Status: No active isolations  Assessment/Plan:      Pleural effusion    Patient CXR with findings of bilateral pleural effusion with history of squamous cell bladder cancer with metastasis. Current presentation concerning for malignant pleural effusion due to bilateral involvement of lungs, past medical malignant history, acute onset over a chronic SOB, lack of fever or productive cough. Patient also has associated decompensated CHF in the setting of Afiv FVR that had not been managed prior to admission that could lead to pulmonary edema. Thoracentesis performed today by pulmonary team. Pleural fluid with WBC elevated at 965 predominance of segmented cells over lymphocytes, glucose normal and protein 4.1 elevated.Graim stain and cultures in progress at this time. Will follow results. Preliminary results suggestive of empyema. Will recommend to start ceftriaxone and clindamycin due to subacute/chronic presentation of patient's clinical course.      - Start Clindamycin 900 mg IV every 8 hrs   - Start ceftriaxone 2g IV daily   - Will follow thoracentesis results for serology, WBC, LDH, culture,  amylase, pH and albumin            Leukocytosis    Patient with worsening leukocytosis since 2 weeks ago from review of past CBCs, currently with bilateral pleural effusions and history of bladder cancer. At this time unclear if increase of WBC secondary to infection or malignancy progression. U/C with coag negative staph unlikely to be causing infection.Diagnostic thoracentesis suggestive for empyema with preliminary results of WBC with segmented cells predominance. Pending lower extremety doppler of left leg results, since patient reports pain of that extremity  and only recently started on anticoagulation. Patient with malignancy and Afib is at risk factor for blood clots which could also present with leukocytosis and fevers.       - lower extremity venous doppler pending results                Anticipated Disposition: start antibiotic therapy     Thank you for your consult. I will follow-up with patient. Please contact us if you have any additional questions.    Braydon Whaley MD  Infectious Disease  [facility]    Subjective:     Principal Problem:Atrial fibrillation with RVR    HPI: Case of 63 y/o male admitted on 10/9/17 PMHx. 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 Patient received 2 cycles of neoadjuvant cis/germ with PD that failed to improve cancer. Patient presented to ED due to progressive worsening of baseline SOB for 1 week evolution. Mentions unable to perform daily living activities, fatigue, low grade subjective fevers. Has to sit up to sleep, unable to tolerate recumbent. Mentions also associated cough and bilateral leg swelling. Since admission patient with Afib FVR not controlled, started this admission on xarelto for anticoagulation. Was consulted to ID at this time for additional recommendations.    Interval History: Patient afebrile, underwent thoracentesis today    Review of Systems   Constitutional: Negative for activity change, appetite change, chills, diaphoresis and fever.   Respiratory: Positive for shortness of breath. Negative for cough, choking and chest tightness.    Gastrointestinal: Negative for abdominal distention, abdominal pain, diarrhea, nausea and vomiting.   Musculoskeletal: Negative for arthralgias and myalgias.     Objective:     Vital Signs (Most Recent):  Temp: 98.3 °F (36.8 °C) (10/13/17 1547)  Pulse: 83 (10/13/17 1547)  Resp: 18 (10/13/17 1547)  BP: 139/66 (10/13/17 1547)  SpO2: (!) 94 % (10/13/17 1547) Vital Signs (24h Range):  Temp:  [97.6 °F  (36.4 °C)-98.3 °F (36.8 °C)] 98.3 °F (36.8 °C)  Pulse:  [78-97] 83  Resp:  [18-24] 18  SpO2:  [94 %-97 %] 94 %  BP: (115-170)/(56-80) 139/66     Weight: 59.1 kg (130 lb 4.7 oz)  Body mass index is 21.03 kg/m².    Estimated Creatinine Clearance: 78 mL/min (based on SCr of 0.8 mg/dL).    Physical Exam   Constitutional: He is oriented to person, place, and time. He appears well-developed. No distress.   HENT:   Head: Normocephalic and atraumatic.   Eyes: Conjunctivae and EOM are normal. Pupils are equal, round, and reactive to light.   Neck: Normal range of motion.   Cardiovascular: Normal heart sounds.    Pulmonary/Chest: He has rales.   Abdominal: Soft. Bowel sounds are normal. He exhibits no distension. There is no tenderness.   Musculoskeletal: Normal range of motion. He exhibits no edema or deformity.   Neurological: He is alert and oriented to person, place, and time.   Skin: No rash noted. No erythema.       Significant Labs:   Blood Culture:   Recent Labs  Lab 09/22/17  1725 10/09/17  1015 10/09/17  1144 10/10/17  1355 10/10/17  1400   LABBLOO No growth after 5 days. No Growth to date  No Growth to date  No Growth to date  No Growth to date  No Growth to date No Growth to date  No Growth to date  No Growth to date  No Growth to date  No Growth to date No Growth to date  No Growth to date  No Growth to date  No Growth to date No Growth to date  No Growth to date  No Growth to date  No Growth to date     BMP:   Recent Labs  Lab 10/13/17  0435   *   *   K 4.2   CL 88*   CO2 37*   BUN 40*   CREATININE 0.8   CALCIUM 9.3   MG 1.8     CBC:   Recent Labs  Lab 10/12/17  0545 10/13/17  0435   WBC 31.19* 28.60*   HGB 9.6* 9.2*   HCT 30.4* 29.9*   * 382*     Microbiology Results (last 7 days)     Procedure Component Value Units Date/Time    Blood culture [[id number]] Collected:  10/10/17 1354    Order Status:  Completed Specimen:  Blood from Peripheral, Forearm, Left Updated:  10/13/17 4527      Blood Culture, Routine No Growth to date     Blood Culture, Routine No Growth to date     Blood Culture, Routine No Growth to date     Blood Culture, Routine No Growth to date    Blood Culture #1 **CANNOT BE ORDERED STAT** [[id number]] Collected:  10/10/17 1400    Order Status:  Completed Specimen:  Blood from Peripheral, Forearm, Left Updated:  10/13/17 1612     Blood Culture, Routine No Growth to date     Blood Culture, Routine No Growth to date     Blood Culture, Routine No Growth to date     Blood Culture, Routine No Growth to date    Gram stain [[id number]] Collected:  10/13/17 1346    Order Status:  Sent Specimen:  Body Fluid from Lung, Left Updated:  10/13/17 1449    Fungus culture [[id number]] Collected:  10/13/17 1346    Order Status:  Sent Specimen:  Body Fluid from Lung, Left Updated:  10/13/17 1449    AFB culture (includes stain) [[id number]] Collected:  10/13/17 1346    Order Status:  Sent Specimen:  Body Fluid from Lung, Left Updated:  10/13/17 1449    Blood culture x two cultures. Draw prior to antibiotics. [[id number]] Collected:  10/09/17 1144    Order Status:  Completed Specimen:  Blood from Peripheral, Hand, Left Updated:  10/13/17 1412     Blood Culture, Routine No Growth to date     Blood Culture, Routine No Growth to date     Blood Culture, Routine No Growth to date     Blood Culture, Routine No Growth to date     Blood Culture, Routine No Growth to date    Narrative:       Aerobic and anaerobic    Culture, Respiratory with Gram Stain [[id number]] Collected:  10/13/17 1348    Order Status:  Sent Specimen:  Respiratory from BAL, LLL Updated:  10/13/17 1349    Blood culture x two cultures. Draw prior to antibiotics. [[id number]] Collected:  10/09/17 1015    Order Status:  Completed Specimen:  Blood from Peripheral, Upper Arm, Right Updated:  10/13/17 1212     Blood Culture, Routine No Growth to date     Blood Culture, Routine No Growth to date     Blood Culture, Routine No Growth to date     Blood  Culture, Routine No Growth to date     Blood Culture, Routine No Growth to date    Narrative:       Aerobic and anaerobic    Urine culture [[id number]]  (Susceptibility) Collected:  10/09/17 1015    Order Status:  Completed Specimen:  Urine Updated:  10/12/17 1536     Urine Culture, Routine --     STAPHYLOCOCCUS EPIDERMIDIS  > 100,000 cfu/ml      Narrative:       Preferred Collection Type->Urine, Clean Catch          Significant Imaging: I have reviewed all pertinent imaging results/findings within the past 24 hours.

## 2017-10-13 NOTE — ASSESSMENT & PLAN NOTE
-patient currently on NOAC for Atrial fibrillation, will need to hold NOAC if antipcated diagnostic/therapeutic thoracentesis  -Pulmonology to eval for need for thoracentesis- imaging from September showed small bilateral effusions with elevated hemidiaphragm- will assess with bedside U/S today.

## 2017-10-13 NOTE — PLAN OF CARE
met with pt and pts wife at the bedside.  Pt is an alert disabled gentleman who lives at home with his wife in [place] and plans to return.  Pt has never used home health in the past.  He may benefit from home health when he is discharged.  sw to follow.

## 2017-10-13 NOTE — PLAN OF CARE
Problem: Patient Care Overview  Goal: Plan of Care Review  Outcome: Ongoing (interventions implemented as appropriate)  Pt denies Chest pain or nausea. C/o pain on lower extremities, prn pain meds given, mod relief obtained. No falls, trauma or injury noted. VSS. 3L NC. po amio daily. RLQ urostomy bag. PT/OT following. Plan of care reviewed with patient. No further questions at this time. No significant events.Spouse at bedside. Will continue to monitor.

## 2017-10-13 NOTE — PROCEDURES
"Amador Harrison is a 62 y.o. male patient.    Temp: 98.1 °F (36.7 °C) (10/13/17 1215)  Pulse: 81 (10/13/17 1215)  Resp: (!) 22 (10/13/17 1215)  BP: (!) 115/56 (10/13/17 1215)  SpO2: 96 % (10/13/17 1215)  Weight: 59.1 kg (130 lb 4.7 oz) (10/12/17 0808)  Height: 5' 6" (167.6 cm) (10/09/17 2058)       Thoracentesis  Date/Time: 10/13/2017 2:03 PM  Performed by: JANETT PADILLA  Authorized by: JANETT PADILLA   Pre-operative diagnosis: Left Pleural Effusion  Post-operative diagnosis: Left Pleural Effusion  Consent Done: Yes  Consent: Verbal consent obtained. Written consent obtained.  Consent given by: patient  Patient understanding: patient states understanding of the procedure being performed  Patient consent: the patient's understanding of the procedure matches consent given  Procedure consent: procedure consent matches procedure scheduled  Relevant documents: relevant documents present and verified  Test results: test results available and properly labeled  Site marked: the operative site was marked  Imaging studies: imaging studies available  Patient identity confirmed: , MRN, name and verbally with patient  Time out: Immediately prior to procedure a "time out" was called to verify the correct patient, procedure, equipment, support staff and site/side marked as required.  Procedure purpose: diagnostic and therapeutic  Indications: pleural effusion  Preparation: Patient was prepped and draped in the usual sterile fashion.  Local anesthesia used: yes    Anesthesia:  Local anesthesia used: yes  Local Anesthetic: lidocaine 1% without epinephrine  Anesthetic total: 8 mL  Patient sedated: no  Description of findings: 1400ml sanguinous fluid removed   Preparation: skin prepped with ChloraPrep  Patient position: sitting  Ultrasound guidance: yes  Location: left posterior  Intercostal space: 6th  Puncture method: over-the-needle catheter  Needle size: 16  Catheter size: 16 gauge  Number of attempts: 1  Drainage " amount: 1400 ml  Drainage characteristics: bloody  Patient tolerance: Patient tolerated the procedure well with no immediate complications  Chest x-ray performed: yes  Technical procedures used: US guided  Complications: No  Estimated blood loss (mL): 0  Specimens: Yes        Vicente Smith MD  Fellow, [employer]  Cell [phone]    I was available to assist with procedure.

## 2017-10-13 NOTE — ASSESSMENT & PLAN NOTE
Patient CXR with findings of bilateral pleural effusion with history of squamous cell bladder cancer with metastasis. Current presentation concerning for malignant pleural effusion due to bilateral involvement of lungs, past medical malignant history, acute onset over a chronic SOB, lack of fever or productive cough. Patient also has associated decompensated CHF in the setting of Afiv FVR that had not been managed prior to admission that could lead to pulmonary edema. Thoracentesis performed today by pulmonary team. Pleural fluid with WBC elevated at 965 predominance of segmented cells over lymphocytes, glucose normal and protein 4.1 elevated.Graim stain and cultures in progress at this time. Will follow results. Preliminary results suggestive of empyema. Will recommend to start ceftriaxone and clindamycin due to subacute/chronic presentation of patient's clinical course.      - Start Clindamycin 900 mg IV every 8 hrs   - Start ceftriaxone 2g IV daily   - Will follow thoracentesis results for serology, WBC, LDH, culture,  amylase, pH and albumin

## 2017-10-13 NOTE — PROGRESS NOTES
[facility]  Pulmonology  Progress Note    Patient Name: Amador Harrison  MRN: [id number]  Admission Date: 10/9/2017  Hospital Length of Stay: 4 days  Code Status: Full Code  Attending Provider: Ewa Anderson MD  Primary Care Provider: Waqar Brady MD   Principal Problem: Atrial fibrillation with RVR    Subjective:     Interval History: Patient with some moderate SOB that is unimproved since admission; remains stable on low flow O2 with good saturations.     Objective:     Vital Signs (Most Recent):  Temp: 97.6 °F (36.4 °C) (10/13/17 0412)  Pulse: 81 (10/13/17 0600)  Resp: 18 (10/13/17 0000)  BP: (!) 141/76 (10/13/17 0412)  SpO2: 95 % (10/13/17 0412) Vital Signs (24h Range):  Temp:  [97.3 °F (36.3 °C)-98.3 °F (36.8 °C)] 97.6 °F (36.4 °C)  Pulse:  [61-97] 81  Resp:  [16-26] 18  SpO2:  [93 %-97 %] 95 %  BP: (130-152)/(64-78) 141/76     Weight: 59.1 kg (130 lb 4.7 oz)  Body mass index is 21.03 kg/m².      Intake/Output Summary (Last 24 hours) at 10/13/17 0729  Last data filed at 10/13/17 0500   Gross per 24 hour   Intake            824.9 ml   Output             2200 ml   Net          -1375.1 ml       Physical Exam   HENT:   Mouth/Throat: Oropharynx is clear and moist. No oropharyngeal exudate.   Eyes: EOM are normal. Pupils are equal, round, and reactive to light. Right eye exhibits no discharge. Left eye exhibits no discharge.   Neck: Normal range of motion. JVD present. No tracheal deviation present.   Cardiovascular: Normal rate, regular rhythm and intact distal pulses.  Exam reveals gallop.    No murmur heard.  irregular   Pulmonary/Chest: Effort normal. No respiratory distress. He has no wheezes. He has rales. He exhibits no tenderness.   Abdominal: Soft. Bowel sounds are normal. He exhibits distension. There is no tenderness. There is no guarding.   Cysto bag w. Urine in LLQ   Lymphadenopathy:     He has no cervical adenopathy.       Vents:       Lines/Drains/Airways     Central Venous  Catheter Line                 Port A Cath Single Lumen 03/15/17 0800  211 days          Drain                 Ureteral Drain/Stent 07/05/17 1218 Right ureter 5 Fr. 99 days         Urostomy 07/05/17 1226 ileal conduit RLQ 99 days                Significant Labs:    CBC/Anemia Profile:    Recent Labs  Lab 10/12/17  0545 10/13/17  0435   WBC 31.19* 28.60*   HGB 9.6* 9.2*   HCT 30.4* 29.9*   * 382*   MCV 80* 82   RDW 14.6* 14.5        Chemistries:    Recent Labs  Lab 10/11/17  1815 10/12/17  0419 10/13/17  0435   NA  --  133* 134*   K  --  3.2* 4.2   CL  --  84* 88*   CO2  --  39* 37*   BUN  --  42* 40*   CREATININE  --  0.8 0.8   CALCIUM  --  9.2 9.3   MG 1.8 2.0 1.8       Microbiology Results (last 7 days)     Procedure Component Value Units Date/Time    Blood Culture #1 **CANNOT BE ORDERED STAT** [[id number]] Collected:  10/10/17 1400    Order Status:  Completed Specimen:  Blood from Peripheral, Forearm, Left Updated:  10/12/17 1612     Blood Culture, Routine No Growth to date     Blood Culture, Routine No Growth to date     Blood Culture, Routine No Growth to date    Blood culture [[id number]] Collected:  10/10/17 1355    Order Status:  Completed Specimen:  Blood from Peripheral, Forearm, Left Updated:  10/12/17 1612     Blood Culture, Routine No Growth to date     Blood Culture, Routine No Growth to date     Blood Culture, Routine No Growth to date    Urine culture [[id number]]  (Susceptibility) Collected:  10/09/17 1015    Order Status:  Completed Specimen:  Urine Updated:  10/12/17 1536     Urine Culture, Routine --     STAPHYLOCOCCUS EPIDERMIDIS  > 100,000 cfu/ml      Narrative:       Preferred Collection Type->Urine, Clean Catch    Blood culture x two cultures. Draw prior to antibiotics. [[id number]] Collected:  10/09/17 1144    Order Status:  Completed Specimen:  Blood from Peripheral, Hand, Left Updated:  10/12/17 1412     Blood Culture, Routine No Growth to date     Blood Culture, Routine No Growth to  date     Blood Culture, Routine No Growth to date     Blood Culture, Routine No Growth to date    Narrative:       Aerobic and anaerobic    Blood culture x two cultures. Draw prior to antibiotics. [[id number]] Collected:  10/09/17 1015    Order Status:  Completed Specimen:  Blood from Peripheral, Upper Arm, Right Updated:  10/12/17 1212     Blood Culture, Routine No Growth to date     Blood Culture, Routine No Growth to date     Blood Culture, Routine No Growth to date     Blood Culture, Routine No Growth to date    Narrative:       Aerobic and anaerobic            Significant Imaging:  CXR: I have reviewed all pertinent results/findings within the past 24 hours and my personal findings are:  A PICC line is present and its tip is in the superior vena cava.  Heart size is unchanged.  Moderate bilateral pleural effusions with some loss of volume and consolidation is seen at both lung bases similar to recent exam    Assessment/Plan:     Bilateral pleural effusion    -patient currently on NOAC for Atrial fibrillation, will need to hold NOAC if antipcated diagnostic/therapeutic thoracentesis  -Pulmonology to eval for need for thoracentesis- imaging from September showed small bilateral effusions with elevated hemidiaphragm and low lung volumes- assessed on bedside U/S and L sided effusion will be amenable to thora. Patient has high R hemidiaphragm on imaging.                   Pepe So MD  Pulmonology  [facility]

## 2017-10-13 NOTE — SUBJECTIVE & OBJECTIVE
Interval History:     Objective:     Vital Signs (Most Recent):  Temp: 97.6 °F (36.4 °C) (10/13/17 0412)  Pulse: 81 (10/13/17 0600)  Resp: 18 (10/13/17 0000)  BP: (!) 141/76 (10/13/17 0412)  SpO2: 95 % (10/13/17 0412) Vital Signs (24h Range):  Temp:  [97.3 °F (36.3 °C)-98.3 °F (36.8 °C)] 97.6 °F (36.4 °C)  Pulse:  [61-97] 81  Resp:  [16-26] 18  SpO2:  [93 %-97 %] 95 %  BP: (130-152)/(64-78) 141/76     Weight: 59.1 kg (130 lb 4.7 oz)  Body mass index is 21.03 kg/m².      Intake/Output Summary (Last 24 hours) at 10/13/17 0729  Last data filed at 10/13/17 0500   Gross per 24 hour   Intake            824.9 ml   Output             2200 ml   Net          -1375.1 ml       Physical Exam   HENT:   Mouth/Throat: Oropharynx is clear and moist. No oropharyngeal exudate.   Eyes: EOM are normal. Pupils are equal, round, and reactive to light. Right eye exhibits no discharge. Left eye exhibits no discharge.   Neck: Normal range of motion. JVD present. No tracheal deviation present.   Cardiovascular: Normal rate, regular rhythm and intact distal pulses.  Exam reveals gallop.    No murmur heard.  irregular   Pulmonary/Chest: Effort normal. No respiratory distress. He has no wheezes. He has rales. He exhibits no tenderness.   Abdominal: Soft. Bowel sounds are normal. He exhibits distension. There is no tenderness. There is no guarding.   Cysto bag w. Urine in LLQ   Lymphadenopathy:     He has no cervical adenopathy.       Vents:       Lines/Drains/Airways     Central Venous Catheter Line                 Port A Cath Single Lumen 03/15/17 0800  211 days          Drain                 Ureteral Drain/Stent 07/05/17 1218 Right ureter 5 Fr. 99 days         Urostomy 07/05/17 1226 ileal conduit RLQ 99 days                Significant Labs:    CBC/Anemia Profile:    Recent Labs  Lab 10/12/17  0545 10/13/17  0435   WBC 31.19* 28.60*   HGB 9.6* 9.2*   HCT 30.4* 29.9*   * 382*   MCV 80* 82   RDW 14.6* 14.5        Chemistries:    Recent  Labs  Lab 10/11/17  1815 10/12/17  0419 10/13/17  0435   NA  --  133* 134*   K  --  3.2* 4.2   CL  --  84* 88*   CO2  --  39* 37*   BUN  --  42* 40*   CREATININE  --  0.8 0.8   CALCIUM  --  9.2 9.3   MG 1.8 2.0 1.8       Microbiology Results (last 7 days)     Procedure Component Value Units Date/Time    Blood Culture #1 **CANNOT BE ORDERED STAT** [[id number]] Collected:  10/10/17 1400    Order Status:  Completed Specimen:  Blood from Peripheral, Forearm, Left Updated:  10/12/17 1612     Blood Culture, Routine No Growth to date     Blood Culture, Routine No Growth to date     Blood Culture, Routine No Growth to date    Blood culture [[id number]] Collected:  10/10/17 1355    Order Status:  Completed Specimen:  Blood from Peripheral, Forearm, Left Updated:  10/12/17 1612     Blood Culture, Routine No Growth to date     Blood Culture, Routine No Growth to date     Blood Culture, Routine No Growth to date    Urine culture [[id number]]  (Susceptibility) Collected:  10/09/17 1015    Order Status:  Completed Specimen:  Urine Updated:  10/12/17 1536     Urine Culture, Routine --     STAPHYLOCOCCUS EPIDERMIDIS  > 100,000 cfu/ml      Narrative:       Preferred Collection Type->Urine, Clean Catch    Blood culture x two cultures. Draw prior to antibiotics. [[id number]] Collected:  10/09/17 1144    Order Status:  Completed Specimen:  Blood from Peripheral, Hand, Left Updated:  10/12/17 1412     Blood Culture, Routine No Growth to date     Blood Culture, Routine No Growth to date     Blood Culture, Routine No Growth to date     Blood Culture, Routine No Growth to date    Narrative:       Aerobic and anaerobic    Blood culture x two cultures. Draw prior to antibiotics. [[id number]] Collected:  10/09/17 1015    Order Status:  Completed Specimen:  Blood from Peripheral, Upper Arm, Right Updated:  10/12/17 1212     Blood Culture, Routine No Growth to date     Blood Culture, Routine No Growth to date     Blood Culture, Routine No Growth to  date     Blood Culture, Routine No Growth to date    Narrative:       Aerobic and anaerobic            Significant Imaging:  CXR: I have reviewed all pertinent results/findings within the past 24 hours and my personal findings are:  A PICC line is present and its tip is in the superior vena cava.  Heart size is unchanged.  Moderate bilateral pleural effusions with some loss of volume and consolidation is seen at both lung bases similar to recent exam

## 2017-10-13 NOTE — ASSESSMENT & PLAN NOTE
Patient with worsening leukocytosis since 2 weeks ago from review of past CBCs, currently with bilateral pleural effusions and history of bladder cancer. At this time unclear if increase of WBC secondary to infection or malignancy progression. U/C with coag negative staph unlikely to be causing infection.Diagnostic thoracentesis suggestive for empyema with preliminary results of WBC with segmented cells predominance. Pending lower extremety doppler of left leg results, since patient reports pain of that extremity and only recently started on anticoagulation. Patient with malignancy and Afib is at risk factor for blood clots which could also present with leukocytosis and fevers.       - lower extremity venous doppler pending results

## 2017-10-13 NOTE — PROGRESS NOTES
THORACENTESIS    TIME IN: 1251  TIME OUT: 1316    Performed by ZARIA Smith MD, Pulmonary CC    Assisted by DOUGIE Díaz RN    Removed approx 1.4 liters of sero-sanguinous fluid from lung. Pt tolerated well; WCTM.

## 2017-10-13 NOTE — PLAN OF CARE
Problem: Patient Care Overview  Goal: Plan of Care Review  Outcome: Outcome(s) achieved Date Met: 10/13/17  Pt free of falls and injury during the shift. Skin is CDI; VSS. Pt had thoracentesis today; removed approx 1.4 L serosanguinous fluid from L lung. Specimen sent down for labs. Pt tolerating plan of care.

## 2017-10-13 NOTE — RESIDENT HANDOFF
Handoff     Primary Team: Networked reference to record PCT  Room Number: 358/358 A     Patient Name: Amador Harrison MRN: [id number]     Date of Birth: [id number] Allergies: Review of patient's allergies indicates no known allergies.     Age: 62 y.o. Admit Date: 10/9/2017     Sex: male  BMI: Body mass index is 21.03 kg/m².     Code Status: Full Code        Illness Level (current clinical status): Watcher - No    Reason for Admission: Atrial fibrillation with RVR    Brief HPI (pertinent PMH and diagnosis or differential diagnosis):  Mr. 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     Procedure Date: Thoracentesis 10/13/2017 2:03 PM    Hospital Course (updated, brief assessment by system or problem, significant events):     Patient was found to have acute decompensated CHF in the setting of Afib RVR; he eventually converted to NS and is now on amiodarone & diltiazem. CXR was remarkable for bilateral pleural effusion which was initially attributed to pulmonary edema or malignant pleural effusions. However patient had a persistently elevated WBC count but remained afebrile. ID was consulted for the leukocytoses and eventually pulmonary was consulted for thoracentesis; to determine whether the effusion was secondary to infection or malignancy progression.     Tasks (specific, using if-then statements):   -  F/u Thoracentesis for serology, WBC, LDH, culture, protein level, amylase, pH and albumin (must take same samples from serum to be able to calculate lights criteria)

## 2017-10-13 NOTE — PHYSICIAN QUERY
PT Name: Amador Harrison  MR #: [id number]    Physician Query Form - Nutrition Clarification     CDS/: Beatriz Ventura               Contact information: [email or web address]     This form is a permanent document in the medical record.     Query Date: October 13, 2017    By submitting this query, we are merely seeking further clarification of documentation.. Please utilize your independent clinical judgment when addressing the question(s) below.    The Medical record contains the following:   Indicators  Supporting Clinical Findings Location in Medical Record    % of Estimated Energy Intake over a time frame from p.o., TF, or TPN appetite change ID Consult 10/12   X Weight Status over a time frame unexpected weight change ID Consult 10/12    Subcutaneous Fat and/or Muscle Loss     X Fluid Accumulation or Edema  He exhibits edema (1+).  H&P 10/9    Reduced  Strength     X Wt / BMI / Usual Body Weight Body mass index is 21.03 kg/m². Cardiology PN 10/12    Delayed Wound Healing / Failure to Thrive      Acute or Chronic Illness      Medication     X Treatment Boost protein shakes- Encouraging high protein diet, protein bars    Cardiology PN 10/12   X Other Hypoalbuminemia-Most likely secondary to malnutrition     Cardiology PN 10/12     AND / ASPEN Clinical Characteristics (October 2011)  A minimum of two characteristics is recommended for diagnosing either moderate or severe malnutrition   Mild Malnutrition Moderate Malnutrition Severe Malnutrition   Energy Intake from p.o., TF or TPN. < 75% intake of estimated energy needs for less than 7 days < 75% intake of estimated energy needs for greater than 7 days < 50% intake of estimated energy needs for > 5 days   Weight Loss 1-2% in 1 month  5% in 3 months  7.5% in 6 months  10% in 1 year 1-2 % in 1 week  5% in 1 month  7.5% in 3 months  10% in 6 months  20% in 1 year > 2% in 1 week  > 5% in 1 month  > 7.5% in 3 months  > 10% in 6 months  > 20% in 1 year   Physical  Findings     None *Mild subcutaneous fat and/or muscle loss  *Mild fluid accumulation  *Stage II decubitus  *Surgical wound or non-healing wound *Mod/severe subcutaneous fat and/or muscle loss  *Mod/severe fluid accumulation  *Stage III or IV decubitus  *Non-healing surgical wound     Provider, please specify diagnosis or diagnoses associated with above clinical findings.    [ x] Mild Protein-Calorie Malnutrition  [ ] Moderate Protein-Calorie Malnutrition  [ ] Severe Protein-Calorie Malnutrition  [ ] Cachexia  [ ] Anorexia  [ ] Abnormal Weight Loss  [ ] Underweight  [ ] Other Nutritional Diagnosis (please specify): ____________________________________  [ ] Other: ________________________________  [ ] Clinically Undetermined    Please document in your progress notes daily for the duration of treatment until resolved and include in your discharge summary.

## 2017-10-14 PROBLEM — J96.01 ACUTE RESPIRATORY FAILURE WITH HYPOXIA: Status: ACTIVE | Noted: 2017-01-01

## 2017-10-14 PROBLEM — R60.9 EDEMA: Status: RESOLVED | Noted: 2017-01-01 | Resolved: 2017-01-01

## 2017-10-14 PROBLEM — R00.2 HEART PALPITATIONS: Status: RESOLVED | Noted: 2017-01-01 | Resolved: 2017-01-01

## 2017-10-14 PROBLEM — Z79.01 LONG-TERM (CURRENT) USE OF ANTICOAGULANTS: Status: ACTIVE | Noted: 2017-01-01

## 2017-10-14 PROBLEM — I48.0 PAROXYSMAL ATRIAL FIBRILLATION WITH RVR: Status: RESOLVED | Noted: 2017-01-01 | Resolved: 2017-01-01

## 2017-10-14 PROBLEM — E83.42 HYPOMAGNESEMIA: Status: ACTIVE | Noted: 2017-01-01

## 2017-10-14 NOTE — ASSESSMENT & PLAN NOTE
Improved. Will treat with KCL tablet 20 mEq po daily as patient is on loop diuretic and monitor daily levels. Goal is potassium level > 4.

## 2017-10-14 NOTE — ASSESSMENT & PLAN NOTE
Long-term (current) use of anticoagulants  Cardiology following and managing. Patient in and out of atrial fibrillation this am but rate controlled. Plan to continue Amiodarone 400 mg po BID for rhythm control and Toprol  mg po BID. Xarelto for long term anticoagulation held for thoracentesis yesterday. Will hold off on restarting at this time in case patient needs further drainage of pleural fluid. Continue to monitor on telemetry.

## 2017-10-14 NOTE — ASSESSMENT & PLAN NOTE
Will treat with Magnesium Sulfate 2 grams IV for 1 dose(s) today and recheck level in the am. Goal is Magnesium level > 2.

## 2017-10-14 NOTE — SUBJECTIVE & OBJECTIVE
Interval History:   NAEON. Patient reports improvement in SOB. But still requiring oxygen. Scheduled for thoracentesis today.     Review of Systems   Constitution: Negative for chills, diaphoresis, fever, weakness, malaise/fatigue and night sweats.   HENT: Negative for odynophagia and sore throat.    Eyes: Negative for blurred vision and discharge.   Cardiovascular: Positive for dyspnea on exertion. Negative for chest pain, irregular heartbeat, leg swelling, near-syncope, orthopnea, palpitations, paroxysmal nocturnal dyspnea and syncope.   Respiratory: Positive for shortness of breath. Negative for cough, sleep disturbances due to breathing and wheezing.    Skin: Negative for color change and rash.   Musculoskeletal: Positive for back pain. Negative for muscle cramps and muscle weakness.   Gastrointestinal: Negative for constipation, diarrhea, nausea and vomiting.   Genitourinary: Negative for flank pain and hematuria.   Neurological: Negative for focal weakness, headaches and light-headedness.   All other systems reviewed and are negative.    Objective:     Vital Signs (Most Recent):  Temp: 97.9 °F (36.6 °C) (10/13/17 2000)  Pulse: 84 (10/13/17 2000)  Resp: 18 (10/13/17 2000)  BP: (!) 148/67 (10/13/17 2000)  SpO2: 98 % (10/13/17 2000) Vital Signs (24h Range):  Temp:  [97.6 °F (36.4 °C)-98.3 °F (36.8 °C)] 97.9 °F (36.6 °C)  Pulse:  [78-97] 84  Resp:  [18-24] 18  SpO2:  [94 %-98 %] 98 %  BP: (115-170)/(56-80) 148/67     Weight: 59.1 kg (130 lb 4.7 oz)  Body mass index is 21.03 kg/m².     SpO2: 98 %  O2 Device (Oxygen Therapy): nasal cannula w/ humidification      Intake/Output Summary (Last 24 hours) at 10/13/17 2129  Last data filed at 10/13/17 1400   Gross per 24 hour   Intake              350 ml   Output             3400 ml   Net            -3050 ml       Lines/Drains/Airways     Central Venous Catheter Line                 Port A Cath Single Lumen 03/15/17 0800  212 days          Drain                 Ureteral  Drain/Stent 07/05/17 1218 Right ureter 5 Fr. 100 days         Urostomy 07/05/17 1226 ileal conduit  days                Physical Exam   Constitutional: He is oriented to person, place, and time. He appears well-developed and well-nourished.   HENT:   Head: Atraumatic.   Eyes: Conjunctivae and EOM are normal. Pupils are equal, round, and reactive to light.   Neck: Normal range of motion. Neck supple. Hepatojugular reflux and JVD present.   Cardiovascular: Normal rate and regular rhythm.  Exam reveals no gallop.    Pulmonary/Chest: No accessory muscle usage. No respiratory distress. He has decreased breath sounds in the right lower field and the left lower field.   Abdominal: There is no tenderness.   LLQ cystostomy bag C/D/I with translucent yellow urine in bag    Musculoskeletal: He exhibits no edema (1+).   Neurological: He is alert and oriented to person, place, and time.       Significant Labs:   Blood Culture: No results for input(s): LABBLOO in the last 48 hours., BMP:   Recent Labs  Lab 10/12/17  0419 10/13/17  0435   * 127*   * 134*   K 3.2* 4.2   CL 84* 88*   CO2 39* 37*   BUN 42* 40*   CREATININE 0.8 0.8   CALCIUM 9.2 9.3   MG 2.0 1.8    and CMP   Recent Labs  Lab 10/12/17  0419 10/13/17  0435   * 134*   K 3.2* 4.2   CL 84* 88*   CO2 39* 37*   * 127*   BUN 42* 40*   CREATININE 0.8 0.8   CALCIUM 9.2 9.3   ANIONGAP 10 9   ESTGFRAFRICA >60.0 >60.0   EGFRNONAA >60.0 >60.0       Significant Imaging: Echocardiogram:   2D echo with color flow doppler:   Results for orders placed or performed during the hospital encounter of 10/09/17   2D echo with color flow doppler   Result Value Ref Range    EF 55 55 - 65    Mitral Valve Regurgitation TRIVIAL     Diastolic Dysfunction No     Est. PA Systolic Pressure 41.83 (A)     Tricuspid Valve Regurgitation TRIVIAL TO MILD

## 2017-10-14 NOTE — ASSESSMENT & PLAN NOTE
Discontinue Amiodarone gtt-restarted po amiodarone    -  Switched to Toprol  mg home dose  - Continue diltiazem 120mg  - Patient with atrial fibrillation and CHADS-VAS score 2 or > 2-started xarelto (held overnight for thoracentesis)  -          Echo with preserved EF

## 2017-10-14 NOTE — PROGRESS NOTES
Pt came out of the bed without any help. He pulled out the port a cath. He is not confused. He states, he is just tired being in bed all the time. Accessed the site with a new hidalgo needle. New dsg applied. Draws blood back and flushed the line. Bed alarm on. Educated pt about the fall risk and to call the nurse before coming out of the bed. Call bell within reach. Family at bedside. Will continue to monitor the patient closely.

## 2017-10-14 NOTE — HOSPITAL COURSE
Patient admitted to cardiac ICU and found to have acute decompensated CHF in the setting of Afib RVR; Patient placed on Amiodarone drip and diuresised with IV Lasix 40 mg IV BID. Patient eventually converted to NS and switched to oral Amiodarone 400 mg po BID for rhythm control. Patient placed on Toprol  mg po daily + Diltiazem  mg po daily for rate control. Patient was placed on Xarelto 20 mg po daily for long term anticoagulation for atrial fibrillation. CXR was remarkable for bilateral pleural effusion which was initially attributed to pulmonary edema or malignant pleural effusions. However patient had a persistently elevated WBC count but remained afebrile. ID was consulted for the leukocytosis with WBC in 25,000-30,000 range and eventually pulmonary was consulted for thoracentesis; to determine whether the effusion was secondary to infection or malignancy progression. ID consulted on 10/12 and concerned about infectious cause of pleural effusions so Pulmonary consulted and patient on 10/13 underwent thoracentesis by Pulmonary on 10/13 (Xarelto held for procedure) and 1400 mL of bloody fluid removed from left lung pleural space and studies sent (LDH, protein, bacterial/fungal/AFB stains and culture, and cytology). Prelim thoracentesis results showed  with 82% segs. ID concerned for infectious cause so patient started on IV Ceftriaxone and Clindamycin empirically on 10/13. Patient since admit is net negative 7 liters of fluid with Lasix and switched to oral Lasix 40 mg po daily on 10/13 by Cardiology. Patient on 3 liters of oxygen with sats of 98% on 10/14. Patient expressed thoughts of being depressed and sad with recent illnesses and requesting medication for depression and patient started on Zoloft 25 mg po daily. Discussed with patient that follow-up with Psychology in Heme/Onc department as outpatient might be of benefit and patient seemed amenable. Patient reports improvement in SOB on  10/15. Patient remains in and out of atrial fibrillation on telemetry but rate controlled and Xarelto restarted on 10/15 at 20 mg po daily for long term anticoagulation for atrial fibrillation. Patient with brief episode of atrial fib with RVR on 10/16 but resolved on its own. His WBC continued to in the 20-30K range. Cultures remained no growth and antibiotic therapy discontinued.

## 2017-10-14 NOTE — ASSESSMENT & PLAN NOTE
Likely related to pleural effusions. Patient currently on 3 liters of oxygen with oxygen sats of 98%. Plan is to try and wean oxygen with goal of oxygen sats > 92%. Will continue to diuresis patient with Lasix 40 mg po daily to see if helps with effusions.

## 2017-10-14 NOTE — PROGRESS NOTES
[facility]  Cardiology  Progress Note    Patient Name: Amador Harrison  MRN: [id number]  Admission Date: 10/9/2017  Hospital Length of Stay: 4 days  Code Status: Full Code   Attending Physician: Ewa Anderson MD   Primary Care Physician: Waqar Brady MD  Expected Discharge Date: 10/16/2017  Principal Problem:Atrial fibrillation with RVR    Subjective:     Interval History:   NAEON. Patient reports improvement in SOB. But still requiring oxygen. Scheduled for thoracentesis today.     Review of Systems   Constitution: Negative for chills, diaphoresis, fever, weakness, malaise/fatigue and night sweats.   HENT: Negative for odynophagia and sore throat.    Eyes: Negative for blurred vision and discharge.   Cardiovascular: Positive for dyspnea on exertion. Negative for chest pain, irregular heartbeat, leg swelling, near-syncope, orthopnea, palpitations, paroxysmal nocturnal dyspnea and syncope.   Respiratory: Positive for shortness of breath. Negative for cough, sleep disturbances due to breathing and wheezing.    Skin: Negative for color change and rash.   Musculoskeletal: Positive for back pain. Negative for muscle cramps and muscle weakness.   Gastrointestinal: Negative for constipation, diarrhea, nausea and vomiting.   Genitourinary: Negative for flank pain and hematuria.   Neurological: Negative for focal weakness, headaches and light-headedness.   All other systems reviewed and are negative.    Objective:     Vital Signs (Most Recent):  Temp: 97.9 °F (36.6 °C) (10/13/17 2000)  Pulse: 84 (10/13/17 2000)  Resp: 18 (10/13/17 2000)  BP: (!) 148/67 (10/13/17 2000)  SpO2: 98 % (10/13/17 2000) Vital Signs (24h Range):  Temp:  [97.6 °F (36.4 °C)-98.3 °F (36.8 °C)] 97.9 °F (36.6 °C)  Pulse:  [78-97] 84  Resp:  [18-24] 18  SpO2:  [94 %-98 %] 98 %  BP: (115-170)/(56-80) 148/67     Weight: 59.1 kg (130 lb 4.7 oz)  Body mass index is 21.03 kg/m².     SpO2: 98 %  O2 Device (Oxygen Therapy): nasal  cannula w/ humidification      Intake/Output Summary (Last 24 hours) at 10/13/17 2129  Last data filed at 10/13/17 1400   Gross per 24 hour   Intake              350 ml   Output             3400 ml   Net            -3050 ml       Lines/Drains/Airways     Central Venous Catheter Line                 Port A Cath Single Lumen 03/15/17 0800  212 days          Drain                 Ureteral Drain/Stent 07/05/17 1218 Right ureter 5 Fr. 100 days         Urostomy 07/05/17 1226 ileal conduit  days                Physical Exam   Constitutional: He is oriented to person, place, and time. He appears well-developed and well-nourished.   HENT:   Head: Atraumatic.   Eyes: Conjunctivae and EOM are normal. Pupils are equal, round, and reactive to light.   Neck: Normal range of motion. Neck supple. Hepatojugular reflux and JVD present.   Cardiovascular: Normal rate and regular rhythm.  Exam reveals no gallop.    Pulmonary/Chest: No accessory muscle usage. No respiratory distress. He has decreased breath sounds in the right lower field and the left lower field.   Abdominal: There is no tenderness.   LLQ cystostomy bag C/D/I with translucent yellow urine in bag    Musculoskeletal: He exhibits no edema (1+).   Neurological: He is alert and oriented to person, place, and time.       Significant Labs:   Blood Culture: No results for input(s): LABBLOO in the last 48 hours., BMP:   Recent Labs  Lab 10/12/17  0419 10/13/17  0435   * 127*   * 134*   K 3.2* 4.2   CL 84* 88*   CO2 39* 37*   BUN 42* 40*   CREATININE 0.8 0.8   CALCIUM 9.2 9.3   MG 2.0 1.8    and CMP   Recent Labs  Lab 10/12/17  0419 10/13/17  0435   * 134*   K 3.2* 4.2   CL 84* 88*   CO2 39* 37*   * 127*   BUN 42* 40*   CREATININE 0.8 0.8   CALCIUM 9.2 9.3   ANIONGAP 10 9   ESTGFRAFRICA >60.0 >60.0   EGFRNONAA >60.0 >60.0       Significant Imaging: Echocardiogram:   2D echo with color flow doppler:   Results for orders placed or performed during  the hospital encounter of 10/09/17   2D echo with color flow doppler   Result Value Ref Range    EF 55 55 - 65    Mitral Valve Regurgitation TRIVIAL     Diastolic Dysfunction No     Est. PA Systolic Pressure 41.83 (A)     Tricuspid Valve Regurgitation TRIVIAL TO MILD      Assessment and Plan:       * Atrial fibrillation with RVR    Discontinue Amiodarone gtt-restarted po amiodarone    -  Switched to Toprol  mg home dose  - Continue diltiazem 120mg  - Patient with atrial fibrillation and CHADS-VAS score 2 or > 2-started xarelto (held overnight for thoracentesis)  -          Echo with preserved EF         Leukocytosis      - Worsening WBC  - Patient remains afebrile  - Blood cultures negative  - Urine cultures; remarkable for coagulase neg staph most likely a contaminant     - CXR notable for bilateral effusions   - Consult ID  -           Thoracentesis, today -diagnostic + therapeutic for pleural fluid analysis        Acute decompensated heart failure    Acute diastolic in setting of atrial fibrillation  -           Echo done; EF 55%.   - Continue lasix 40 mg po QD  - Strict I/Os  - Avoid nephro toxic drugs  - Daily weights   -  monitor lytes and replete as needed           Hypertension    - Diltiazem 120 mg XL  - Lopressor 50 QID         Hypoalbuminemia    Most likely secondary to malnutrition    - Boost protein shakes  - Encouraging high protein diet, protein bars           Hypokalemia    - monitor and replete as needed         Pleural effusion    -Patient continues to have persistent b/l pleural effusions inspite of diuresis -seems actually worse + symptomatic  -thoracentesis today; xarelto held overnight             VTE Risk Mitigation         Ordered     Medium Risk of VTE  Once      10/09/17 1729     Place ALEJANDRO hose  Until discontinued      10/09/17 1729     Place sequential compression device  Until discontinued      10/09/17 1729          Malinda Diana MD  Cardiology  [facility]

## 2017-10-14 NOTE — ASSESSMENT & PLAN NOTE
Patient with worsening leukocytosis since 2 weeks ago from review of past CBCs, currently with bilateral pleural effusions and history of bladder cancer. At this time unclear if increase of WBC secondary to infection or malignancy progression. U/C with  Staph epi (contaminant) unlikely to be causing infection.Diagnostic thoracentesis suggestive for empyema with preliminary results of WBC with segmented cells predominance. Pending culture results Lower extremety doppler of left leg results no signs of DV.    - Monitor culture results

## 2017-10-14 NOTE — ASSESSMENT & PLAN NOTE
Etiology unclear and could be multifactorial (heart failure vs. malignant as patient with known lung mets from bladder cancer vs. infectious). Pulmonary consulted and following and patient underwent diagnostic left sided  thoracentesis on 10/13 and 1400 mL removed. Studies sent and awaiting full results. Prelim results noted with 965 WBC with 82% segs so neutrophilic predominance raises concern for infection. LDH from serum pending but fluid LDH noted to be 166 with glucose of 123. Patient on empiric antibiotics with Ceftriaxone and Clindamycin for now and pleural fluid culture sent and results pending.

## 2017-10-14 NOTE — SUBJECTIVE & OBJECTIVE
Interval History: Patient underwent thoracentesis yesterday by Pulmonary and had 1400 mL of bloody pleural fluid removed from left lung. Patient reports he still feels short of breath with minimal movement but somewhat better after thoracentesis. Patient was back in atrial fibrillation this am on telemetry but rate controlled. Patient denies any chest pain or abdominal pain.     Review of Systems   Constitutional: Negative for chills and fever.   Respiratory: Positive for shortness of breath. Negative for cough.    Cardiovascular: Negative for chest pain, palpitations and leg swelling.   Gastrointestinal: Negative for abdominal pain, diarrhea, nausea and vomiting.   Genitourinary: Negative for dysuria.   Musculoskeletal: Negative for arthralgias and back pain.   Skin: Negative for rash.   Neurological: Negative for dizziness and light-headedness.   Psychiatric/Behavioral: Negative for agitation, confusion and hallucinations.     Objective:     Vital Signs (Most Recent):  Temp: 98.1 °F (36.7 °C) (10/14/17 1134)  Pulse: 96 (10/14/17 1500)  Resp: 18 (10/14/17 1134)  BP: 110/61 (10/14/17 1134)  SpO2: 98 % (10/14/17 1142) Vital Signs (24h Range):  Temp:  [97.9 °F (36.6 °C)-98.5 °F (36.9 °C)] 98.1 °F (36.7 °C)  Pulse:  [] 96  Resp:  [18-21] 18  SpO2:  [97 %-98 %] 98 %  BP: (110-148)/(61-69) 110/61     Weight: 59.1 kg (130 lb 4.7 oz)  Body mass index is 21.03 kg/m².    Intake/Output Summary (Last 24 hours) at 10/14/17 1602  Last data filed at 10/14/17 1558   Gross per 24 hour   Intake             1450 ml   Output             1051 ml   Net              399 ml      Physical Exam   Constitutional: He is oriented to person, place, and time. He appears well-developed and well-nourished. He appears distressed (Mild respiratory ).   HENT:   Mouth/Throat: Oropharynx is clear and moist.   Eyes: Conjunctivae are normal.   Neck: Neck supple. No JVD present.   Cardiovascular: Normal rate and normal heart sounds.  An irregularly  irregular rhythm present. Exam reveals no gallop and no friction rub.    No murmur heard.  Pulmonary/Chest: No accessory muscle usage. Tachypnea noted. He has decreased breath sounds in the right lower field and the left lower field. He has no wheezes. He has no rales.   Abdominal: Soft. Bowel sounds are normal. He exhibits no distension. There is no tenderness.   Musculoskeletal: He exhibits no edema or tenderness.   Neurological: He is alert and oriented to person, place, and time.   Skin: Skin is warm. Capillary refill takes less than 2 seconds. No rash noted. No erythema.   Psychiatric: He has a normal mood and affect. His behavior is normal.       Significant Labs:     CBC:   Recent Labs  Lab 10/13/17  0435 10/14/17  0347   WBC 28.60* 31.05*   HGB 9.2* 9.1*   HCT 29.9* 29.1*   * 426*     CMP:   Recent Labs  Lab 10/13/17  0435 10/14/17  0347   * 136   K 4.2 3.5   CL 88* 89*   CO2 37* 36*   * 144*   BUN 40* 33*   CREATININE 0.8 0.7   CALCIUM 9.3 8.5*   ANIONGAP 9 11   EGFRNONAA >60.0 >60.0     Coagulation:   Magnesium:   Recent Labs  Lab 10/13/17  0435 10/14/17  0347   MG 1.8 1.6     Results for orders placed or performed during the hospital encounter of 10/09/17   WBC & Diff,Body Fluid Thoracentesis Fluid   Result Value Ref Range    Body Fluid Type Thoracentesis Fluid     Fluid Appearance Bloody     Fluid Color Red     WBC, Body Fluid 965 /cu mm    Segs, Fluid 82 %    Lymphs, Fluid 8 %    Monocytes/Macrophages, Fluid 10 %     Results for orders placed or performed during the hospital encounter of 10/09/17   LDH, Peritoneal, Pleural Fluid or JONI Drainage, In-House Thoracentesis Fluid   Result Value Ref Range    Body Fluid Source, LDH Thoracentesis Fluid     LD, Fluid 166 Not established U/L     Results for orders placed or performed during the hospital encounter of 10/09/17   Protein, Peritoneal, Pleural Fluid or JONI Drainage, In-House Thoracentesis Fluid   Result Value Ref Range    Body Fluid  Source, Total Protein Thoracentesis Fluid     Body Fluid, Protein 4.1 Not established g/dL        Significant Imaging: I have reviewed all pertinent imaging results/findings within the past 24 hours.

## 2017-10-14 NOTE — PROGRESS NOTES
[facility]  Cardiology  Progress Note    Patient Name: Amador Harrison  MRN: [id number]  Admission Date: 10/9/2017  Hospital Length of Stay: 5 days  Code Status: Full Code   Attending Physician: Yuki Mack MD   Primary Care Physician: Waqar Brady MD  Expected Discharge Date: 10/16/2017  Principal Problem:Atrial fibrillation with RVR    Subjective:     Interval History:   NAEON. S/p thoracentesis. Patient reports improvement in SOB but still requiring oxygen, still requiring oxygen. Denies any CP, cough, fever, chills, n/v.     Review of Systems   Constitution: Negative for chills, diaphoresis, fever, weakness, malaise/fatigue and night sweats.   HENT: Negative for odynophagia and sore throat.    Eyes: Negative for blurred vision and discharge.   Cardiovascular: Positive for dyspnea on exertion. Negative for chest pain, irregular heartbeat, leg swelling, near-syncope, orthopnea, palpitations, paroxysmal nocturnal dyspnea and syncope.   Respiratory: Positive for shortness of breath. Negative for cough, sleep disturbances due to breathing and wheezing.    Skin: Negative for color change and rash.   Musculoskeletal: Positive for back pain. Negative for muscle cramps and muscle weakness.   Gastrointestinal: Negative for constipation, diarrhea, nausea and vomiting.   Genitourinary: Negative for flank pain and hematuria.   Neurological: Negative for focal weakness, headaches and light-headedness.   All other systems reviewed and are negative.    Objective:     Vital Signs (Most Recent):  Temp: 98.1 °F (36.7 °C) (10/14/17 1134)  Pulse: 96 (10/14/17 1500)  Resp: 18 (10/14/17 1134)  BP: 110/61 (10/14/17 1134)  SpO2: 98 % (10/14/17 1142) Vital Signs (24h Range):  Temp:  [97.9 °F (36.6 °C)-98.5 °F (36.9 °C)] 98.1 °F (36.7 °C)  Pulse:  [] 96  Resp:  [18-21] 18  SpO2:  [97 %-98 %] 98 %  BP: (110-148)/(61-69) 110/61     Weight: 59.1 kg (130 lb 4.7 oz)  Body mass index is 21.03 kg/m².     SpO2:  98 %  O2 Device (Oxygen Therapy): nasal cannula      Intake/Output Summary (Last 24 hours) at 10/14/17 1636  Last data filed at 10/14/17 1558   Gross per 24 hour   Intake             1450 ml   Output             1051 ml   Net              399 ml       Lines/Drains/Airways     Central Venous Catheter Line                 Port A Cath Single Lumen 03/15/17 0800  213 days          Drain                 Ureteral Drain/Stent 07/05/17 1218 Right ureter 5 Fr. 101 days         Urostomy 07/05/17 1226 ileal conduit  days                Physical Exam   Constitutional: He is oriented to person, place, and time. He appears well-developed and well-nourished.   HENT:   Head: Atraumatic.   Eyes: Conjunctivae and EOM are normal. Pupils are equal, round, and reactive to light.   Neck: Normal range of motion. Neck supple. No JVD present.   Cardiovascular: Normal rate and regular rhythm.  Exam reveals no gallop.    Pulmonary/Chest: No accessory muscle usage. No respiratory distress. He has decreased breath sounds in the right lower field and the left lower field.   Abdominal: There is no tenderness.   LLQ cystostomy bag C/D/I with translucent yellow urine in bag    Musculoskeletal: He exhibits no edema (1+).   Neurological: He is alert and oriented to person, place, and time.       Significant Labs:   CMP     Recent Labs  Lab 10/13/17  0435 10/14/17  0347   * 136   K 4.2 3.5   CL 88* 89*   CO2 37* 36*   * 144*   BUN 40* 33*   CREATININE 0.8 0.7   CALCIUM 9.3 8.5*   ANIONGAP 9 11   ESTGFRAFRICA >60.0 >60.0   EGFRNONAA >60.0 >60.0    and CBC     Recent Labs  Lab 10/13/17  0435 10/14/17  0347   WBC 28.60* 31.05*   HGB 9.2* 9.1*   HCT 29.9* 29.1*   * 426*         Assessment and Plan:       * Atrial fibrillation with RVR    Discontinue Amiodarone gtt-restarted po amiodarone    -  Switched to Toprol  mg home dose  - Continue diltiazem 120mg  - Patient with atrial fibrillation and CHADS-VAS score 2 or > 2;  xarelto being held; waiting pulmon recs  -          Echo with preserved EF         Leukocytosis      - Worsening WBC  - Patient remains afebrile  - Blood cultures negative  - Urine cultures; remarkable for coagulase neg staph most likely a contaminant     - CXR notable for bilateral effusions   - Consult ID  -           Thoracentesis done, protein 4.1, , ; serum protein & LDH pending  - Started rocephin and clinda           Acute decompensated heart failure    Acute diastolic in setting of atrial fibrillation  -           Echo done; EF 55%.   - Continue lasix 40 mg po QD  - Strict I/Os  - Avoid nephro toxic drugs  - Daily weights   -  monitor lytes and replete as needed           Essential hypertension    - Diltiazem 120 mg XL  - Lopressor 50 QID         Hypoalbuminemia    Most likely secondary to malnutrition    - Boost protein shakes  - Encouraging high protein diet, protein bars           Hypokalemia    - monitor and replete as needed         Bilateral pleural effusion    -Patient continues to have persistent b/l pleural effusions inspite of diuresis -seems actually worse + symptomatic  -thoracentesis today; xarelto held overnight             VTE Risk Mitigation         Ordered     Medium Risk of VTE  Once      10/09/17 1729     Place ALEJANDRO hose  Until discontinued      10/09/17 1729     Place sequential compression device  Until discontinued      10/09/17 1729          Malinda Diana MD  Cardiology  [facility]

## 2017-10-14 NOTE — ASSESSMENT & PLAN NOTE
- Worsening WBC  - Patient remains afebrile  - Blood cultures negative  - Urine cultures; remarkable for coagulase neg staph most likely a contaminant     - CXR notable for bilateral effusions   - Consult ID  -           Thoracentesis, today -diagnostic + therapeutic for pleural fluid analysis

## 2017-10-14 NOTE — ASSESSMENT & PLAN NOTE
Patient CXR with findings of bilateral pleural effusion with history of squamous cell bladder cancer with metastasis. Current presentation concerning for malignant pleural effusion due to bilateral involvement of lungs, past medical malignant history, acute onset over a chronic SOB, lack of fever or productive cough. Patient also has associated decompensated CHF in the setting of Afiv FVR that had not been managed prior to admission that could lead to pulmonary edema. Thoracentesis performed 10/13/17 by pulmonary team. Pleural fluid with WBC elevated at 965 predominance of segmented cells over lymphocytes, glucose normal and protein 4.1 elevated.Graim stain no signs of organisms and cultures in progress at this time. Will follow results. Preliminary results suggestive of empyema vs exudate non infectious origen. Will recommend to continue ceftriaxone due to subacute/chronic presentation of patient's clinical course. Once culture results obtain will determine length of therapy. Would be beneficial to send samples for cytology. Will recommend to discontinue clindamycin to reduce risk of c diff colitis and associated side effects.        - discontinue Clindamycin 900 mg IV every 8 hrs   - continue ceftriaxone 2g IV daily   - Will follow thoracentesis results for cytology, LDH, culture, pH

## 2017-10-14 NOTE — PROGRESS NOTES
[facility]  Infectious Disease  Progress Note    Patient Name: Amador Harrison  MRN: [id number]  Admission Date: 10/9/2017  Length of Stay: 5 days  Attending Physician: Yuki Mack MD  Primary Care Provider: Waqar Brady MD    Isolation Status: No active isolations  Assessment/Plan:      Bilateral pleural effusion    Patient CXR with findings of bilateral pleural effusion with history of squamous cell bladder cancer with metastasis. Current presentation concerning for malignant pleural effusion due to bilateral involvement of lungs, past medical malignant history, acute onset over a chronic SOB, lack of fever or productive cough. Patient also has associated decompensated CHF in the setting of Afiv FVR that had not been managed prior to admission that could lead to pulmonary edema. Thoracentesis performed 10/13/17 by pulmonary team. Pleural fluid with WBC elevated at 965 predominance of segmented cells over lymphocytes, glucose normal and protein 4.1 elevated.Graim stain no signs of organisms and cultures in progress at this time. Will follow results. Preliminary results suggestive of empyema vs exudate non infectious origen. Will recommend to continue ceftriaxone due to subacute/chronic presentation of patient's clinical course. Once culture results obtain will determine length of therapy. Would be beneficial to send samples for cytology. Will recommend to discontinue clindamycin to reduce risk of c diff colitis and associated side effects.        - discontinue Clindamycin 900 mg IV every 8 hrs   - continue ceftriaxone 2g IV daily   - Will follow thoracentesis results for cytology, LDH, culture, pH           Leukocytosis    Patient with worsening leukocytosis since 2 weeks ago from review of past CBCs, currently with bilateral pleural effusions and history of bladder cancer. At this time unclear if increase of WBC secondary to infection or malignancy progression. U/C with  Staph epi  (contaminant) unlikely to be causing infection.Diagnostic thoracentesis suggestive for empyema with preliminary results of WBC with segmented cells predominance. Pending culture results Lower extremety doppler of left leg results no signs of DV.    - Monitor culture results                Anticipated Disposition: continue antibiotics     Thank you for your consult. I will follow-up with patient. Please contact us if you have any additional questions.    Braydon Whaley MD  Infectious Disease  [facility] [address]    Subjective:     Principal Problem:Atrial fibrillation with RVR    HPI: Case of 61 y/o male admitted on 10/9/17 PMHx. 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 Patient received 2 cycles of neoadjuvant cis/germ with PD that failed to improve cancer. Patient presented to ED due to progressive worsening of baseline SOB for 1 week evolution. Mentions unable to perform daily living activities, fatigue, low grade subjective fevers. Has to sit up to sleep, unable to tolerate recumbent. Mentions also associated cough and bilateral leg swelling. Since admission patient with Afib FVR not controlled, started this admission on xarelto for anticoagulation. Was consulted to ID at this time for additional recommendations.    Interval History: Patient continues afebrile no incident during night. Mentions improvement from dyspnea.     Review of Systems   Constitutional: Positive for fatigue. Negative for activity change, appetite change, chills, diaphoresis and fever.   Respiratory: Positive for shortness of breath. Negative for cough and choking.    Cardiovascular: Negative for chest pain.   Gastrointestinal: Negative for abdominal distention, abdominal pain, diarrhea, nausea and vomiting.   Skin: Negative for rash.     Objective:     Vital Signs (Most Recent):  Temp: 98.1 °F (36.7 °C) (10/14/17 1721)  Pulse: 104 (10/14/17 1721)  Resp: (!) 21 (10/14/17  1721)  BP: (!) 105/58 (10/14/17 1721)  SpO2: 99 % (10/14/17 1721) Vital Signs (24h Range):  Temp:  [97.9 °F (36.6 °C)-98.5 °F (36.9 °C)] 98.1 °F (36.7 °C)  Pulse:  [] 104  Resp:  [18-21] 21  SpO2:  [97 %-99 %] 99 %  BP: (105-148)/(58-69) 105/58     Weight: 59.1 kg (130 lb 4.7 oz)  Body mass index is 21.03 kg/m².    Estimated Creatinine Clearance: 89.1 mL/min (based on SCr of 0.7 mg/dL).    Physical Exam   Constitutional: He is oriented to person, place, and time. He appears well-developed and well-nourished.   HENT:   Head: Normocephalic and atraumatic.   Eyes: Conjunctivae and EOM are normal. Pupils are equal, round, and reactive to light.   Neck: Normal range of motion.   Cardiovascular: Normal rate, regular rhythm and normal heart sounds.    Pulmonary/Chest: Effort normal.   Decreased breath sounds at bases    Abdominal: Soft. Bowel sounds are normal.   Neurological: He is alert and oriented to person, place, and time.       Significant Labs:   Blood Culture:     Recent Labs  Lab 09/22/17  1725 10/09/17  1015 10/09/17  1144 10/10/17  1355 10/10/17  1400   LABBLOO No growth after 5 days. No growth after 5 days. No growth after 5 days. No Growth to date  No Growth to date  No Growth to date  No Growth to date  No Growth to date No Growth to date  No Growth to date  No Growth to date  No Growth to date  No Growth to date     BMP:     Recent Labs  Lab 10/14/17  0347   *      K 3.5   CL 89*   CO2 36*   BUN 33*   CREATININE 0.7   CALCIUM 8.5*   MG 1.6     CBC:     Recent Labs  Lab 10/13/17  0435 10/14/17  0347   WBC 28.60* 31.05*   HGB 9.2* 9.1*   HCT 29.9* 29.1*   * 426*     Microbiology Results (last 7 days)     Procedure Component Value Units Date/Time    Blood culture [[id number]] Collected:  10/10/17 1355    Order Status:  Completed Specimen:  Blood from Peripheral, Forearm, Left Updated:  10/14/17 1612     Blood Culture, Routine No Growth to date     Blood Culture, Routine No  Growth to date     Blood Culture, Routine No Growth to date     Blood Culture, Routine No Growth to date     Blood Culture, Routine No Growth to date    Blood Culture #1 **CANNOT BE ORDERED STAT** [[id number]] Collected:  10/10/17 1400    Order Status:  Completed Specimen:  Blood from Peripheral, Forearm, Left Updated:  10/14/17 1612     Blood Culture, Routine No Growth to date     Blood Culture, Routine No Growth to date     Blood Culture, Routine No Growth to date     Blood Culture, Routine No Growth to date     Blood Culture, Routine No Growth to date    Blood culture x two cultures. Draw prior to antibiotics. [[id number]] Collected:  10/09/17 1144    Order Status:  Completed Specimen:  Blood from Peripheral, Hand, Left Updated:  10/14/17 1412     Blood Culture, Routine No growth after 5 days.    Narrative:       Aerobic and anaerobic    Blood culture x two cultures. Draw prior to antibiotics. [[id number]] Collected:  10/09/17 1015    Order Status:  Completed Specimen:  Blood from Peripheral, Upper Arm, Right Updated:  10/14/17 1212     Blood Culture, Routine No growth after 5 days.    Narrative:       Aerobic and anaerobic    Gram stain [[id number]] Collected:  10/13/17 1346    Order Status:  Completed Specimen:  Body Fluid from Lung, Left Updated:  10/13/17 1751     Gram Stain Result Rare WBC's      No organisms seen    Fungus culture [[id number]] Collected:  10/13/17 1346    Order Status:  Sent Specimen:  Body Fluid from Lung, Left Updated:  10/13/17 1449    AFB culture (includes stain) [[id number]] Collected:  10/13/17 1346    Order Status:  Sent Specimen:  Body Fluid from Lung, Left Updated:  10/13/17 1449    Culture, Respiratory with Gram Stain [[id number]] Collected:  10/13/17 1348    Order Status:  Sent Specimen:  Respiratory from BAL, LLL Updated:  10/13/17 1349    Urine culture [[id number]]  (Susceptibility) Collected:  10/09/17 1015    Order Status:  Completed Specimen:  Urine Updated:  10/12/17 1536     Urine  Culture, Routine --     STAPHYLOCOCCUS EPIDERMIDIS  > 100,000 cfu/ml      Narrative:       Preferred Collection Type->Urine, Clean Catch          Significant Imaging: I have reviewed all pertinent imaging results/findings within the past 24 hours.

## 2017-10-14 NOTE — ASSESSMENT & PLAN NOTE
Discontinue Amiodarone gtt-restarted po amiodarone    -  Switched to Toprol  mg home dose  - Continue diltiazem 120mg  - Patient with atrial fibrillation and CHADS-VAS score 2 or > 2; xarelto being held; waiting pulmon recs  -          Echo with preserved EF

## 2017-10-14 NOTE — ASSESSMENT & PLAN NOTE
Patient's blood pressure is controlled here in the hospital over past 24 hours. Goal for blood pressure is SBP < 150 and DBP < 90 as patient > or = 60 years of age with no diabetes or advanced kidney disease based on JNC 8 guidelines. Continue current treatment regimen with Toprol  mg po daily + Diltiazem  mg po daily. Plan to continue to monitor patient's blood pressure routinely while patient is hospitalized.

## 2017-10-14 NOTE — PROGRESS NOTES
[facility] Hospital Medicine  Progress Note    Patient Name: Amador Harrison  MRN: [id number]  Patient Class: IP- Inpatient   Admission Date: 10/9/2017  Length of Stay: 5 days  Attending Physician: Yuki Mack MD  Primary Care Provider: Waqar Brady MD    [facility] Medicine Team: [facility]  Yuki Mack MD    Subjective:     Principal Problem:Atrial fibrillation with RVR    HPI:  Mr. 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      The patient states he's had baseline SOB since being diagnosed with mets to the lung 2 weeks ago, however since last week it's been progressively getting worse. Previously he could ambulate around the house, shower, make breakfast; however now he needs help and gets SOB w/ minimal exertion (on standing; walking to the restroom). Associated with PND, worsening orthopnea (pt sits upright to sleep now), chronic cough and new leg swelling since last night. Denies any fevers, chills, WG, n/v, CP.     Hospital Course:  Patient admitted to cardiac ICU and found to have acute decompensated CHF in the setting of Afib RVR; Patient placed on Amiodarone drip and diuresised with IV Lasix 40 mg IV BID. Patient eventually converted to NS and switched to oral Amiodarone 400 mg po BID for rhythm control. Patient placed on Toprol  mg po daily + Diltiazem  mg po daily for rate control. Patient was placed on Xarelto 20 mg po daily for long term anticoagulation for atrial fibrillation. CXR was remarkable for bilateral pleural effusion which was initially attributed to pulmonary edema or malignant pleural effusions. However patient had a persistently elevated WBC count  but remained afebrile. ID was consulted for the leukocytosis with WBC in 25,000-30,000 range and eventually pulmonary was consulted for thoracentesis; to determine whether the effusion was secondary to infection or malignancy progression. ID consulted on 10/12 and concerned about infectious cause of pleural effusions so Pulmonary consulted and patient on 10/13 underwent thoracentesis by Pulmonary on 10/13 and 1400 mL of bloody fluid removed from left lung pleural space and studies sent (LDH, protein, bacterial/fungal/AFB stains and culture, and cytology). Prelim thoracentesis results showed  with 82% segs. ID concerned for infectious cause so patient started on IV Ceftriaxone and Clindamycin empirically on 10/13. Patient since admit is net negative 7 liters of fluid with Lasix and switched to oral lasix 40 mg po daily on 10/13 by Cardiology. Patient on 3 liters of oxygen with mellisa of 98% on 10/14.     Interval History: Patient underwent thoracentesis yesterday by Pulmonary and had 1400 mL of bloody pleural fluid removed from left lung. Patient reports he still feels short of breath with minimal movement but somewhat better after thoracentesis. Patient was back in atrial fibrillation this am on telemetry but rate controlled. Patient denies any chest pain or abdominal pain.     Review of Systems   Constitutional: Negative for chills and fever.   Respiratory: Positive for shortness of breath. Negative for cough.    Cardiovascular: Negative for chest pain, palpitations and leg swelling.   Gastrointestinal: Negative for abdominal pain, diarrhea, nausea and vomiting.   Genitourinary: Negative for dysuria.   Musculoskeletal: Negative for arthralgias and back pain.   Skin: Negative for rash.   Neurological: Negative for dizziness and light-headedness.   Psychiatric/Behavioral: Negative for agitation, confusion and hallucinations.     Objective:     Vital Signs (Most Recent):  Temp: 98.1 °F (36.7 °C) (10/14/17  1134)  Pulse: 96 (10/14/17 1500)  Resp: 18 (10/14/17 1134)  BP: 110/61 (10/14/17 1134)  SpO2: 98 % (10/14/17 1142) Vital Signs (24h Range):  Temp:  [97.9 °F (36.6 °C)-98.5 °F (36.9 °C)] 98.1 °F (36.7 °C)  Pulse:  [] 96  Resp:  [18-21] 18  SpO2:  [97 %-98 %] 98 %  BP: (110-148)/(61-69) 110/61     Weight: 59.1 kg (130 lb 4.7 oz)  Body mass index is 21.03 kg/m².    Intake/Output Summary (Last 24 hours) at 10/14/17 1602  Last data filed at 10/14/17 1558   Gross per 24 hour   Intake             1450 ml   Output             1051 ml   Net              399 ml      Physical Exam   Constitutional: He is oriented to person, place, and time. He appears well-developed and well-nourished. He appears distressed (Mild respiratory ).   HENT:   Mouth/Throat: Oropharynx is clear and moist.   Eyes: Conjunctivae are normal.   Neck: Neck supple. No JVD present.   Cardiovascular: Normal rate and normal heart sounds.  An irregularly irregular rhythm present. Exam reveals no gallop and no friction rub.    No murmur heard.  Pulmonary/Chest: No accessory muscle usage. Tachypnea noted. He has decreased breath sounds in the right lower field and the left lower field. He has no wheezes. He has no rales.   Abdominal: Soft. Bowel sounds are normal. He exhibits no distension. There is no tenderness.   Musculoskeletal: He exhibits no edema or tenderness.   Neurological: He is alert and oriented to person, place, and time.   Skin: Skin is warm. Capillary refill takes less than 2 seconds. No rash noted. No erythema.   Psychiatric: He has a normal mood and affect. His behavior is normal.       Significant Labs:     CBC:   Recent Labs  Lab 10/13/17  0435 10/14/17  0347   WBC 28.60* 31.05*   HGB 9.2* 9.1*   HCT 29.9* 29.1*   * 426*     CMP:   Recent Labs  Lab 10/13/17  0435 10/14/17  0347   * 136   K 4.2 3.5   CL 88* 89*   CO2 37* 36*   * 144*   BUN 40* 33*   CREATININE 0.8 0.7   CALCIUM 9.3 8.5*   ANIONGAP 9 11   EGFRNONAA  >60.0 >60.0     Coagulation:   Magnesium:   Recent Labs  Lab 10/13/17  0435 10/14/17  0347   MG 1.8 1.6     Results for orders placed or performed during the hospital encounter of 10/09/17   WBC & Diff,Body Fluid Thoracentesis Fluid   Result Value Ref Range    Body Fluid Type Thoracentesis Fluid     Fluid Appearance Bloody     Fluid Color Red     WBC, Body Fluid 965 /cu mm    Segs, Fluid 82 %    Lymphs, Fluid 8 %    Monocytes/Macrophages, Fluid 10 %     Results for orders placed or performed during the hospital encounter of 10/09/17   LDH, Peritoneal, Pleural Fluid or JONI Drainage, In-House Thoracentesis Fluid   Result Value Ref Range    Body Fluid Source, LDH Thoracentesis Fluid     LD, Fluid 166 Not established U/L     Results for orders placed or performed during the hospital encounter of 10/09/17   Protein, Peritoneal, Pleural Fluid or JONI Drainage, In-House Thoracentesis Fluid   Result Value Ref Range    Body Fluid Source, Total Protein Thoracentesis Fluid     Body Fluid, Protein 4.1 Not established g/dL        Significant Imaging: I have reviewed all pertinent imaging results/findings within the past 24 hours.    Assessment/Plan:      * Atrial fibrillation with RVR    Long-term (current) use of anticoagulants  Cardiology following and managing. Patient in and out of atrial fibrillation this am but rate controlled. Plan to continue Amiodarone 400 mg po BID for rhythm control and Toprol  mg po BID. Xarelto for long term anticoagulation held for thoracentesis yesterday. Will hold off on restarting at this time in case patient needs further drainage of pleural fluid. Continue to monitor on telemetry.          Acute respiratory failure with hypoxia    Likely related to pleural effusions. Patient currently on 3 liters of oxygen with oxygen sats of 98%. Plan is to try and wean oxygen with goal of oxygen sats > 92%. Will continue to diuresis patient with Lasix 40 mg po daily to see if helps with effusions.            Leukocytosis    Unchanged. Infectious disease consulted and following and suspect lungs are source. Blood and urine cultures are negative. ID concerned for para pneumonic effusions/empyema as predominant neutrophils on diagnostic thoracentesis. Cultures for pleural fluid sent and are pending. Patient on empiric Ceftriaxone and Clindamycin as per ID recommendations. Monitor daily CBC.           Acute decompensated heart failure    Cardiology following and feel likely related to atrial fibrillation as patient with normal diastolic and systolic function on 2 D echo. Will continue to diuresis with Lasix 40 mg po daily and monitor strict I+O's to see how patient is responding to diuretics. Patient so far in hospital is net negative 7 liters.           Hypokalemia    Improved. Will treat with KCL tablet 20 mEq po daily as patient is on loop diuretic and monitor daily levels. Goal is potassium level > 4.           Hypomagnesemia    Will treat with Magnesium Sulfate 2 grams IV for 1 dose(s) today and recheck level in the am. Goal is Magnesium level > 2.             Bilateral pleural effusion    Etiology unclear and could be multifactorial (heart failure vs. malignant as patient with known lung mets from bladder cancer vs. infectious). Pulmonary consulted and following and patient underwent diagnostic left sided  thoracentesis on 10/13 and 1400 m L removed. Studies sent and awaiting full results. Prelim results noted with 965 WBC with 82% segs so neutrophilic predominance raises concern for infection. LDH from serum pending but fluid LDH noted to be 166 with glucose of 123. Patient on empiric antibiotics with Ceftriaxone and Clindamycin for now and pleural fluid culture sent and results pending.         Essential hypertension    Patient's blood pressure is controlled here in the hospital over past 24 hours. Goal for blood pressure is SBP < 150 and DBP < 90 as patient > or = 60 years of age with no diabetes or advanced  kidney disease based on JNC 8 guidelines. Continue current treatment regimen with Toprol  mg po daily + Diltiazem  mg po daily. Plan to continue to monitor patient's blood pressure routinely while patient is hospitalized.             Hypoalbuminemia    Related to acute illness and active malignancy. Nutrition supplements for support.           Malignant neoplasm of trigone of urinary bladder    Lung metastases  Patient with known lung mets from bladder carcinoma. All chemotherapy treatment on hold for now due to acute illness. Continue routine ileo conduit care as patient s/p cystectomy in 7/2017.           VTE Risk Mitigation         Ordered     Medium Risk of VTE  Once      10/09/17 1729     Place ALEJANDRO hose  Until discontinued      10/09/17 1729     Place sequential compression device  Until discontinued      10/09/17 1729              Yuki Mack MD  Department of Hospital Medicine   [facility]

## 2017-10-14 NOTE — PLAN OF CARE
Problem: Patient Care Overview  Goal: Plan of Care Review  Outcome: Ongoing (interventions implemented as appropriate)  Pt denies Chest pain or nausea. C/o pain on lower extremities, prn pain meds given, mod relief obtained. No falls, trauma or injury noted. VSS. 3L NC. Iv antibiotics. po amio daily. RLQ urostomy bag. PT/OT following. S/p thoracentesis-1.5L fluid removed. Plan of care reviewed with patient. No further questions at this time. No significant events.Spouse at bedside. Will continue to monitor.

## 2017-10-14 NOTE — ASSESSMENT & PLAN NOTE
Lung metastases  Patient with known lung mets from bladder carcinoma. All chemotherapy treatment on hold for now due to acute illness. Continue routine ileo conduit care as patient s/p cystectomy in 7/2017.

## 2017-10-14 NOTE — ASSESSMENT & PLAN NOTE
Unchanged. Infectious disease consulted and following and suspect lungs are source. Blood and urine cultures are negative. ID concerned for para pneumonic effusions/empyema as predominant neutrophils on diagnostic thoracentesis. Cultures for pleural fluid sent and are pending. Patient on empiric Ceftriaxone and Clindamycin as per ID recommendations. Monitor daily CBC.

## 2017-10-14 NOTE — HPI
Mr. 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 In August patient had post op CT scan which was notable for pulmonary nodules and ground glass opacities and suspected to be possibly infectious given the rapid development (prior CT 5/17). Patient developed cough and SOB and repeat CT 9/18/17 was notable for progression of these lesions and more likely metastatic disease. He underwent bronchoscopic biopsy 9/25/17 and path was consistent with urothelial mets. Patient was then briefly admitted in 9/2017 with atrial fibrillation (not on anticoagulation) who presents to the ED with a complaints of progressively woreseing shortness of breath and fatigue since last week.      The patient states he's had baseline SOB since being diagnosed with mets to the lung 2 weeks ago, however since last week it's been progressively getting worse. Previously he could ambulate around the house, shower, make breakfast; however now he needs help and gets SOB w/ minimal exertion (on standing; walking to the restroom). Associated with PND, worsening orthopnea (pt sits upright to sleep now), chronic cough and new leg swelling since last night. Denies any fevers, chills, WG, n/v, CP.

## 2017-10-14 NOTE — ASSESSMENT & PLAN NOTE
- Worsening WBC  - Patient remains afebrile  - Blood cultures negative  - Urine cultures; remarkable for coagulase neg staph most likely a contaminant     - CXR notable for bilateral effusions   - Consult ID  -           Thoracentesis done, protein 4.1, , ; serum protein & LDH pending  - Started rocephin and clinda

## 2017-10-14 NOTE — SUBJECTIVE & OBJECTIVE
Interval History:   NAEON. S/p thoracentesis. Patient reports improvement in SOB but still requiring oxygen, still requiring oxygen. Denies any CP, cough, fever, chills, n/v.     Review of Systems   Constitution: Negative for chills, diaphoresis, fever, weakness, malaise/fatigue and night sweats.   HENT: Negative for odynophagia and sore throat.    Eyes: Negative for blurred vision and discharge.   Cardiovascular: Positive for dyspnea on exertion. Negative for chest pain, irregular heartbeat, leg swelling, near-syncope, orthopnea, palpitations, paroxysmal nocturnal dyspnea and syncope.   Respiratory: Positive for shortness of breath. Negative for cough, sleep disturbances due to breathing and wheezing.    Skin: Negative for color change and rash.   Musculoskeletal: Positive for back pain. Negative for muscle cramps and muscle weakness.   Gastrointestinal: Negative for constipation, diarrhea, nausea and vomiting.   Genitourinary: Negative for flank pain and hematuria.   Neurological: Negative for focal weakness, headaches and light-headedness.   All other systems reviewed and are negative.    Objective:     Vital Signs (Most Recent):  Temp: 98.1 °F (36.7 °C) (10/14/17 1134)  Pulse: 96 (10/14/17 1500)  Resp: 18 (10/14/17 1134)  BP: 110/61 (10/14/17 1134)  SpO2: 98 % (10/14/17 1142) Vital Signs (24h Range):  Temp:  [97.9 °F (36.6 °C)-98.5 °F (36.9 °C)] 98.1 °F (36.7 °C)  Pulse:  [] 96  Resp:  [18-21] 18  SpO2:  [97 %-98 %] 98 %  BP: (110-148)/(61-69) 110/61     Weight: 59.1 kg (130 lb 4.7 oz)  Body mass index is 21.03 kg/m².     SpO2: 98 %  O2 Device (Oxygen Therapy): nasal cannula      Intake/Output Summary (Last 24 hours) at 10/14/17 1636  Last data filed at 10/14/17 1558   Gross per 24 hour   Intake             1450 ml   Output             1051 ml   Net              399 ml       Lines/Drains/Airways     Central Venous Catheter Line                 Port A Cath Single Lumen 03/15/17 0800  213 days          Drain                  Ureteral Drain/Stent 07/05/17 1218 Right ureter 5 Fr. 101 days         Urostomy 07/05/17 1226 ileal conduit  days                Physical Exam   Constitutional: He is oriented to person, place, and time. He appears well-developed and well-nourished.   HENT:   Head: Atraumatic.   Eyes: Conjunctivae and EOM are normal. Pupils are equal, round, and reactive to light.   Neck: Normal range of motion. Neck supple. No JVD present.   Cardiovascular: Normal rate and regular rhythm.  Exam reveals no gallop.    Pulmonary/Chest: No accessory muscle usage. No respiratory distress. He has decreased breath sounds in the right lower field and the left lower field.   Abdominal: There is no tenderness.   LLQ cystostomy bag C/D/I with translucent yellow urine in bag    Musculoskeletal: He exhibits no edema (1+).   Neurological: He is alert and oriented to person, place, and time.       Significant Labs:   CMP     Recent Labs  Lab 10/13/17  0435 10/14/17  0347   * 136   K 4.2 3.5   CL 88* 89*   CO2 37* 36*   * 144*   BUN 40* 33*   CREATININE 0.8 0.7   CALCIUM 9.3 8.5*   ANIONGAP 9 11   ESTGFRAFRICA >60.0 >60.0   EGFRNONAA >60.0 >60.0    and CBC     Recent Labs  Lab 10/13/17  0435 10/14/17  0347   WBC 28.60* 31.05*   HGB 9.2* 9.1*   HCT 29.9* 29.1*   * 426*

## 2017-10-14 NOTE — ASSESSMENT & PLAN NOTE
Cardiology following and feel likely related to atrial fibrillation as patient with normal diastolic and systolic function on 2 D echo. Will continue to diuresis with Lasix 40 mg po daily and monitor strict I+O's to see how patient is responding to diuretics. Patient so far in hospital is net negative 7 liters.

## 2017-10-14 NOTE — SUBJECTIVE & OBJECTIVE
Interval History: Patient continues afebrile no incident during night. Mentions improvement from dyspnea.     Review of Systems   Constitutional: Positive for fatigue. Negative for activity change, appetite change, chills, diaphoresis and fever.   Respiratory: Positive for shortness of breath. Negative for cough and choking.    Cardiovascular: Negative for chest pain.   Gastrointestinal: Negative for abdominal distention, abdominal pain, diarrhea, nausea and vomiting.   Skin: Negative for rash.     Objective:     Vital Signs (Most Recent):  Temp: 98.1 °F (36.7 °C) (10/14/17 1721)  Pulse: 104 (10/14/17 1721)  Resp: (!) 21 (10/14/17 1721)  BP: (!) 105/58 (10/14/17 1721)  SpO2: 99 % (10/14/17 1721) Vital Signs (24h Range):  Temp:  [97.9 °F (36.6 °C)-98.5 °F (36.9 °C)] 98.1 °F (36.7 °C)  Pulse:  [] 104  Resp:  [18-21] 21  SpO2:  [97 %-99 %] 99 %  BP: (105-148)/(58-69) 105/58     Weight: 59.1 kg (130 lb 4.7 oz)  Body mass index is 21.03 kg/m².    Estimated Creatinine Clearance: 89.1 mL/min (based on SCr of 0.7 mg/dL).    Physical Exam   Constitutional: He is oriented to person, place, and time. He appears well-developed and well-nourished.   HENT:   Head: Normocephalic and atraumatic.   Eyes: Conjunctivae and EOM are normal. Pupils are equal, round, and reactive to light.   Neck: Normal range of motion.   Cardiovascular: Normal rate, regular rhythm and normal heart sounds.    Pulmonary/Chest: Effort normal.   Decreased breath sounds at bases    Abdominal: Soft. Bowel sounds are normal.   Neurological: He is alert and oriented to person, place, and time.       Significant Labs:   Blood Culture:     Recent Labs  Lab 09/22/17  1725 10/09/17  1015 10/09/17  1144 10/10/17  1355 10/10/17  1400   LABBLOO No growth after 5 days. No growth after 5 days. No growth after 5 days. No Growth to date  No Growth to date  No Growth to date  No Growth to date  No Growth to date No Growth to date  No Growth to date  No Growth  to date  No Growth to date  No Growth to date     BMP:     Recent Labs  Lab 10/14/17  0347   *      K 3.5   CL 89*   CO2 36*   BUN 33*   CREATININE 0.7   CALCIUM 8.5*   MG 1.6     CBC:     Recent Labs  Lab 10/13/17  0435 10/14/17  0347   WBC 28.60* 31.05*   HGB 9.2* 9.1*   HCT 29.9* 29.1*   * 426*     Microbiology Results (last 7 days)     Procedure Component Value Units Date/Time    Blood culture [[id number]] Collected:  10/10/17 1355    Order Status:  Completed Specimen:  Blood from Peripheral, Forearm, Left Updated:  10/14/17 1612     Blood Culture, Routine No Growth to date     Blood Culture, Routine No Growth to date     Blood Culture, Routine No Growth to date     Blood Culture, Routine No Growth to date     Blood Culture, Routine No Growth to date    Blood Culture #1 **CANNOT BE ORDERED STAT** [[id number]] Collected:  10/10/17 1400    Order Status:  Completed Specimen:  Blood from Peripheral, Forearm, Left Updated:  10/14/17 1612     Blood Culture, Routine No Growth to date     Blood Culture, Routine No Growth to date     Blood Culture, Routine No Growth to date     Blood Culture, Routine No Growth to date     Blood Culture, Routine No Growth to date    Blood culture x two cultures. Draw prior to antibiotics. [[id number]] Collected:  10/09/17 1144    Order Status:  Completed Specimen:  Blood from Peripheral, Hand, Left Updated:  10/14/17 1412     Blood Culture, Routine No growth after 5 days.    Narrative:       Aerobic and anaerobic    Blood culture x two cultures. Draw prior to antibiotics. [[id number]] Collected:  10/09/17 1015    Order Status:  Completed Specimen:  Blood from Peripheral, Upper Arm, Right Updated:  10/14/17 1212     Blood Culture, Routine No growth after 5 days.    Narrative:       Aerobic and anaerobic    Gram stain [[id number]] Collected:  10/13/17 1346    Order Status:  Completed Specimen:  Body Fluid from Lung, Left Updated:  10/13/17 1751     Gram Stain Result Rare  WBC's      No organisms seen    Fungus culture [[id number]] Collected:  10/13/17 1346    Order Status:  Sent Specimen:  Body Fluid from Lung, Left Updated:  10/13/17 1449    AFB culture (includes stain) [[id number]] Collected:  10/13/17 1346    Order Status:  Sent Specimen:  Body Fluid from Lung, Left Updated:  10/13/17 1449    Culture, Respiratory with Gram Stain [[id number]] Collected:  10/13/17 1348    Order Status:  Sent Specimen:  Respiratory from BAL, LLL Updated:  10/13/17 1349    Urine culture [[id number]]  (Susceptibility) Collected:  10/09/17 1015    Order Status:  Completed Specimen:  Urine Updated:  10/12/17 1536     Urine Culture, Routine --     STAPHYLOCOCCUS EPIDERMIDIS  > 100,000 cfu/ml      Narrative:       Preferred Collection Type->Urine, Clean Catch          Significant Imaging: I have reviewed all pertinent imaging results/findings within the past 24 hours.

## 2017-10-14 NOTE — ASSESSMENT & PLAN NOTE
-Patient continues to have persistent b/l pleural effusions inspite of diuresis -seems actually worse + symptomatic  -thoracentesis today; xarelto held overnight

## 2017-10-15 PROBLEM — F32.0 MILD SINGLE CURRENT EPISODE OF MAJOR DEPRESSIVE DISORDER: Status: ACTIVE | Noted: 2017-01-01

## 2017-10-15 NOTE — ASSESSMENT & PLAN NOTE
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 ID consulted for leukocytosis - patient complaining of SOB, intermittent non-productive cough, otherwise, no localizing signs or symptoms of infection - suspect leukocytosis in setting of cancer progression. Pleural fluid with WBC >900, 80% segs unclear if infected, aerobic culture not done, have empirically started ceftriaxone.  Peripheral WBC does not appear to be related to infection at this time.  F/u cultures and cont CTX

## 2017-10-15 NOTE — ASSESSMENT & PLAN NOTE
- Worsening WBC  - Patient remains afebrile  - Blood cultures negative  - Urine cultures; remarkable for coagulase neg staph most likely a contaminant     - CXR notable for bilateral effusions   - Consult ID  -           Thoracentesis done, protein 4.1, , ; serum protein & LDH pending  - Started rocephin   - D/c clinda per ID

## 2017-10-15 NOTE — ASSESSMENT & PLAN NOTE
Improved after treatment with Magnesium Sulfate 2 grams IV for 1 dose on 10/14. Monitor daily level with goal of Magnesium level > 2.

## 2017-10-15 NOTE — ASSESSMENT & PLAN NOTE
Discussed with patient who expressed irving depressed concerning his situation and recent diagnosis of bladder cancer and complications since. Patient denies any suicidal thoughts. Will start Sertraline 25 mg po daily to treat and refer to Psychology in Heme/Onc clinic as outpatient.

## 2017-10-15 NOTE — SUBJECTIVE & OBJECTIVE
Interval History: afebrile, WBC 29K    Review of Systems   Constitutional: Negative for activity change, appetite change, chills, fatigue and fever.   HENT: Negative for congestion, dental problem, mouth sores and sinus pressure.    Eyes: Negative for pain, redness and visual disturbance.   Respiratory: Negative for cough, shortness of breath and wheezing.    Cardiovascular: Negative for chest pain and leg swelling.   Gastrointestinal: Negative for abdominal distention, abdominal pain, diarrhea, nausea and vomiting.   Endocrine: Negative for polyuria.   Genitourinary: Negative for decreased urine volume, dysuria and frequency.   Musculoskeletal: Negative for joint swelling.   Skin: Negative for color change.   Allergic/Immunologic: Negative for food allergies.   Neurological: Negative for dizziness, weakness and headaches.   Hematological: Negative for adenopathy.   Psychiatric/Behavioral: Negative for agitation and confusion. The patient is not nervous/anxious.      Objective:     Vital Signs (Most Recent):  Temp: 97.8 °F (36.6 °C) (10/15/17 0726)  Pulse: (!) 115 (10/15/17 0726)  Resp: (!) 21 (10/15/17 0726)  BP: 125/66 (10/15/17 0726)  SpO2: 98 % (10/15/17 0726) Vital Signs (24h Range):  Temp:  [97.7 °F (36.5 °C)-98.5 °F (36.9 °C)] 97.8 °F (36.6 °C)  Pulse:  [] 115  Resp:  [18-22] 21  SpO2:  [97 %-99 %] 98 %  BP: ()/(57-83) 125/66     Weight: 60.2 kg (132 lb 11.5 oz)  Body mass index is 21.42 kg/m².    Estimated Creatinine Clearance: 90.8 mL/min (based on SCr of 0.7 mg/dL).    Physical Exam   Constitutional: He is oriented to person, place, and time. He appears well-developed and well-nourished.   HENT:   Head: Normocephalic and atraumatic.   Mouth/Throat: Oropharynx is clear and moist.   Eyes: Conjunctivae are normal.   Neck: Neck supple.   Cardiovascular: Normal rate, regular rhythm and normal heart sounds.    No murmur heard.  Pulmonary/Chest: Effort normal and breath sounds normal. No respiratory  distress. He has no wheezes.   Decrease breath sounds b/l bases   Abdominal: Soft. Bowel sounds are normal. He exhibits no distension. There is no tenderness.   Musculoskeletal: Normal range of motion. He exhibits no edema or tenderness.   Lymphadenopathy:     He has no cervical adenopathy.   Neurological: He is alert and oriented to person, place, and time. Coordination normal.   Skin: Skin is warm and dry. No rash noted.   Psychiatric: He has a normal mood and affect. His behavior is normal.       Significant Labs:   CBC:   Recent Labs  Lab 10/14/17  0347 10/15/17  0555   WBC 31.05* 29.49*   HGB 9.1* 8.8*   HCT 29.1* 28.8*   * 359*     CMP:   Recent Labs  Lab 10/14/17  0347 10/15/17  0555    136   K 3.5 3.7   CL 89* 90*   CO2 36* 36*   * 125*   BUN 33* 30*   CREATININE 0.7 0.7   CALCIUM 8.5* 8.9   ANIONGAP 11 10   EGFRNONAA >60.0 >60.0       Significant Imaging: I have reviewed all pertinent imaging results/findings within the past 24 hours.     10/13 USLE negative  9/22 CTA multiple pleural based nodules throughout both lungs with some demonstrating cavitation    micro    10/13 sputum cx not in lab, gram stain negative, fluid , 82%, 8 lymphs, unclear if cytology sent, added on aerobic culture today 10/15

## 2017-10-15 NOTE — ASSESSMENT & PLAN NOTE
-  Switched to Toprol  mg home dose  - Continue diltiazem 120mg  - Patient with atrial fibrillation and CHADS-VAS score 2 or > 2; Restart xarelto; no chest tube per pulmonary  - Amiodarone 400 BID *14 days (Started 10/11/17 - 10/24/17)  - Switch to Amiodarone 200 Daily (10/24/17)  -          Echo with preserved EF     Cardiology will sign off, please don't hesitate to call for for questions.  Thank you.

## 2017-10-15 NOTE — SUBJECTIVE & OBJECTIVE
Interval History: Patient reports improvement in SOB today. Patient states he is feeling overall better. Patient reports feeling depressed about his situation as he states one thing after another seems to be happening to him since march when first diagnosed with cancer. Patient denies any suicidal or homicidal thoughts. Patient requesting medication for depression.     Review of Systems   Constitutional: Negative for chills and fever.   Respiratory: Positive for shortness of breath. Negative for cough.    Cardiovascular: Negative for chest pain, palpitations and leg swelling.   Gastrointestinal: Negative for abdominal pain, diarrhea, nausea and vomiting.   Genitourinary: Negative for dysuria.   Musculoskeletal: Negative for arthralgias and back pain.   Skin: Negative for rash.   Neurological: Negative for dizziness and light-headedness.   Psychiatric/Behavioral: Positive for dysphoric mood. Negative for agitation, confusion, sleep disturbance and suicidal ideas.     Objective:     Vital Signs (Most Recent):  Temp: 97.5 °F (36.4 °C) (10/15/17 1217)  Pulse: 73 (10/15/17 1500)  Resp: 18 (10/15/17 1217)  BP: (!) 92/59 (10/15/17 1217)  SpO2: 97 % (10/15/17 1217) Vital Signs (24h Range):  Temp:  [97.5 °F (36.4 °C)-98.2 °F (36.8 °C)] 97.5 °F (36.4 °C)  Pulse:  [] 73  Resp:  [18-22] 18  SpO2:  [97 %-99 %] 97 %  BP: ()/(57-83) 92/59     Weight: 60.2 kg (132 lb 11.5 oz)  Body mass index is 21.42 kg/m².    Intake/Output Summary (Last 24 hours) at 10/15/17 1512  Last data filed at 10/15/17 0600   Gross per 24 hour   Intake              510 ml   Output              825 ml   Net             -315 ml      Physical Exam   Constitutional: He is oriented to person, place, and time. He appears well-developed and well-nourished. He appears distressed (Mild respiratory ).   HENT:   Mouth/Throat: Oropharynx is clear and moist.   Eyes: Conjunctivae are normal.   Neck: Neck supple. No JVD present.   Cardiovascular: Normal rate  and normal heart sounds.  An irregularly irregular rhythm present. Exam reveals no gallop and no friction rub.    No murmur heard.  Pulmonary/Chest: Effort normal. He has decreased breath sounds in the right lower field and the left lower field. He has no wheezes. He has no rales.   Abdominal: Soft. Bowel sounds are normal. He exhibits no distension. There is no tenderness.   Musculoskeletal: He exhibits no edema or tenderness.   Neurological: He is alert and oriented to person, place, and time.   Skin: Skin is warm. Capillary refill takes less than 2 seconds. No rash noted. No erythema.   Psychiatric: His speech is normal and behavior is normal. He exhibits a depressed mood.       Significant Labs:   CBC:   Recent Labs  Lab 10/14/17  0347 10/15/17  0555   WBC 31.05* 29.49*   HGB 9.1* 8.8*   HCT 29.1* 28.8*   * 359*     CMP:   Recent Labs  Lab 10/14/17  0347 10/15/17  0555    136   K 3.5 3.7   CL 89* 90*   CO2 36* 36*   * 125*   BUN 33* 30*   CREATININE 0.7 0.7   CALCIUM 8.5* 8.9   ANIONGAP 11 10   EGFRNONAA >60.0 >60.0     Magnesium level 1.9    Significant Imaging: I have reviewed all pertinent imaging results/findings within the past 24 hours.

## 2017-10-15 NOTE — PROGRESS NOTES
[facility] Hospital Medicine  Progress Note    Patient Name: Amador Harrison  MRN: [id number]  Patient Class: IP- Inpatient   Admission Date: 10/9/2017  Length of Stay: 6 days  Attending Physician: Yuki Mack MD  Primary Care Provider: Waqar Brady MD    [facility] Medicine Team: [facility] K Yuki Mack MD    Subjective:     Principal Problem:Atrial fibrillation with RVR    HPI:  Mr. 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 In August patient had post op CT scan which was notable for pulmonary nodules and ground glass opacities and suspected to be possibly infectious given the rapid development (prior CT 5/17). Patient developed cough and SOB and repeat CT 9/18/17 was notable for progression of these lesions and more likely metastatic disease. He underwent bronchoscopic biopsy 9/25/17 and path was consistent with urothelial mets. Patient was then briefly admitted in 9/2017 with atrial fibrillation (not on anticoagulation) who presents to the ED with a complaints of progressively woreseing shortness of breath and fatigue since last week.      The patient states he's had baseline SOB since being diagnosed with mets to the lung 2 weeks ago, however since last week it's been progressively getting worse. Previously he could ambulate around the house, shower, make breakfast; however now he needs help and gets SOB w/ minimal exertion (on standing; walking to the restroom). Associated with PND, worsening orthopnea (pt sits upright to sleep now), chronic cough and new leg swelling since last night. Denies any fevers, chills, WG, n/v, CP.     Hospital Course:  Patient  admitted to cardiac ICU and found to have acute decompensated CHF in the setting of Afib RVR; Patient placed on Amiodarone drip and diuresised with IV Lasix 40 mg IV BID. Patient eventually converted to NS and switched to oral Amiodarone 400 mg po BID for rhythm control. Patient placed on Toprol  mg po daily + Diltiazem  mg po daily for rate control. Patient was placed on Xarelto 20 mg po daily for long term anticoagulation for atrial fibrillation. CXR was remarkable for bilateral pleural effusion which was initially attributed to pulmonary edema or malignant pleural effusions. However patient had a persistently elevated WBC count but remained afebrile. ID was consulted for the leukocytosis with WBC in 25,000-30,000 range and eventually pulmonary was consulted for thoracentesis; to determine whether the effusion was secondary to infection or malignancy progression. ID consulted on 10/12 and concerned about infectious cause of pleural effusions so Pulmonary consulted and patient on 10/13 underwent thoracentesis by Pulmonary on 10/13 (Xarelto held for procedure) and 1400 mL of bloody fluid removed from left lung pleural space and studies sent (LDH, protein, bacterial/fungal/AFB stains and culture, and cytology). Prelim thoracentesis results showed  with 82% segs. ID concerned for infectious cause so patient started on IV Ceftriaxone and Clindamycin empirically on 10/13. Patient since admit is net negative 7 liters of fluid with Lasix and switched to oral Lasix 40 mg po daily on 10/13 by Cardiology. Patient on 3 liters of oxygen with sats of 98% on 10/14. Patient expressed thoughts of being depressed and sad with recent illnesses and requesting medication for depression and patient started on Zoloft 25 mg po daily. Discussed with patient that follow-up with Psychology in Heme/Onc department as outpatient might be of benefit and patient seemed amenable. Patient reports improvement in SOB on 10/15.  Patient remains in and out of atrial fibrillation on telemetry but rate controlled and Xarelto restarted on 10/15 at 20 mg po daily for long term anticoagulation for atrial fibrillation.     Interval History: Patient reports improvement in SOB today. Patient states he is feeling overall better. Patient reports feeling depressed about his situation as he states one thing after another seems to be happening to him since march when first diagnosed with cancer. Patient denies any suicidal or homicidal thoughts. Patient requesting medication for depression.     Review of Systems   Constitutional: Negative for chills and fever.   Respiratory: Positive for shortness of breath. Negative for cough.    Cardiovascular: Negative for chest pain, palpitations and leg swelling.   Gastrointestinal: Negative for abdominal pain, diarrhea, nausea and vomiting.   Genitourinary: Negative for dysuria.   Musculoskeletal: Negative for arthralgias and back pain.   Skin: Negative for rash.   Neurological: Negative for dizziness and light-headedness.   Psychiatric/Behavioral: Positive for dysphoric mood. Negative for agitation, confusion, sleep disturbance and suicidal ideas.     Objective:     Vital Signs (Most Recent):  Temp: 97.5 °F (36.4 °C) (10/15/17 1217)  Pulse: 73 (10/15/17 1500)  Resp: 18 (10/15/17 1217)  BP: (!) 92/59 (10/15/17 1217)  SpO2: 97 % (10/15/17 1217) Vital Signs (24h Range):  Temp:  [97.5 °F (36.4 °C)-98.2 °F (36.8 °C)] 97.5 °F (36.4 °C)  Pulse:  [] 73  Resp:  [18-22] 18  SpO2:  [97 %-99 %] 97 %  BP: ()/(57-83) 92/59     Weight: 60.2 kg (132 lb 11.5 oz)  Body mass index is 21.42 kg/m².    Intake/Output Summary (Last 24 hours) at 10/15/17 1512  Last data filed at 10/15/17 0600   Gross per 24 hour   Intake              510 ml   Output              825 ml   Net             -315 ml      Physical Exam   Constitutional: He is oriented to person, place, and time. He appears well-developed and well-nourished. He  appears distressed (Mild respiratory ).   HENT:   Mouth/Throat: Oropharynx is clear and moist.   Eyes: Conjunctivae are normal.   Neck: Neck supple. No JVD present.   Cardiovascular: Normal rate and normal heart sounds.  An irregularly irregular rhythm present. Exam reveals no gallop and no friction rub.    No murmur heard.  Pulmonary/Chest: Effort normal. He has decreased breath sounds in the right lower field and the left lower field. He has no wheezes. He has no rales.   Abdominal: Soft. Bowel sounds are normal. He exhibits no distension. There is no tenderness.   Musculoskeletal: He exhibits no edema or tenderness.   Neurological: He is alert and oriented to person, place, and time.   Skin: Skin is warm. Capillary refill takes less than 2 seconds. No rash noted. No erythema.   Psychiatric: His speech is normal and behavior is normal. He exhibits a depressed mood.       Significant Labs:   CBC:   Recent Labs  Lab 10/14/17  0347 10/15/17  0555   WBC 31.05* 29.49*   HGB 9.1* 8.8*   HCT 29.1* 28.8*   * 359*     CMP:   Recent Labs  Lab 10/14/17  0347 10/15/17  0555    136   K 3.5 3.7   CL 89* 90*   CO2 36* 36*   * 125*   BUN 33* 30*   CREATININE 0.7 0.7   CALCIUM 8.5* 8.9   ANIONGAP 11 10   EGFRNONAA >60.0 >60.0     Magnesium level 1.9    Significant Imaging: I have reviewed all pertinent imaging results/findings within the past 24 hours.    Assessment/Plan:      * Atrial fibrillation with RVR    Long-term (current) use of anticoagulants  Cardiology following and managing. Patient in and out of atrial fibrillation but rate controlled. Plan to continue Amiodarone 400 mg po BID for rhythm control and Toprol  po daily + Diltiazem  mg po daily. Xarelto for long term anticoagulation held for thoracentesis on 10/13 and no plans for further thoracentesis so will restart Xarelto at 20 mg po daily. Continue to monitor on telemetry.          Acute respiratory failure with hypoxia    Unchanged.  Likely related to pleural effusions. Patient currently on 3 liters of oxygen with oxygen sats of 98%. Plan is to try and wean oxygen with goal of oxygen sats > 92%. Will continue to diuresis patient with Lasix 40 mg po daily to see if helps with effusions.           Leukocytosis    Mild improvement. Infectious disease consulted and following and suspect lungs are source. Blood and urine cultures are negative. ID concerned for para pneumonic effusions/empyema as predominant neutrophils on diagnostic thoracentesis. Cultures for pleural fluid sent and are pending. Patient on empiric Ceftriaxone 2 grams IV daily as per ID recommendations. Monitor daily CBC.           Acute decompensated heart failure    Improved. Cardiology following and feel likely related to atrial fibrillation as patient with normal diastolic and systolic function on 2 D echo. Will continue to diuresis with Lasix 40 mg po daily and monitor strict I+O's to see how patient is responding to diuretics. Patient so far in hospital is net negative 7 liters.           Hypokalemia    Improved. Will treat with KCL tablet 20 mEq po daily as patient is on loop diuretic and monitor daily levels. Goal is potassium level > 4.           Hypomagnesemia    Improved after treatment with Magnesium Sulfate 2 grams IV for 1 dose on 10/14. Monitor daily level with goal of Magnesium level > 2.             Bilateral pleural effusion    Etiology unclear and could be multifactorial (heart failure vs. malignant as patient with known lung mets from bladder cancer vs. infectious). Pulmonary consulted and following and patient underwent diagnostic left sided thoracentesis on 10/13 and 1400 mL removed. Studies sent and awaiting full results. Prelim results noted with 965 WBC with 82% segs so neutrophilic predominance raises concern for infection. LDH from serum pending but fluid LDH noted to be 166 with glucose of 123. Patient on empiric antibiotics with Ceftriaxone for now as per  ID recs and pleural fluid culture sent and results pending.         Essential hypertension    Patient's blood pressure is controlled here in the hospital over past 24 hours. Goal for blood pressure is SBP < 150 and DBP < 90 as patient > or = 60 years of age with no diabetes or advanced kidney disease based on JNC 8 guidelines. Continue current treatment regimen with Toprol  mg po daily + Diltiazem  mg po daily. Plan to continue to monitor patient's blood pressure routinely while patient is hospitalized.             Hypoalbuminemia    Related to acute illness and active malignancy. Nutrition supplements for support.           Malignant neoplasm of trigone of urinary bladder    Lung metastases  Patient with known lung mets from bladder carcinoma. All chemotherapy treatment on hold for now due to acute illness. Continue routine ileo conduit care as patient s/p cystectomy in 7/2017. Heme/Onc evaluated patient in the hospital and plan on discharge for patient to follow-up in Heme/Onc clinic to start Keytruda infusions (immunotherapy) for treatment.        Mild single current episode of major depressive disorder    Discussed with patient who expressed irving depressed concerning his situation and recent diagnosis of bladder cancer and complications since. Patient denies any suicidal thoughts. Will start Sertraline 25 mg po daily to treat and refer to Psychology in Heme/Onc clinic as outpatient.             VTE Risk Mitigation         Ordered     rivaroxaban tablet 20 mg  With dinner     Route:  Oral        10/15/17 1509     Medium Risk of VTE  Once      10/09/17 1729     Place ALEJANDRO hose  Until discontinued      10/09/17 1729     Place sequential compression device  Until discontinued      10/09/17 1729          Plan for Home Health PT/OT on discharge as per PT/OT recs once medically stable.     Yuki Mack MD  Department of Hospital Medicine   [facility]

## 2017-10-15 NOTE — ASSESSMENT & PLAN NOTE
Lung metastases  Patient with known lung mets from bladder carcinoma. All chemotherapy treatment on hold for now due to acute illness. Continue routine ileo conduit care as patient s/p cystectomy in 7/2017. Heme/Onc evaluated patient in the hospital and plan on discharge for patient to follow-up in Heme/Onc clinic to start Keytruda infusions (immunotherapy) for treatment.

## 2017-10-15 NOTE — SUBJECTIVE & OBJECTIVE
Interval History:   NAEON. Patient reports improvement in SOB but still requiring oxygen, still requiring oxygen. Reports feeling depressed; will discuss it with primary. Denies any CP, cough, fever, chills, n/v.     Has a tendency to revert back to afib but converts back to NS. Heart rate stable.     Review of Systems   Constitution: Negative for chills, diaphoresis, fever, weakness, malaise/fatigue and night sweats.   HENT: Negative for odynophagia and sore throat.    Eyes: Negative for blurred vision and discharge.   Cardiovascular: Positive for dyspnea on exertion. Negative for chest pain, irregular heartbeat, leg swelling, near-syncope, orthopnea, palpitations, paroxysmal nocturnal dyspnea and syncope.   Respiratory: Positive for shortness of breath. Negative for cough, sleep disturbances due to breathing and wheezing.    Skin: Negative for color change and rash.   Musculoskeletal: Positive for back pain. Negative for muscle cramps and muscle weakness.   Gastrointestinal: Negative for constipation, diarrhea, nausea and vomiting.   Genitourinary: Negative for flank pain and hematuria.   Neurological: Negative for focal weakness, headaches and light-headedness.   All other systems reviewed and are negative.    Objective:     Vital Signs (Most Recent):  Temp: 97.5 °F (36.4 °C) (10/15/17 1217)  Pulse: 80 (10/15/17 1400)  Resp: 18 (10/15/17 1217)  BP: (!) 92/59 (10/15/17 1217)  SpO2: 97 % (10/15/17 1217) Vital Signs (24h Range):  Temp:  [97.5 °F (36.4 °C)-98.2 °F (36.8 °C)] 97.5 °F (36.4 °C)  Pulse:  [] 80  Resp:  [18-22] 18  SpO2:  [97 %-99 %] 97 %  BP: ()/(57-83) 92/59     Weight: 60.2 kg (132 lb 11.5 oz)  Body mass index is 21.42 kg/m².     SpO2: 97 %  O2 Device (Oxygen Therapy): nasal cannula      Intake/Output Summary (Last 24 hours) at 10/15/17 1444  Last data filed at 10/15/17 0600   Gross per 24 hour   Intake              510 ml   Output              825 ml   Net             -315 ml        Lines/Drains/Airways     Central Venous Catheter Line                 Port A Cath Single Lumen 03/15/17 0800  214 days          Drain                 Ureteral Drain/Stent 07/05/17 1218 Right ureter 5 Fr. 102 days         Urostomy 07/05/17 1226 ileal conduit  days                Physical Exam   Constitutional: He is oriented to person, place, and time. He appears well-developed and well-nourished.   HENT:   Head: Atraumatic.   Eyes: Conjunctivae and EOM are normal. Pupils are equal, round, and reactive to light.   Neck: Normal range of motion. Neck supple. No JVD present.   Cardiovascular: Normal rate and regular rhythm.  Exam reveals no gallop.    Pulmonary/Chest: No accessory muscle usage. No respiratory distress. He has decreased breath sounds in the right lower field and the left lower field.   Abdominal: There is no tenderness.   LLQ cystostomy bag C/D/I with translucent yellow urine in bag    Musculoskeletal: He exhibits no edema (1+).   Neurological: He is alert and oriented to person, place, and time.   Vitals reviewed.      Significant Labs:   CMP     Recent Labs  Lab 10/14/17  0347 10/15/17  0555    136   K 3.5 3.7   CL 89* 90*   CO2 36* 36*   * 125*   BUN 33* 30*   CREATININE 0.7 0.7   CALCIUM 8.5* 8.9   ANIONGAP 11 10   ESTGFRAFRICA >60.0 >60.0   EGFRNONAA >60.0 >60.0   , CBC     Recent Labs  Lab 10/14/17  0347 10/15/17  0555   WBC 31.05* 29.49*   HGB 9.1* 8.8*   HCT 29.1* 28.8*   * 359*    and INR No results for input(s): INR, PROTIME in the last 48 hours.

## 2017-10-15 NOTE — ASSESSMENT & PLAN NOTE
Long-term (current) use of anticoagulants  Cardiology following and managing. Patient in and out of atrial fibrillation but rate controlled. Plan to continue Amiodarone 400 mg po BID for rhythm control and Toprol  po daily + Diltiazem  mg po daily. Xarelto for long term anticoagulation held for thoracentesis on 10/13 and no plans for further thoracentesis so will restart Xarelto at 20 mg po daily. Continue to monitor on telemetry.

## 2017-10-15 NOTE — ASSESSMENT & PLAN NOTE
Unchanged. Likely related to pleural effusions. Patient currently on 3 liters of oxygen with oxygen sats of 98%. Plan is to try and wean oxygen with goal of oxygen sats > 92%. Will continue to diuresis patient with Lasix 40 mg po daily to see if helps with effusions.

## 2017-10-15 NOTE — PROGRESS NOTES
[facility]  Cardiology  Progress Note    Patient Name: Amador Harrison  MRN: [id number]  Admission Date: 10/9/2017  Hospital Length of Stay: 6 days  Code Status: Full Code   Attending Physician: Yuki Mack MD   Primary Care Physician: Waqar Brady MD  Expected Discharge Date: 10/16/2017  Principal Problem:Atrial fibrillation with RVR    Subjective:     Interval History:   NAEON. Patient reports improvement in SOB but still requiring oxygen, still requiring oxygen. Reports feeling depressed; will discuss it with primary. Denies any CP, cough, fever, chills, n/v.     Has a tendency to revert back to afib but converts back to NS. Heart rate stable.     Review of Systems   Constitution: Negative for chills, diaphoresis, fever, weakness, malaise/fatigue and night sweats.   HENT: Negative for odynophagia and sore throat.    Eyes: Negative for blurred vision and discharge.   Cardiovascular: Positive for dyspnea on exertion. Negative for chest pain, irregular heartbeat, leg swelling, near-syncope, orthopnea, palpitations, paroxysmal nocturnal dyspnea and syncope.   Respiratory: Positive for shortness of breath. Negative for cough, sleep disturbances due to breathing and wheezing.    Skin: Negative for color change and rash.   Musculoskeletal: Positive for back pain. Negative for muscle cramps and muscle weakness.   Gastrointestinal: Negative for constipation, diarrhea, nausea and vomiting.   Genitourinary: Negative for flank pain and hematuria.   Neurological: Negative for focal weakness, headaches and light-headedness.   All other systems reviewed and are negative.    Objective:     Vital Signs (Most Recent):  Temp: 97.5 °F (36.4 °C) (10/15/17 1217)  Pulse: 80 (10/15/17 1400)  Resp: 18 (10/15/17 1217)  BP: (!) 92/59 (10/15/17 1217)  SpO2: 97 % (10/15/17 1217) Vital Signs (24h Range):  Temp:  [97.5 °F (36.4 °C)-98.2 °F (36.8 °C)] 97.5 °F (36.4 °C)  Pulse:  [] 80  Resp:  [18-22] 18  SpO2:   [97 %-99 %] 97 %  BP: ()/(57-83) 92/59     Weight: 60.2 kg (132 lb 11.5 oz)  Body mass index is 21.42 kg/m².     SpO2: 97 %  O2 Device (Oxygen Therapy): nasal cannula      Intake/Output Summary (Last 24 hours) at 10/15/17 1444  Last data filed at 10/15/17 0600   Gross per 24 hour   Intake              510 ml   Output              825 ml   Net             -315 ml       Lines/Drains/Airways     Central Venous Catheter Line                 Port A Cath Single Lumen 03/15/17 0800  214 days          Drain                 Ureteral Drain/Stent 07/05/17 1218 Right ureter 5 Fr. 102 days         Urostomy 07/05/17 1226 ileal conduit  days                Physical Exam   Constitutional: He is oriented to person, place, and time. He appears well-developed and well-nourished.   HENT:   Head: Atraumatic.   Eyes: Conjunctivae and EOM are normal. Pupils are equal, round, and reactive to light.   Neck: Normal range of motion. Neck supple. No JVD present.   Cardiovascular: Normal rate and regular rhythm.  Exam reveals no gallop.    Pulmonary/Chest: No accessory muscle usage. No respiratory distress. He has decreased breath sounds in the right lower field and the left lower field.   Abdominal: There is no tenderness.   LLQ cystostomy bag C/D/I with translucent yellow urine in bag    Musculoskeletal: He exhibits no edema (1+).   Neurological: He is alert and oriented to person, place, and time.   Vitals reviewed.      Significant Labs:   CMP     Recent Labs  Lab 10/14/17  0347 10/15/17  0555    136   K 3.5 3.7   CL 89* 90*   CO2 36* 36*   * 125*   BUN 33* 30*   CREATININE 0.7 0.7   CALCIUM 8.5* 8.9   ANIONGAP 11 10   ESTGFRAFRICA >60.0 >60.0   EGFRNONAA >60.0 >60.0   , CBC     Recent Labs  Lab 10/14/17  0347 10/15/17  0555   WBC 31.05* 29.49*   HGB 9.1* 8.8*   HCT 29.1* 28.8*   * 359*    and INR No results for input(s): INR, PROTIME in the last 48 hours.        Assessment and Plan:       * Atrial  fibrillation with RVR    -  Switched to Toprol  mg home dose  - Continue diltiazem 120mg  - Patient with atrial fibrillation and CHADS-VAS score 2 or > 2; Restart xarelto; no chest tube per pulmonary  - Amiodarone 400 BID *14 days (Started 10/11/17 - 10/24/17)  - Switch to Amiodarone 200 Daily (10/24/17)  -          Echo with preserved EF     Cardiology will sign off, please don't hesitate to call for questions.  Thank you.            Leukocytosis      - Worsening WBC  - Patient remains afebrile  - Blood cultures negative  - Urine cultures; remarkable for coagulase neg staph most likely a contaminant     - CXR notable for bilateral effusions   - Consult ID  -           Thoracentesis done, protein 4.1, , ; serum protein & LDH pending  - Started rocephin   - D/c clinda per ID            Acute decompensated heart failure    Acute diastolic in setting of atrial fibrillation  -           Echo done; EF 55%.   - Continue lasix 40 mg po QD  - Strict I/Os  - Avoid nephro toxic drugs  - Daily weights   -  monitor lytes and replete as needed           Essential hypertension    - Diltiazem 120 mg XL  - Toprol 200 daily XL         Hypoalbuminemia    Most likely secondary to malnutrition    - Boost protein shakes  - Encouraging high protein diet, protein bars           Hypokalemia    - monitor and replete as needed         Bilateral pleural effusion    -Patient continues to have persistent b/l pleural effusions inspite of diuresis             VTE Risk Mitigation         Ordered     Medium Risk of VTE  Once      10/09/17 1729     Place ALEJANDRO hose  Until discontinued      10/09/17 1729     Place sequential compression device  Until discontinued      10/09/17 1729          Malinda Diana MD  Cardiology  [facility]

## 2017-10-15 NOTE — PLAN OF CARE
Problem: Fall Risk (Adult)  Intervention: Patient Rounds  Plan of care discussed with patient. Patient is free of fall/trauma/injury but remains on fall precautions. Remains on O2 3ln/c and SOB noted without exertion.  Oxycodone continued for lower back pain. Antibiotics adjusted.  All questions addressed pt and wife. Will continue to monitor closely

## 2017-10-15 NOTE — ASSESSMENT & PLAN NOTE
Etiology unclear and could be multifactorial (heart failure vs. malignant as patient with known lung mets from bladder cancer vs. infectious). Pulmonary consulted and following and patient underwent diagnostic left sided thoracentesis on 10/13 and 1400 mL removed. Studies sent and awaiting full results. Prelim results noted with 965 WBC with 82% segs so neutrophilic predominance raises concern for infection. LDH from serum pending but fluid LDH noted to be 166 with glucose of 123. Patient on empiric antibiotics with Ceftriaxone for now as per ID recs and pleural fluid culture sent and results pending.

## 2017-10-15 NOTE — ASSESSMENT & PLAN NOTE
Mild improvement. Infectious disease consulted and following and suspect lungs are source. Blood and urine cultures are negative. ID concerned for para pneumonic effusions/empyema as predominant neutrophils on diagnostic thoracentesis. Cultures for pleural fluid sent and are pending. Patient on empiric Ceftriaxone 2 grams IV daily as per ID recommendations. Monitor daily CBC.

## 2017-10-15 NOTE — ASSESSMENT & PLAN NOTE
Improved. Cardiology following and feel likely related to atrial fibrillation as patient with normal diastolic and systolic function on 2 D echo. Will continue to diuresis with Lasix 40 mg po daily and monitor strict I+O's to see how patient is responding to diuretics. Patient so far in hospital is net negative 7 liters.

## 2017-10-15 NOTE — PROGRESS NOTES
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 ID consulted for leukocytosis - patient complaining of SOB, intermittent non-productive cough, otherwise, no localizing signs or symptoms of infection - suspect leukocytosis in setting of cancer progression. Pleural fluid with WBC >900, 80% segs unclear if infected, aerobic culture not done, have empirically started ceftriaxone.  Peripheral WBC does not appear to be related to infection at this time.  F/u cultures and cont CTX              Anticipated Disposition: pending    Thank you for your consult. I will follow-up with patient. Please contact us if you have any additional questions.    Cosme Pedroza MD  Infectious Disease  [facility]    Subjective:     Principal Problem:Atrial fibrillation with RVR    HPI: Case of 63 y/o male admitted on 10/9/17 PMHx. 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 Patient received 2 cycles of neoadjuvant cis/germ with PD that failed to improve cancer. Patient presented to ED due to progressive worsening of baseline SOB for 1 week evolution. Mentions unable to perform daily living activities, fatigue, low grade subjective fevers. Has to sit up to sleep, unable to tolerate recumbent.  Mentions also associated cough and bilateral leg swelling. Since admission patient with Afib FVR not controlled, started this admission on xarelto for anticoagulation. Was consulted to ID at this time for additional recommendations.    Interval History: afebrile, WBC 29K    Review of Systems   Constitutional: Negative for activity change, appetite change, chills, fatigue and fever.   HENT: Negative for congestion, dental problem, mouth sores and sinus pressure.    Eyes: Negative for pain, redness and visual disturbance.   Respiratory: Negative for cough, shortness of breath and wheezing.    Cardiovascular: Negative for chest pain and leg swelling.   Gastrointestinal: Negative for abdominal distention, abdominal pain, diarrhea, nausea and vomiting.   Endocrine: Negative for polyuria.   Genitourinary: Negative for decreased urine volume, dysuria and frequency.   Musculoskeletal: Negative for joint swelling.   Skin: Negative for color change.   Allergic/Immunologic: Negative for food allergies.   Neurological: Negative for dizziness, weakness and headaches.   Hematological: Negative for adenopathy.   Psychiatric/Behavioral: Negative for agitation and confusion. The patient is not nervous/anxious.      Objective:     Vital Signs (Most Recent):  Temp: 97.8 °F (36.6 °C) (10/15/17 0726)  Pulse: (!) 115 (10/15/17 0726)  Resp: (!) 21 (10/15/17 0726)  BP: 125/66 (10/15/17 0726)  SpO2: 98 % (10/15/17 0726) Vital Signs (24h Range):  Temp:  [97.7 °F (36.5 °C)-98.5 °F (36.9 °C)] 97.8 °F (36.6 °C)  Pulse:  [] 115  Resp:  [18-22] 21  SpO2:  [97 %-99 %] 98 %  BP: ()/(57-83) 125/66     Weight: 60.2 kg (132 lb 11.5 oz)  Body mass index is 21.42 kg/m².    Estimated Creatinine Clearance: 90.8 mL/min (based on SCr of 0.7 mg/dL).    Physical Exam   Constitutional: He is oriented to person, place, and time. He appears well-developed and well-nourished.   HENT:   Head: Normocephalic and atraumatic.   Mouth/Throat: Oropharynx is  clear and moist.   Eyes: Conjunctivae are normal.   Neck: Neck supple.   Cardiovascular: Normal rate, regular rhythm and normal heart sounds.    No murmur heard.  Pulmonary/Chest: Effort normal and breath sounds normal. No respiratory distress. He has no wheezes.   Decrease breath sounds b/l bases   Abdominal: Soft. Bowel sounds are normal. He exhibits no distension. There is no tenderness.   Musculoskeletal: Normal range of motion. He exhibits no edema or tenderness.   Lymphadenopathy:     He has no cervical adenopathy.   Neurological: He is alert and oriented to person, place, and time. Coordination normal.   Skin: Skin is warm and dry. No rash noted.   Psychiatric: He has a normal mood and affect. His behavior is normal.       Significant Labs:   CBC:   Recent Labs  Lab 10/14/17  0347 10/15/17  0555   WBC 31.05* 29.49*   HGB 9.1* 8.8*   HCT 29.1* 28.8*   * 359*     CMP:   Recent Labs  Lab 10/14/17  0347 10/15/17  0555    136   K 3.5 3.7   CL 89* 90*   CO2 36* 36*   * 125*   BUN 33* 30*   CREATININE 0.7 0.7   CALCIUM 8.5* 8.9   ANIONGAP 11 10   EGFRNONAA >60.0 >60.0       Significant Imaging: I have reviewed all pertinent imaging results/findings within the past 24 hours.     10/13 USLE negative  9/22 CTA multiple pleural based nodules throughout both lungs with some demonstrating cavitation    micro    10/13 sputum cx not in lab, gram stain negative, fluid , 82%, 8 lymphs, unclear if cytology sent, added on aerobic culture today 10/15

## 2017-10-16 NOTE — PLAN OF CARE
Problem: Patient Care Overview  Goal: Plan of Care Review  Outcome: Ongoing (interventions implemented as appropriate)  Pt free of falls and injury. VSS; POC reviewed and questions answered. Pt tolerating plan of care.

## 2017-10-16 NOTE — PLAN OF CARE
Problem: Occupational Therapy Goal  Goal: Occupational Therapy Goal  Goals to be met by: 10/19/2017    Patient will increase functional independence with ADLs by performing:    UE Dressing with Set-up Assistance.  LE Dressing with Stand-by Assistance.  Grooming while standing with Contact Guard Assistance.  Toileting from toilet with Stand-by Assistance for hygiene and clothing management.   Stand pivot transfers with Moderate Assistance.-MET  Toilet transfer to toilet with Contact Guard Assistance.     Outcome: Ongoing (interventions implemented as appropriate)  Continue OT plan of care.

## 2017-10-16 NOTE — PLAN OF CARE
Problem: Patient Care Overview  Goal: Plan of Care Review  Outcome: Ongoing (interventions implemented as appropriate)  Pt denies Chest pain SOB or nausea. C/o pain on lower extremities, prn pain meds given, mod relief obtained. No falls, trauma or injury noted. VSS. 3L NC. Iv antibiotics. po amio daily. RLQ urostomy bag. PT/OT following. S/p thoracentesis-1.5L fluid removed. Plan of care reviewed with patient. No further questions at this time. No significant events.Spouse at bedside. Will continue to monitor.

## 2017-10-16 NOTE — PT/OT/SLP PROGRESS
Physical Therapy  Treatment    Amador Harrison   MRN: [id number]   Admitting Diagnosis: Atrial fibrillation with RVR    PT Received On: 10/16/17  PT Start Time: 1035     PT Stop Time: 1105    PT Total Time (min): 30 min       Billable Minutes:  Gait [id number] and Therapeutic Activity 8 (co-tx with OT)    Treatment Type: Treatment  PT/PTA: PT     PTA Visit Number: 0       General Precautions: Standard, fall  Orthopedic Precautions: N/A   Braces: N/A    Do you have any cultural, spiritual, Yazdanism conflicts, given your current situation?: none noted     Subjective:  Communicated with RN prior to session.  Pt agreeable to therapy. Pt's wife asking about getting a w/c so that she can take pt to visitor [place].     Pain/Comfort  Pain Rating 1: 0/10    Objective:   Patient found with: telemetry, oxygen (urostomy )    Functional Mobility:  Bed Mobility:   Supine to Sit: Minimum Assistance, With side rail (with HOB elevated)    Transfers:  Sit <> Stand Assistance: Contact Guard Assistance (x2 reps)  Sit <> Stand Assistive Device: Rolling Walker  Bed <> Chair Technique: Stand Pivot  Bed <> Chair Assistance: Contact Guard Assistance  Bed <> Chair Assistive Device: Rolling Walker    Gait:   Gait Distance: 2 ft. stand pivot bed>chair + 10 ft. within room   Assistance 1: Contact Guard Assistance  Gait Assistive Device: Rolling walker  Gait Pattern: 3-point gait  Gait Deviation(s): decreased david, increased time in double stance, decreased velocity of limb motion, decreased step length, decreased weight-shifting ability, decreased toe-to-floor clearance   Seated rest break between ambulation trials; chair follow throughout 2nd ambulation trials   Cues for RW management and maintaining close proximity to RW    Balance:   Static Sit: supervision-SBA  Dynamic Sit: SBA-CGA  Static Stand: CGA  Dynamic stand: CGA     Therapeutic Activities and Exercises:  Transferred supine>sit with Kamari. Mild dizziness reported upon transitioning  to upright position. Pt cued on pursed lip breathing. Increased time spent sitting EOB prior to progressing mobility to allow dizziness to subside.   Donned 2nd gown as robe. Donned underwear with assist.   Stand pivot bed>chair with RW and CGA. Required seated rest break following.  W/c located during session so that pt's wife can transport pt to visitor [place]. Pt's wife educated on how to manage portable O2 for transport outside of room.   Ambulated from bedside chair within room x10 ft with w/c follow throughout. Pt's wife then transported pt to visitor [place]. RN notified.       AM-PAC 6 CLICK MOBILITY  How much help from another person does this patient currently need?   1 = Unable, Total/Dependent Assistance  2 = A lot, Maximum/Moderate Assistance   3 = A little, Minimum/Contact Guard/Supervision  4 = None, Modified [place]/Independent    Turning over in bed (including adjusting bedclothes, sheets and blankets)?: 3  Sitting down on and standing up from a chair with arms (e.g., wheelchair, bedside commode, etc.): 3  Moving from lying on back to sitting on the side of the bed?: 3  Moving to and from a bed to a chair (including a wheelchair)?: 3  Need to walk in hospital room?: 3  Climbing 3-5 steps with a railing?: 1  Total Score: 16    AM-PAC Raw Score CMS G-Code Modifier Level of Impairment Assistance   6 % Total / Unable   7 - 9 CM 80 - 100% Maximal Assist   10 - 14 CL 60 - 80% Moderate Assist   15 - 19 CK 40 - 60% Moderate Assist   20 - 22 CJ 20 - 40% Minimal Assist   23 CI 1-20% SBA / CGA   24 CH 0% Independent/ Mod I     Patient left seated in w/c with all lines intact, call button in reach, RN notified notified and pt's wife present.    Assessment:  Amador Harrison is a 62 y.o. male with a medical diagnosis of Atrial fibrillation with RVR and presents with SOB with activity, requiring supplemental O2. Pt progressing functional mobility compared to initial evaluation, as pt was able to  perform transfers and gait without physical assist. However, pt did require CGA and RW for improved safety and stability. Further ambulation and (I) with functional mobility remains limited by impaired endurance and weakness. Pt would continue to benefit from skilled acute PT in order to address current deficits and progress functional mobility. Anticipate that pt will be able to return home with HH when medically appropriate.      Rehab identified problem list/impairments: Rehab identified problem list/impairments: weakness, impaired functional mobilty, impaired endurance, gait instability, impaired balance, impaired self care skills, pain, impaired cardiopulmonary response to activity, decreased coordination    Rehab potential is good.    Activity tolerance: Good    Discharge recommendations: Discharge Facility/Level Of Care Needs: home with home health     Barriers to discharge: Barriers to Discharge: None    Equipment recommendations: Equipment Needed After Discharge:  (TBD)     GOALS:    Physical Therapy Goals        Problem: Physical Therapy Goal    Goal Priority Disciplines Outcome Goal Variances Interventions   Physical Therapy Goal     PT/OT, PT Ongoing (interventions implemented as appropriate)     Description:  Goals to be met by: 10/22/17     Patient will increase functional independence with mobility by performin. Supine to sit with Supervision   2. Sit to stand transfer with Stand-by Assistance  3. Bed to chair transfer with Contact Guard Assistance using Rolling Walker or appropriate AD as needed - met 10/16  4. Gait  x 20 feet with Contact Guard Assistance using Rolling Walker or appropriate AD as needed.   5. Stand for 2 minutes with Stand-by Assistance, while performing dynamic task, using appropriate AD as needed  6. Lower extremity exercise program x15 reps, with supervision, in order to increase LE strength and (I) with functional mobility.                        PLAN:    Patient to be  seen 4 x/week  to address the above listed problems via gait training, therapeutic activities, therapeutic exercises, neuromuscular re-education  Plan of Care expires: 11/10/17  Plan of Care reviewed with: patient, spouse        Ivana Jason, PT, DPT   10/16/2017  [phone]

## 2017-10-16 NOTE — PT/OT/SLP PROGRESS
Occupational Therapy  Treatment    Amador Harrison   MRN: [id number]   Admitting Diagnosis: Atrial fibrillation with RVR    OT Date of Treatment: 10/16/17   OT Start Time: 1035  OT Stop Time: 1108  OT Total Time (min): 33 min    Billable Minutes:  Self Care/Home Management 8 and Therapeutic Activity 15    General Precautions: Standard, fall  Orthopedic Precautions: N/A  Braces: N/A    Subjective:  Communicated with RN prior to session. Pt agreeable to OT/PT. Reported wanting to get out of room.    Pain/Comfort  Pain Rating 1: 0/10  Pain Rating Post-Intervention 1: 0/10    Objective:  Patient found with: telemetry, oxygen (urostomy)     Functional Mobility:  Bed Mobility:  Supine to Sit: Minimum Assistance    Transfers:  Sit <> Stand Assistance: Contact Guard Assistance from EOB and bedside chair  Sit <> Stand Assistive Device: Rolling Walker    Functional Ambulation: Few steps from EOB to bedside chair and then ~10 ft in room with CGA using RW    Activities of Daily Living:  UE Dressing Level of Assistance: Minimum assistance to don gown around back  LE Dressing Level of Assistance: Maximum assistance to don underwear; assist to don over B feed and to fully pull up while standing    Balance:   Static Sit: GOOD+: Takes MAXIMAL challenges from all directions.    Dynamic Sit: GOOD-: Maintains balance through MODERATE excursions of active trunk movement,     Static Stand: FAIR+: Takes MINIMAL challenges from all directions  Dynamic stand: FAIR: Needs CONTACT GUARD during gait    Therapeutic Activities and Exercises:  Pt supine in bed, required Min A for bed mobility; performed LB/UB dressing while EOB; stand pivot T/F to bedside chair with CGA using RW; seated rest break and then pt able to perform few more feet within the room; left up in W/C and assisted to visitor[place] (RN aware)    AM-PAC 6 CLICK ADL   How much help from another person does this patient currently need?   1 = Unable, Total/Dependent Assistance  2 =  "A lot, Maximum/Moderate Assistance  3 = A little, Minimum/Contact Guard/Supervision  4 = None, Modified [place]/Independent    Putting on and taking off regular lower body clothing? : 2  Bathing (including washing, rinsing, drying)?: 3  Toileting, which includes using toilet, bedpan, or urinal? : 3  Putting on and taking off regular upper body clothing?: 3  Taking care of personal grooming such as brushing teeth?: 3  Eating meals?: 4  Total Score: 18     AM-PAC Raw Score CMS "G-Code Modifier Level of Impairment Assistance   6 % Total / Unable   7 - 8 CM 80 - 100% Maximal Assist   9-13 CL 60 - 80% Moderate Assist   14 - 19 CK 40 - 60% Moderate Assist   20 - 22 CJ 20 - 40% Minimal Assist   23 CI 1-20% SBA / CGA   24 CH 0% Independent/ Mod I       Patient left up in W/C with all lines intact, RN notified and wife present    ASSESSMENT:  Amador Harrison is a 62 y.o. male with a medical diagnosis of Atrial fibrillation with RVR and presents with deficits listed below. Pt improved T/Fs today, only able to ambulate short distance before fatigue/SOB. Pt progressing toward goals and would continue to benefit from OT to increase independence. Recommend HH upon D/C.    Rehab identified problem list/impairments: Rehab identified problem list/impairments: weakness, impaired endurance, impaired self care skills, impaired functional mobilty, impaired cardiopulmonary response to activity    Rehab potential is good.    Activity tolerance: Fair    Discharge recommendations: Discharge Facility/Level Of Care Needs: home with home health     Barriers to discharge: Barriers to Discharge: None    Equipment recommendations:  (TBD)     GOALS:    Occupational Therapy Goals        Problem: Occupational Therapy Goal    Goal Priority Disciplines Outcome Interventions   Occupational Therapy Goal     OT, PT/OT Ongoing (interventions implemented as appropriate)    Description:  Goals to be met by: 10/19/2017    Patient will increase " functional independence with ADLs by performing:    UE Dressing with Set-up Assistance.  LE Dressing with Stand-by Assistance.  Grooming while standing with Contact Guard Assistance.  Toileting from toilet with Stand-by Assistance for hygiene and clothing management.   Stand pivot transfers with Moderate Assistance.-MET  Toilet transfer to toilet with Contact Guard Assistance.                       Plan:  Patient to be seen 3 x/week to address the above listed problems via self-care/home management, therapeutic activities, therapeutic exercises  Plan of Care expires: 11/09/17  Plan of Care reviewed with: patient, spouse         KYLER Vickers  10/16/2017

## 2017-10-16 NOTE — PROGRESS NOTES
[facility] Hospital Medicine  Progress Note    Patient Name: Amador Harrison  MRN: [id number]  Patient Class: IP- Inpatient   Admission Date: 10/9/2017  Length of Stay: 7 days  Attending Physician: Yuki Mack MD  Primary Care Provider: Waqar Brady MD    [facility] Medicine Team: [facility]  Yuki Mack MD    Subjective:     Principal Problem:Atrial fibrillation with RVR    HPI:  Mr. 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 In August patient had post op CT scan which was notable for pulmonary nodules and ground glass opacities and suspected to be possibly infectious given the rapid development (prior CT 5/17). Patient developed cough and SOB and repeat CT 9/18/17 was notable for progression of these lesions and more likely metastatic disease. He underwent bronchoscopic biopsy 9/25/17 and path was consistent with urothelial mets. Patient was then briefly admitted in 9/2017 with atrial fibrillation (not on anticoagulation) who presents to the ED with a complaints of progressively woreseing shortness of breath and fatigue since last week.      The patient states he's had baseline SOB since being diagnosed with mets to the lung 2 weeks ago, however since last week it's been progressively getting worse. Previously he could ambulate around the house, shower, make breakfast; however now he needs help and gets SOB w/ minimal exertion (on standing; walking to the restroom). Associated with PND, worsening orthopnea (pt sits upright to sleep now), chronic cough and new leg swelling since last night. Denies any fevers, chills, WG, n/v, CP.     Hospital Course:  Patient  admitted to cardiac ICU and found to have acute decompensated CHF in the setting of Afib RVR; Patient placed on Amiodarone drip and diuresised with IV Lasix 40 mg IV BID. Patient eventually converted to NS and switched to oral Amiodarone 400 mg po BID for rhythm control. Patient placed on Toprol  mg po daily + Diltiazem  mg po daily for rate control. Patient was placed on Xarelto 20 mg po daily for long term anticoagulation for atrial fibrillation. CXR was remarkable for bilateral pleural effusion which was initially attributed to pulmonary edema or malignant pleural effusions. However patient had a persistently elevated WBC count but remained afebrile. ID was consulted for the leukocytosis with WBC in 25,000-30,000 range and eventually pulmonary was consulted for thoracentesis; to determine whether the effusion was secondary to infection or malignancy progression. ID consulted on 10/12 and concerned about infectious cause of pleural effusions so Pulmonary consulted and patient on 10/13 underwent thoracentesis by Pulmonary on 10/13 (Xarelto held for procedure) and 1400 mL of bloody fluid removed from left lung pleural space and studies sent (LDH, protein, bacterial/fungal/AFB stains and culture, and cytology). Prelim thoracentesis results showed  with 82% segs. ID concerned for infectious cause so patient started on IV Ceftriaxone and Clindamycin empirically on 10/13. Patient since admit is net negative 7 liters of fluid with Lasix and switched to oral Lasix 40 mg po daily on 10/13 by Cardiology. Patient on 3 liters of oxygen with sats of 98% on 10/14. Patient expressed thoughts of being depressed and sad with recent illnesses and requesting medication for depression and patient started on Zoloft 25 mg po daily. Discussed with patient that follow-up with Psychology in Heme/Onc department as outpatient might be of benefit and patient seemed amenable. Patient reports improvement in SOB on 10/15.  Patient remains in and out of atrial fibrillation on telemetry but rate controlled and Xarelto restarted on 10/15 at 20 mg po daily for long term anticoagulation for atrial fibrillation. Patient with brief episode of atrial fib with RVR on 10/16 but resolved on its own. Patient with slight improvement in WBC to 27,000 on 10/16. No growth so far from pleural fluid.     Interval History: Patient reports symptomatically he feels better and feels his SOB is improved. Patient worked with therapy this am and recommending Home Health PT but wife requested referral to [facility] because they feel patient needs more therapy prior to discharge. I told wife we could place referral but patient is not yet medically stable yet for discharge. Patient had brief episode of atrial fibrillation with RVR this am around 7:15 am but resolved quickly on its own and by time EKG done at 7:21 am but with atrial flutter but with controlled rate of 98. Patient remains on oxygen at 3 liters with oxygen sats of 97%.     Review of Systems   Constitutional: Negative for chills and fever.   Respiratory: Positive for shortness of breath. Negative for cough.    Cardiovascular: Negative for chest pain, palpitations and leg swelling.   Gastrointestinal: Negative for abdominal pain, diarrhea, nausea and vomiting.   Genitourinary: Negative for dysuria.   Musculoskeletal: Negative for arthralgias and back pain.   Skin: Negative for rash.   Neurological: Negative for dizziness and light-headedness.   Psychiatric/Behavioral: Positive for dysphoric mood. Negative for agitation, confusion, sleep disturbance and suicidal ideas.     Objective:     Vital Signs (Most Recent):  Temp: 97 °F (36.1 °C) (10/16/17 1524)  Pulse: 69 (10/16/17 1524)  Resp: 20 (10/16/17 1524)  BP: (!) 111/57 (10/16/17 1524)  SpO2: 97 % (10/16/17 1524) Vital Signs (24h Range):  Temp:  [97 °F (36.1 °C)-98.5 °F (36.9 °C)] 97 °F (36.1 °C)  Pulse:  [] 69  Resp:  [17-22] 20  SpO2:  [97  %-100 %] 97 %  BP: (111-119)/(57-86) 111/57     Weight: 60.2 kg (132 lb 11.5 oz)  Body mass index is 21.42 kg/m².    Intake/Output Summary (Last 24 hours) at 10/16/17 1535  Last data filed at 10/16/17 1400   Gross per 24 hour   Intake             1575 ml   Output             1025 ml   Net              550 ml      Physical Exam   Constitutional: He is oriented to person, place, and time. He appears well-developed and well-nourished. No distress.   HENT:   Mouth/Throat: Oropharynx is clear and moist.   Eyes: Conjunctivae are normal.   Neck: Neck supple. No JVD present.   Cardiovascular: Normal rate and normal heart sounds.  An irregularly irregular rhythm present. Exam reveals no gallop and no friction rub.    No murmur heard.  Pulmonary/Chest: Effort normal. He has decreased breath sounds in the right lower field and the left lower field. He has no wheezes. He has no rales.   Abdominal: Soft. Bowel sounds are normal. He exhibits no distension. There is no tenderness.   Musculoskeletal: He exhibits no edema or tenderness.   Neurological: He is alert and oriented to person, place, and time.   Skin: Skin is warm. Capillary refill takes less than 2 seconds. No rash noted. No erythema.   Psychiatric: His speech is normal and behavior is normal. He exhibits a depressed mood.       Significant Labs:   CBC:   Recent Labs  Lab 10/15/17  0555 10/16/17  0545   WBC 29.49* 27.64*   HGB 8.8* 9.1*   HCT 28.8* 29.4*   * 439*     CMP:   Recent Labs  Lab 10/15/17  0555 10/16/17  0545    136   K 3.7 4.1   CL 90* 90*   CO2 36* 35*   * 110   BUN 30* 30*   CREATININE 0.7 0.8   CALCIUM 8.9 9.2   ANIONGAP 10 11   EGFRNONAA >60.0 >60.0     Results for orders placed or performed during the hospital encounter of 10/09/17   Culture, Body Fluid - Bactec   Result Value Ref Range    Body Fluid Culture, Sterile No growth to date       Significant Imaging: I have reviewed all pertinent imaging results/findings within the past 24  hours.    Assessment/Plan:      * Atrial fibrillation with RVR    Long-term (current) use of anticoagulants  Cardiology following and managing. Patient in and out of atrial fibrillation but rate controlled. Plan to continue Amiodarone 400 mg po BID for rhythm control and Toprol  po daily + Diltiazem  mg po daily. Xarelto for long term anticoagulation held for thoracentesis on 10/13 and no plans for further thoracentesis so patient restarted Xarelto at 20 mg po daily on 10/15. Continue to monitor on telemetry.          Acute respiratory failure with hypoxia    Unchanged. Likely related to pleural effusions. Patient currently on 3 liters of oxygen with oxygen sats of 97% but symptomatically patient is feeling better. Plan is to try and wean oxygen with goal of oxygen sats > 92%. Will continue to diuresis patient with Lasix 40 mg po daily to see if helps with effusions.           Leukocytosis    Mild improvement down to 27,000 on 10/16 down from 29,000 yesterday. Infectious disease consulted and following and suspect lungs are source. Blood and urine cultures are negative. ID concerned for para pneumonic effusions/empyema as predominant neutrophils on diagnostic thoracentesis. Cultures for pleural fluid sent and so far no growth. Patient on empiric Ceftriaxone 2 grams IV daily as per ID recommendations. Monitor daily CBC.           Acute decompensated heart failure    Improved. Cardiology following and feel likely related to atrial fibrillation as patient with normal diastolic and systolic function on 2 D echo. Will continue to diuresis with Lasix 40 mg po daily and monitor strict I+O's to see how patient is responding to diuretics. Patient so far in hospital is net negative 7 liters.           Hypokalemia    Resolved. Will treat with KCL tablet 20 mEq po daily as patient is on loop diuretic and monitor daily levels. Goal is potassium level > 4.           Hypomagnesemia    Resolved after treatment with  Magnesium Sulfate 2 grams IV for 1 dose on 10/14. Monitor daily level with goal of Magnesium level > 2.             Bilateral pleural effusion    Etiology unclear and could be multifactorial (heart failure vs. malignant as patient with known lung mets from bladder cancer vs. infectious). Pulmonary consulted and following and patient underwent diagnostic left sided thoracentesis on 10/13 and 1400 mL removed. Studies sent and awaiting full results. Prelim results noted with 965 WBC with 82% segs so neutrophilic predominance raises concern for infection. LDH from serum pending but fluid LDH noted to be 166 with glucose of 123. Patient on empiric antibiotics with Ceftriaxone for now as per ID recs and pleural fluid culture sent so far no growth.        Essential hypertension    Patient's blood pressure is controlled here in the hospital over past 24 hours. Goal for blood pressure is SBP < 150 and DBP < 90 as patient > or = 60 years of age with no diabetes or advanced kidney disease based on JNC 8 guidelines. Continue current treatment regimen with Toprol  mg po daily + Diltiazem  mg po daily. Plan to continue to monitor patient's blood pressure routinely while patient is hospitalized.             Hypoalbuminemia    Related to acute illness and active malignancy. Nutrition supplements for support.           Malignant neoplasm of trigone of urinary bladder    Lung metastases  Patient with known lung mets from bladder carcinoma. All chemotherapy treatment on hold for now due to acute illness. Continue routine ileo conduit care as patient s/p cystectomy in 7/2017. Heme/Onc evaluated patient in the hospital and plan on discharge for patient to follow-up in Heme/Onc clinic to start Keytruda infusions (immunotherapy) for treatment.        Mild single current episode of major depressive disorder    Discussed with patient who expressed irving depressed concerning his situation and recent diagnosis of bladder cancer and  complications since. Patient denies any suicidal thoughts. Will start Sertraline 25 mg po daily to treat and refer to Psychology in Heme/Onc clinic as outpatient.             VTE Risk Mitigation         Ordered     rivaroxaban tablet 20 mg  With dinner     Route:  Oral        10/15/17 1509     Medium Risk of VTE  Once      10/09/17 1729     Place ALEJANDRO hose  Until discontinued      10/09/17 1729     Place sequential compression device  Until discontinued      10/09/17 1729              Yuki Mack MD  Department of Hospital Medicine   [facility]

## 2017-10-16 NOTE — ASSESSMENT & PLAN NOTE
Long-term (current) use of anticoagulants  Cardiology following and managing. Patient in and out of atrial fibrillation but rate controlled. Plan to continue Amiodarone 400 mg po BID for rhythm control and Toprol  po daily + Diltiazem  mg po daily. Xarelto for long term anticoagulation held for thoracentesis on 10/13 and no plans for further thoracentesis so patient restarted Xarelto at 20 mg po daily on 10/15. Continue to monitor on telemetry.

## 2017-10-16 NOTE — ASSESSMENT & PLAN NOTE
Resolved after treatment with Magnesium Sulfate 2 grams IV for 1 dose on 10/14. Monitor daily level with goal of Magnesium level > 2.

## 2017-10-16 NOTE — PLAN OF CARE
Discharge planning: Patient not medically ready for discharge. Plan is for home health at discharge.

## 2017-10-16 NOTE — SUBJECTIVE & OBJECTIVE
Interval History: Patient afebrile, no events during night     Review of Systems   Constitutional: Positive for fatigue. Negative for activity change, appetite change, chills, diaphoresis and fever.   Respiratory: Positive for shortness of breath. Negative for cough and chest tightness.    Gastrointestinal: Negative for abdominal distention, abdominal pain, diarrhea, nausea and vomiting.   Skin: Negative for rash.     Objective:     Vital Signs (Most Recent):  Temp: 97 °F (36.1 °C) (10/16/17 1524)  Pulse: 69 (10/16/17 1524)  Resp: 20 (10/16/17 1524)  BP: (!) 111/57 (10/16/17 1524)  SpO2: 97 % (10/16/17 1524) Vital Signs (24h Range):  Temp:  [97 °F (36.1 °C)-98.5 °F (36.9 °C)] 97 °F (36.1 °C)  Pulse:  [] 69  Resp:  [17-22] 20  SpO2:  [97 %-100 %] 97 %  BP: (111-119)/(57-86) 111/57     Weight: 60.2 kg (132 lb 11.5 oz)  Body mass index is 21.42 kg/m².    Estimated Creatinine Clearance: 79.4 mL/min (based on SCr of 0.8 mg/dL).    Physical Exam   Constitutional: He is oriented to person, place, and time. He appears well-developed and well-nourished.   HENT:   Head: Normocephalic and atraumatic.   Eyes: EOM are normal. Pupils are equal, round, and reactive to light.   Neck: Normal range of motion.   Cardiovascular: Normal rate, regular rhythm and normal heart sounds.    Pulmonary/Chest: Effort normal and breath sounds normal.   Abdominal: Soft. Bowel sounds are normal.   Musculoskeletal: Normal range of motion. He exhibits no edema.   Neurological: He is alert and oriented to person, place, and time.   Skin: No rash noted.       Significant Labs:   Blood Culture:   Recent Labs  Lab 09/22/17  1725 10/09/17  1015 10/09/17  1144 10/10/17  1355 10/10/17  1400   LABBLOO No growth after 5 days. No growth after 5 days. No growth after 5 days. No growth after 5 days. No growth after 5 days.     BMP:   Recent Labs  Lab 10/16/17  0545         K 4.1   CL 90*   CO2 35*   BUN 30*   CREATININE 0.8   CALCIUM 9.2   MG  2.1     CBC:   Recent Labs  Lab 10/15/17  0555 10/16/17  0545   WBC 29.49* 27.64*   HGB 8.8* 9.1*   HCT 28.8* 29.4*   * 439*       Significant Imaging: I have reviewed all pertinent imaging results/findings within the past 24 hours.

## 2017-10-16 NOTE — CONSULTS
Wound ostomy care consulted for pouch change.    Patient/wife had no questions, pouch needed to be changed and they had no scissors to cut pouch.    Plan of care discussed with patient/family who verbalized understanding. Supplies left at bedside with pre-cut pouch.       10/16/17 0830       Urostomy 07/05/17 1226 ileal conduit RLQ   Date of First Assessment/Time of First Assessment: 07/05/17 1226   Present Prior to Hospital Arrival?: Yes  Inserted by: MD  Urostomy Type: ileal conduit  Location: RLQ   Stoma Appearance round;moist;pink;protruding above skin level   Stoma Size (in) 30 mm   Stomal Appliance 1 piece;Dry;Intact;No Leakage;Changed;To drainage bag to dependent drainage   Accessories/Skin Care cleansed w/ water;skin sealant   Stoma Function yellow urine   Peristomal Skin dry   Tolerance no signs/symptoms of discomfort

## 2017-10-16 NOTE — ASSESSMENT & PLAN NOTE
Patient CXR with findings of bilateral pleural effusion with history of squamous cell bladder cancer with metastasis. Current presentation concerning for malignant pleural effusion due to bilateral involvement of lungs, past medical malignant history, acute onset over a chronic SOB, lack of fever or productive cough. Patient also has associated decompensated CHF in the setting of Afiv FVR that had not been managed prior to admission that could lead to pulmonary edema. Thoracentesis performed 10/13/17 by pulmonary team. Pleural fluid with WBC elevated at 965 predominance of segmented cells over lymphocytes, glucose normal and protein 4.1 elevated.Graim stain no signs of organisms and cultures in progress at this time. Will follow results. Preliminary results suggestive of empyema vs exudate non infectious origen. Will recommend to continue ceftriaxone until reevaluation of culture from pleural fluid, if negative tomorrow will recommend to discontinue. Would be beneficial to send samples for cytology. At this time since preliminary results of culture no growth higher suspicion for malignant effusion.     - continue ceftriaxone 2g IV daily   - Will follow thoracentesis for final culture results

## 2017-10-16 NOTE — PROGRESS NOTES
[facility]  Infectious Disease  Progress Note    Patient Name: Amador Harrison  MRN: [id number]  Admission Date: 10/9/2017  Length of Stay: 7 days  Attending Physician: Yuki Mack MD  Primary Care Provider: Waqar Brady MD    Isolation Status: No active isolations  Assessment/Plan:      Bilateral pleural effusion    Patient CXR with findings of bilateral pleural effusion with history of squamous cell bladder cancer with metastasis. Current presentation concerning for malignant pleural effusion due to bilateral involvement of lungs, past medical malignant history, acute onset over a chronic SOB, lack of fever or productive cough. Patient also has associated decompensated CHF in the setting of Afiv FVR that had not been managed prior to admission that could lead to pulmonary edema. Thoracentesis performed 10/13/17 by pulmonary team. Pleural fluid with WBC elevated at 965 predominance of segmented cells over lymphocytes, glucose normal and protein 4.1 elevated.Graim stain no signs of organisms and cultures in progress at this time. Will follow results. Preliminary results suggestive of empyema vs exudate non infectious origen. Will recommend to continue ceftriaxone until reevaluation of culture from pleural fluid, if negative tomorrow will recommend to discontinue. Would be beneficial to send samples for cytology. At this time since preliminary results of culture no growth higher suspicion for malignant effusion.     - continue ceftriaxone 2g IV daily   - Will follow thoracentesis for final culture results                 Anticipated Disposition: continue antibiotics     Thank you for your consult. I will follow-up with patient. Please contact us if you have any additional questions.    Braydon Whaley MD  Infectious Disease  [facility]    Subjective:     Principal Problem:Atrial fibrillation with RVR    HPI: Case of 61 y/o male admitted on 10/9/17 PMHx. [place]  squamous cell bladder cancer diagnosed on 3/2017 now with mets to lung diagnosed 8/2017. Patient received 2 cycles of neoadjuvant cis/germ with PD that failed to improve cancer. Patient presented to ED due to progressive worsening of baseline SOB for 1 week evolution. Mentions unable to perform daily living activities, fatigue, low grade subjective fevers. Has to sit up to sleep, unable to tolerate recumbent. Mentions also associated cough and bilateral leg swelling. Since admission patient with Afib FVR not controlled, started this admission on xarelto for anticoagulation. Was consulted to ID at this time for additional recommendations.    Interval History: Patient afebrile, no events during night     Review of Systems   Constitutional: Positive for fatigue. Negative for activity change, appetite change, chills, diaphoresis and fever.   Respiratory: Positive for shortness of breath. Negative for cough and chest tightness.    Gastrointestinal: Negative for abdominal distention, abdominal pain, diarrhea, nausea and vomiting.   Skin: Negative for rash.     Objective:     Vital Signs (Most Recent):  Temp: 97 °F (36.1 °C) (10/16/17 1524)  Pulse: 69 (10/16/17 1524)  Resp: 20 (10/16/17 1524)  BP: (!) 111/57 (10/16/17 1524)  SpO2: 97 % (10/16/17 1524) Vital Signs (24h Range):  Temp:  [97 °F (36.1 °C)-98.5 °F (36.9 °C)] 97 °F (36.1 °C)  Pulse:  [] 69  Resp:  [17-22] 20  SpO2:  [97 %-100 %] 97 %  BP: (111-119)/(57-86) 111/57     Weight: 60.2 kg (132 lb 11.5 oz)  Body mass index is 21.42 kg/m².    Estimated Creatinine Clearance: 79.4 mL/min (based on SCr of 0.8 mg/dL).    Physical Exam   Constitutional: He is oriented to person, place, and time. He appears well-developed and well-nourished.   HENT:   Head: Normocephalic and atraumatic.   Eyes: EOM are normal. Pupils are equal, round, and reactive to light.   Neck: Normal range of motion.   Cardiovascular: Normal rate, regular rhythm and normal heart sounds.     Pulmonary/Chest: Effort normal and breath sounds normal.   Abdominal: Soft. Bowel sounds are normal.   Musculoskeletal: Normal range of motion. He exhibits no edema.   Neurological: He is alert and oriented to person, place, and time.   Skin: No rash noted.       Significant Labs:   Blood Culture:   Recent Labs  Lab 09/22/17  1725 10/09/17  1015 10/09/17  1144 10/10/17  1355 10/10/17  1400   LABBLOO No growth after 5 days. No growth after 5 days. No growth after 5 days. No growth after 5 days. No growth after 5 days.     BMP:   Recent Labs  Lab 10/16/17  0545         K 4.1   CL 90*   CO2 35*   BUN 30*   CREATININE 0.8   CALCIUM 9.2   MG 2.1     CBC:   Recent Labs  Lab 10/15/17  0555 10/16/17  0545   WBC 29.49* 27.64*   HGB 8.8* 9.1*   HCT 28.8* 29.4*   * 439*       Significant Imaging: I have reviewed all pertinent imaging results/findings within the past 24 hours.

## 2017-10-16 NOTE — ASSESSMENT & PLAN NOTE
Unchanged. Likely related to pleural effusions. Patient currently on 3 liters of oxygen with oxygen sats of 97% but symptomatically patient is feeling better. Plan is to try and wean oxygen with goal of oxygen sats > 92%. Will continue to diuresis patient with Lasix 40 mg po daily to see if helps with effusions.

## 2017-10-16 NOTE — ASSESSMENT & PLAN NOTE
Mild improvement down to 27,000 on 10/16 down from 29,000 yesterday. Infectious disease consulted and following and suspect lungs are source. Blood and urine cultures are negative. ID concerned for para pneumonic effusions/empyema as predominant neutrophils on diagnostic thoracentesis. Cultures for pleural fluid sent and so far no growth. Patient on empiric Ceftriaxone 2 grams IV daily as per ID recommendations. Monitor daily CBC.

## 2017-10-16 NOTE — PLAN OF CARE
Problem: Physical Therapy Goal  Goal: Physical Therapy Goal  Goals to be met by: 10/22/17     Patient will increase functional independence with mobility by performin. Supine to sit with Supervision   2. Sit to stand transfer with Stand-by Assistance  3. Bed to chair transfer with Contact Guard Assistance using Rolling Walker or appropriate AD as needed - met 10/16  4. Gait  x 20 feet with Contact Guard Assistance using Rolling Walker or appropriate AD as needed.   5. Stand for 2 minutes with Stand-by Assistance, while performing dynamic task, using appropriate AD as needed  6. Lower extremity exercise program x15 reps, with supervision, in order to increase LE strength and (I) with functional mobility.      Outcome: Ongoing (interventions implemented as appropriate)  Goals reviewed and remain appropriate. Pt progressing towards goals.    Ivana Jason, PT, DPT   10/16/2017  [phone]

## 2017-10-16 NOTE — ASSESSMENT & PLAN NOTE
Etiology unclear and could be multifactorial (heart failure vs. malignant as patient with known lung mets from bladder cancer vs. infectious). Pulmonary consulted and following and patient underwent diagnostic left sided thoracentesis on 10/13 and 1400 mL removed. Studies sent and awaiting full results. Prelim results noted with 965 WBC with 82% segs so neutrophilic predominance raises concern for infection. LDH from serum pending but fluid LDH noted to be 166 with glucose of 123. Patient on empiric antibiotics with Ceftriaxone for now as per ID recs and pleural fluid culture sent so far no growth.

## 2017-10-16 NOTE — SUBJECTIVE & OBJECTIVE
Interval History: Patient reports symptomatically he feels better and feels his SOB is improved. Patient worked with therapy this am and recommending Home Health PT but wife requested referral to [facility] because they feel patient needs more therapy prior to discharge. I told wife we could place referral but patient is not yet medically stable yet for discharge. Patient had brief episode of atrial fibrillation with RVR this am around 7:15 am but resolved quickly on its own and by time EKG done at 7:21 am but with atrial flutter but with controlled rate of 98. Patient remains on oxygen at 3 liters with oxygen sats of 97%.     Review of Systems   Constitutional: Negative for chills and fever.   Respiratory: Positive for shortness of breath. Negative for cough.    Cardiovascular: Negative for chest pain, palpitations and leg swelling.   Gastrointestinal: Negative for abdominal pain, diarrhea, nausea and vomiting.   Genitourinary: Negative for dysuria.   Musculoskeletal: Negative for arthralgias and back pain.   Skin: Negative for rash.   Neurological: Negative for dizziness and light-headedness.   Psychiatric/Behavioral: Positive for dysphoric mood. Negative for agitation, confusion, sleep disturbance and suicidal ideas.     Objective:     Vital Signs (Most Recent):  Temp: 97 °F (36.1 °C) (10/16/17 1524)  Pulse: 69 (10/16/17 1524)  Resp: 20 (10/16/17 1524)  BP: (!) 111/57 (10/16/17 1524)  SpO2: 97 % (10/16/17 1524) Vital Signs (24h Range):  Temp:  [97 °F (36.1 °C)-98.5 °F (36.9 °C)] 97 °F (36.1 °C)  Pulse:  [] 69  Resp:  [17-22] 20  SpO2:  [97 %-100 %] 97 %  BP: (111-119)/(57-86) 111/57     Weight: 60.2 kg (132 lb 11.5 oz)  Body mass index is 21.42 kg/m².    Intake/Output Summary (Last 24 hours) at 10/16/17 1535  Last data filed at 10/16/17 1400   Gross per 24 hour   Intake             1575 ml   Output             1025 ml   Net              550 ml      Physical Exam   Constitutional: He is oriented to person,  place, and time. He appears well-developed and well-nourished. No distress.   HENT:   Mouth/Throat: Oropharynx is clear and moist.   Eyes: Conjunctivae are normal.   Neck: Neck supple. No JVD present.   Cardiovascular: Normal rate and normal heart sounds.  An irregularly irregular rhythm present. Exam reveals no gallop and no friction rub.    No murmur heard.  Pulmonary/Chest: Effort normal. He has decreased breath sounds in the right lower field and the left lower field. He has no wheezes. He has no rales.   Abdominal: Soft. Bowel sounds are normal. He exhibits no distension. There is no tenderness.   Musculoskeletal: He exhibits no edema or tenderness.   Neurological: He is alert and oriented to person, place, and time.   Skin: Skin is warm. Capillary refill takes less than 2 seconds. No rash noted. No erythema.   Psychiatric: His speech is normal and behavior is normal. He exhibits a depressed mood.       Significant Labs:   CBC:   Recent Labs  Lab 10/15/17  0555 10/16/17  0545   WBC 29.49* 27.64*   HGB 8.8* 9.1*   HCT 28.8* 29.4*   * 439*     CMP:   Recent Labs  Lab 10/15/17  0555 10/16/17  0545    136   K 3.7 4.1   CL 90* 90*   CO2 36* 35*   * 110   BUN 30* 30*   CREATININE 0.7 0.8   CALCIUM 8.9 9.2   ANIONGAP 10 11   EGFRNONAA >60.0 >60.0     Results for orders placed or performed during the hospital encounter of 10/09/17   Culture, Body Fluid - Bactec   Result Value Ref Range    Body Fluid Culture, Sterile No growth to date       Significant Imaging: I have reviewed all pertinent imaging results/findings within the past 24 hours.

## 2017-10-17 PROBLEM — Z75.8 DISCHARGE PLANNING ISSUES: Status: ACTIVE | Noted: 2017-01-01

## 2017-10-17 NOTE — PLAN OF CARE
Problem: Physical Therapy Goal  Goal: Physical Therapy Goal  Goals to be met by: 10/22/17     Patient will increase functional independence with mobility by performin. Supine to sit with Supervision - met 10/17  2. Sit to stand transfer with Stand-by Assistance - met 10/17  3. Bed to chair transfer with Contact Guard Assistance using Rolling Walker or appropriate AD as needed - met 10/16  4. Gait  x 20 feet with Contact Guard Assistance using Rolling Walker or appropriate AD as needed. - met 10/17  4a. Gait x 50 feet with CGA using RW or appropriate AD as needed. - not met   5. Stand for 2 minutes with Stand-by Assistance, while performing dynamic task, using appropriate AD as needed - not met  6. Lower extremity exercise program x15 reps, with supervision, in order to increase LE strength and (I) with functional mobility. - not met      Outcome: Ongoing (interventions implemented as appropriate)  Treatment completed and some goals met. Goals appropriate.

## 2017-10-17 NOTE — ASSESSMENT & PLAN NOTE
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 ID consulted for leukocytosis - patient complaining of SOB, intermittent non-productive cough, otherwise, no localizing signs or symptoms of infection - suspect leukocytosis in setting of cancer progression. Pleural fluid with WBC >900, 80% segs unclear if infected, aerobic culture NGTD, empirically started ceftriaxone.  Peripheral WBC does not appear to be related to infection at this time.  Will stop ceftriaxone given negative cultures.  Will sign off for now please call back if questions, discussed with team

## 2017-10-17 NOTE — PT/OT/SLP PROGRESS
Physical Therapy  Treatment    Amador Harrison   MRN: [id number]   Admitting Diagnosis: Atrial fibrillation with RVR    PT Received On: 10/17/17  PT Start Time: 1405     PT Stop Time: 1430    PT Total Time (min): 25 min       Billable Minutes:  Gait Training 25    Treatment Type: Treatment  PT/PTA: PT     PTA Visit Number: 0       General Precautions: Standard, fall  Orthopedic Precautions: N/A   Braces: N/A    Do you have any cultural, spiritual, Church conflicts, given your current situation?: none noted     Subjective:  Communicated with RN prior to session. Pt agreeable to session.      Pain/Comfort  Pain Rating 1: 0/10  Pain Rating Post-Intervention 1: 0/10    Objective:   Patient found with: oxygen, telemetry (urostomy )    Functional Mobility:  Pt required increased time to perform functional mobility 2nd to decreased endurance and SOB.     Bed Mobility:   Supine to Sit: Supervision  Sit to Supine: Supervision    Transfers:  Sit <> Stand Assistance: Stand By Assistance  Sit <> Stand Assistive Device: Rolling Walker (VC's for technique)    Gait:   Gait Distance: 40 ft with RW from bed>in hallway>bed; O2 intact at 3L  Assistance 1: Contact Guard Assistance  Gait Assistive Device: Rolling walker  Gait Pattern: reciprocal  Gait Deviation(s): decreased david, increased time in double stance, decreased step length, decreased stride length    Pt required increased time during ambulation second to decreased endurance and SOB.  Pt's SOB increase during second half of ambulation, needing VC's for breathing technique.  Pt required VC's to increase step length. Pt reported moderate fatigue and slight shaking after ambulation. RN notified.     Balance:   Static Sit: GOOD: Takes MODERATE challenges from all directions  Dynamic Sit: GOOD: Maintains balance through MODERATE excursions of active trunk movement  Static Stand: FAIR: Maintains without assist but unable to take challenges  Dynamic stand: FAIR: Needs CONTACT  GUARD during gait     Therapeutic Activities and Exercises:  Pt educated pt on incr OOB activity including sitting in bedside chair majority of day and amb with nsg. Pt verbalized understanding.       AM-PAC 6 CLICK MOBILITY  How much help from another person does this patient currently need?   1 = Unable, Total/Dependent Assistance  2 = A lot, Maximum/Moderate Assistance  3 = A little, Minimum/Contact Guard/Supervision  4 = None, Modified Russell/Independent    Turning over in bed (including adjusting bedclothes, sheets and blankets)?: 3  Sitting down on and standing up from a chair with arms (e.g., wheelchair, bedside commode, etc.): 3  Moving from lying on back to sitting on the side of the bed?: 3  Moving to and from a bed to a chair (including a wheelchair)?: 3  Need to walk in hospital room?: 3  Climbing 3-5 steps with a railing?: 1  Total Score: 16    AM-PAC Raw Score CMS G-Code Modifier Level of Impairment Assistance   6 % Total / Unable   7 - 9 CM 80 - 100% Maximal Assist   10 - 14 CL 60 - 80% Moderate Assist   15 - 19 CK 40 - 60% Moderate Assist   20 - 22 CJ 20 - 40% Minimal Assist   23 CI 1-20% SBA / CGA   24 CH 0% Independent/ Mod I     Patient left HOB elevated with all lines intact, call button in reach and RN notified.    Assessment:  Amador Harrison is a 62 y.o. male with a medical diagnosis of Atrial fibrillation with RVR and presents with decreased strength, endurance, balance, transfers, and gait distance. Pt progressing towards goals, but not at PLOF. Pt tolerated session well but had an increase in SOB during the second half of ambulation with 3L O2 intact, requiring VC's for breathing techniques and sitting after ambulation to relieve. RN notified.  Pt is improving with therapy evidenced by decrease assist required for supine<>sit, sit<>stand with RW, and increased gait distance. Pt would benefit from continued PT services to improve overall functional mobility. Recommend d/c to  Skilled nursing facility to maximize functional independence and safety 2nd to needing increased assistance at current functional level .      Rehab identified problem list/impairments: Rehab identified problem list/impairments: weakness, impaired cardiopulmonary response to activity, impaired endurance, impaired functional mobilty, gait instability, impaired self care skills, impaired balance    Rehab potential is good.    Activity tolerance: Good    Discharge recommendations: Discharge Facility/Level Of Care Needs: nursing facility, skilled (change to SNF at this time 2nd to increacreased burden on family at this functional level)     Barriers to discharge: Barriers to Discharge: None    Equipment recommendations: Equipment Needed After Discharge:  (TBD )     GOALS:    Physical Therapy Goals        Problem: Physical Therapy Goal    Goal Priority Disciplines Outcome Goal Variances Interventions   Physical Therapy Goal     PT/OT, PT Ongoing (interventions implemented as appropriate)     Description:  Goals to be met by: 10/22/17     Patient will increase functional independence with mobility by performin. Supine to sit with Supervision - met 10/17  2. Sit to stand transfer with Stand-by Assistance - met 10/17  3. Bed to chair transfer with Contact Guard Assistance using Rolling Walker or appropriate AD as needed - met 10/16  4. Gait  x 20 feet with Contact Guard Assistance using Rolling Walker or appropriate AD as needed. - met 10/17  4a. Gait x 50 feet with CGA using RW or appropriate AD as needed. - not met   5. Stand for 2 minutes with Stand-by Assistance, while performing dynamic task, using appropriate AD as needed - not met  6. Lower extremity exercise program x15 reps, with supervision, in order to increase LE strength and (I) with functional mobility. - not met                        PLAN:    Patient to be seen 4 x/week  to address the above listed problems via gait training, therapeutic activities,  therapeutic exercises, neuromuscular re-education  Plan of Care expires: 11/10/17  Plan of Care reviewed with: patient         Meseret URSZULA Montgomery, PT  10/17/2017

## 2017-10-17 NOTE — PLAN OF CARE
Problem: Patient Care Overview  Goal: Plan of Care Review  Pt denies Chest pain SOB or nausea. C/o pain on lower extremities, prn pain meds given, mod relief obtained. No falls, trauma or injury noted. VSS. 3L NC. Iv antibiotics. SR-Afib-SR, po amio daily. RLQ urostomy bag. PT/OT following. S/p thoracentesis-1L fluid removed. Plan of care reviewed with patient. No further questions at this time. No significant events.Spouse at bedside

## 2017-10-17 NOTE — PROGRESS NOTES
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 ID consulted for leukocytosis - patient complaining of SOB, intermittent non-productive cough, otherwise, no localizing signs or symptoms of infection - suspect leukocytosis in setting of cancer progression. Pleural fluid with WBC >900, 80% segs unclear if infected, aerobic culture NGTD, empirically started ceftriaxone.  Peripheral WBC does not appear to be related to infection at this time.  Will stop ceftriaxone given negative cultures.  Will sign off for now please call back if questions, discussed with team              Anticipated Disposition: pending    Thank you for your consult. I will sign off. Please contact us if you have any additional questions.    Cosme Pedroza MD  Infectious Disease  [facility]     Subjective:     Principal Problem:Atrial fibrillation with RVR    HPI: Case of 61 y/o male admitted on 10/9/17 PMHx. 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 Patient received 2 cycles of neoadjuvant cis/germ with PD that failed to improve cancer. Patient presented to ED due to progressive worsening of baseline SOB for 1 week evolution. Mentions unable to perform daily living activities, fatigue, low grade  subjective fevers. Has to sit up to sleep, unable to tolerate recumbent. Mentions also associated cough and bilateral leg swelling. Since admission patient with Afib FVR not controlled, started this admission on xarelto for anticoagulation. Was consulted to ID at this time for additional recommendations.    Interval History: afebrile, 98% on RA documented, WBC still 28    Review of Systems   Constitutional: Negative for activity change, appetite change, chills, fatigue and fever.   HENT: Negative for congestion, dental problem, mouth sores and sinus pressure.    Eyes: Negative for pain, redness and visual disturbance.   Respiratory: Negative for cough, shortness of breath and wheezing.    Cardiovascular: Negative for chest pain and leg swelling.   Gastrointestinal: Negative for abdominal distention, abdominal pain, diarrhea, nausea and vomiting.   Endocrine: Negative for polyuria.   Genitourinary: Negative for decreased urine volume, dysuria and frequency.   Musculoskeletal: Negative for joint swelling.   Skin: Negative for color change.   Allergic/Immunologic: Negative for food allergies.   Neurological: Negative for dizziness, weakness and headaches.   Hematological: Negative for adenopathy.   Psychiatric/Behavioral: Negative for agitation and confusion. The patient is not nervous/anxious.      Objective:     Vital Signs (Most Recent):  Temp: 97.8 °F (36.6 °C) (10/17/17 0411)  Pulse: 78 (10/17/17 0700)  Resp: 18 (10/17/17 0411)  BP: 133/60 (10/17/17 0411)  SpO2: 98 % (10/17/17 0411) Vital Signs (24h Range):  Temp:  [97 °F (36.1 °C)-98.5 °F (36.9 °C)] 97.8 °F (36.6 °C)  Pulse:  [68-97] 78  Resp:  [16-20] 18  SpO2:  [97 %-100 %] 98 %  BP: (111-144)/(57-70) 133/60     Weight: 60.2 kg (132 lb 11.5 oz)  Body mass index is 21.42 kg/m².    Estimated Creatinine Clearance: 79.4 mL/min (based on SCr of 0.8 mg/dL).    Physical Exam   Constitutional: He is oriented to person, place, and time. He appears well-developed and  well-nourished.   HENT:   Head: Normocephalic and atraumatic.   Mouth/Throat: Oropharynx is clear and moist.   Eyes: Conjunctivae are normal.   Neck: Neck supple.   Cardiovascular: Normal rate, regular rhythm and normal heart sounds.    No murmur heard.  Pulmonary/Chest: Effort normal and breath sounds normal. No respiratory distress. He has no wheezes.   Decrease breath sounds b/l bases   Abdominal: Soft. Bowel sounds are normal. He exhibits no distension. There is no tenderness.   Musculoskeletal: Normal range of motion. He exhibits no edema or tenderness.   Lymphadenopathy:     He has no cervical adenopathy.   Neurological: He is alert and oriented to person, place, and time. Coordination normal.   Skin: Skin is warm and dry. No rash noted.   Psychiatric: He has a normal mood and affect. His behavior is normal.       Significant Labs:   CBC:     Recent Labs  Lab 10/16/17  0545 10/17/17  0440   WBC 27.64* 28.96*   HGB 9.1* 9.0*   HCT 29.4* 30.2*   * 380*     CMP:     Recent Labs  Lab 10/16/17  0545 10/17/17  0440    137   K 4.1 4.9   CL 90* 91*   CO2 35* 34*    123*   BUN 30* 33*   CREATININE 0.8 0.8   CALCIUM 9.2 9.5   ANIONGAP 11 12   EGFRNONAA >60.0 >60.0       Significant Imaging: I have reviewed all pertinent imaging results/findings within the past 24 hours.     10/13 USLE negative  9/22 CTA multiple pleural based nodules throughout both lungs with some demonstrating cavitation    micro    10/13 sputum cx not in lab, gram stain negative, fluid , 82%, 8 lymphs, unclear if cytology sent, aerobic culture NGTD prior to abx

## 2017-10-17 NOTE — SUBJECTIVE & OBJECTIVE
Interval History: afebrile, 98% on RA documented, WBC still 28    Review of Systems   Constitutional: Negative for activity change, appetite change, chills, fatigue and fever.   HENT: Negative for congestion, dental problem, mouth sores and sinus pressure.    Eyes: Negative for pain, redness and visual disturbance.   Respiratory: Negative for cough, shortness of breath and wheezing.    Cardiovascular: Negative for chest pain and leg swelling.   Gastrointestinal: Negative for abdominal distention, abdominal pain, diarrhea, nausea and vomiting.   Endocrine: Negative for polyuria.   Genitourinary: Negative for decreased urine volume, dysuria and frequency.   Musculoskeletal: Negative for joint swelling.   Skin: Negative for color change.   Allergic/Immunologic: Negative for food allergies.   Neurological: Negative for dizziness, weakness and headaches.   Hematological: Negative for adenopathy.   Psychiatric/Behavioral: Negative for agitation and confusion. The patient is not nervous/anxious.      Objective:     Vital Signs (Most Recent):  Temp: 97.8 °F (36.6 °C) (10/17/17 0411)  Pulse: 78 (10/17/17 0700)  Resp: 18 (10/17/17 0411)  BP: 133/60 (10/17/17 0411)  SpO2: 98 % (10/17/17 0411) Vital Signs (24h Range):  Temp:  [97 °F (36.1 °C)-98.5 °F (36.9 °C)] 97.8 °F (36.6 °C)  Pulse:  [68-97] 78  Resp:  [16-20] 18  SpO2:  [97 %-100 %] 98 %  BP: (111-144)/(57-70) 133/60     Weight: 60.2 kg (132 lb 11.5 oz)  Body mass index is 21.42 kg/m².    Estimated Creatinine Clearance: 79.4 mL/min (based on SCr of 0.8 mg/dL).    Physical Exam   Constitutional: He is oriented to person, place, and time. He appears well-developed and well-nourished.   HENT:   Head: Normocephalic and atraumatic.   Mouth/Throat: Oropharynx is clear and moist.   Eyes: Conjunctivae are normal.   Neck: Neck supple.   Cardiovascular: Normal rate, regular rhythm and normal heart sounds.    No murmur heard.  Pulmonary/Chest: Effort normal and breath sounds normal.  No respiratory distress. He has no wheezes.   Decrease breath sounds b/l bases   Abdominal: Soft. Bowel sounds are normal. He exhibits no distension. There is no tenderness.   Musculoskeletal: Normal range of motion. He exhibits no edema or tenderness.   Lymphadenopathy:     He has no cervical adenopathy.   Neurological: He is alert and oriented to person, place, and time. Coordination normal.   Skin: Skin is warm and dry. No rash noted.   Psychiatric: He has a normal mood and affect. His behavior is normal.       Significant Labs:   CBC:     Recent Labs  Lab 10/16/17  0545 10/17/17  0440   WBC 27.64* 28.96*   HGB 9.1* 9.0*   HCT 29.4* 30.2*   * 380*     CMP:     Recent Labs  Lab 10/16/17  0545 10/17/17  0440    137   K 4.1 4.9   CL 90* 91*   CO2 35* 34*    123*   BUN 30* 33*   CREATININE 0.8 0.8   CALCIUM 9.2 9.5   ANIONGAP 11 12   EGFRNONAA >60.0 >60.0       Significant Imaging: I have reviewed all pertinent imaging results/findings within the past 24 hours.     10/13 USLE negative  9/22 CTA multiple pleural based nodules throughout both lungs with some demonstrating cavitation    micro    10/13 sputum cx not in lab, gram stain negative, fluid , 82%, 8 lymphs, unclear if cytology sent, aerobic culture NGTD prior to abx

## 2017-10-18 PROBLEM — I49.9 ARRHYTHMIA: Status: ACTIVE | Noted: 2017-01-01

## 2017-10-18 PROBLEM — I45.5 SINUS ARREST: Status: ACTIVE | Noted: 2017-01-01

## 2017-10-18 PROBLEM — R04.2 HEMOPTYSIS: Status: ACTIVE | Noted: 2017-01-01

## 2017-10-18 NOTE — PLAN OF CARE
Problem: Patient Care Overview  Goal: Plan of Care Review  Outcome: Ongoing (interventions implemented as appropriate)  Pt admitted to CMICU post a core call. Pt noted to have a pause while on tele and go unresponsive.  Pt did not need any intervention to regain response. Pt noted with increased respiratory effort, tachypneia, and some intermitten diaphoresis. Medications were started to help with the dyspnea. Dyspnea has improved at this time. On 4L NC. Will con't to Pt has decided for DNR and will be going home on hospice. Placement is pending. Pt wife has been updated and all questions answered.

## 2017-10-18 NOTE — PLAN OF CARE
The [facility] is not in network with pt's insurance.    SW sent referrals to [facility] and , via .  They will find out if they are in network with pt's insurance.    Tatiana Alvarado LCSW     [phone]  Critical Care (MICU)

## 2017-10-18 NOTE — HOSPITAL COURSE
Mr. Joseph was admitted to Cardiology and effectively diuresed with some improvement in dyspnea. Additionally, his RVR was managed with uptitration of metoprolol, addition of diltiazem, and amiodarone. Rivaroxaban started for anticoagulation for Afib. He was stepped down to  and ID was consulted due to persistent leukocytosis with bilateral pleural effusions concerning for infectious etiology. A diagnostic, therapeutic thora was completed by Pulmonary with 1.5 L removed from left pleural space. Pleural fluid with significant WBC's (900's) and 80% segs, however cultures were NGTD. He was briefly on rocephin, clindamycin per ID recommendations until these were discontinued on 10/16 and leukocytosis felt to be related to malignancy.     On 10/18, a code blue was called by nursing staff as patient had a brief episode of unresponsiveness to sternal rub with faint, bradycardic carotid pulse palpated. This occurred after patient ambulated back to bed from chair, was laying flat to be repositioned in bed. Per nursing staff, no chest compressions or ACLS measures initiated because patient regained consciousness and HR improved s/p sitting patient upward in bed with increase in oxygen.     10/19:  Plan for comfort directed care.  Titrate transdermal and oral narcotics and benzodiazepine. Pt was scheduled to be discharged to hospice however, he passed away at 0245 AM.

## 2017-10-18 NOTE — ASSESSMENT & PLAN NOTE
--suspect worsening hypoxemia over past month is multifactorial: acute on chronic HF, worsening metastatic disease, in the setting of new bilateral effusions causing dyspnea  --hypoxemia initially improved with diuresis and patient was stable on 2-4 L NC. Will continue diuresis as tolerated  --s/p left thoracentesis with 1.5 L removed. Offered to repeat thora on right side upon MICU admission for therapeutic purposes, however patient declined. States that he no longer wants invasive procedures  --upon further discission with patient and his wife, they would like to officially make him a DNR/DNI with goals to focus on comfort. These conversations had been held previously between themselves and Dr. Sepulveda. Will continue discussions today  --start PRN opiates/benzos for treatment of pain, dyspnea, anxiety

## 2017-10-18 NOTE — PROGRESS NOTES
Code blue called by PCT after getting pt back to bed. RN entered room and pt unresponsive with no pulse. Pt sternal rubbed while putting pt in CPR position and pt came to. Pt following commands and responsive. Code team arrived. LUIS ALBERTO KWONG paged. Code changed to rapid response. Events as below. Pause and bandar noted on tele. Pt, wife, and belongings transferred to [facility]. Bedside report given to EDITH Mane.       Note by Payal Sprague RN:  0747: Code called. No pulse and pt unresponsive.  0748: Pt responsive after sternal rub and pulse felt by Sammie Skinner RN while placing pt in CPR position. No compressions done.  0749: Cecy Meraz NP and  at bedside            STAT EKG ordered and pt placed on nonrebreather            Code stopped and rapid response initiated  0750:LUIS ALBERTO KWONG paged. /85 BP, 95% nonrebreather, 46 HR, 122 MAP, blood glucose 140, pt responsive to voice and following commands  0751: Abelardo Bazan MD arrived  0753: EKG performed; 190/81 BP, 117 MAP, 77 HR, 99%                nonrebreather  0754: VS: 182/79, 100% nonrebreather, 80 HR, 114 MAP  0806: rapid response stopped

## 2017-10-18 NOTE — ASSESSMENT & PLAN NOTE
Patient with known lung mets from bladder carcinoma. All chemotherapy treatment on hold for now due to acute illness.   · Continue routine ileo conduit care as patient s/p cystectomy in 7/2017.   · Heme/Onc evaluated patient in the hospital and plan on discharge for patient to follow-up in Heme/Onc clinic to start Keytruda infusions (immunotherapy) for treatment.

## 2017-10-18 NOTE — PLAN OF CARE
[facility]     Department of Hospital Medicine     [address] [place] [address]     [phone] after hours  [phone] fax                                   HOSPICE  ORDERS     10/18/2017    Admit to Hospice:  Inpatient Service    Diagnoses:  Active Hospital Problems    Diagnosis  POA    *Metastasis from bladder cancer [C79.9, C67.9]  Yes     Priority: 5     Acute respiratory failure with hypoxia [J96.01]  Yes     Priority: 1 - High    Sinus arrest [I45.5]  Yes     Priority: 2     Paroxysmal atrial fibrillation [I48.0]  Yes     Priority: 5     Hemoptysis [R04.2]  Yes     Priority: 6     Long-term (current) use of anticoagulants [Z79.01]  Not Applicable     Priority: 6      Xarelto for atrial fibrillation      Leukocytosis [D72.829]  Yes     Priority: 6      Chronic    Acute decompensated heart failure [I50.9]  Yes     Priority: 6     Malignant neoplasm metastatic to lung [C78.00]  Yes     Priority: 8     Atrial fibrillation with RVR [I48.91]  Yes     Priority: 10     Essential hypertension [I10]  Yes     Priority: 10     Bilateral pleural effusion [J90]  Yes     Priority: 13     Mild single current episode of major depressive disorder [F32.0]  Yes     Priority: 18     Hypoalbuminemia [E88.09]  Yes     Priority: 18       Resolved Hospital Problems    Diagnosis Date Resolved POA   No resolved problems to display.       Hospice Qualifying Diagnoses: Metastatic bladder cancer, hypoxemia       Patient has a life expectancy < 6 months due to these conditions.    Vital Signs: Routine per Hospice Protocol.    Allergies:Review of patient's allergies indicates:  No Known Allergies    Diet: regular as tolerated    Activities: As tolerated    Nursing: Per Hospice Routine    Future Orders:  Hospice Medical Director may dictate new orders for comfortable care measures & sign death certificate.    Medications:         Comfort Care Medications Only     Amador Harrison   Home Medication Instructions OLGA:[id number]    Printed on:10/18/17 2570   Medication Information                      diazePAM (VALIUM) 5 MG tablet  Take 1 tablet (5 mg total) by mouth every 6 (six) hours as needed for Anxiety.             fentaNYL (DURAGESIC) 25 mcg/hr  Place 1 patch onto the skin every 72 hours.             morphine 10 mg/5 mL solution  Take 2 mLs (4 mg total) by mouth every 4 (four) hours as needed for Pain (dyspnea, breathlessness).                 _________________________________  Ameena Sotelo NP  10/18/2017

## 2017-10-18 NOTE — ASSESSMENT & PLAN NOTE
Resolved.   · KCL tablet 20 mEq po daily as patient is on loop diuretic and monitor daily levels.   · Goal is potassium level > 4.

## 2017-10-18 NOTE — PLAN OF CARE
Rapid response called by Nursing Staff. Primary service will defer management to ICU.   Nayla Dover MD  [facility] Medicine Staff

## 2017-10-18 NOTE — ASSESSMENT & PLAN NOTE
Unchanged. Likely related to pleural effusions. Patient currently on 3 liters of oxygen with oxygen sats of 97% but symptomatically patient is feeling better.   · Try and wean oxygen with goal of oxygen sats > 92%.   · Will continue to diuresis patient with Lasix 40 mg po daily to see if helps with effusions.   · CXR PA and LAT tomorrow.

## 2017-10-18 NOTE — SUBJECTIVE & OBJECTIVE
Review of Systems   Constitutional: Positive for diaphoresis and fatigue. Negative for chills and fever.   Eyes: Negative for visual disturbance.   Respiratory: Positive for cough and shortness of breath. Negative for wheezing.    Cardiovascular: Negative for chest pain and palpitations.   Gastrointestinal: Negative for abdominal distention, abdominal pain and nausea.   Genitourinary: Negative for decreased urine volume.   Skin: Positive for pallor.   Neurological: Positive for dizziness, weakness (generalized ) and light-headedness. Negative for seizures, facial asymmetry, speech difficulty and headaches.   Psychiatric/Behavioral: The patient is nervous/anxious.      Objective:     Vital Signs (Most Recent):  Temp: 97.7 °F (36.5 °C) (10/18/17 1100)  Pulse: 80 (10/18/17 1300)  Resp: (!) 41 (10/18/17 1300)  BP: 128/66 (10/18/17 1300)  SpO2: 100 % (10/18/17 1300) Vital Signs (24h Range):  Temp:  [97.4 °F (36.3 °C)-98.9 °F (37.2 °C)] 97.7 °F (36.5 °C)  Pulse:  [46-86] 80  Resp:  [20-47] 41  SpO2:  [95 %-100 %] 100 %  BP: (110-195)/(60-88) 128/66   Weight: 60.2 kg (132 lb 11.5 oz)  Body mass index is 21.42 kg/m².      Intake/Output Summary (Last 24 hours) at 10/18/17 1404  Last data filed at 10/18/17 1300   Gross per 24 hour   Intake              770 ml   Output             1100 ml   Net             -330 ml       Physical Exam   Constitutional: He is oriented to person, place, and time. He appears well-developed. He appears ill. He appears distressed.   HENT:   Head: Normocephalic and atraumatic.   Mouth/Throat: No oropharyngeal exudate.   Eyes: Conjunctivae and EOM are normal. Pupils are equal, round, and reactive to light. Right eye exhibits no discharge. Left eye exhibits no discharge. No scleral icterus.   Neck: Normal range of motion. Neck supple. JVD present. No tracheal deviation present. No thyromegaly present.   Cardiovascular: Normal rate, regular rhythm, S1 normal and S2 normal.    Murmur heard.   Systolic  murmur is present with a grade of 3/6   Pulses:       Radial pulses are 2+ on the right side, and 2+ on the left side.        Dorsalis pedis pulses are 1+ on the right side, and 1+ on the left side.   Warm extremities    Pulmonary/Chest: Accessory muscle usage present. Tachypnea noted. He is in respiratory distress. He has decreased breath sounds in the right middle field, the right lower field and the left lower field. He has no wheezes. He has rales in the right upper field and the left upper field.   Abdominal: Soft. Bowel sounds are normal. He exhibits no distension. There is no tenderness. There is no guarding.   Lymphadenopathy:     He has no cervical adenopathy.   Neurological: He is alert and oriented to person, place, and time. GCS eye subscore is 4. GCS verbal subscore is 5. GCS motor subscore is 6.   Skin: Skin is warm and dry. Capillary refill takes 2 to 3 seconds. He is not diaphoretic. There is pallor.       Vents:     Lines/Drains/Airways     Central Venous Catheter Line                 Port A Cath Single Lumen 03/15/17 0800  217 days          Drain                 Ureteral Drain/Stent 07/05/17 1218 Right ureter 5 Fr. 105 days         Urostomy 07/05/17 1226 ileal conduit  days              Significant Labs:    CBC/Anemia Profile:    Recent Labs  Lab 10/17/17  0440 10/18/17  0400 10/18/17  0801 10/18/17  0846   WBC 28.96* 36.42*  --  39.94*   HGB 9.0* 9.0*  --  9.6*   HCT 30.2* 29.3* 32* 31.6*   * 425*  --  476*   MCV 85 83  --  83   RDW 14.6* 14.4  --  14.6*        Chemistries:    Recent Labs  Lab 10/17/17  0440 10/18/17  0400 10/18/17  0846    134* 134*   K 4.9 5.1 5.2*   CL 91* 90* 89*   CO2 34* 34* 34*   BUN 33* 32* 32*   CREATININE 0.8 0.7 0.8   CALCIUM 9.5 9.6 10.1   ALBUMIN  --   --  2.2*   PROT  --   --  7.1   BILITOT  --   --  0.4   ALKPHOS  --   --  103   ALT  --   --  15   AST  --   --  15   MG 1.6 1.5* 1.6   PHOS  --   --  3.5         Significant Imaging:  I have  reviewed and interpreted all pertinent imaging results/findings within the past 24 hours.

## 2017-10-18 NOTE — ASSESSMENT & PLAN NOTE
Patient's blood pressure is controlled here in the hospital over past 24 hours. Goal for blood pressure is SBP < 150 and DBP < 90 as patient > or = 60 years of age with no diabetes or advanced kidney disease based on JNC 8 guidelines.   · Continue current treatment regimen with Toprol  mg po daily + Diltiazem  mg po daily.   · Plan to continue to monitor patient's blood pressure routinely while patient is hospitalized.

## 2017-10-18 NOTE — ASSESSMENT & PLAN NOTE
--patient of Dr. Sepulveda's   --in discussion of possibly pursuing immunotherapy as an outpatient. Talked with Med Onc fellow today to ask his team/Coco to stop by patient's room to discuss this morning's events, patient's desire to transition to hospice, and likely inability to receive immunotherapy under these circumstances.

## 2017-10-18 NOTE — ASSESSMENT & PLAN NOTE
Etiology unclear and could be multifactorial (heart failure vs. malignant as patient with known lung mets from bladder cancer vs. infectious). Pulmonary consulted and patient underwent diagnostic left sided thoracentesis on 10/13 and 1400 mL removed. Results noted with 965 WBC with 82% segs raising concern for infection. However cultures remain no growth.  · Follow up fluid cytology.

## 2017-10-18 NOTE — PROGRESS NOTES
[facility] Hospital Medicine  Progress Note    Patient Name: Amador Harrison  MRN: [id number]  Patient Class: IP- Inpatient   Admission Date: 10/9/2017  Length of Stay: 8 days  Attending Physician: Nayla Dover MD  Primary Care Provider: Waqar Brady MD    [facility] Medicine Team: [facility]  Nayla Dover MD    Subjective:     Principal Problem:Atrial fibrillation with RVR    HPI:  Mr. 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 In August patient had post op CT scan which was notable for pulmonary nodules and ground glass opacities and suspected to be possibly infectious given the rapid development (prior CT 5/17). Patient developed cough and SOB and repeat CT 9/18/17 was notable for progression of these lesions and more likely metastatic disease. He underwent bronchoscopic biopsy 9/25/17 and path was consistent with urothelial mets. Patient was then briefly admitted in 9/2017 with atrial fibrillation (not on anticoagulation) who presents to the ED with a complaints of progressively woreseing shortness of breath and fatigue since last week.      The patient states he's had baseline SOB since being diagnosed with mets to the lung 2 weeks ago, however since last week it's been progressively getting worse. Previously he could ambulate around the house, shower, make breakfast; however now he needs help and gets SOB w/ minimal exertion (on standing; walking to the restroom). Associated with PND, worsening orthopnea (pt sits upright to sleep now), chronic cough and new leg swelling since last night. Denies any fevers, chills, WG, n/v, CP.     Hospital Course:  Patient  admitted to cardiac ICU and found to have acute decompensated CHF in the setting of Afib RVR; Patient placed on Amiodarone drip and diuresised with IV Lasix 40 mg IV BID. Patient eventually converted to NS and switched to oral Amiodarone 400 mg po BID for rhythm control. Patient placed on Toprol  mg po daily + Diltiazem  mg po daily for rate control. Patient was placed on Xarelto 20 mg po daily for long term anticoagulation for atrial fibrillation. CXR was remarkable for bilateral pleural effusion which was initially attributed to pulmonary edema or malignant pleural effusions. However patient had a persistently elevated WBC count but remained afebrile. ID was consulted for the leukocytosis with WBC in 25,000-30,000 range and eventually pulmonary was consulted for thoracentesis; to determine whether the effusion was secondary to infection or malignancy progression. ID consulted on 10/12 and concerned about infectious cause of pleural effusions so Pulmonary consulted and patient on 10/13 underwent thoracentesis by Pulmonary on 10/13 (Xarelto held for procedure) and 1400 mL of bloody fluid removed from left lung pleural space and studies sent (LDH, protein, bacterial/fungal/AFB stains and culture, and cytology). Prelim thoracentesis results showed  with 82% segs. ID concerned for infectious cause so patient started on IV Ceftriaxone and Clindamycin empirically on 10/13. Patient since admit is net negative 7 liters of fluid with Lasix and switched to oral Lasix 40 mg po daily on 10/13 by Cardiology. Patient on 3 liters of oxygen with sats of 98% on 10/14. Patient expressed thoughts of being depressed and sad with recent illnesses and requesting medication for depression and patient started on Zoloft 25 mg po daily. Discussed with patient that follow-up with Psychology in Heme/Onc department as outpatient might be of benefit and patient seemed amenable. Patient reports improvement in SOB on 10/15.  "Patient remains in and out of atrial fibrillation on telemetry but rate controlled and Xarelto restarted on 10/15 at 20 mg po daily for long term anticoagulation for atrial fibrillation. Patient with brief episode of atrial fib with RVR on 10/16 but resolved on its own. His WBC continued to in the 20-30K range. Cultures remained no growth and antibiotic therapy discontinued.     Interval History: "Really short of breath after therapy". No acute overnight events. WBC count remains elevated and cultures no growth.    Review of Systems   Constitutional: Positive for fatigue. Negative for chills and fever.   HENT: Negative for sinus pain and sinus pressure.    Eyes: Negative for visual disturbance.   Respiratory: Positive for shortness of breath. Negative for cough.    Cardiovascular: Negative for chest pain, palpitations and leg swelling.   Gastrointestinal: Negative for abdominal pain, constipation, diarrhea, nausea and vomiting.   Musculoskeletal: Negative for arthralgias and back pain.   Skin: Negative for rash.   Neurological: Positive for weakness. Negative for dizziness, tremors, light-headedness, numbness and headaches.   Psychiatric/Behavioral: Positive for dysphoric mood. Negative for agitation, behavioral problems, confusion, hallucinations, self-injury and suicidal ideas. The patient is not nervous/anxious.      Objective:     Vital Signs (Most Recent):  Temp: 97.4 °F (36.3 °C) (10/17/17 1706)  Pulse: (!) 59 (10/17/17 1706)  Resp: 18 (10/17/17 1133)  BP: 110/60 (10/17/17 1706)  SpO2: 100 % (10/17/17 1706) Vital Signs (24h Range):  Temp:  [97.3 °F (36.3 °C)-97.8 °F (36.6 °C)] 97.4 °F (36.3 °C)  Pulse:  [57-83] 59  Resp:  [16-21] 18  SpO2:  [97 %-100 %] 100 %  BP: (110-153)/(58-69) 110/60     Weight: 60.2 kg (132 lb 11.5 oz)  Body mass index is 21.42 kg/m².    Intake/Output Summary (Last 24 hours) at 10/17/17 4718  Last data filed at 10/17/17 1400   Gross per 24 hour   Intake              890 ml   Output       "       1060 ml   Net             -170 ml      Physical Exam   Constitutional: He is oriented to person, place, and time. He appears well-developed and well-nourished.   HENT:   Head: Normocephalic and atraumatic.   Right Ear: External ear normal.   Left Ear: External ear normal.   Mouth/Throat: Oropharynx is clear and moist.   Eyes: Conjunctivae and EOM are normal. Pupils are equal, round, and reactive to light.   Neck: Normal range of motion. Neck supple. No thyromegaly present.   Cardiovascular: Normal rate and normal heart sounds.  An irregularly irregular rhythm present.   No murmur heard.  Pulmonary/Chest: Effort normal. He has decreased breath sounds in the right lower field and the left lower field. He has no wheezes. He has no rales.   Abdominal: Soft. Bowel sounds are normal. He exhibits no mass. There is no tenderness. There is no rebound.   Musculoskeletal: Normal range of motion.   Lymphadenopathy:     He has no cervical adenopathy.   Neurological: He is alert and oriented to person, place, and time.   Skin: Skin is warm and dry.   Psychiatric: He has a normal mood and affect. His speech is normal and behavior is normal. Thought content normal.   Nursing note and vitals reviewed.      Significant Labs:   BMP:   Recent Labs  Lab 10/17/17  0440   *      K 4.9   CL 91*   CO2 34*   BUN 33*   CREATININE 0.8   CALCIUM 9.5   MG 1.6     CBC:   Recent Labs  Lab 10/16/17  0545 10/17/17  0440   WBC 27.64* 28.96*   HGB 9.1* 9.0*   HCT 29.4* 30.2*   * 380*       Significant Imaging: I have reviewed all pertinent imaging results/findings within the past 24 hours.    Assessment/Plan:      * Atrial fibrillation with RVR    Patient in and out of atrial fibrillation but rate controlled. Requires long term anticoagulation.  · Amiodarone 400 mg po BID for rhythm control until 10/23 then daily starting on 10/24.  · Toprol  po daily + Diltiazem  mg po daily.   · Xarelto for long term  anticoagulation held for thoracentesis on 10/13 and restarted Xarelto at 20 mg po daily on 10/15.   · Continue to monitor on telemetry.          Paroxysmal atrial fibrillation    See above            Long-term (current) use of anticoagulants    See above            Acute respiratory failure with hypoxia    Unchanged. Likely related to pleural effusions. Patient currently on 3 liters of oxygen with oxygen sats of 97% but symptomatically patient is feeling better.   · Try and wean oxygen with goal of oxygen sats > 92%.   · Will continue to diuresis patient with Lasix 40 mg po daily to see if helps with effusions.   · CXR PA and LAT tomorrow.          Leukocytosis    Continues with overall down trend however remains elevated and without significant clinical improvement with broad spectrum antibiotic therapy. Cultures remained no growth therefore now suspicion for effusions to be malignant in origin.   · Monitor WBC.          Acute decompensated heart failure    Improved. Cardiology felt likely related to atrial fibrillation as patient with normal diastolic and systolic function on 2 D echo. Weight decreased from 142 lbs on admission to 132 lbs on 10/15.   · Continue Lasix 40 mg po daily.  · Monitor strict I+O's to see how patient is responding to diuretics.   · Weight daily.        Bilateral pleural effusion    Etiology unclear and could be multifactorial (heart failure vs. malignant as patient with known lung mets from bladder cancer vs. infectious). Pulmonary consulted and patient underwent diagnostic left sided thoracentesis on 10/13 and 1400 mL removed. Results noted with 965 WBC with 82% segs raising concern for infection. However cultures remain no growth.  · Follow up fluid cytology.        Hypokalemia    Resolved.   · KCL tablet 20 mEq po daily as patient is on loop diuretic and monitor daily levels.   · Goal is potassium level > 4.           Hypomagnesemia    Resolved after treatment with Magnesium Sulfate 2  grams IV for 1 dose on 10/14.   · Monitor daily level with goal of Magnesium level > 2.             Essential hypertension    Patient's blood pressure is controlled here in the hospital over past 24 hours. Goal for blood pressure is SBP < 150 and DBP < 90 as patient > or = 60 years of age with no diabetes or advanced kidney disease based on JNC 8 guidelines.   · Continue current treatment regimen with Toprol  mg po daily + Diltiazem  mg po daily.   · Plan to continue to monitor patient's blood pressure routinely while patient is hospitalized.             Hypoalbuminemia    Related to acute illness and active malignancy.   · Nutrition supplements for support.           Malignant neoplasm of trigone of urinary bladder    Patient with known lung mets from bladder carcinoma. All chemotherapy treatment on hold for now due to acute illness.   · Continue routine ileo conduit care as patient s/p cystectomy in 7/2017.   · Heme/Onc evaluated patient in the hospital and plan on discharge for patient to follow-up in Heme/Onc clinic to start Keytruda infusions (immunotherapy) for treatment.        Mild single current episode of major depressive disorder    Patient expressed being depressed concerning his situation and recent diagnosis of bladder cancer and complications since. Patient denies any suicidal thoughts.   · Continue Sertraline 25 mg po daily to treat and refer to Psychology in Heme/Onc clinic as outpatient.           Malignant neoplasm metastatic to lung    See above            Discharge planning issues    Patient pending evaluation by [facility] for placement.             VTE Risk Mitigation         Ordered     rivaroxaban tablet 20 mg  With dinner     Route:  Oral        10/15/17 1509     Medium Risk of VTE  Once      10/09/17 1729     Place ALEJANDRO hose  Until discontinued      10/09/17 1729     Place sequential compression device  Until discontinued      10/09/17 1729              Nayla Dover  MD  Department of Hospital Medicine   [facility]

## 2017-10-18 NOTE — ASSESSMENT & PLAN NOTE
Resolved after treatment with Magnesium Sulfate 2 grams IV for 1 dose on 10/14.   · Monitor daily level with goal of Magnesium level > 2.

## 2017-10-18 NOTE — SUBJECTIVE & OBJECTIVE
"Interval History: "Really short of breath after therapy". No acute overnight events. WBC count remains elevated and cultures no growth.    Review of Systems   Constitutional: Positive for fatigue. Negative for chills and fever.   HENT: Negative for sinus pain and sinus pressure.    Eyes: Negative for visual disturbance.   Respiratory: Positive for shortness of breath. Negative for cough.    Cardiovascular: Negative for chest pain, palpitations and leg swelling.   Gastrointestinal: Negative for abdominal pain, constipation, diarrhea, nausea and vomiting.   Musculoskeletal: Negative for arthralgias and back pain.   Skin: Negative for rash.   Neurological: Positive for weakness. Negative for dizziness, tremors, light-headedness, numbness and headaches.   Psychiatric/Behavioral: Positive for dysphoric mood. Negative for agitation, behavioral problems, confusion, hallucinations, self-injury and suicidal ideas. The patient is not nervous/anxious.      Objective:     Vital Signs (Most Recent):  Temp: 97.4 °F (36.3 °C) (10/17/17 1706)  Pulse: (!) 59 (10/17/17 1706)  Resp: 18 (10/17/17 1133)  BP: 110/60 (10/17/17 1706)  SpO2: 100 % (10/17/17 1706) Vital Signs (24h Range):  Temp:  [97.3 °F (36.3 °C)-97.8 °F (36.6 °C)] 97.4 °F (36.3 °C)  Pulse:  [57-83] 59  Resp:  [16-21] 18  SpO2:  [97 %-100 %] 100 %  BP: (110-153)/(58-69) 110/60     Weight: 60.2 kg (132 lb 11.5 oz)  Body mass index is 21.42 kg/m².    Intake/Output Summary (Last 24 hours) at 10/17/17 1908  Last data filed at 10/17/17 1400   Gross per 24 hour   Intake              890 ml   Output             1060 ml   Net             -170 ml      Physical Exam   Constitutional: He is oriented to person, place, and time. He appears well-developed and well-nourished.   HENT:   Head: Normocephalic and atraumatic.   Right Ear: External ear normal.   Left Ear: External ear normal.   Mouth/Throat: Oropharynx is clear and moist.   Eyes: Conjunctivae and EOM are normal. Pupils are " equal, round, and reactive to light.   Neck: Normal range of motion. Neck supple. No thyromegaly present.   Cardiovascular: Normal rate and normal heart sounds.  An irregularly irregular rhythm present.   No murmur heard.  Pulmonary/Chest: Effort normal. He has decreased breath sounds in the right lower field and the left lower field. He has no wheezes. He has no rales.   Abdominal: Soft. Bowel sounds are normal. He exhibits no mass. There is no tenderness. There is no rebound.   Musculoskeletal: Normal range of motion.   Lymphadenopathy:     He has no cervical adenopathy.   Neurological: He is alert and oriented to person, place, and time.   Skin: Skin is warm and dry.   Psychiatric: He has a normal mood and affect. His speech is normal and behavior is normal. Thought content normal.   Nursing note and vitals reviewed.      Significant Labs:   BMP:   Recent Labs  Lab 10/17/17  0440   *      K 4.9   CL 91*   CO2 34*   BUN 33*   CREATININE 0.8   CALCIUM 9.5   MG 1.6     CBC:   Recent Labs  Lab 10/16/17  0545 10/17/17  0440   WBC 27.64* 28.96*   HGB 9.1* 9.0*   HCT 29.4* 30.2*   * 380*       Significant Imaging: I have reviewed all pertinent imaging results/findings within the past 24 hours.

## 2017-10-18 NOTE — PT/OT/SLP PROGRESS
Physical Therapy      Amador Harrison  MRN: [id number]    Patient not seen today secondary to pt with code/rapid response called this AM. Pt transferred to ICU. PT orders discontinued. Pt will require new orders to resume therapy when medically appropriate.     Jhoana Aldana PT, DPT   10/18/2017  Pager: [phone]

## 2017-10-18 NOTE — PT/OT/SLP PROGRESS
Occupational Therapy      Amador Harrison  MRN: [id number]    Patient not seen today secondary to pt code/rapid response this AM. Pt transferred to ICU. OT orders discontinued. Pt will need new orders for therapy once medically appropriate.      Beth Heath OT  10/18/2017

## 2017-10-18 NOTE — ASSESSMENT & PLAN NOTE
Continues with overall down trend however remains elevated and without significant clinical improvement with broad spectrum antibiotic therapy. Cultures remained no growth therefore now suspicion for effusions to be malignant in origin.   · Monitor WBC.

## 2017-10-18 NOTE — PT/OT/SLP DISCHARGE
Occupational Therapy Discharge Summary    Amador Harrison  MRN: [id number]   Atrial fibrillation with RVR   Patient Discharged from acute Occupational Therapy on 10/18/2017.  Please refer to prior OT note dated on 10/16/2017 for functional status.     Assessment:   Patient has not met goals.  GOALS:    Occupational Therapy Goals        Problem: Occupational Therapy Goal    Goal Priority Disciplines Outcome Interventions   Occupational Therapy Goal     OT, PT/OT Ongoing (interventions implemented as appropriate)    Description:  Goals to be met by: 10/19/2017    Patient will increase functional independence with ADLs by performing:    UE Dressing with Set-up Assistance.  LE Dressing with Stand-by Assistance.  Grooming while standing with Contact Guard Assistance.  Toileting from toilet with Stand-by Assistance for hygiene and clothing management.   Stand pivot transfers with Moderate Assistance.-MET  Toilet transfer to toilet with Contact Guard Assistance.                     Reasons for Discontinuation of Therapy Services  Patient is unable to continue work toward goals because of medical or psychosocial complications.      Plan:  Patient transferred to ICU.       Beth Heath, OT  10/18/2017

## 2017-10-18 NOTE — PROGRESS NOTES
Pt c/o coughing blood. Its quarter size, blood mixed with cough. He states he had a one episode yesterday too. Mauro (IM K) notified. No new orders at this time. Will monitor the patient closely.

## 2017-10-18 NOTE — CHAPLAIN
Per elida Lee there was a disconnect re. pt's wishes for DNR. Spoke with RN about code status. The conversation had already taken place with pt and wife and a change was being made in the chart. Spoke with wife Ariana. She requested Fr. Hilliard so I contacted him for last rites. Ariana has some siblings, pt has some siblings. I encouraged Ariana to have someone come and be with her. Pt agreed. Pt's youngest sister and  arrived before I left.

## 2017-10-18 NOTE — PROGRESS NOTES
[facility]  Critical Care Medicine  Progress Note    Patient Name: Amador Harrison  MRN: [id number]  Admission Date: 10/9/2017  Hospital Length of Stay: 9 days  Code Status: DNR  Attending Provider: Mauro Arnold MD  Primary Care Provider: Waqar Brady MD   Principal Problem: Acute respiratory failure with hypoxia    Subjective:     HPI:  Mr. Harrison is a 62 year old male with history significant for invasive papillary urothelial carcinoma diagnosed March 2017 s/p TURBT. He underwent two cycles of neoadjuvant cisplatin and gem and subsequent laparoscopic cystectomy with ileal conduit in July 2017. In August, post op CT scan which was notable for pulmonary nodules and ground glass opacities and suspected to be possibly infectious given the rapid development (prior CT in May unremarkable). He then developed worsening cough, dyspnea and repeat CT in September was notable for progression of these lesions and suspected metastatic disease. He underwent bronchoscopic biopsy on 9/25/17 and pathology was consistent with urothelial mets. He has been recommended treatment with immunotherapy with his primary oncologist, Dr. Sepulveda, and was awaiting insurance approval. However, his dyspnea rapidly worsened and presented to the ED on 10/9 for admission. Of note, he was recently diagnosed with Atrial Fibrillation with RVR and had his home metoprolol increased.     Upon ED presentation, Mr. Harrison was found to be dyspneic, complaining of fatigue, cough, PND, LE swelling. He reported his dyspnea has progressively worsened over past several weeks. In addition he was in Afib RVR, and he was admitted to Cardiology for management of RVR and acute on chronic diastolic HF.     Hospital/ICU Course:  Mr. Joseph was admitted to Cardiology and effectively diuresed with some improvement in dyspnea. Additionally, his RVR was managed with uptitration of metoprolol, addition of diltiazem, and amiodarone.  Rivaroxaban started for anticoagulation for Afib. He was stepped down to  and ID was consulted due to persistent leukocytosis with bilateral pleural effusions concerning for infectious etiology. A diagnostic, therapeutic thora was completed by Pulmonary with 1.5 L removed from left pleural space. Pleural fluid with significant WBC's (900's) and 80% segs, however cultures were NGTD. He was briefly on rocephin, clindamycin per ID recommendations until these were discontinued on 10/16 and leukocytosis felt to be related to malignancy.     On 10/18, a code blue was called by nursing staff as patient had a brief episode of unresponsiveness to sternal rub with faint, bradycardic carotid pulse palpated. This occurred after patient ambulated back to bed from chair, was laying flat to be repositioned in bed. Per nursing staff, no chest compressions or ACLS measures initiated because patient regained consciousness and HR improved s/p sitting patient upward in bed with increase in oxygen. Upon my arrival, patient was alert, oriented to person and place, and tachypneic. Otherwise, vital signs were stable.       Review of Systems   Constitutional: Positive for diaphoresis and fatigue. Negative for chills and fever.   Eyes: Negative for visual disturbance.   Respiratory: Positive for cough and shortness of breath. Negative for wheezing.    Cardiovascular: Negative for chest pain and palpitations.   Gastrointestinal: Negative for abdominal distention, abdominal pain and nausea.   Genitourinary: Negative for decreased urine volume.   Skin: Positive for pallor.   Neurological: Positive for dizziness, weakness (generalized ) and light-headedness. Negative for seizures, facial asymmetry, speech difficulty and headaches.   Psychiatric/Behavioral: The patient is nervous/anxious.      Objective:     Vital Signs (Most Recent):  Temp: 97.7 °F (36.5 °C) (10/18/17 1100)  Pulse: 80 (10/18/17 1300)  Resp: (!) 41 (10/18/17 1300)  BP: 128/66  (10/18/17 1300)  SpO2: 100 % (10/18/17 1300) Vital Signs (24h Range):  Temp:  [97.4 °F (36.3 °C)-98.9 °F (37.2 °C)] 97.7 °F (36.5 °C)  Pulse:  [46-86] 80  Resp:  [20-47] 41  SpO2:  [95 %-100 %] 100 %  BP: (110-195)/(60-88) 128/66   Weight: 60.2 kg (132 lb 11.5 oz)  Body mass index is 21.42 kg/m².      Intake/Output Summary (Last 24 hours) at 10/18/17 1404  Last data filed at 10/18/17 1300   Gross per 24 hour   Intake              770 ml   Output             1100 ml   Net             -330 ml       Physical Exam   Constitutional: He is oriented to person, place, and time. He appears well-developed. He appears ill. He appears distressed.   HENT:   Head: Normocephalic and atraumatic.   Mouth/Throat: No oropharyngeal exudate.   Eyes: Conjunctivae and EOM are normal. Pupils are equal, round, and reactive to light. Right eye exhibits no discharge. Left eye exhibits no discharge. No scleral icterus.   Neck: Normal range of motion. Neck supple. JVD present. No tracheal deviation present. No thyromegaly present.   Cardiovascular: Normal rate, regular rhythm, S1 normal and S2 normal.    Murmur heard.   Systolic murmur is present with a grade of 3/6   Pulses:       Radial pulses are 2+ on the right side, and 2+ on the left side.        Dorsalis pedis pulses are 1+ on the right side, and 1+ on the left side.   Warm extremities    Pulmonary/Chest: Accessory muscle usage present. Tachypnea noted. He is in respiratory distress. He has decreased breath sounds in the right middle field, the right lower field and the left lower field. He has no wheezes. He has rales in the right upper field and the left upper field.   Abdominal: Soft. Bowel sounds are normal. He exhibits no distension. There is no tenderness. There is no guarding.   Lymphadenopathy:     He has no cervical adenopathy.   Neurological: He is alert and oriented to person, place, and time. GCS eye subscore is 4. GCS verbal subscore is 5. GCS motor subscore is 6.   Skin:  Skin is warm and dry. Capillary refill takes 2 to 3 seconds. He is not diaphoretic. There is pallor.       Vents:     Lines/Drains/Airways     Central Venous Catheter Line                 Port A Cath Single Lumen 03/15/17 0800  217 days          Drain                 Ureteral Drain/Stent 07/05/17 1218 Right ureter 5 Fr. 105 days         Urostomy 07/05/17 1226 ileal conduit  days              Significant Labs:    CBC/Anemia Profile:    Recent Labs  Lab 10/17/17  0440 10/18/17  0400 10/18/17  0801 10/18/17  0846   WBC 28.96* 36.42*  --  39.94*   HGB 9.0* 9.0*  --  9.6*   HCT 30.2* 29.3* 32* 31.6*   * 425*  --  476*   MCV 85 83  --  83   RDW 14.6* 14.4  --  14.6*        Chemistries:    Recent Labs  Lab 10/17/17  0440 10/18/17  0400 10/18/17  0846    134* 134*   K 4.9 5.1 5.2*   CL 91* 90* 89*   CO2 34* 34* 34*   BUN 33* 32* 32*   CREATININE 0.8 0.7 0.8   CALCIUM 9.5 9.6 10.1   ALBUMIN  --   --  2.2*   PROT  --   --  7.1   BILITOT  --   --  0.4   ALKPHOS  --   --  103   ALT  --   --  15   AST  --   --  15   MG 1.6 1.5* 1.6   PHOS  --   --  3.5         Significant Imaging:  I have reviewed and interpreted all pertinent imaging results/findings within the past 24 hours.    Assessment/Plan:     Psychiatric   Mild single current episode of major depressive disorder    --started on sertraline per . Will continue for now        Pulmonary   * Acute respiratory failure with hypoxia    --suspect worsening hypoxemia over past month is multifactorial: acute on chronic HF, worsening metastatic disease, in the setting of new bilateral effusions causing dyspnea  --hypoxemia initially improved with diuresis and patient was stable on 2-4 L NC. Will continue diuresis as tolerated  --s/p left thoracentesis with 1.5 L removed. Offered to repeat thora on right side upon MICU admission for therapeutic purposes, however patient declined. States that he no longer wants invasive procedures  --upon further discission with  patient and his wife, they would like to officially make him a DNR/DNI with goals to focus on comfort. These conversations had been held previously between themselves and Dr. Sepulveda. Will continue discussions today  --start PRN opiates/benzos for treatment of pain, dyspnea, anxiety         Bilateral pleural effusion    --plan as above        Hemoptysis    --worsening since initiation of rivaroxaban. Continue to hold        Cardiac/Vascular   Essential hypertension    --as above. Normotensive. Hold meds        Acute decompensated heart failure    --continue lasix as needed for dyspnea        Paroxysmal atrial fibrillation    --hold beta blockade, diltiazem, amiodarone given this AM's arrest and transition to comfort        Sinus arrest    --possibly related to severe hypoxemia.  --transitioning to comfort/hospice care. DNR/DNI        Hematology   Long-term (current) use of anticoagulants    --hold given hemoptysis         Oncology   Malignant neoplasm metastatic to lung    --see above        Leukocytosis    --hold all Abx  --previously seen by ID and felt to be related to malignancy        Metastasis from bladder cancer    --patient of Dr. Sepulveda's   --in discussion of possibly pursuing immunotherapy as an outpatient. Talked with Med Onc fellow today to ask his team/Coco to stop by patient's room to discuss this morning's events, patient's desire to transition to hospice, and likely inability to receive immunotherapy under these circumstances.        Other   Hypoalbuminemia    --diet as tolerated          Critical Care Time: 65 minutes  Critical secondary to Patient has a condition that poses threat to life and bodily function: sinus arrest, hypoxemic respiratory failure       Critical care was time spent personally by me on the following activities: development of treatment plan with patient or surrogate and bedside caregivers, discussions with consultants, evaluation of patient's response to treatment,  examination of patient, ordering and performing treatments and interventions, ordering and review of laboratory studies, ordering and review of radiographic studies, pulse oximetry, re-evaluation of patient's condition. This critical care time did not overlap with that of any other provider or involve time for any procedures.     Ameena Sotelo NP  Critical Care Medicine  [facility]

## 2017-10-18 NOTE — ASSESSMENT & PLAN NOTE
Patient in and out of atrial fibrillation but rate controlled. Requires long term anticoagulation.  · Amiodarone 400 mg po BID for rhythm control until 10/23 then daily starting on 10/24.  · Toprol  po daily + Diltiazem  mg po daily.   · Xarelto for long term anticoagulation held for thoracentesis on 10/13 and restarted Xarelto at 20 mg po daily on 10/15.   · Continue to monitor on telemetry.

## 2017-10-18 NOTE — PLAN OF CARE
Problem: Patient Care Overview  Goal: Plan of Care Review  Outcome: Ongoing (interventions implemented as appropriate)  Pt denies Chest pain SOB or nausea. C/o pain on lower extremities, prn pain meds given, mod relief obtained. No falls, trauma or injury noted. VSS. 3L NC. SR-Afib-SR, po amio daily. RLQ urostomy bag. PT/OT following. S/p thoracentesis-1L fluid removed.discharge planning ongoing. Plan of care reviewed with patient. No further questions at this time. No significant events.Spouse at bedside

## 2017-10-18 NOTE — HPI
Mr. Harrison is a 62 year old male with history significant for invasive papillary urothelial carcinoma diagnosed March 2017 s/p TURBT. He underwent two cycles of neoadjuvant cisplatin and gem and subsequent laparoscopic cystectomy with ileal conduit in July 2017. In August, post op CT scan which was notable for pulmonary nodules and ground glass opacities and suspected to be possibly infectious given the rapid development (prior CT in May unremarkable). He then developed worsening cough, dyspnea and repeat CT in September was notable for progression of these lesions and suspected metastatic disease. He underwent bronchoscopic biopsy on 9/25/17 and pathology was consistent with urothelial mets. He has been recommended treatment with immunotherapy with his primary oncologist, Dr. Sepulveda, and was awaiting insurance approval. However, his dyspnea rapidly worsened and presented to the ED on 10/9 for admission. Of note, he was recently diagnosed with Atrial Fibrillation with RVR and had his home metoprolol increased.     Upon ED presentation, Mr. Harrison was found to be dyspneic, complaining of fatigue, cough, PND, LE swelling. He reported his dyspnea has progressively worsened over past several weeks. In addition he was in Afib RVR, and he was admitted to Cardiology for management of RVR and acute on chronic diastolic HF.

## 2017-10-18 NOTE — NURSING TRANSFER
Nursing Transfer Note      10/18/2017     Transfer From: [facility]    Transfer via bed    Transfer with cardiac monitoring, O2    Transported by RNx3    Medicines sent: none    Chart send with patient: Yes    Patient reassessed at: 10/18/17 at 0830    Upon arrival to floor: cardiac monitor applied, patient oriented to room, call bell in reach and bed in lowest position

## 2017-10-18 NOTE — PLAN OF CARE
RENETTA met with pt's wife to discuss options for hospice as [facility] can't accept.  Ms Olivera would like pt to go to [facility].  RENETTA spoke to Brooklyn with [facility] and they are running her insurance info.  Brooklyn has Ms Olivera's number and will contact her.      Joselin :  [phone]    [place] with :  [phone]    Plan for pt to transfer to [facility], tomorrow.    Tatiana Alvarado LCSW     [phone]  Critical Care (MICU)

## 2017-10-18 NOTE — PLAN OF CARE
RENETTA met with family to discuss d/c plans.  Pt and his wife would like pt to go to [facility] [address].  RENETTA spoke to Alba with [facility] ([phone]).  RENETTA sent hospice referral through .  Alba will meet with pt and pt's wife and answer questions.  RENETTA spoke to [facility] NP who stated that pt will likely go to hospice, tomorrow.  RENETTA made Alba aware of this.    RENETTA will continue to follow.    Tatiana Alvarado LCSW     [phone]  Critical Care (MICU)

## 2017-10-18 NOTE — PT/OT/SLP DISCHARGE
Physical Therapy Discharge Summary    Amador Harrison  MRN: [id number]   Atrial fibrillation with RVR   Patient Discharged from acute Physical Therapy on 10/18/17.  Please refer to prior PT noted date on 10/17/17 for functional status.     Assessment:   Patient has not met goals.  GOALS:    Physical Therapy Goals        Problem: Physical Therapy Goal    Goal Priority Disciplines Outcome Goal Variances Interventions   Physical Therapy Goal     PT/OT, PT Ongoing (interventions implemented as appropriate)     Description:  Goals to be met by: 10/22/17     Patient will increase functional independence with mobility by performin. Supine to sit with Supervision - met 10/17  2. Sit to stand transfer with Stand-by Assistance - met 10/17  3. Bed to chair transfer with Contact Guard Assistance using Rolling Walker or appropriate AD as needed - met 10/16  4. Gait  x 20 feet with Contact Guard Assistance using Rolling Walker or appropriate AD as needed. - met 10/17  4a. Gait x 50 feet with CGA using RW or appropriate AD as needed. - not met   5. Stand for 2 minutes with Stand-by Assistance, while performing dynamic task, using appropriate AD as needed - not met  6. Lower extremity exercise program x15 reps, with supervision, in order to increase LE strength and (I) with functional mobility. - not met                      Reasons for Discontinuation of Therapy Services  Patient is unable to continue work toward goals because of medical or psychosocial complications.      Plan:  Patient transferred to ICU following rapid response on 10/18. PT orders discontinued. Pt will require new orders to resume therapy when medically appropriate.     Jhoana Aldana PT, DPT   10/18/2017  Pager: [phone]

## 2017-10-18 NOTE — NURSING TRANSFER
Nursing Transfer Note      10/18/2017     Transfer To: [id number] from [id number]    Transfer via bed    Transfer with 4L O2 NC, cardiac monitoring, BP monitoring     Transported by RN x3    Medicines sent: Yes, AM meds placed at bedside    Chart send with patient: Yes    Notified: spouse at bedside    Patient reassessed at: RN at bedside 10/18, 0810 (date, time)    Upon arrival to floor: cardiac monitor applied, patient oriented to room, call bell in reach and bed in lowest position

## 2017-10-18 NOTE — ASSESSMENT & PLAN NOTE
Patient expressed being depressed concerning his situation and recent diagnosis of bladder cancer and complications since. Patient denies any suicidal thoughts.   · Continue Sertraline 25 mg po daily to treat and refer to Psychology in Heme/Onc clinic as outpatient.

## 2017-10-19 NOTE — PROGRESS NOTES
Asystole alarming on monitor. Pt not responding to verbal stimuli. No palpable pulses. Dr. Cruz w/ [facility] notified and to come to bedside. Emotion support provided to pt's wife, Joselin.  notified.

## 2017-10-19 NOTE — DISCHARGE SUMMARY
[facility]  Critical Care Medicine  Discharge Summary      Patient Name: Amador Harrison  MRN: [id number]  Admission Date: 10/9/2017  Hospital Length of Stay: 10 days    Discharge Date and Time:  10/19/2017 2:54 AM  Attending Physician: Mauro Arnold MD   Discharging Provider: Santos Cruz MD  Primary Care Provider: Waqar Brady MD  Reason for Admission:Acute Hypoxic Resp Failure     HPI:   Mr. Harrison is a 62 year old male with history significant for invasive papillary urothelial carcinoma diagnosed March 2017 s/p TURBT. He underwent two cycles of neoadjuvant cisplatin and gem and subsequent laparoscopic cystectomy with ileal conduit in July 2017. In August, post op CT scan which was notable for pulmonary nodules and ground glass opacities and suspected to be possibly infectious given the rapid development (prior CT in May unremarkable). He then developed worsening cough, dyspnea and repeat CT in September was notable for progression of these lesions and suspected metastatic disease. He underwent bronchoscopic biopsy on 9/25/17 and pathology was consistent with urothelial mets. He has been recommended treatment with immunotherapy with his primary oncologist, Dr. Sepulveda, and was awaiting insurance approval. However, his dyspnea rapidly worsened and presented to the ED on 10/9 for admission. Of note, he was recently diagnosed with Atrial Fibrillation with RVR and had his home metoprolol increased.     Upon ED presentation, Mr. Harrison was found to be dyspneic, complaining of fatigue, cough, PND, LE swelling. He reported his dyspnea has progressively worsened over past several weeks. In addition he was in Afib RVR, and he was admitted to Cardiology for management of RVR and acute on chronic diastolic HF.     * No surgery found *    Indwelling Lines/Drains at Time of Discharge:   Lines/Drains/Airways     Central Venous Catheter Line                 Port A Cath Single Lumen 03/15/17  0800  217 days          Drain                 Ureteral Drain/Stent 07/05/17 1218 Right ureter 5 Fr. 105 days         Urostomy 07/05/17 1226 ileal conduit  days              Hospital Course:   Mr. Joseph was admitted to Cardiology and effectively diuresed with some improvement in dyspnea. Additionally, his RVR was managed with uptitration of metoprolol, addition of diltiazem, and amiodarone. Rivaroxaban started for anticoagulation for Afib. He was stepped down to  and ID was consulted due to persistent leukocytosis with bilateral pleural effusions concerning for infectious etiology. A diagnostic, therapeutic thora was completed by Pulmonary with 1.5 L removed from left pleural space. Pleural fluid with significant WBC's (900's) and 80% segs, however cultures were NGTD. He was briefly on rocephin, clindamycin per ID recommendations until these were discontinued on 10/16 and leukocytosis felt to be related to malignancy.     On 10/18, a code blue was called by nursing staff as patient had a brief episode of unresponsiveness to sternal rub with faint, bradycardic carotid pulse palpated. This occurred after patient ambulated back to bed from chair, was laying flat to be repositioned in bed. Per nursing staff, no chest compressions or ACLS measures initiated because patient regained consciousness and HR improved s/p sitting patient upward in bed with increase in oxygen.     10/19:  Plan for comfort directed care.  Titrate transdermal and oral narcotics and benzodiazepine. Pt was scheduled to be discharged to hospice however, he passed away at 0245 AM.     Consults         Status Ordering Provider     Inpatient consult to Cardiology  Once     Provider:  (Not yet assigned)    Completed EVELINE CESAR     Inpatient consult to Hematology/Oncology  Once     Provider:  (Not yet assigned)    Completed JUANCARLOS CONCEPCION     Inpatient consult to Infectious Diseases  Once     Provider:  (Not yet assigned)    Completed  ALLEN BULLARD     Inpatient consult to Pulmonology  Once     Provider:  (Not yet assigned)    Completed ALLEN BULLARD        Significant Labs:  All pertinent labs within the past 24 hours have been reviewed.    Significant Imaging:  I have reviewed and interpreted all pertinent imaging results/findings within the past 24 hours.    Pending Diagnostic Studies:     Procedure Component Value Units Date/Time    Lactate dehydrogenase [[id number]] Collected:  10/13/17 1349    Order Status:  Sent Lab Status:  In process Updated:  10/13/17 1349    Specimen:  Blood from Blood     Protein, total [[id number]] Collected:  10/13/17 1349    Order Status:  Sent Lab Status:  In process Updated:  10/13/17 1349    Specimen:  Blood from Blood         Final Active Diagnoses:    Diagnosis Date Noted POA    PRINCIPAL PROBLEM:  Metastasis from bladder cancer [C79.9, C67.9] 10/13/2017 Yes    Sinus arrest [I45.5] 10/18/2017 Yes    Hemoptysis [R04.2] 10/18/2017 Yes    Mild single current episode of major depressive disorder [F32.0] 10/15/2017 Yes    Paroxysmal atrial fibrillation [I48.0]  Yes    Acute respiratory failure with hypoxia [J96.01] 10/14/2017 Yes    Long-term (current) use of anticoagulants [Z79.01] 10/14/2017 Not Applicable    Bilateral pleural effusion [J90] 10/13/2017 Yes    Leukocytosis [D72.829] 10/11/2017 Yes     Chronic    Atrial fibrillation with RVR [I48.91] 10/09/2017 Yes    Acute decompensated heart failure [I50.9] 10/09/2017 Yes    Malignant neoplasm metastatic to lung [C78.00] 10/03/2017 Yes    Essential hypertension [I10] 2017 Yes    Hypoalbuminemia [E88.09] 2017 Yes      Problems Resolved During this Admission:    Diagnosis Date Noted Date Resolved POA     No new Assessment & Plan notes have been filed under this hospital service since the last note was generated.  Service: Critical Care Medicine    Discharged Condition: good    Disposition:     Follow-up Information     [facility].    Specialty:  Hospice Services  Why:  Hospice  Contact information:  [address] [facility] [address]  [phone]                 Patient Instructions:   No discharge procedures on file.  Medications:  Reconciled Home Medications:   Current Discharge Medication List      START taking these medications    Details   diazePAM (VALIUM) 5 MG tablet Take 1 tablet (5 mg total) by mouth every 6 (six) hours as needed for Anxiety.  Qty: 20 tablet, Refills: 0      fentaNYL (DURAGESIC) 25 mcg/hr Place 1 patch onto the skin every 72 hours.  Qty: 1 patch, Refills: 0      morphine 10 mg/5 mL solution Take 2 mLs (4 mg total) by mouth every 4 (four) hours as needed for Pain (dyspnea, breathlessness).  Qty: 15 mL, Refills: 0      rivaroxaban (XARELTO) 20 mg Tab Take 1 tablet (20 mg total) by mouth daily with dinner or evening meal.  Qty: 30 tablet, Refills: 2         STOP taking these medications       aspirin 81 MG Chew Comments:   Reason for Stopping:         codeine-guaifenesin  mg/5 mL Liqd Comments:   Reason for Stopping:         fish oil-omega-3 fatty acids 300-1,000 mg capsule Comments:   Reason for Stopping:         glucosamine-chondroitin 500-400 mg tablet Comments:   Reason for Stopping:         indapamide (LOZOL) 2.5 MG Tab Comments:   Reason for Stopping:         lactobacillus acidophilus & bulgar (LACTINEX) 100 million cell packet Comments:   Reason for Stopping:         lidocaine-prilocaine (EMLA) cream Comments:   Reason for Stopping:         lorazepam (ATIVAN) 0.5 MG tablet Comments:   Reason for Stopping:         metoprolol succinate (TOPROL-XL) 50 MG 24 hr tablet Comments:   Reason for Stopping:         multivitamin (ONE DAILY MULTIVITAMIN) per tablet Comments:   Reason for Stopping:         oxycodone-acetaminophen (PERCOCET) 5-325 mg per tablet Comments:   Reason for Stopping:         valsartan (DIOVAN) 320 MG tablet Comments:   Reason for Stoppin.9 % SODIUM CHLORIDE (SODIUM  CHLORIDE 0.9%) 0.9 % solution Comments:   Reason for Stopping:         benzonatate (TESSALON PERLES) 100 MG capsule Comments:   Reason for Stopping:         naproxen sodium (ANAPROX) 220 MG tablet Comments:   Reason for Stopping:         ondansetron (ZOFRAN-ODT) 8 MG TbDL Comments:   Reason for Stopping:         oxycodone-acetaminophen (PERCOCET)  mg per tablet Comments:   Reason for Stopping:         trazodone (DESYREL) 50 MG tablet Comments:   Reason for Stopping:                Santos Cruz MD  Critical Care Medicine  [facility]

## 2017-10-19 NOTE — PLAN OF CARE
10/19/17 0920   Final Note   Assessment Type Final Discharge Note   Discharge Disposition Exp Medical   Hospital Follow Up  Appt(s) scheduled? No   Discharge plans and expectations educations in teach back method with documentation complete? No   Right Care Referral Info   Post Acute Recommendation Other  ( in medical facility)   Marion Claire, RN, BSN  Case Management  [facility]  Ext. [phone]

## 2017-10-19 NOTE — SIGNIFICANT EVENT
Death Note     Called to bedside by patient's nurse. Nursing supervisor notified. Family at bedside.   Patient is not responding to verbal or tactile stimuli. Patient does not have a pupillary or corneal reflex. His pupils are fixed and dilated. No heart or breath sounds on auscultation. No respirations. No palpable pulses.     Time of death 2:40 a.m.    Cause of Death: Cardiac arrest due to metastatic bladder cancer.       Santos Cruz MD  Internal Medicine PGY-1

## 2017-10-23 LAB
BACTERIA BLD CULT: NORMAL
BACTERIA BLD CULT: NORMAL

## 2017-11-14 LAB — FUNGUS SPEC CULT: NORMAL

## 2017-12-15 LAB
ACID FAST MOD KINY STN SPEC: NORMAL
MYCOBACTERIUM SPEC QL CULT: NORMAL

## 2020-09-20 NOTE — ASSESSMENT & PLAN NOTE
- CTA with areas of cavitation in both lobes and increased consolidation in the right middle lobe     compared to previous studies  - Hold all forms on anticoagulation  - plan for outpatient biopsy Monday morning        no

## 2021-10-09 NOTE — PLAN OF CARE
[place]uous [place] Gluc [place]DEXCOM [place]6 [place]ENSOR[place] [facility] [place]  [facility]   Anjelica Galindo MD    INTERNAL MEDICINE - ENDOCRINOLOGY, DIABETES & METABOLISM     Nv: 10/12/21     Problem: Patient Care Overview  Goal: Plan of Care Review  Outcome: Ongoing (interventions implemented as appropriate)  Patient received 1 liter of fluid without any issues. Notified to call MD with any further concerns No apparent distress noted.

## 2022-09-08 NOTE — ED NOTES
ordered rate changed to 10mg/hr.per pump   
C/o low back and leg pain. Sitting in chair at bedside  
Nicko cath in right upper forearm  
Portable cxr done  
Report called to [facility] on 3rd floor -------------------------------------------going 354A  
IV intact

## 2024-05-22 NOTE — TELEPHONE ENCOUNTER
----- Message from Nanveronica Magdaleno sent at 3/3/2017  4:16 PM CST -----  Contact: self  Pt had his surgery in the morning. Pt was told to returning to the office for a visit on Friday, March 10th. Pt is unsure if he has to call to set up an appointment or was it already schedule.  Please advise the pt at [phone]. Thanks!    Anuradha CHAUDHARY

## (undated) DEVICE — TRAY CYSTO BASIN

## (undated) DEVICE — Device

## (undated) DEVICE — SOL IRR NACL .9% 3000ML

## (undated) DEVICE — COVER TIP CURVED SCISSORS XI

## (undated) DEVICE — CATH BACTI GUARD 2W 20FR 30CC

## (undated) DEVICE — IRRIGATOR ENDOSCOPY DISP.

## (undated) DEVICE — ELECTRODE LOOP CUTTING BIPOLAR

## (undated) DEVICE — SYR 50ML CATH TIP

## (undated) DEVICE — BAG TISS RETRV MONARCH 10MM

## (undated) DEVICE — ELECTRODE REM PLYHSV RETURN 9

## (undated) DEVICE — SUT 1 48IN PDS II VIO MONO

## (undated) DEVICE — KIT EVACUATOR 3-SPRING 1/8 DRN

## (undated) DEVICE — MAT QUICK 40X30 FLOOR FLUID LF

## (undated) DEVICE — DRESSING ABSRBNT ISLAND 3.6X8

## (undated) DEVICE — SEE MEDLINE ITEM 152622

## (undated) DEVICE — DRAPE ABDOMINAL TIBURON 14X11

## (undated) DEVICE — PACK ROBOTIC

## (undated) DEVICE — SEE MEDLINE ITEM 146313

## (undated) DEVICE — ENDOCATCH 15MM

## (undated) DEVICE — ADHESIVE DERMABOND ADVANCED

## (undated) DEVICE — SCISSOR 5MMX35CM DIRECT DRIVE

## (undated) DEVICE — SUT CTD VICRYL 2-0 VIL BR

## (undated) DEVICE — SOL ELECTROLUBE ANTI-STIC

## (undated) DEVICE — HEMOSTAT SURGICEL NU-KNIT 6X9

## (undated) DEVICE — KIT ROBOTIC 4 ARM DA VINCI SI

## (undated) DEVICE — SUT 3-0 VICRYL SH CR/8 18

## (undated) DEVICE — SEE MEDLINE ITEM 152467

## (undated) DEVICE — SUT CTD VICRYL 2-0 UND BR

## (undated) DEVICE — CONTAINER SPECIMEN STRL 4OZ

## (undated) DEVICE — SOL IRR WATER STRL 3000 ML

## (undated) DEVICE — GOWN SMARTGOWN LVL4 X-LONG XL

## (undated) DEVICE — SPONGE IV DRAIN 4X4 STERILE

## (undated) DEVICE — DRAIN CHANNEL ROUND 15FR

## (undated) DEVICE — SOL WATER STRL IRR 1000ML

## (undated) DEVICE — LUBRICANT SURGILUBE 2 OZ

## (undated) DEVICE — SEE MEDLINE ITEM 146417

## (undated) DEVICE — SOL 9P NACL IRR PIC IL

## (undated) DEVICE — GLOVE SURG BIOGEL LATEX SZ 7.5

## (undated) DEVICE — GUIDE WIRE MOTION .035 X 150CM

## (undated) DEVICE — SUT 2-0 VICRYL / CT-1

## (undated) DEVICE — PORT AIRSEAL 12/120MM LPI

## (undated) DEVICE — COVER LIGHT HANDLE

## (undated) DEVICE — SYR 10CC LUER LOCK

## (undated) DEVICE — SET TRI-LUMEN FILTERED TUBE

## (undated) DEVICE — CLIP HEMO-LOK MLX LARGE LF

## (undated) DEVICE — CUTTER PROXIMATE BLUE 75MM

## (undated) DEVICE — SUT VICRYL 4-0 RB1 27IN UD

## (undated) DEVICE — SUT ETHILON 2-0 PSLX 30IN

## (undated) DEVICE — NDL HYPO A BEVEL 22X1 1/2

## (undated) DEVICE — TRAY CATH UM FOLEY SIL W 16FR

## (undated) DEVICE — SUT SILK 3-0 STRANDS 30IN

## (undated) DEVICE — CLIPPER BLADE MOD 4406 (CAREF)

## (undated) DEVICE — RELOAD PROXIMATE CUT BLUE 75MM

## (undated) DEVICE — COLLECTION SPECIMEN NEPTUNE

## (undated) DEVICE — DRAPE STERI INSTRUMENT 1018

## (undated) DEVICE — CLIP MED TICALL

## (undated) DEVICE — TROCAR ENDOPATH XCEL 5MM 7.5CM

## (undated) DEVICE — GUIDEWIRE ZIPWIRE .035 150CM L

## (undated) DEVICE — EVACUATOR WOUND BULB 100CC

## (undated) DEVICE — CLIP LIGACLIP XTRA TITANIUM

## (undated) DEVICE — BLADE SURG #15 CARBON STEEL

## (undated) DEVICE — SEE L#95700

## (undated) DEVICE — PACK CYSTO

## (undated) DEVICE — COVER LIGHT HANDLE 80/CA

## (undated) DEVICE — SYR LUER LOCK TIP 60ML

## (undated) DEVICE — GLOVE BIOGEL ECLIPSE SZ 7

## (undated) DEVICE — SYR 30CC LUER LOCK

## (undated) DEVICE — HOLDER CATH IAB ADH STATLOCK

## (undated) DEVICE — SPONGE LAP 4X18 PREWASHED

## (undated) DEVICE — GOWN B1 X-LG X-LONG

## (undated) DEVICE — KIT UROSTOMY

## (undated) DEVICE — SYR SLIP TIP 20CC

## (undated) DEVICE — SYR ONLY LUER LOCK 20CC

## (undated) DEVICE — SEE MEDLINE ITEM 152487

## (undated) DEVICE — SUT PROLENE 4-0 RB-1 BL MO

## (undated) DEVICE — SUPPORT ULNA NERVE PROTECTOR

## (undated) DEVICE — BAG URINARY DRAINAGE 2000ML

## (undated) DEVICE — SEALER LIGASURE IMPACT 18CM

## (undated) DEVICE — SET CYSTO IRRIGATING

## (undated) DEVICE — STAPLER SKIN PROXIMATE WIDE

## (undated) DEVICE — APPLICATOR CHLORAPREP ORN 26ML

## (undated) DEVICE — SUT SILK 0 STRANDS 30IN BLK

## (undated) DEVICE — NDL INSUF ULTRA VERESS 120MM

## (undated) DEVICE — CUP MEDICINE STERILE 2OZ

## (undated) DEVICE — LEGGINGS 48X31 INCH

## (undated) DEVICE — COVER SNAP KAP 26IN